# Patient Record
Sex: FEMALE | Race: WHITE | URBAN - METROPOLITAN AREA
[De-identification: names, ages, dates, MRNs, and addresses within clinical notes are randomized per-mention and may not be internally consistent; named-entity substitution may affect disease eponyms.]

---

## 2017-05-31 ENCOUNTER — OUTPATIENT (OUTPATIENT)
Dept: OUTPATIENT SERVICES | Facility: HOSPITAL | Age: 82
LOS: 1 days | Discharge: HOME | End: 2017-05-31

## 2017-06-28 ENCOUNTER — OUTPATIENT (OUTPATIENT)
Dept: OUTPATIENT SERVICES | Facility: HOSPITAL | Age: 82
LOS: 1 days | Discharge: HOME | End: 2017-06-28

## 2017-06-28 DIAGNOSIS — I48.91 UNSPECIFIED ATRIAL FIBRILLATION: ICD-10-CM

## 2017-06-28 DIAGNOSIS — S72.143A DISPLACED INTERTROCHANTERIC FRACTURE OF UNSPECIFIED FEMUR, INITIAL ENCOUNTER FOR CLOSED FRACTURE: ICD-10-CM

## 2017-06-28 DIAGNOSIS — Z79.01 LONG TERM (CURRENT) USE OF ANTICOAGULANTS: ICD-10-CM

## 2017-08-02 ENCOUNTER — OUTPATIENT (OUTPATIENT)
Dept: OUTPATIENT SERVICES | Facility: HOSPITAL | Age: 82
LOS: 1 days | Discharge: HOME | End: 2017-08-02

## 2017-08-02 DIAGNOSIS — Z79.01 LONG TERM (CURRENT) USE OF ANTICOAGULANTS: ICD-10-CM

## 2017-08-02 DIAGNOSIS — I48.91 UNSPECIFIED ATRIAL FIBRILLATION: ICD-10-CM

## 2017-08-02 DIAGNOSIS — S72.143A DISPLACED INTERTROCHANTERIC FRACTURE OF UNSPECIFIED FEMUR, INITIAL ENCOUNTER FOR CLOSED FRACTURE: ICD-10-CM

## 2017-09-06 ENCOUNTER — OUTPATIENT (OUTPATIENT)
Dept: OUTPATIENT SERVICES | Facility: HOSPITAL | Age: 82
LOS: 1 days | Discharge: HOME | End: 2017-09-06

## 2017-09-06 DIAGNOSIS — S72.143A DISPLACED INTERTROCHANTERIC FRACTURE OF UNSPECIFIED FEMUR, INITIAL ENCOUNTER FOR CLOSED FRACTURE: ICD-10-CM

## 2017-09-06 DIAGNOSIS — I48.91 UNSPECIFIED ATRIAL FIBRILLATION: ICD-10-CM

## 2017-09-06 DIAGNOSIS — Z79.01 LONG TERM (CURRENT) USE OF ANTICOAGULANTS: ICD-10-CM

## 2017-09-08 ENCOUNTER — OUTPATIENT (OUTPATIENT)
Dept: OUTPATIENT SERVICES | Facility: HOSPITAL | Age: 82
LOS: 1 days | Discharge: HOME | End: 2017-09-08

## 2017-09-08 DIAGNOSIS — S72.143A DISPLACED INTERTROCHANTERIC FRACTURE OF UNSPECIFIED FEMUR, INITIAL ENCOUNTER FOR CLOSED FRACTURE: ICD-10-CM

## 2017-09-08 DIAGNOSIS — I48.91 UNSPECIFIED ATRIAL FIBRILLATION: ICD-10-CM

## 2017-09-08 DIAGNOSIS — Z79.01 LONG TERM (CURRENT) USE OF ANTICOAGULANTS: ICD-10-CM

## 2017-10-11 ENCOUNTER — OUTPATIENT (OUTPATIENT)
Dept: OUTPATIENT SERVICES | Facility: HOSPITAL | Age: 82
LOS: 1 days | Discharge: HOME | End: 2017-10-11

## 2017-10-11 DIAGNOSIS — I48.91 UNSPECIFIED ATRIAL FIBRILLATION: ICD-10-CM

## 2017-10-11 DIAGNOSIS — S72.143A DISPLACED INTERTROCHANTERIC FRACTURE OF UNSPECIFIED FEMUR, INITIAL ENCOUNTER FOR CLOSED FRACTURE: ICD-10-CM

## 2017-10-11 DIAGNOSIS — Z79.01 LONG TERM (CURRENT) USE OF ANTICOAGULANTS: ICD-10-CM

## 2017-10-25 ENCOUNTER — OUTPATIENT (OUTPATIENT)
Dept: OUTPATIENT SERVICES | Facility: HOSPITAL | Age: 82
LOS: 1 days | Discharge: HOME | End: 2017-10-25

## 2017-10-25 DIAGNOSIS — S72.143A DISPLACED INTERTROCHANTERIC FRACTURE OF UNSPECIFIED FEMUR, INITIAL ENCOUNTER FOR CLOSED FRACTURE: ICD-10-CM

## 2017-10-25 DIAGNOSIS — I48.91 UNSPECIFIED ATRIAL FIBRILLATION: ICD-10-CM

## 2017-10-25 DIAGNOSIS — Z79.01 LONG TERM (CURRENT) USE OF ANTICOAGULANTS: ICD-10-CM

## 2017-11-15 ENCOUNTER — OUTPATIENT (OUTPATIENT)
Dept: OUTPATIENT SERVICES | Facility: HOSPITAL | Age: 82
LOS: 1 days | Discharge: HOME | End: 2017-11-15

## 2017-11-15 DIAGNOSIS — Z79.01 LONG TERM (CURRENT) USE OF ANTICOAGULANTS: ICD-10-CM

## 2017-11-15 DIAGNOSIS — S72.143A DISPLACED INTERTROCHANTERIC FRACTURE OF UNSPECIFIED FEMUR, INITIAL ENCOUNTER FOR CLOSED FRACTURE: ICD-10-CM

## 2017-11-15 DIAGNOSIS — I48.91 UNSPECIFIED ATRIAL FIBRILLATION: ICD-10-CM

## 2017-12-20 ENCOUNTER — OUTPATIENT (OUTPATIENT)
Dept: OUTPATIENT SERVICES | Facility: HOSPITAL | Age: 82
LOS: 1 days | Discharge: HOME | End: 2017-12-20

## 2017-12-20 DIAGNOSIS — Z79.01 LONG TERM (CURRENT) USE OF ANTICOAGULANTS: ICD-10-CM

## 2017-12-20 DIAGNOSIS — S72.143A DISPLACED INTERTROCHANTERIC FRACTURE OF UNSPECIFIED FEMUR, INITIAL ENCOUNTER FOR CLOSED FRACTURE: ICD-10-CM

## 2017-12-20 DIAGNOSIS — I48.91 UNSPECIFIED ATRIAL FIBRILLATION: ICD-10-CM

## 2018-01-24 ENCOUNTER — OUTPATIENT (OUTPATIENT)
Dept: OUTPATIENT SERVICES | Facility: HOSPITAL | Age: 83
LOS: 1 days | Discharge: HOME | End: 2018-01-24

## 2018-01-24 DIAGNOSIS — Z79.01 LONG TERM (CURRENT) USE OF ANTICOAGULANTS: ICD-10-CM

## 2018-01-24 DIAGNOSIS — I48.91 UNSPECIFIED ATRIAL FIBRILLATION: ICD-10-CM

## 2018-02-04 DIAGNOSIS — S72.143A DISPLACED INTERTROCHANTERIC FRACTURE OF UNSPECIFIED FEMUR, INITIAL ENCOUNTER FOR CLOSED FRACTURE: ICD-10-CM

## 2018-02-28 ENCOUNTER — OUTPATIENT (OUTPATIENT)
Dept: OUTPATIENT SERVICES | Facility: HOSPITAL | Age: 83
LOS: 1 days | Discharge: HOME | End: 2018-02-28

## 2018-02-28 DIAGNOSIS — I48.91 UNSPECIFIED ATRIAL FIBRILLATION: ICD-10-CM

## 2018-02-28 DIAGNOSIS — Z79.01 LONG TERM (CURRENT) USE OF ANTICOAGULANTS: ICD-10-CM

## 2018-04-04 ENCOUNTER — OUTPATIENT (OUTPATIENT)
Dept: OUTPATIENT SERVICES | Facility: HOSPITAL | Age: 83
LOS: 1 days | Discharge: HOME | End: 2018-04-04

## 2018-04-04 DIAGNOSIS — Z79.01 LONG TERM (CURRENT) USE OF ANTICOAGULANTS: ICD-10-CM

## 2018-04-04 DIAGNOSIS — I48.91 UNSPECIFIED ATRIAL FIBRILLATION: ICD-10-CM

## 2018-05-02 ENCOUNTER — OUTPATIENT (OUTPATIENT)
Dept: OUTPATIENT SERVICES | Facility: HOSPITAL | Age: 83
LOS: 1 days | Discharge: HOME | End: 2018-05-02

## 2018-05-02 DIAGNOSIS — Z79.01 LONG TERM (CURRENT) USE OF ANTICOAGULANTS: ICD-10-CM

## 2018-05-02 DIAGNOSIS — I48.91 UNSPECIFIED ATRIAL FIBRILLATION: ICD-10-CM

## 2018-05-09 ENCOUNTER — OUTPATIENT (OUTPATIENT)
Dept: OUTPATIENT SERVICES | Facility: HOSPITAL | Age: 83
LOS: 1 days | Discharge: HOME | End: 2018-05-09

## 2018-05-09 DIAGNOSIS — Z79.01 LONG TERM (CURRENT) USE OF ANTICOAGULANTS: ICD-10-CM

## 2018-05-09 DIAGNOSIS — I48.91 UNSPECIFIED ATRIAL FIBRILLATION: ICD-10-CM

## 2018-05-23 ENCOUNTER — OUTPATIENT (OUTPATIENT)
Dept: OUTPATIENT SERVICES | Facility: HOSPITAL | Age: 83
LOS: 1 days | Discharge: HOME | End: 2018-05-23

## 2018-05-23 DIAGNOSIS — I48.91 UNSPECIFIED ATRIAL FIBRILLATION: ICD-10-CM

## 2018-05-23 DIAGNOSIS — Z79.01 LONG TERM (CURRENT) USE OF ANTICOAGULANTS: ICD-10-CM

## 2018-06-06 ENCOUNTER — OUTPATIENT (OUTPATIENT)
Dept: OUTPATIENT SERVICES | Facility: HOSPITAL | Age: 83
LOS: 1 days | Discharge: HOME | End: 2018-06-06

## 2018-06-06 DIAGNOSIS — I48.91 UNSPECIFIED ATRIAL FIBRILLATION: ICD-10-CM

## 2018-06-06 DIAGNOSIS — Z79.01 LONG TERM (CURRENT) USE OF ANTICOAGULANTS: ICD-10-CM

## 2018-07-05 ENCOUNTER — OUTPATIENT (OUTPATIENT)
Dept: OUTPATIENT SERVICES | Facility: HOSPITAL | Age: 83
LOS: 1 days | Discharge: HOME | End: 2018-07-05

## 2018-07-05 DIAGNOSIS — Z79.01 LONG TERM (CURRENT) USE OF ANTICOAGULANTS: ICD-10-CM

## 2018-07-05 DIAGNOSIS — I48.91 UNSPECIFIED ATRIAL FIBRILLATION: ICD-10-CM

## 2018-08-09 ENCOUNTER — OUTPATIENT (OUTPATIENT)
Dept: OUTPATIENT SERVICES | Facility: HOSPITAL | Age: 83
LOS: 1 days | Discharge: HOME | End: 2018-08-09

## 2018-08-09 DIAGNOSIS — I48.91 UNSPECIFIED ATRIAL FIBRILLATION: ICD-10-CM

## 2018-08-09 DIAGNOSIS — Z79.01 LONG TERM (CURRENT) USE OF ANTICOAGULANTS: ICD-10-CM

## 2018-09-13 ENCOUNTER — OUTPATIENT (OUTPATIENT)
Dept: OUTPATIENT SERVICES | Facility: HOSPITAL | Age: 83
LOS: 1 days | Discharge: HOME | End: 2018-09-13

## 2018-09-13 DIAGNOSIS — I48.91 UNSPECIFIED ATRIAL FIBRILLATION: ICD-10-CM

## 2018-09-13 DIAGNOSIS — Z79.01 LONG TERM (CURRENT) USE OF ANTICOAGULANTS: ICD-10-CM

## 2018-10-14 ENCOUNTER — EMERGENCY (EMERGENCY)
Facility: HOSPITAL | Age: 83
LOS: 0 days | Discharge: HOME | End: 2018-10-14
Attending: EMERGENCY MEDICINE | Admitting: EMERGENCY MEDICINE

## 2018-10-14 VITALS
TEMPERATURE: 98 F | DIASTOLIC BLOOD PRESSURE: 70 MMHG | OXYGEN SATURATION: 99 % | HEIGHT: 64 IN | RESPIRATION RATE: 20 BRPM | SYSTOLIC BLOOD PRESSURE: 176 MMHG | HEART RATE: 70 BPM
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
## 2018-10-14 DIAGNOSIS — Z88.8 ALLERGY STATUS TO OTHER DRUGS, MEDICAMENTS AND BIOLOGICAL SUBSTANCES STATUS: ICD-10-CM

## 2018-10-14 DIAGNOSIS — I25.10 ATHEROSCLEROTIC HEART DISEASE OF NATIVE CORONARY ARTERY WITHOUT ANGINA PECTORIS: ICD-10-CM

## 2018-10-14 DIAGNOSIS — Z79.899 OTHER LONG TERM (CURRENT) DRUG THERAPY: ICD-10-CM
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
## 2018-10-14 DIAGNOSIS — S09.90XA UNSPECIFIED INJURY OF HEAD, INITIAL ENCOUNTER: ICD-10-CM
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
## 2018-10-14 DIAGNOSIS — Z88.0 ALLERGY STATUS TO PENICILLIN: ICD-10-CM

## 2018-10-14 DIAGNOSIS — Z79.01 LONG TERM (CURRENT) USE OF ANTICOAGULANTS: ICD-10-CM

## 2018-10-14 LAB
APTT BLD: 40.4 SEC — HIGH (ref 27–39.2)
INR BLD: 3.48 RATIO — HIGH (ref 0.65–1.3)
PROTHROM AB SERPL-ACNC: 38.5 SEC — HIGH (ref 9.95–12.87)

## 2018-10-14 NOTE — ED ADULT TRIAGE NOTE - CHIEF COMPLAINT QUOTE
" I was kneeling on the floor looking for soemthing and I fell back and  hit my head" denies LOC, pt on blood thinner.

## 2018-10-14 NOTE — ED PROVIDER NOTE - OBJECTIVE STATEMENT
89 y/o female on coumadin s/p fall striking her head on floor. 89 y/o female on coumadin s/p mechanical fall striking her head on floor. patient denies any LOC, visual changes, neck pain , vomiting. patient denies any back or buttock pain, weakness to upper or lower extremities. 89 y/o female on coumadin s/p mechanical fall striking her head on floor. patient denies any LOC, visual changes, neck pain , vomiting. patient denies any back or buttock pain, weakness to upper or lower extremities. last INR from 9/13/18 was 2.3

## 2018-10-14 NOTE — ED PROVIDER NOTE - MUSCULOSKELETAL, MLM
Spine appears normal, no cervical or lumbar tenderness or step off , range of motion is not limited, no muscle or joint tenderness

## 2018-10-14 NOTE — ED ADULT NURSE NOTE - NSIMPLEMENTINTERV_GEN_ALL_ED
Implemented All Fall Risk Interventions:  [place] to call system. Call bell, personal items and telephone within reach. Instruct patient to call for assistance. Room bathroom lighting operational. Non-slip footwear when patient is off stretcher. Physically safe environment: no spills, clutter or unnecessary equipment. Stretcher in lowest position, wheels locked, appropriate side rails in place. Provide visual cue, wrist band, yellow gown, etc. Monitor gait and stability. Monitor for mental status changes and reorient to person, place, and time. Review medications for side effects contributing to fall risk. Reinforce activity limits and safety measures with patient and family.

## 2018-10-14 NOTE — ED PROVIDER NOTE - ATTENDING CONTRIBUTION TO CARE
89 year old female, on coumadin, comes in with closed head injury after falling backwards, no loc, no n/v/d, no fever, no cp/sob    CONSTITUTIONAL: Well-developed; well-nourished; in no acute distress. Sitting up and providing appropriate history and physical examination  TRAUMA: Primary and Secondary surveys intact, GCS 15, no midline CTLS spine tenderness, Pelvis stable, + moving all extremities  SKIN: skin exam is warm and dry, no acute rash.  HEAD: Normocephalic;   EYES: PERRL, 3 mm bilateral, no nystagmus, EOM intact; conjunctiva and sclera clear.  ENT: No nasal discharge; airway clear.  NECK: Supple; non tender. + full passive ROM in all directions. No JVD  EXT: Normal ROM. No clubbing, cyanosis or edema. Dp and Pt Pulses intact. Cap refill less than 3 seconds  NEURO: CN 2-12 intact, normal finger to nose, normal romberg, stable gait, no sensory or motor deficits, Alert, oriented, grossly unremarkable. No Focal deficits. GCS 15. NIH 0

## 2018-10-14 NOTE — ED PROVIDER NOTE - SKIN, MLM
Skin normal color for race, warm, dry and intact. No evidence of rash. +STS with hematoma noted to left occiput

## 2018-10-19 ENCOUNTER — OUTPATIENT (OUTPATIENT)
Dept: OUTPATIENT SERVICES | Facility: HOSPITAL | Age: 83
LOS: 1 days | Discharge: HOME | End: 2018-10-19

## 2018-10-19 DIAGNOSIS — I48.91 UNSPECIFIED ATRIAL FIBRILLATION: ICD-10-CM

## 2018-10-19 DIAGNOSIS — Z79.01 LONG TERM (CURRENT) USE OF ANTICOAGULANTS: ICD-10-CM

## 2018-10-19 PROBLEM — I25.10 ATHEROSCLEROTIC HEART DISEASE OF NATIVE CORONARY ARTERY WITHOUT ANGINA PECTORIS: Chronic | Status: ACTIVE | Noted: 2018-10-14

## 2018-10-19 PROBLEM — D64.9 ANEMIA, UNSPECIFIED: Chronic | Status: ACTIVE | Noted: 2018-10-14

## 2018-10-19 LAB
POCT INR: 2.8 RATIO — HIGH (ref 0.9–1.2)
POCT PT: 34 SEC — HIGH (ref 10–13.4)

## 2018-11-14 ENCOUNTER — OUTPATIENT (OUTPATIENT)
Dept: OUTPATIENT SERVICES | Facility: HOSPITAL | Age: 83
LOS: 1 days | Discharge: HOME | End: 2018-11-14

## 2018-11-14 DIAGNOSIS — Z79.01 LONG TERM (CURRENT) USE OF ANTICOAGULANTS: ICD-10-CM

## 2018-11-14 DIAGNOSIS — I63.9 CEREBRAL INFARCTION, UNSPECIFIED: ICD-10-CM

## 2018-11-14 LAB
POCT INR: 2.5 RATIO — HIGH (ref 0.9–1.2)
POCT INR: 2.5 RATIO — HIGH (ref 0.9–1.2)
POCT PT: 30.2 SEC — HIGH (ref 10–13.4)
POCT PT: 30.2 SEC — HIGH (ref 10–13.4)

## 2018-12-19 ENCOUNTER — OUTPATIENT (OUTPATIENT)
Dept: OUTPATIENT SERVICES | Facility: HOSPITAL | Age: 83
LOS: 1 days | Discharge: HOME | End: 2018-12-19

## 2018-12-19 DIAGNOSIS — Z79.01 LONG TERM (CURRENT) USE OF ANTICOAGULANTS: ICD-10-CM

## 2018-12-19 DIAGNOSIS — I63.9 CEREBRAL INFARCTION, UNSPECIFIED: ICD-10-CM

## 2019-01-23 ENCOUNTER — OUTPATIENT (OUTPATIENT)
Dept: OUTPATIENT SERVICES | Facility: HOSPITAL | Age: 84
LOS: 1 days | Discharge: HOME | End: 2019-01-23

## 2019-01-23 DIAGNOSIS — Z79.01 LONG TERM (CURRENT) USE OF ANTICOAGULANTS: ICD-10-CM

## 2019-01-23 DIAGNOSIS — I63.9 CEREBRAL INFARCTION, UNSPECIFIED: ICD-10-CM

## 2019-01-23 LAB
POCT INR: 2.9 RATIO — HIGH (ref 0.9–1.2)
POCT PT: 34.7 SEC — HIGH (ref 10–13.4)

## 2019-02-27 ENCOUNTER — OUTPATIENT (OUTPATIENT)
Dept: OUTPATIENT SERVICES | Facility: HOSPITAL | Age: 84
LOS: 1 days | Discharge: HOME | End: 2019-02-27

## 2019-02-27 DIAGNOSIS — Z79.01 LONG TERM (CURRENT) USE OF ANTICOAGULANTS: ICD-10-CM

## 2019-02-27 DIAGNOSIS — I63.9 CEREBRAL INFARCTION, UNSPECIFIED: ICD-10-CM

## 2019-02-27 LAB
POCT INR: 2.1 RATIO — HIGH (ref 0.9–1.2)
POCT PT: 25.8 SEC — HIGH (ref 10–13.4)

## 2019-03-27 ENCOUNTER — OUTPATIENT (OUTPATIENT)
Dept: OUTPATIENT SERVICES | Facility: HOSPITAL | Age: 84
LOS: 1 days | Discharge: HOME | End: 2019-03-27

## 2019-03-27 DIAGNOSIS — Z79.01 LONG TERM (CURRENT) USE OF ANTICOAGULANTS: ICD-10-CM

## 2019-03-27 DIAGNOSIS — I63.9 CEREBRAL INFARCTION, UNSPECIFIED: ICD-10-CM

## 2019-05-02 ENCOUNTER — OUTPATIENT (OUTPATIENT)
Dept: OUTPATIENT SERVICES | Facility: HOSPITAL | Age: 84
LOS: 1 days | Discharge: HOME | End: 2019-05-02

## 2019-05-02 DIAGNOSIS — Z79.01 LONG TERM (CURRENT) USE OF ANTICOAGULANTS: ICD-10-CM

## 2019-05-02 DIAGNOSIS — I63.9 CEREBRAL INFARCTION, UNSPECIFIED: ICD-10-CM

## 2019-05-02 LAB
POCT INR: 2.9 RATIO — HIGH (ref 0.9–1.2)
POCT PT: 34.8 SEC — HIGH (ref 10–13.4)

## 2019-05-21 PROBLEM — Z00.00 ENCOUNTER FOR PREVENTIVE HEALTH EXAMINATION: Status: ACTIVE | Noted: 2019-05-21

## 2019-05-24 ENCOUNTER — APPOINTMENT (OUTPATIENT)
Dept: VASCULAR SURGERY | Facility: CLINIC | Age: 84
End: 2019-05-24
Payer: MEDICARE

## 2019-05-24 DIAGNOSIS — I83.019 VARICOSE VEINS OF RIGHT LOWER EXTREMITY WITH ULCER OF UNSPECIFIED SITE: ICD-10-CM

## 2019-05-24 DIAGNOSIS — L97.919 VARICOSE VEINS OF RIGHT LOWER EXTREMITY WITH ULCER OF UNSPECIFIED SITE: ICD-10-CM

## 2019-05-24 DIAGNOSIS — I10 ESSENTIAL (PRIMARY) HYPERTENSION: ICD-10-CM

## 2019-05-24 PROCEDURE — 29580 STRAPPING UNNA BOOT: CPT | Mod: 50

## 2019-05-24 RX ORDER — LOSARTAN POTASSIUM 100 MG/1
TABLET, FILM COATED ORAL
Refills: 0 | Status: ACTIVE | COMMUNITY

## 2019-05-24 RX ORDER — WARFARIN SODIUM 6 MG/1
TABLET ORAL
Refills: 0 | Status: ACTIVE | COMMUNITY

## 2019-05-24 NOTE — ASSESSMENT
[FreeTextEntry1] : 89 y/o female presents for bilateral lower extremity edema and weeping wounds, denies any pain, fever or chills. She has palpable pedal pulses on exam and I am initiating unna boots bilaterally, she will follow up in one weeks time.

## 2019-05-24 NOTE — HISTORY OF PRESENT ILLNESS
[FreeTextEntry1] : 89 y/o female presents for bilateral lower extremity edema and weeping wounds, denies any pain, fever or chills.

## 2019-05-31 ENCOUNTER — APPOINTMENT (OUTPATIENT)
Dept: VASCULAR SURGERY | Facility: CLINIC | Age: 84
End: 2019-05-31

## 2019-06-06 ENCOUNTER — OUTPATIENT (OUTPATIENT)
Dept: OUTPATIENT SERVICES | Facility: HOSPITAL | Age: 84
LOS: 1 days | Discharge: HOME | End: 2019-06-06

## 2019-06-06 DIAGNOSIS — Z79.01 LONG TERM (CURRENT) USE OF ANTICOAGULANTS: ICD-10-CM

## 2019-06-06 DIAGNOSIS — I63.9 CEREBRAL INFARCTION, UNSPECIFIED: ICD-10-CM

## 2019-06-06 LAB
POCT INR: 2.6 RATIO — HIGH (ref 0.9–1.2)
POCT PT: 30.9 SEC — HIGH (ref 10–13.4)

## 2019-07-10 ENCOUNTER — OUTPATIENT (OUTPATIENT)
Dept: OUTPATIENT SERVICES | Facility: HOSPITAL | Age: 84
LOS: 1 days | Discharge: HOME | End: 2019-07-10

## 2019-07-10 DIAGNOSIS — I63.9 CEREBRAL INFARCTION, UNSPECIFIED: ICD-10-CM

## 2019-07-10 DIAGNOSIS — Z79.01 LONG TERM (CURRENT) USE OF ANTICOAGULANTS: ICD-10-CM

## 2019-08-14 ENCOUNTER — OUTPATIENT (OUTPATIENT)
Dept: OUTPATIENT SERVICES | Facility: HOSPITAL | Age: 84
LOS: 1 days | Discharge: HOME | End: 2019-08-14

## 2019-08-14 DIAGNOSIS — I63.9 CEREBRAL INFARCTION, UNSPECIFIED: ICD-10-CM

## 2019-08-14 DIAGNOSIS — Z79.01 LONG TERM (CURRENT) USE OF ANTICOAGULANTS: ICD-10-CM

## 2019-08-14 LAB
POCT INR: 2.5 RATIO — HIGH (ref 0.9–1.2)
POCT PT: 30.4 SEC — HIGH (ref 10–13.4)

## 2019-09-18 ENCOUNTER — OUTPATIENT (OUTPATIENT)
Dept: OUTPATIENT SERVICES | Facility: HOSPITAL | Age: 84
LOS: 1 days | Discharge: HOME | End: 2019-09-18

## 2019-09-18 DIAGNOSIS — Z79.01 LONG TERM (CURRENT) USE OF ANTICOAGULANTS: ICD-10-CM

## 2019-09-18 DIAGNOSIS — I63.9 CEREBRAL INFARCTION, UNSPECIFIED: ICD-10-CM

## 2019-09-18 LAB
POCT INR: 3.1 RATIO — HIGH (ref 0.9–1.2)
POCT PT: 37.1 SEC — HIGH (ref 10–13.4)

## 2019-10-15 ENCOUNTER — APPOINTMENT (OUTPATIENT)
Dept: VASCULAR SURGERY | Facility: CLINIC | Age: 84
End: 2019-10-15
Payer: MEDICARE

## 2019-10-15 PROCEDURE — 29580 STRAPPING UNNA BOOT: CPT | Mod: LT

## 2019-10-15 NOTE — ASSESSMENT
[FreeTextEntry1] : Patient is a 89-year-old female with left leg venous stasis ulcers. I recommend restarting Lupron therapy and I will see her back in my office in one week's time or sooner if any new symptoms develop.

## 2019-10-15 NOTE — HISTORY OF PRESENT ILLNESS
[FreeTextEntry1] : The patient is a 89-year-old female with a new left leg venous stasis ulcer. The ulcers on the lateral aspect of her leg. The patient presents with Scotch tape and bandaged to the skin. During removal of the hand each the skin double stent and the patient began to bleed. So the nitrate and pressure was used to achieve hemostasis

## 2019-10-22 ENCOUNTER — APPOINTMENT (OUTPATIENT)
Dept: VASCULAR SURGERY | Facility: CLINIC | Age: 84
End: 2019-10-22
Payer: MEDICARE

## 2019-10-22 PROCEDURE — 29580 STRAPPING UNNA BOOT: CPT | Mod: LT

## 2019-10-22 NOTE — REASON FOR VISIT
[Follow-Up: _____] : a [unfilled] follow-up visit [Spouse] : spouse [FreeTextEntry1] :  left leg stasis ulcer.

## 2019-10-22 NOTE — HISTORY OF PRESENT ILLNESS
[FreeTextEntry1] : 89 year-old female with a left leg venous stasis ulcer on the lateral aspect of her leg. Started on unna boot therapy, tolerating it.

## 2019-10-22 NOTE — ASSESSMENT
[FreeTextEntry1] : 89 year-old female with a left leg venous stasis ulcer on the lateral aspect of her leg. Started on unna boot therapy, tolerating it.\par I recommend to continue with unna boot therapy until the wound is healed well.

## 2019-10-23 ENCOUNTER — OUTPATIENT (OUTPATIENT)
Dept: OUTPATIENT SERVICES | Facility: HOSPITAL | Age: 84
LOS: 1 days | Discharge: HOME | End: 2019-10-23

## 2019-10-23 DIAGNOSIS — Z79.01 LONG TERM (CURRENT) USE OF ANTICOAGULANTS: ICD-10-CM

## 2019-10-23 DIAGNOSIS — I63.9 CEREBRAL INFARCTION, UNSPECIFIED: ICD-10-CM

## 2019-10-23 LAB
POCT INR: 5.1 RATIO — CRITICAL HIGH (ref 0.9–1.2)
POCT PT: 61 SEC — HIGH (ref 10–13.4)

## 2019-10-25 ENCOUNTER — OUTPATIENT (OUTPATIENT)
Dept: OUTPATIENT SERVICES | Facility: HOSPITAL | Age: 84
LOS: 1 days | Discharge: HOME | End: 2019-10-25

## 2019-10-25 DIAGNOSIS — I63.9 CEREBRAL INFARCTION, UNSPECIFIED: ICD-10-CM

## 2019-10-25 DIAGNOSIS — Z79.01 LONG TERM (CURRENT) USE OF ANTICOAGULANTS: ICD-10-CM

## 2019-10-25 LAB
POCT INR: 2.8 RATIO — HIGH (ref 0.9–1.2)
POCT PT: 33.1 SEC — HIGH (ref 10–13.4)

## 2019-10-29 ENCOUNTER — APPOINTMENT (OUTPATIENT)
Dept: VASCULAR SURGERY | Facility: CLINIC | Age: 84
End: 2019-10-29
Payer: MEDICARE

## 2019-10-29 DIAGNOSIS — I70.213 ATHEROSCLEROSIS OF NATIVE ARTERIES OF EXTREMITIES WITH INTERMITTENT CLAUDICATION, BILATERAL LEGS: ICD-10-CM

## 2019-10-29 PROCEDURE — 29580 STRAPPING UNNA BOOT: CPT | Mod: LT

## 2019-10-29 PROCEDURE — 93922 UPR/L XTREMITY ART 2 LEVELS: CPT

## 2019-10-29 NOTE — ASSESSMENT
[FreeTextEntry1] : 89 year-old female with a left leg venous stasis ulcer on the lateral aspect of her leg. Started on unna boot therapy, tolerating it. She requests VNS services for wound care.\par I recommend to continue with unna boot therapy until the wound is healed well.

## 2019-11-05 ENCOUNTER — APPOINTMENT (OUTPATIENT)
Dept: VASCULAR SURGERY | Facility: CLINIC | Age: 84
End: 2019-11-05

## 2019-11-06 ENCOUNTER — OUTPATIENT (OUTPATIENT)
Dept: OUTPATIENT SERVICES | Facility: HOSPITAL | Age: 84
LOS: 1 days | Discharge: HOME | End: 2019-11-06

## 2019-11-06 DIAGNOSIS — Z79.01 LONG TERM (CURRENT) USE OF ANTICOAGULANTS: ICD-10-CM

## 2019-11-06 DIAGNOSIS — I63.9 CEREBRAL INFARCTION, UNSPECIFIED: ICD-10-CM

## 2019-11-06 LAB
POCT INR: 2 RATIO — HIGH (ref 0.9–1.2)
POCT PT: 24.6 SEC — HIGH (ref 10–13.4)

## 2019-11-07 ENCOUNTER — APPOINTMENT (OUTPATIENT)
Dept: VASCULAR SURGERY | Facility: CLINIC | Age: 84
End: 2019-11-07
Payer: MEDICARE

## 2019-11-07 PROCEDURE — 29580 STRAPPING UNNA BOOT: CPT | Mod: LT

## 2019-11-07 NOTE — ASSESSMENT
[FreeTextEntry1] : 89 year-old female with a left leg venous stasis ulcer on the postero-lateral aspect of her leg, improving on unna boot therapy, tolerating it. She refused VNS services for wound care.\par I recommend to continue with unna boot therapy until the wound is healed well.

## 2019-11-15 ENCOUNTER — APPOINTMENT (OUTPATIENT)
Dept: VASCULAR SURGERY | Facility: CLINIC | Age: 84
End: 2019-11-15
Payer: MEDICARE

## 2019-11-15 PROCEDURE — 29580 STRAPPING UNNA BOOT: CPT | Mod: LT

## 2019-11-21 ENCOUNTER — APPOINTMENT (OUTPATIENT)
Dept: VASCULAR SURGERY | Facility: CLINIC | Age: 84
End: 2019-11-21
Payer: MEDICARE

## 2019-11-21 PROCEDURE — 29580 STRAPPING UNNA BOOT: CPT | Mod: LT

## 2019-11-26 ENCOUNTER — APPOINTMENT (OUTPATIENT)
Dept: VASCULAR SURGERY | Facility: CLINIC | Age: 84
End: 2019-11-26
Payer: MEDICARE

## 2019-11-26 DIAGNOSIS — I83.029 VARICOSE VEINS OF LEFT LOWER EXTREMITY WITH ULCER OF UNSPECIFIED SITE: ICD-10-CM

## 2019-11-26 DIAGNOSIS — L97.929 VARICOSE VEINS OF LEFT LOWER EXTREMITY WITH ULCER OF UNSPECIFIED SITE: ICD-10-CM

## 2019-11-26 PROCEDURE — 29580 STRAPPING UNNA BOOT: CPT | Mod: LT

## 2019-11-26 NOTE — ASSESSMENT
[FreeTextEntry1] : 89 year-old female with a left leg venous stasis ulcer on the postero-lateral aspect of her leg, healed on unna boot therapy. She was instructed on compression therapy and follow up as needed.

## 2019-11-27 ENCOUNTER — OUTPATIENT (OUTPATIENT)
Dept: OUTPATIENT SERVICES | Facility: HOSPITAL | Age: 84
LOS: 1 days | Discharge: HOME | End: 2019-11-27

## 2019-11-27 DIAGNOSIS — Z79.01 LONG TERM (CURRENT) USE OF ANTICOAGULANTS: ICD-10-CM

## 2019-11-27 DIAGNOSIS — I63.9 CEREBRAL INFARCTION, UNSPECIFIED: ICD-10-CM

## 2019-11-27 LAB
POCT INR: 2.6 RATIO — HIGH (ref 0.9–1.2)
POCT PT: 31.2 SEC — HIGH (ref 10–13.4)

## 2020-01-03 ENCOUNTER — OUTPATIENT (OUTPATIENT)
Dept: OUTPATIENT SERVICES | Facility: HOSPITAL | Age: 85
LOS: 1 days | Discharge: HOME | End: 2020-01-03

## 2020-01-03 DIAGNOSIS — Z79.01 LONG TERM (CURRENT) USE OF ANTICOAGULANTS: ICD-10-CM

## 2020-01-03 DIAGNOSIS — I63.9 CEREBRAL INFARCTION, UNSPECIFIED: ICD-10-CM

## 2020-01-03 LAB
POCT INR: 2.7 RATIO — HIGH (ref 0.9–1.2)
POCT PT: 32.6 SEC — HIGH (ref 10–13.4)

## 2020-02-11 ENCOUNTER — OUTPATIENT (OUTPATIENT)
Dept: OUTPATIENT SERVICES | Facility: HOSPITAL | Age: 85
LOS: 1 days | Discharge: HOME | End: 2020-02-11

## 2020-02-11 DIAGNOSIS — I63.9 CEREBRAL INFARCTION, UNSPECIFIED: ICD-10-CM

## 2020-02-11 DIAGNOSIS — Z79.01 LONG TERM (CURRENT) USE OF ANTICOAGULANTS: ICD-10-CM

## 2020-02-11 LAB
POCT INR: 3 RATIO — HIGH (ref 0.9–1.2)
POCT PT: 36 SEC — HIGH (ref 10–13.4)

## 2020-02-18 LAB
INR PPP: 3 RATIO
POCT-PROTHROMBIN TIME: 36 SECS

## 2020-03-11 ENCOUNTER — OUTPATIENT (OUTPATIENT)
Dept: OUTPATIENT SERVICES | Facility: HOSPITAL | Age: 85
LOS: 1 days | Discharge: HOME | End: 2020-03-11

## 2020-03-11 DIAGNOSIS — I63.9 CEREBRAL INFARCTION, UNSPECIFIED: ICD-10-CM

## 2020-03-11 DIAGNOSIS — Z79.01 LONG TERM (CURRENT) USE OF ANTICOAGULANTS: ICD-10-CM

## 2020-03-11 LAB
INR PPP: 3.4 RATIO
POCT INR: 3.4 RATIO — HIGH (ref 0.9–1.2)
POCT PT: 41.2 SEC — HIGH (ref 10–13.4)
POCT-PROTHROMBIN TIME: 41.2 SECS

## 2020-03-13 DIAGNOSIS — Z86.69 PERSONAL HISTORY OF OTHER DISEASES OF THE NERVOUS SYSTEM AND SENSE ORGANS: ICD-10-CM

## 2020-03-13 DIAGNOSIS — Z86.39 PERSONAL HISTORY OF OTHER ENDOCRINE, NUTRITIONAL AND METABOLIC DISEASE: ICD-10-CM

## 2020-03-13 DIAGNOSIS — Z86.73 PERSONAL HISTORY OF TRANSIENT ISCHEMIC ATTACK (TIA), AND CEREBRAL INFARCTION W/OUT RESIDUAL DEFICITS: ICD-10-CM

## 2020-03-13 DIAGNOSIS — Z86.79 PERSONAL HISTORY OF OTHER DISEASES OF THE CIRCULATORY SYSTEM: ICD-10-CM

## 2020-03-13 DIAGNOSIS — I63.9 CEREBRAL INFARCTION, UNSPECIFIED: ICD-10-CM

## 2020-03-13 DIAGNOSIS — Z87.39 PERSONAL HISTORY OF OTHER DISEASES OF THE MUSCULOSKELETAL SYSTEM AND CONNECTIVE TISSUE: ICD-10-CM

## 2020-03-13 RX ORDER — ACETAMINOPHEN 325 MG/1
TABLET, FILM COATED ORAL
Refills: 0 | Status: ACTIVE | COMMUNITY

## 2020-03-13 RX ORDER — EZETIMIBE AND SIMVASTATIN 10; 20 MG/1; MG/1
10-20 TABLET ORAL
Refills: 0 | Status: ACTIVE | COMMUNITY

## 2020-04-15 ENCOUNTER — APPOINTMENT (OUTPATIENT)
Dept: VASCULAR SURGERY | Facility: CLINIC | Age: 85
End: 2020-04-15

## 2020-04-17 ENCOUNTER — INPATIENT (INPATIENT)
Facility: HOSPITAL | Age: 85
LOS: 4 days | Discharge: HOME | End: 2020-04-22
Attending: INTERNAL MEDICINE | Admitting: INTERNAL MEDICINE
Payer: MEDICARE

## 2020-04-17 VITALS
RESPIRATION RATE: 18 BRPM | TEMPERATURE: 97 F | DIASTOLIC BLOOD PRESSURE: 70 MMHG | HEART RATE: 72 BPM | SYSTOLIC BLOOD PRESSURE: 167 MMHG | OXYGEN SATURATION: 100 %

## 2020-04-17 LAB
ALBUMIN SERPL ELPH-MCNC: 3.2 G/DL — LOW (ref 3.5–5.2)
ALP SERPL-CCNC: 126 U/L — HIGH (ref 30–115)
ALT FLD-CCNC: 26 U/L — SIGNIFICANT CHANGE UP (ref 0–41)
ANION GAP SERPL CALC-SCNC: 10 MMOL/L — SIGNIFICANT CHANGE UP (ref 7–14)
APTT BLD: 54.4 SEC — HIGH (ref 27–39.2)
AST SERPL-CCNC: 34 U/L — SIGNIFICANT CHANGE UP (ref 0–41)
BASOPHILS # BLD AUTO: 0.01 K/UL — SIGNIFICANT CHANGE UP (ref 0–0.2)
BASOPHILS NFR BLD AUTO: 0.1 % — SIGNIFICANT CHANGE UP (ref 0–1)
BILIRUB SERPL-MCNC: 1.4 MG/DL — HIGH (ref 0.2–1.2)
BLD GP AB SCN SERPL QL: SIGNIFICANT CHANGE UP
BUN SERPL-MCNC: 16 MG/DL — SIGNIFICANT CHANGE UP (ref 10–20)
CALCIUM SERPL-MCNC: 8.5 MG/DL — SIGNIFICANT CHANGE UP (ref 8.5–10.1)
CHLORIDE SERPL-SCNC: 91 MMOL/L — LOW (ref 98–110)
CO2 SERPL-SCNC: 24 MMOL/L — SIGNIFICANT CHANGE UP (ref 17–32)
CREAT SERPL-MCNC: 0.8 MG/DL — SIGNIFICANT CHANGE UP (ref 0.7–1.5)
EOSINOPHIL # BLD AUTO: 0.09 K/UL — SIGNIFICANT CHANGE UP (ref 0–0.7)
EOSINOPHIL NFR BLD AUTO: 0.9 % — SIGNIFICANT CHANGE UP (ref 0–8)
GLUCOSE SERPL-MCNC: 89 MG/DL — SIGNIFICANT CHANGE UP (ref 70–99)
HCT VFR BLD CALC: 26 % — LOW (ref 37–47)
HCT VFR BLD CALC: 26.8 % — LOW (ref 37–47)
HGB BLD-MCNC: 8.5 G/DL — LOW (ref 12–16)
HGB BLD-MCNC: 8.6 G/DL — LOW (ref 12–16)
IMM GRANULOCYTES NFR BLD AUTO: 0.6 % — HIGH (ref 0.1–0.3)
INR BLD: 4.62 RATIO — HIGH (ref 0.65–1.3)
INR BLD: 5.04 RATIO — CRITICAL HIGH (ref 0.65–1.3)
LYMPHOCYTES # BLD AUTO: 1.04 K/UL — LOW (ref 1.2–3.4)
LYMPHOCYTES # BLD AUTO: 10.6 % — LOW (ref 20.5–51.1)
MCHC RBC-ENTMCNC: 23.7 PG — LOW (ref 27–31)
MCHC RBC-ENTMCNC: 24.9 PG — LOW (ref 27–31)
MCHC RBC-ENTMCNC: 31.7 G/DL — LOW (ref 32–37)
MCHC RBC-ENTMCNC: 33.1 G/DL — SIGNIFICANT CHANGE UP (ref 32–37)
MCV RBC AUTO: 74.9 FL — LOW (ref 81–99)
MCV RBC AUTO: 75.1 FL — LOW (ref 81–99)
MONOCYTES # BLD AUTO: 1.23 K/UL — HIGH (ref 0.1–0.6)
MONOCYTES NFR BLD AUTO: 12.6 % — HIGH (ref 1.7–9.3)
NEUTROPHILS # BLD AUTO: 7.36 K/UL — HIGH (ref 1.4–6.5)
NEUTROPHILS NFR BLD AUTO: 75.2 % — SIGNIFICANT CHANGE UP (ref 42.2–75.2)
NRBC # BLD: 0 /100 WBCS — SIGNIFICANT CHANGE UP (ref 0–0)
NRBC # BLD: 0 /100 WBCS — SIGNIFICANT CHANGE UP (ref 0–0)
NT-PROBNP SERPL-SCNC: 1944 PG/ML — HIGH (ref 0–300)
PLATELET # BLD AUTO: 319 K/UL — SIGNIFICANT CHANGE UP (ref 130–400)
PLATELET # BLD AUTO: 358 K/UL — SIGNIFICANT CHANGE UP (ref 130–400)
POTASSIUM SERPL-MCNC: 4.2 MMOL/L — SIGNIFICANT CHANGE UP (ref 3.5–5)
POTASSIUM SERPL-SCNC: 4.2 MMOL/L — SIGNIFICANT CHANGE UP (ref 3.5–5)
PROT SERPL-MCNC: 6.7 G/DL — SIGNIFICANT CHANGE UP (ref 6–8)
PROTHROM AB SERPL-ACNC: >40 SEC — HIGH (ref 9.95–12.87)
PROTHROM AB SERPL-ACNC: >40 SEC — HIGH (ref 9.95–12.87)
RBC # BLD: 3.46 M/UL — LOW (ref 4.2–5.4)
RBC # BLD: 3.58 M/UL — LOW (ref 4.2–5.4)
RBC # FLD: 14.8 % — HIGH (ref 11.5–14.5)
RBC # FLD: 14.9 % — HIGH (ref 11.5–14.5)
SODIUM SERPL-SCNC: 125 MMOL/L — LOW (ref 135–146)
WBC # BLD: 11.28 K/UL — HIGH (ref 4.8–10.8)
WBC # BLD: 9.79 K/UL — SIGNIFICANT CHANGE UP (ref 4.8–10.8)
WBC # FLD AUTO: 11.28 K/UL — HIGH (ref 4.8–10.8)
WBC # FLD AUTO: 9.79 K/UL — SIGNIFICANT CHANGE UP (ref 4.8–10.8)

## 2020-04-17 PROCEDURE — 72170 X-RAY EXAM OF PELVIS: CPT | Mod: 26

## 2020-04-17 PROCEDURE — 71250 CT THORAX DX C-: CPT | Mod: 26

## 2020-04-17 PROCEDURE — 73590 X-RAY EXAM OF LOWER LEG: CPT | Mod: 26,50

## 2020-04-17 PROCEDURE — 73706 CT ANGIO LWR EXTR W/O&W/DYE: CPT | Mod: 26,LT

## 2020-04-17 PROCEDURE — 99285 EMERGENCY DEPT VISIT HI MDM: CPT

## 2020-04-17 PROCEDURE — 74176 CT ABD & PELVIS W/O CONTRAST: CPT | Mod: 26

## 2020-04-17 PROCEDURE — 93970 EXTREMITY STUDY: CPT | Mod: 26

## 2020-04-17 PROCEDURE — 70450 CT HEAD/BRAIN W/O DYE: CPT | Mod: 26

## 2020-04-17 PROCEDURE — 71045 X-RAY EXAM CHEST 1 VIEW: CPT | Mod: 26

## 2020-04-17 PROCEDURE — 73552 X-RAY EXAM OF FEMUR 2/>: CPT | Mod: 26,LT

## 2020-04-17 PROCEDURE — 72125 CT NECK SPINE W/O DYE: CPT | Mod: 26

## 2020-04-17 RX ORDER — PHYTONADIONE (VIT K1) 5 MG
5 TABLET ORAL ONCE
Refills: 0 | Status: COMPLETED | OUTPATIENT
Start: 2020-04-17 | End: 2020-04-17

## 2020-04-17 RX ORDER — CHLORHEXIDINE GLUCONATE 213 G/1000ML
1 SOLUTION TOPICAL
Refills: 0 | Status: DISCONTINUED | OUTPATIENT
Start: 2020-04-17 | End: 2020-04-22

## 2020-04-17 RX ORDER — MORPHINE SULFATE 50 MG/1
2 CAPSULE, EXTENDED RELEASE ORAL ONCE
Refills: 0 | Status: DISCONTINUED | OUTPATIENT
Start: 2020-04-17 | End: 2020-04-17

## 2020-04-17 RX ORDER — TRANEXAMIC ACID 100 MG/ML
1000 INJECTION, SOLUTION INTRAVENOUS ONCE
Refills: 0 | Status: COMPLETED | OUTPATIENT
Start: 2020-04-17 | End: 2020-04-17

## 2020-04-17 RX ORDER — PROTHROMBIN COMPLEX CONCENTRATE (HUMAN) 25.5; 16.5; 24; 22; 22; 26 [IU]/ML; [IU]/ML; [IU]/ML; [IU]/ML; [IU]/ML; [IU]/ML
1500 POWDER, FOR SOLUTION INTRAVENOUS ONCE
Refills: 0 | Status: COMPLETED | OUTPATIENT
Start: 2020-04-17 | End: 2020-04-17

## 2020-04-17 RX ORDER — PROTHROMBIN COMPLEX CONCENTRATE (HUMAN) 25.5; 16.5; 24; 22; 22; 26 [IU]/ML; [IU]/ML; [IU]/ML; [IU]/ML; [IU]/ML; [IU]/ML
1500 POWDER, FOR SOLUTION INTRAVENOUS ONCE
Refills: 0 | Status: DISCONTINUED | OUTPATIENT
Start: 2020-04-17 | End: 2020-04-17

## 2020-04-17 RX ADMIN — PROTHROMBIN COMPLEX CONCENTRATE (HUMAN) 400 INTERNATIONAL UNIT(S): 25.5; 16.5; 24; 22; 22; 26 POWDER, FOR SOLUTION INTRAVENOUS at 22:10

## 2020-04-17 RX ADMIN — Medication 5 MILLIGRAM(S): at 22:23

## 2020-04-17 RX ADMIN — TRANEXAMIC ACID 220 MILLIGRAM(S): 100 INJECTION, SOLUTION INTRAVENOUS at 22:24

## 2020-04-17 RX ADMIN — MORPHINE SULFATE 2 MILLIGRAM(S): 50 CAPSULE, EXTENDED RELEASE ORAL at 14:25

## 2020-04-17 NOTE — CONSULT NOTE ADULT - ASSESSMENT
ASSESSMENT:  89y old f presents as a transfer from [facility] with elevated INR (5) and large left medial thigh hematoma with signs of extrav vs pseudoaneurysm    PLAN:    - Continue trending CBC (hgb 8.5->8.6)  - Reversal of INR  - Appreciate vascular recommendations IR for intervention if required  -Trauma will follow - given lack of trauma to area/no known mechanism of injury, no further signs of trauma and INR >5, injury likely spontaneous/not related to significant traumatic injury    -  d/w Dr. Cabral ASSESSMENT:  89y old f presents as a transfer from [facility] with elevated INR (5) and large left medial thigh hematoma with signs of extrav vs pseudoaneurysm    PLAN:    - Continue trending CBC (hgb 8.5->8.6)  - Hyponatremia - fluid restrict  - Reversal of INR  - Appreciate vascular recommendations IR for intervention if required  -Trauma will follow - given lack of trauma to area/no known mechanism of injury, no further signs of trauma and INR >5, injury likely spontaneous/not related to significant traumatic injury      -  d/w Dr. Cabral

## 2020-04-17 NOTE — ED PROVIDER NOTE - SECONDARY DIAGNOSIS.
Extravasation of blood Iliac artery bleed Hematoma Elevated INR Lower extremity edema Leg ulcer, right, limited to breakdown of skin Hyponatremia

## 2020-04-17 NOTE — ED ADULT NURSE REASSESSMENT NOTE - NS ED NURSE REASSESS COMMENT FT1
Received patient from [facility]. pt is A&Ox2 presenting with a large, hard hematoma on the left thigh. Pt denies any pain or discomfort in that area and is disoriented to situation. Pt denies chest pain, SOB, cough, pt is +pulses in all extremities.

## 2020-04-17 NOTE — ED PROVIDER NOTE - PROGRESS NOTE DETAILS
Elsa: Spoke to vascular fellow, , reviewed films on phone, states " do not transfer, trend H/H, if drop transfer for potential embolization". spoke with [facility], evaluated at bedside. no specific recs will touch base with dr. BOYER. Will transfer pt for H/H trend, and for close monitoring and having tertiary care in house. Dr. Kilgore is aware of transfer. Ct with 3 mm focus of hyperdensity within the left iliacus muscle, compatible with active extravasation and/or pseudoaneurysm. 5 cm left iliacus intramuscular hematoma. Given elevated INR and h/H patient will require serial H/H and close monitoring, will benefit from transfer to [facility] given availability of IR and Vascular. pt evalulated in ED, d/w vascular at [facility], no intervention at this time,   will repeat H/H and consider reversal of INR

## 2020-04-17 NOTE — ED PROVIDER NOTE - NS ED ROS FT
Constitutional: (-) fever, (-) chills  Eyes: (-) visual changes  ENT: (-) nasal congestions  Cardiovascular: (-) chest pain, (-) syncope  Respiratory: (-) cough, (-) shortness of breath, (-) dyspnea,   Gastrointestinal: (-) vomiting, (-) diarrhea, (-)nausea,  Musculoskeletal: (-) neck pain, (-) back pain, (-) joint pain,  Integumentary: (-) rash, (+) edema, (+) bruises  Neurological: (-) headache, (-) loc, (-) dizziness, (-) tingling, (-)numbness,  Peripheral Vascular: (+) leg swelling  :  (-)dysuria,  (-) hematuria  Allergic/Immunologic: (-) pruritus

## 2020-04-17 NOTE — ED ADULT TRIAGE NOTE - ESI TRIAGE ACUITY LEVEL, MLM
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 Patient slept well overnight. Denies any issues. Anxiety continues to improve after visit with psych/onc yesterday, plan for family visit with Dr. Yuan today. Remains afebrile, VSS. On nadege and vanc until 3/23 then will switch to ppx levaquin on 3/24; last dose of vicki today, will switch to voriconazole tonight. HR remains controlled on metoprolol and diltiazem. O2 sats WNL on 2L NC. Stopping allopurinol today, TLS labs will now be daily and DIC labs will be Mon/Thurs. Continues working with PT/OT    Objective:     Vital Signs (Most Recent):  Temp: 96.7 °F (35.9 °C) (03/20/20 1127)  Pulse: 69 (03/20/20 1127)  Resp: 19 (03/20/20 1127)  BP: 131/61 (03/20/20 1127)  SpO2: (!) 92 % (03/20/20 1127) Vital Signs (24h Range):  Temp:  [96.7 °F (35.9 °C)-98.5 °F (36.9 °C)] 96.7 °F (35.9 °C)  Pulse:  [66-80] 69  Resp:  [17-22] 19  SpO2:  [92 %-100 %] 92 %  BP: (116-135)/(51-63) 131/61     Weight: 112.9 kg (248 lb 14.4 oz)  Body mass index is 42.72 kg/m².  Body surface area is 2.26 meters squared.    ECOG SCORE         [unfilled]    Intake/Output - Last 3 Shifts       03/18 0700 - 03/19 0659 03/19 0700 - 03/20 0659 03/20 0700 - 03/21 0659    P.O. 1380 850     I.V. (mL/kg) 1188.3 (10.9) 1260.8 (11.2)     Blood   350    IV Piggyback 2363.5 2441.5     Total Intake(mL/kg) 4931.9 (45) 4552.3 (40.3) 350 (3.1)    Urine (mL/kg/hr) 3350 (1.3) 2850 (1.1) 800 (1.5)    Stool 0      Total Output 3350 2850 800    Net +1581.9 +1702.3 -450           Urine Occurrence  5 x     Stool Occurrence 0 x            Physical Exam   Constitutional: She is oriented to person, place, and time. She appears well-developed and well-nourished. No distress.   Morbidly obese female   HENT:   Head: Normocephalic and atraumatic.   Right Ear: External ear normal.   Left Ear: External ear normal.   Mouth/Throat: Oropharynx is clear and moist.   Eyes: Conjunctivae and EOM  are normal. No scleral icterus.   Neck: Normal range of motion. Neck supple. No JVD present.   Cardiovascular: Normal rate, regular rhythm, normal heart sounds and intact distal pulses.   Pulmonary/Chest: Effort normal. No respiratory distress.   Diminished throughout   Abdominal: Soft. Bowel sounds are normal. She exhibits no distension. There is no tenderness.   Musculoskeletal: Normal range of motion. She exhibits no edema.   Lymphadenopathy:     She has no cervical adenopathy.   Neurological: She is alert and oriented to person, place, and time.   Skin: Skin is warm and dry.   Previous rash with scattered small, somewhat round papules on neck, forearm, back, chest are now unremarkable; generalized bruising  RCW scherer c/d/i with no signs of infection   Psychiatric: Her behavior is normal. Judgment and thought content normal. Her mood appears anxious.   Nursing note and vitals reviewed.      Significant Labs:   CBC:   Recent Labs   Lab 03/19/20  0457 03/19/20  1752 03/20/20  0414   WBC 0.67* 0.70* 0.35*   HGB 7.5* 7.1* 6.7*   HCT 24.1* 22.0* 21.3*   PLT 30* 34* 36*    and CMP:   Recent Labs   Lab 03/19/20  0457 03/19/20  1752 03/20/20  0414    140 139   K 4.4 3.9 4.1    110 106   CO2 22* 22* 23   * 151* 161*   BUN 28* 27* 25*   CREATININE 0.8 0.8 0.7   CALCIUM 8.5* 7.6* 8.1*   PROT 6.4 5.8* 5.7*   ALBUMIN 3.2* 2.9* 2.9*   BILITOT 1.4* 1.2* 1.3*   ALKPHOS 83 77 70   AST 11 9* 8*   ALT 30 24 22   ANIONGAP 9 8 10   EGFRNONAA >60.0 >60.0 >60.0       Diagnostic Results:  None   3

## 2020-04-17 NOTE — ED ADULT NURSE NOTE - OBJECTIVE STATEMENT
bruising to left posterior/inner thigh, edema to bilateral lower extremities, weeping wounds to legs

## 2020-04-17 NOTE — ED ADULT NURSE NOTE - NSIMPLEMENTINTERV_GEN_ALL_ED
Implemented All Fall with Harm Risk Interventions:  [place] to call system. Call bell, personal items and telephone within reach. Instruct patient to call for assistance. Room bathroom lighting operational. Non-slip footwear when patient is off stretcher. Physically safe environment: no spills, clutter or unnecessary equipment. Stretcher in lowest position, wheels locked, appropriate side rails in place. Provide visual cue, wrist band, yellow gown, etc. Monitor gait and stability. Monitor for mental status changes and reorient to person, place, and time. Review medications for side effects contributing to fall risk. Reinforce activity limits and safety measures with patient and family. Provide visual clues: red socks.

## 2020-04-17 NOTE — CONSULT NOTE ADULT - SUBJECTIVE AND OBJECTIVE BOX
TRAUMA ACTIVATION LEVEL:  Consult    MECHANISM OF INJURY:      [] Blunt  	[] MVC	[] Fall	[] Pedestrian Struck	[] Motorcycle   [] Assault   [] Bicycle collision  [] Sports injury     [] Penetrating  	[] Gun Shot Wound 		[] Stab Wound    GCS: 	E: 4	V: 5	M: 6    89y old f Presents as a transfer from [facility] with LE hematoma. Patient s/p vascular surgery evaluation for LLE hematoma with signs concerning for pseudoaneurysm vs active extravasation Patient transferred [place] for further evaluation by ED team. Patient and family deny any history of falls or traumatic injury however due to lack of known mechanism trauma surgery consult placed for evaluation. From chart review and dicussion patients symptoms have been present for approximately 2 days but have worsened. In ED patient with INR of 5      PAST MEDICAL & SURGICAL HISTORY:  CAD (coronary artery disease)  Anemia  Atrial fibrillation      Allergies    Celebrex (Unknown)  penicillins (Unknown)    Intolerances        Home Medications:  Dilt- mg/24 hours oral capsule, extended release: 1 cap(s) orally once a day (14 Oct 2018 12:09)  losartan 50 mg oral tablet: 1 tab(s) orally once a day (14 Oct 2018 12:09)  Vytorin:  (14 Oct 2018 12:09)  warfarin 2.5 mg oral tablet: 1 tab(s) orally once a day (14 Oct 2018 12:09)      ROS: 10-system review is otherwise negative except HPI above.      Primary Survey:    A - airway intact  B - bilateral breath sounds and good chest rise  C - palpable pulses in all extremities  D - GCS 15 on arrival, CROCKER  Exposure obtained    Vital Signs Last 24 Hrs  T(C): 36.8 (17 Apr 2020 20:38), Max: 36.8 (17 Apr 2020 20:38)  T(F): 98.2 (17 Apr 2020 20:38), Max: 98.2 (17 Apr 2020 20:38)  HR: 66 (17 Apr 2020 20:38) (66 - 94)  BP: 147/74 (17 Apr 2020 20:38) (140/71 - 176/62)  BP(mean): --  RR: 18 (17 Apr 2020 20:38) (18 - 19)  SpO2: 99% (17 Apr 2020 20:38) (99% - 100%)    Secondary Survey:   General: NAD  HEENT: Normocephalic, atraumatic  Neck: Soft, midline trachea. no cspine tenderness  Chest: No chest wall tenderness  Cardiac: S1, S2, RRR  Respiratory: Bilateral breath sounds, clear and equal bilaterally  Abdomen: Soft, non-distended, non-tender, no rebound,   Groin: Normal appearing, pelvis stable   Ext: B/L LE wraps, palpable dp b/l. Left thigh with large tender area to medial portion, decreased strength likely secondary to discomfort  Back: no TTP, no palpable runoff/stepoff/deformity    LABS:  Labs:  CAPILLARY BLOOD GLUCOSE                              8.6    11.28 )-----------( 319      ( 17 Apr 2020 20:50 )             26.0       Auto Neutrophil %: 75.2 % (04-17-20 @ 14:15)  Auto Immature Granulocyte %: 0.6 % (04-17-20 @ 14:15)    04-17    125<L>  |  91<L>  |  16  ----------------------------<  89  4.2   |  24  |  0.8      Calcium, Total Serum: 8.5 mg/dL (04-17-20 @ 14:15)      LFTs:             6.7  | 1.4  | 34       ------------------[126     ( 17 Apr 2020 14:15 )  3.2  | x    | 26          Lipase:x      Amylase:x             Coags:     >40.00  ----< 5.04    ( 17 Apr 2020 14:15 )     54.4            Serum Pro-Brain Natriuretic Peptide: 1944 pg/mL (04-17-20 @ 14:15)      RADIOLOGY & ADDITIONAL STUDIES:  < from: CT Head No Cont (04.17.20 @ 16:59) >  No CT evidence of acute intracranial pathology.    Chronic microvascular ischemic changes.    < end of copied text >    < from: CT Cervical Spine No Cont (04.17.20 @ 16:59) >  No evidence of acute fracture or subluxation.     Multilevel degenerative disc disease.    < end of copied text >    < from: CT Chest No Cont (04.17.20 @ 16:59) >  Limited evaluation of solid organs and vascular structures without intravenous contrast.    Left iliopsoas hematoma measuring 7 cm x 4 cm.. Call back request submitted    Trace bilateral pleural effusions.    < end of copied text >    < from: CT Angio Lower Extremity w/ IV Cont, Left (04.17.20 @ 16:59) >  1.  3 mm focus of hyperdensity within the left iliacus muscle, compatible with active extravasation and/or pseudoaneurysm. 5 cm left iliacus intramuscular hematoma.    2.  Soft tissue edema in the left calf.    3.  Atherosclerotic calcifications, without significant stenosis.    < end of copied text >    < from: Xray Femur 2 Views, Left (04.17.20 @ 15:53) >  Single image  No soft tissue abnormality is seen.  This is a limited examination of the femur. Grossly no bony abnormality is seen.  Impression  Limited exam. Grossly no acute abnormality seen.    < end of copied text >    < from: Xray Tibia + Fibula 2 Views, Bilateral (04.17.20 @ 15:53) >  Impression  Soft tissue swelling    < end of copied text >    < from: Xray Pelvis AP only (04.17.20 @ 15:53) >  No acute fracture or dislocation.    < end of copied text >    < from: Xray Chest 1 View-PORTABLE IMMEDIATE (04.17.20 @ 14:03) >  Bilateral nonspecific reticular opacities.    Enlarged cardiac silhouettewhich appears similar to prior exam and may represent cardiomegaly. Echocardiogram may be obtained for correlation.    < end of copied text >

## 2020-04-17 NOTE — CONSULT NOTE ADULT - ASSESSMENT
89 yo F, with pmhx of Afib on warfarin, which is supra-therapeutic at 5,  brought to the ED with complaints of LEFT leg discomfort and ecchymosis noted 2 days ago. CT demonstrates Left iliopsoas hematoma measuring 7 cm x 4 cm with concern of active bleed.    Case D/W vascular fellow, Dr. Almodovar:   - no acute vascular intervention  - recommend to hold/reverse coumadin   -Trend H/H Q 6 hrs; transfuse PRN  -Consider IR consult for dropping h/h or worsening pain/hematoma  - above D/W ER attending/staff: pt to be transferred [facility] for further management/vascular eval. Dr. Almodovar aware 89 yo F, with pmhx of Afib on warfarin, which is supra-therapeutic at 5,  brought to the ED with complaints of LEFT leg discomfort and ecchymosis noted 2 days ago. CT demonstrates Left iliopsoas hematoma measuring 7 cm x 4 cm with concern of active bleed.    Case D/W vascular fellow, Dr. Corley:   - no acute vascular intervention  - recommend to hold/reverse coumadin   -Trend H/H Q 6 hrs; transfuse PRN  -Consider IR consult for dropping h/h or worsening pain/hematoma  - above D/W ER attending/staff: pt to be transferred [facility] for further management/vascular eval. Dr. Corley aware

## 2020-04-17 NOTE — ED PROVIDER NOTE - OBJECTIVE STATEMENT
89yo female, pmh of afib on coumadin, hearing impairment, cad, presents to ed for left leg bruising and pain, right lower leg weeping. As per son, heriberto, ecchymosis to left leg noticed two days ago, mild, worsening over last two days, painful to touch, no specific radiation of pain. Pt and family deny any specific trauma or chi. Denies fever, chills, cp, sob, nvd, abd pain, back pain, other joint pain.

## 2020-04-17 NOTE — ED PROVIDER NOTE - PHYSICAL EXAMINATION
Physical Exam    Vital Signs: I have reviewed the initial vital signs.  Constitutional: well-nourished, appears stated age, no acute distress  Eyes: Conjunctiva pink, Sclera clear.  Cardiovascular: S1 and S2, regular rate, regular rhythm, well-perfused extremities, radial pulses equal and 2+  Respiratory: unlabored respiratory effort, clear to auscultation bilaterally no wheezing, rales and rhonchi  Gastrointestinal: soft, non-tender abdomen, no pulsatile mass, normal bowl sounds  Musculoskeletal: supple neck, + lower extremity edema, no midline tenderness, ttp over left medial thigh overlying ecchymosis, induration.   Integumentary: lower extremity edema, right lateral ankle with skin break down 2/2 ulcer. ecchymosis left medial thigh extending from inguinal fold to medial knee.   Neurologic: awake, alert, nvi

## 2020-04-17 NOTE — ED PROVIDER NOTE - CARE PLAN
Principal Discharge DX:	Anemia  Secondary Diagnosis:	Extravasation of blood  Secondary Diagnosis:	Iliac artery bleed  Secondary Diagnosis:	Hematoma  Secondary Diagnosis:	Elevated INR Principal Discharge DX:	Anemia  Secondary Diagnosis:	Extravasation of blood  Secondary Diagnosis:	Iliac artery bleed  Secondary Diagnosis:	Hematoma  Secondary Diagnosis:	Elevated INR  Secondary Diagnosis:	Lower extremity edema  Secondary Diagnosis:	Leg ulcer, right, limited to breakdown of skin

## 2020-04-17 NOTE — ED ADULT NURSE REASSESSMENT NOTE - NS ED NURSE REASSESS COMMENT FT1
[place] [facility]. alert and oriented upon arrival [place] [facility]. alert and confused upon arrival. vss

## 2020-04-17 NOTE — ED PROVIDER NOTE - CLINICAL SUMMARY MEDICAL DECISION MAKING FREE TEXT BOX
tx from [facility]  ED workup with anemia and ?extravasation, will admit

## 2020-04-17 NOTE — CONSULT NOTE ADULT - SUBJECTIVE AND OBJECTIVE BOX
*Pt poor historian, history obtained from ED staff*    89 yo F, with pmhx of Afib on warfarin, brought to the ED with complaints of LEFT leg discomfort and ecchymosis noted 2 days ago. Pt states that she may have "hit" her leg although mechanism of injury is unknown.    PAST MEDICAL & SURGICAL HISTORY:  CAD (coronary artery disease)  Anemia  Atrial fibrillation    Home Medications:(AS PER EMR)  Dilt- mg/24 hours oral capsule, extended release: 1 cap(s) orally once a day (14 Oct 2018 12:09)  losartan 50 mg oral tablet: 1 tab(s) orally once a day (14 Oct 2018 12:09)  Vytorin:  (14 Oct 2018 12:09)  warfarin 2.5 mg oral tablet: 1 tab(s) orally once a day (14 Oct 2018 12:09)    Allergies    Celebrex (Unknown)  penicillins (Unknown)    Intolerances    Social History:  n/a    ICU Vital Signs Last 24 Hrs  T(C): 36.3 (17 Apr 2020 18:35), Max: 36.3 (17 Apr 2020 18:35)  T(F): 97.4 (17 Apr 2020 18:35), Max: 97.4 (17 Apr 2020 18:35)  HR: 75 (17 Apr 2020 18:35) (72 - 75)  BP: 171/70 (17 Apr 2020 18:35) (140/71 - 171/70)  BP(mean): --  ABP: --  ABP(mean): --  RR: 18 (17 Apr 2020 18:35) (18 - 19)  SpO2: 100% (17 Apr 2020 18:35) (99% - 100%)    VITALS:     GENERAL: NAD, lying in bed comfortably  HEAD:  Atraumatic, Normocephalic  EXTREMITIES:  Large area of ecchymosis at the LEFT medial thigh radiating past her knee; area is soft, mild induration noted to posterior thigh; mild tenderness, Lower ankles wrapped secondary to b/l weeping ulcers at the ankle.                             8.5    9.79  )-----------( 358      ( 17 Apr 2020 14:15 )             26.8       04-17    125<L>  |  91<L>  |  16  ----------------------------<  89  4.2   |  24  |  0.8    Ca    8.5      17 Apr 2020 14:15    TPro  6.7  /  Alb  3.2<L>  /  TBili  1.4<H>  /  DBili  x   /  AST  34  /  ALT  26  /  AlkPhos  126<H>  04-17      PT/INR - ( 17 Apr 2020 14:15 )   PT: >40.00 sec;   INR: 5.04 ratio         PTT - ( 17 Apr 2020 14:15 )  PTT:54.4 sec    Lactate Trend    Radiology:  CT abd/pelvis/chest    IMPRESSION:    Limited evaluation of solid organs and vascular structures without intravenous contrast.    Left iliopsoas hematoma measuring 7 cm x 4 cm.. Call back request submitted    Trace bilateral pleural effusions.    < from: CT Cervical Spine No Cont (04.17.20 @ 16:59) >    IMPRESSION:     No evidence of acute fracture or subluxation.     Multilevel degenerative disc disease.      < from: CT Head No Cont (04.17.20 @ 16:59) >  IMPRESSION:    No CT evidence of acute intracranial pathology.    Chronic microvascular ischemic changes.    < from: Xray Tibia + Fibula 2 Views, Bilateral (04.17.20 @ 15:53) >  INTERPRETATION:  Clinical History / Reason for exam: Soft tissue swelling  2 images  There is diffuse soft tissue swelling. No soft tissue emphysema is seen.  No bony abnormality is seen. 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  Impression  Soft tissue swelling    < from: Xray Pelvis AP only (04.17.20 @ 15:53) >  Impression:    No acute fracture or dislocation.

## 2020-04-18 LAB
ALBUMIN SERPL ELPH-MCNC: 3 G/DL — LOW (ref 3.5–5.2)
ALP SERPL-CCNC: 106 U/L — SIGNIFICANT CHANGE UP (ref 30–115)
ALT FLD-CCNC: 21 U/L — SIGNIFICANT CHANGE UP (ref 0–41)
ANION GAP SERPL CALC-SCNC: 14 MMOL/L — SIGNIFICANT CHANGE UP (ref 7–14)
APPEARANCE UR: CLEAR — SIGNIFICANT CHANGE UP
APTT BLD: 37.3 SEC — SIGNIFICANT CHANGE UP (ref 27–39.2)
AST SERPL-CCNC: 24 U/L — SIGNIFICANT CHANGE UP (ref 0–41)
BACTERIA # UR AUTO: NEGATIVE — SIGNIFICANT CHANGE UP
BASOPHILS # BLD AUTO: 0.02 K/UL — SIGNIFICANT CHANGE UP (ref 0–0.2)
BASOPHILS NFR BLD AUTO: 0.2 % — SIGNIFICANT CHANGE UP (ref 0–1)
BILIRUB SERPL-MCNC: 1.2 MG/DL — SIGNIFICANT CHANGE UP (ref 0.2–1.2)
BILIRUB UR-MCNC: NEGATIVE — SIGNIFICANT CHANGE UP
BUN SERPL-MCNC: 11 MG/DL — SIGNIFICANT CHANGE UP (ref 10–20)
CALCIUM SERPL-MCNC: 8.3 MG/DL — LOW (ref 8.5–10.1)
CHLORIDE SERPL-SCNC: 94 MMOL/L — LOW (ref 98–110)
CHLORIDE UR-SCNC: 99 — SIGNIFICANT CHANGE UP
CO2 SERPL-SCNC: 21 MMOL/L — SIGNIFICANT CHANGE UP (ref 17–32)
COLOR SPEC: YELLOW — SIGNIFICANT CHANGE UP
CREAT SERPL-MCNC: 0.5 MG/DL — LOW (ref 0.7–1.5)
DIFF PNL FLD: SIGNIFICANT CHANGE UP
EOSINOPHIL # BLD AUTO: 0.08 K/UL — SIGNIFICANT CHANGE UP (ref 0–0.7)
EOSINOPHIL NFR BLD AUTO: 0.8 % — SIGNIFICANT CHANGE UP (ref 0–8)
EPI CELLS # UR: 2 /HPF — SIGNIFICANT CHANGE UP (ref 0–5)
GLUCOSE SERPL-MCNC: 57 MG/DL — LOW (ref 70–99)
GLUCOSE UR QL: NEGATIVE — SIGNIFICANT CHANGE UP
HCT VFR BLD CALC: 26 % — LOW (ref 37–47)
HGB BLD-MCNC: 8.2 G/DL — LOW (ref 12–16)
HYALINE CASTS # UR AUTO: 1 /LPF — SIGNIFICANT CHANGE UP (ref 0–7)
IMM GRANULOCYTES NFR BLD AUTO: 0.4 % — HIGH (ref 0.1–0.3)
INR BLD: 1.9 RATIO — HIGH (ref 0.65–1.3)
KETONES UR-MCNC: ABNORMAL
LEUKOCYTE ESTERASE UR-ACNC: NEGATIVE — SIGNIFICANT CHANGE UP
LYMPHOCYTES # BLD AUTO: 1.2 K/UL — SIGNIFICANT CHANGE UP (ref 1.2–3.4)
LYMPHOCYTES # BLD AUTO: 12.1 % — LOW (ref 20.5–51.1)
MAGNESIUM SERPL-MCNC: 2 MG/DL — SIGNIFICANT CHANGE UP (ref 1.8–2.4)
MCHC RBC-ENTMCNC: 23.4 PG — LOW (ref 27–31)
MCHC RBC-ENTMCNC: 31.5 G/DL — LOW (ref 32–37)
MCV RBC AUTO: 74.3 FL — LOW (ref 81–99)
MONOCYTES # BLD AUTO: 1.03 K/UL — HIGH (ref 0.1–0.6)
MONOCYTES NFR BLD AUTO: 10.4 % — HIGH (ref 1.7–9.3)
NEUTROPHILS # BLD AUTO: 7.52 K/UL — HIGH (ref 1.4–6.5)
NEUTROPHILS NFR BLD AUTO: 76.1 % — HIGH (ref 42.2–75.2)
NITRITE UR-MCNC: NEGATIVE — SIGNIFICANT CHANGE UP
NRBC # BLD: 0 /100 WBCS — SIGNIFICANT CHANGE UP (ref 0–0)
OSMOLALITY UR: 570 MOS/KG — SIGNIFICANT CHANGE UP (ref 50–1400)
PH UR: 6.5 — SIGNIFICANT CHANGE UP (ref 5–8)
PHOSPHATE SERPL-MCNC: 3.1 MG/DL — SIGNIFICANT CHANGE UP (ref 2.1–4.9)
PLATELET # BLD AUTO: 359 K/UL — SIGNIFICANT CHANGE UP (ref 130–400)
POTASSIUM SERPL-MCNC: 4.7 MMOL/L — SIGNIFICANT CHANGE UP (ref 3.5–5)
POTASSIUM SERPL-SCNC: 4.7 MMOL/L — SIGNIFICANT CHANGE UP (ref 3.5–5)
PROT SERPL-MCNC: 6.3 G/DL — SIGNIFICANT CHANGE UP (ref 6–8)
PROT UR-MCNC: ABNORMAL
PROTHROM AB SERPL-ACNC: 21.9 SEC — HIGH (ref 9.95–12.87)
RBC # BLD: 3.5 M/UL — LOW (ref 4.2–5.4)
RBC # FLD: 14.9 % — HIGH (ref 11.5–14.5)
RBC CASTS # UR COMP ASSIST: 43 /HPF — HIGH (ref 0–4)
SODIUM SERPL-SCNC: 129 MMOL/L — LOW (ref 135–146)
SODIUM UR-SCNC: 120 MMOL/L — SIGNIFICANT CHANGE UP
SP GR SPEC: 1.04 — HIGH (ref 1.01–1.02)
UROBILINOGEN FLD QL: ABNORMAL
WBC # BLD: 9.89 K/UL — SIGNIFICANT CHANGE UP (ref 4.8–10.8)
WBC # FLD AUTO: 9.89 K/UL — SIGNIFICANT CHANGE UP (ref 4.8–10.8)
WBC UR QL: 1 /HPF — SIGNIFICANT CHANGE UP (ref 0–5)

## 2020-04-18 PROCEDURE — 99222 1ST HOSP IP/OBS MODERATE 55: CPT | Mod: AI,GC

## 2020-04-18 RX ORDER — SODIUM CHLORIDE 9 MG/ML
1000 INJECTION INTRAMUSCULAR; INTRAVENOUS; SUBCUTANEOUS
Refills: 0 | Status: DISCONTINUED | OUTPATIENT
Start: 2020-04-18 | End: 2020-04-20

## 2020-04-18 RX ORDER — PANTOPRAZOLE SODIUM 20 MG/1
40 TABLET, DELAYED RELEASE ORAL
Refills: 0 | Status: DISCONTINUED | OUTPATIENT
Start: 2020-04-18 | End: 2020-04-20

## 2020-04-18 RX ADMIN — CHLORHEXIDINE GLUCONATE 1 APPLICATION(S): 213 SOLUTION TOPICAL at 05:19

## 2020-04-18 RX ADMIN — PANTOPRAZOLE SODIUM 40 MILLIGRAM(S): 20 TABLET, DELAYED RELEASE ORAL at 06:06

## 2020-04-18 RX ADMIN — SODIUM CHLORIDE 75 MILLILITER(S): 9 INJECTION INTRAMUSCULAR; INTRAVENOUS; SUBCUTANEOUS at 09:19

## 2020-04-18 NOTE — CHART NOTE - NSCHARTNOTEFT_GEN_A_CORE
Pt was seen and examined at bedside independently, pt feels OK, she denies any active bleeding for last 16-18 hours, asking for food. Pt was admitted for acute blood loss anemia, lower GI bleed ( was bleeding from sigmoid colon), antiplatelets were held ( pt has h/o PVD, h/o b/l LE stents in 2018).  She was started on Protonix drip, consulted by GI, needs follow up ( unable to get in touch with GI team today), will start clear liquid diet.  If pt is stable for next 24 hours will resume ASA.  Case d/w medical residents during rounds. Pt was seen and examined at bedside independently, pt feels OK, she denies any complaints, she is very anxious, does not remember how she ended up in the hospital.  Pt was admitted with acute blood loss anemia, spontaneous soft tissue bleeding in setting of supra therapeutic INR.   Coumadin was held, will monitor CBC and INR level, pt has a very high risk of fall, advanced age and h/o spontaneous bleeding, will d/c Coumadin from now on ( risk outweigh the benefit).  Case d/w residents during rounds.

## 2020-04-18 NOTE — PROGRESS NOTE ADULT - ASSESSMENT
89y old f presents as a transfer from [facility] with elevated INR (5) and large left medial thigh hematoma with signs of extrav vs pseudoaneurysm    PLAN:    - Continue trending CBC (hgb 8.5->8.6)  - Hyponatremia - fluid restrict  - Reversal of INR  - Appreciate vascular recommendations IR for intervention if required  -Trauma will follow - given lack of trauma to area/no known mechanism of injury, no further signs of trauma and INR >5, injury likely spontaneous/not related to significant traumatic injury      -  d/w Dr. Cabral 89y old f presents as a transfer from [facility] with elevated INR (5) and large left medial thigh hematoma with signs of extrav vs pseudoaneurysm    PLAN:    - Continue trending CBC (hgb 8.5->8.6->8.2)  - Hyponatremia - fluid restrict  - INR <2  - Given lack of trauma to area/no known mechanism of injury, no further signs of trauma and INR >5, injury likely spontaneous/not related to significant traumatic injury

## 2020-04-18 NOTE — H&P ADULT - NSHPPHYSICALEXAM_GEN_ALL_CORE
:  VITAL SIGNS: Last 24 Hours  T(C): 36.9 (18 Apr 2020 00:37), Max: 36.9 (18 Apr 2020 00:37)  T(F): 98.5 (18 Apr 2020 00:37), Max: 98.5 (18 Apr 2020 00:37)  HR: 85 (18 Apr 2020 00:37) (66 - 94)  BP: 167/77 (18 Apr 2020 00:37) (140/71 - 176/62)  RR: 18 (18 Apr 2020 00:37) (18 - 19)  SpO2: 99% (17 Apr 2020 20:38) (99% - 100%)    PHYSICAL EXAM:  GENERAL:   Awake, alert; NAD.  HEENT:  Head NC/AT; Conjunctivae pink, Sclera anicteric; Oral mucosa moist.  CARDIO:   Regular rate; IRRegular rhythm (Afib vs. PVC); S1 & S2.  RESP:   Decreased inspiration; No rales or rhonchi appreciated.  GI:   Soft; NT/ND; No guarding; No rebound tenderness.  EXT:   Minimal edema present in the BL LE.  SKIN:   Intact; Ecchymosis along medial aspect of the left thigh and to upper portion of leg (Painful to palpation); BL legs cleanly wrapped in Kerlex.

## 2020-04-18 NOTE — H&P ADULT - NSICDXPASTMEDICALHX_GEN_ALL_CORE_FT
PAST MEDICAL HISTORY:  Anemia     Atrial fibrillation     CAD (coronary artery disease)     Coronary artery disease     Dementia     Hearing loss     Hypertension PAST MEDICAL HISTORY:  Anemia     Atrial fibrillation     CAD (coronary artery disease)     Coronary artery disease     Dementia     Hearing loss     Hypertension     Peripheral artery disease     Venous stasis ulcers of both lower extremities

## 2020-04-18 NOTE — H&P ADULT - HISTORY OF PRESENT ILLNESS
PMHx:  Atrial fibrillation (Coumadin); CAD; ?HF; Hearing loss    This is a 89 year old female who presented to the ED with a cc/o Left Lower Extremity Pain associated with ecchymosis and weeping x2 days. The patient likely suffers from dementia, as she was unable to provide me with appropriate HPI. She was not aware of why she was at the hospital, and simply agreed to everything discussed without providing details. She denied any recent fall and/or trauma. She reported she felt fine and had no active complaints.

## 2020-04-18 NOTE — H&P ADULT - ASSESSMENT
Left Iliopsoas Hematoma (7.6 cm x 4.0 cm); Extravasation vs. pseudoaneurysm   Likely spontaneous in setting of supra-therapeutic INR and lack of evidence of trauma  - Surgical/Trauma and Vascular consult recommendations appreciated  - s/p K-centra and Vitamin K in ED with concern for active bleeding  - Hemoglobin has remained stable since presentation to admission  - Continue to hold daily Coumadin; Follow up INR in AM  - Follow up Hemoglobin; Follow up extension of hematoma  - If worsening, consider Interventional Radiology evaluation   - Type and Screen active    Hyponatremia; Unclear etiology  - May be related to fluid overload (high BNP & reported weight gain), but not appreciated on exam  - Follow up AM Serum Osm/BMP and Urine Osm + Lytes  - Fluid restriction; NPO for now    Hx of Atrial Fibrillation; On Coumadin  - Currently rate controlled  - Continue home CCB, and hold daily Coumadin    Hx of CAD; Hx of ?HF  - Unclear of extent of cardiac disease  - Continue home medications; Stable    Hx of HTN and HLD  - Continue home dose of Ezetimibe and Simvastatin    Hearing Loss  - Hard of hearing during exam Please call family in AM for further information regarding patient HPI and PMHx/Medications; Medications listed were last updated in 2018; Most recent note in HIE from vascular says the patient is only on Coumadin and Losartan, but no doses listed. No medications available on outside source option.    Left Iliopsoas Hematoma (7.6 cm x 4.0 cm); Extravasation vs. pseudoaneurysm   Likely spontaneous in setting of supra-therapeutic INR and lack of evidence of trauma  - Surgical/Trauma and Vascular consult recommendations appreciated  - s/p K-centra and Vitamin K in ED with concern for active bleeding  - Hemoglobin has remained stable since presentation to admission  - Continue to hold daily Coumadin; Follow up INR in AM  - Follow up Hemoglobin; Follow up extension of hematoma  - If worsening, consider Interventional Radiology evaluation   - Type and Screen active    Hyponatremia; Unclear etiology  - May be related to fluid overload (high BNP & reported weight gain), but not appreciated on exam  - Follow up AM Serum Osm/BMP and Urine Osm + Lytes  - Fluid restriction; NPO for now    Venous stasis ulcers in BL LE; Hx of BL LE PAD  - Follow up with Vascular regarding daily care; Unna boots in the past    Hx of Atrial Fibrillation; On Coumadin  - Currently rate controlled  - Continue home CCB, and hold daily Coumadin    BL Trace Pleural Effusions; Hx of ?CAD; Hx of ?HF: Unclear of extent of cardiac disease; Elevated BNP    Hx of HTN and HLD: Blood pressure acceptable during time of admission    Hearing Loss: Hard of hearing during exam

## 2020-04-18 NOTE — H&P ADULT - ATTENDING COMMENTS
HPI:  PMHx:  Atrial fibrillation (Coumadin); CAD; ?HF; Hearing loss    This is a 89 year old female who presented to the ED with a cc/o Left Lower Extremity Pain associated with ecchymosis and weeping x2 days. The patient likely suffers from dementia, as she was unable to provide me with appropriate HPI. She was not aware of why she was at the hospital, and simply agreed to everything discussed without providing details. She denied any recent fall and/or trauma. She reported she felt fine and had no active complaints. (18 Apr 2020 02:43)    REVIEW OF SYSTEMS: see cc/HPI  CONSTITUTIONAL: No weakness, fevers or chills  EYES/ENT: No visual changes;  No vertigo or throat pain   NECK: No pain or stiffness  RESPIRATORY: No cough, wheezing, hemoptysis; No shortness of breath  CARDIOVASCULAR: No chest pain or palpitations  GASTROINTESTINAL: No abdominal or epigastric pain. No nausea, vomiting, or hematemesis; No diarrhea or constipation. No melena or hematochezia.  GENITOURINARY: No dysuria, frequency or hematuria  NEUROLOGICAL: No numbness or weakness  SKIN: No itching, rashes    Physical Exam:  General: WN/WD NAD  Neurology: A&Ox2- confused a bit, nonfocal, follows commands  Eyes: PERRLA/ EOMI  ENT/Neck: Neck supple, trachea midline, No JVD  Respiratory: CTA B/L, No wheezing, rales, rhonchi  CV: Normal rate regular rhythm, S1S2, no murmurs, rubs or gallops  Abdominal: Soft, NT, ND +BS,   Extremities: No edema, + peripheral pulses, LLE tense at the level of the thigh  Skin: No Rashes, Hematoma, Ecchymosis, weeping skin on the distal LE / ant tibial areas  Incisions:   Tubes:    L iliopsoas hematoma / supra-therapeutic INR - spontaneous bleeding and s/p K-centra and Vit K  - agree w/ holding coumadin  -serial CBC/ Type and screen  -monitor clinically for pain and swelling     Hyponatremia   -send urine lytes/ cr/ osm  -check serum osm    Venous stasis ulcers   -local wound care and Vascular team adam Cook w/ surpa-therapeutic INR  -coumadin held for now and will restart when INR 2-3 and no acute bleeding     HTN / Dyslipidemia     B/L LE SCD for DVT prophylaxis

## 2020-04-18 NOTE — PROGRESS NOTE ADULT - SUBJECTIVE AND OBJECTIVE BOX
GENERAL SURGERY PROGRESS NOTE     CHAVO DYE  89y  Female  Hospital day :1d  POD:  Procedure:   OVERNIGHT EVENTS: Uneventful    T(F): 98.5 (04-18-20 @ 00:37), Max: 98.5 (04-18-20 @ 00:37)  HR: 85 (04-18-20 @ 00:37) (66 - 94)  BP: 167/77 (04-18-20 @ 00:37) (140/71 - 176/62)  ABP: --  ABP(mean): --  RR: 18 (04-18-20 @ 00:37) (18 - 19)  SpO2: 98% (04-18-20 @ 03:05) (98% - 100%)    DIET/FLUIDS:   NG:                                                                              DRAINS:   BM:   EMESIS:   URINE:    GI proph:  pantoprazole    Tablet 40 milliGRAM(s) Oral before breakfast    AC/ proph:   ABx:     PHYSICAL EXAM:  GENERAL: NAD  CHEST/LUNG: Clear to auscultation bilaterally  HEART: Regular rate and rhythm  ABDOMEN: Soft, Nontender, Nondistended;   EXTREMITIES: B/L LE wraps, palpable dp b/l. Left thigh with large tender area to medial portion, decreased strength likely secondary to discomfort        LABS  Labs:  CAPILLARY BLOOD GLUCOSE                              8.6    11.28 )-----------( 319      ( 17 Apr 2020 20:50 )             26.0       Auto Neutrophil %: 75.2 % (04-17-20 @ 14:15)  Auto Immature Granulocyte %: 0.6 % (04-17-20 @ 14:15)    04-17    125<L>  |  91<L>  |  16  ----------------------------<  89  4.2   |  24  |  0.8      Calcium, Total Serum: 8.5 mg/dL (04-17-20 @ 14:15)      LFTs:             6.7  | 1.4  | 34       ------------------[126     ( 17 Apr 2020 14:15 )  3.2  | x    | 26          Lipase:x      Amylase:x             Coags:     >40.00  ----< 4.62    ( 17 Apr 2020 21:36 )     x               Serum Pro-Brain Natriuretic Peptide: 1944 pg/mL (04-17-20 @ 14:15)              RADIOLOGY & ADDITIONAL TESTS:      No new studies

## 2020-04-18 NOTE — H&P ADULT - NSHPLABSRESULTS_GEN_ALL_CORE
:  LAB RESULTS:                        8.6    11.28 )-----------( 319      ( 17 Apr 2020 20:50 )             26.0     125<L>  |  91<L>  |  16  ----------------------------<  89  4.2   |  24  |  0.8    Ca    8.5      17 Apr 2020 14:15    TPro  6.7  /  Alb  3.2<L>  /  TBili  1.4<H>  /  DBili  x   /  AST  34  /  ALT  26  /  AlkPhos  126<H>  04-17    PT/INR - ( 17 Apr 2020 21:36 )   PT: >40.00 sec;   INR: 4.62 ratio    PTT - ( 17 Apr 2020 14:15 )  PTT:54.4 sec    MICROBIOLOGY: None    RADIOLOGY:  Reviewed    ALLERGIES:  Celebrex (Unknown)  penicillins (Unknown)    ===========================================================

## 2020-04-19 LAB
ANION GAP SERPL CALC-SCNC: 14 MMOL/L — SIGNIFICANT CHANGE UP (ref 7–14)
BASOPHILS # BLD AUTO: 0 K/UL — SIGNIFICANT CHANGE UP (ref 0–0.2)
BASOPHILS NFR BLD AUTO: 0 % — SIGNIFICANT CHANGE UP (ref 0–1)
BUN SERPL-MCNC: 11 MG/DL — SIGNIFICANT CHANGE UP (ref 10–20)
CALCIUM SERPL-MCNC: 7.8 MG/DL — LOW (ref 8.5–10.1)
CHLORIDE SERPL-SCNC: 98 MMOL/L — SIGNIFICANT CHANGE UP (ref 98–110)
CO2 SERPL-SCNC: 20 MMOL/L — SIGNIFICANT CHANGE UP (ref 17–32)
CREAT SERPL-MCNC: 0.5 MG/DL — LOW (ref 0.7–1.5)
EOSINOPHIL # BLD AUTO: 0.11 K/UL — SIGNIFICANT CHANGE UP (ref 0–0.7)
EOSINOPHIL NFR BLD AUTO: 1.4 % — SIGNIFICANT CHANGE UP (ref 0–8)
GLUCOSE SERPL-MCNC: 66 MG/DL — LOW (ref 70–99)
HCT VFR BLD CALC: 26.9 % — LOW (ref 37–47)
HGB BLD-MCNC: 8.7 G/DL — LOW (ref 12–16)
IMM GRANULOCYTES NFR BLD AUTO: 0.5 % — HIGH (ref 0.1–0.3)
LYMPHOCYTES # BLD AUTO: 0.93 K/UL — LOW (ref 1.2–3.4)
LYMPHOCYTES # BLD AUTO: 12 % — LOW (ref 20.5–51.1)
MCHC RBC-ENTMCNC: 24.6 PG — LOW (ref 27–31)
MCHC RBC-ENTMCNC: 32.3 G/DL — SIGNIFICANT CHANGE UP (ref 32–37)
MCV RBC AUTO: 76.2 FL — LOW (ref 81–99)
MONOCYTES # BLD AUTO: 1.04 K/UL — HIGH (ref 0.1–0.6)
MONOCYTES NFR BLD AUTO: 13.4 % — HIGH (ref 1.7–9.3)
NEUTROPHILS # BLD AUTO: 5.62 K/UL — SIGNIFICANT CHANGE UP (ref 1.4–6.5)
NEUTROPHILS NFR BLD AUTO: 72.7 % — SIGNIFICANT CHANGE UP (ref 42.2–75.2)
NRBC # BLD: 0 /100 WBCS — SIGNIFICANT CHANGE UP (ref 0–0)
PLATELET # BLD AUTO: 301 K/UL — SIGNIFICANT CHANGE UP (ref 130–400)
POTASSIUM SERPL-MCNC: 4.6 MMOL/L — SIGNIFICANT CHANGE UP (ref 3.5–5)
POTASSIUM SERPL-SCNC: 4.6 MMOL/L — SIGNIFICANT CHANGE UP (ref 3.5–5)
RBC # BLD: 3.53 M/UL — LOW (ref 4.2–5.4)
RBC # FLD: 15.1 % — HIGH (ref 11.5–14.5)
SODIUM SERPL-SCNC: 132 MMOL/L — LOW (ref 135–146)
WBC # BLD: 7.74 K/UL — SIGNIFICANT CHANGE UP (ref 4.8–10.8)
WBC # FLD AUTO: 7.74 K/UL — SIGNIFICANT CHANGE UP (ref 4.8–10.8)

## 2020-04-19 PROCEDURE — 99233 SBSQ HOSP IP/OBS HIGH 50: CPT

## 2020-04-19 PROCEDURE — 71045 X-RAY EXAM CHEST 1 VIEW: CPT | Mod: 26

## 2020-04-19 RX ORDER — METOPROLOL TARTRATE 50 MG
25 TABLET ORAL DAILY
Refills: 0 | Status: DISCONTINUED | OUTPATIENT
Start: 2020-04-19 | End: 2020-04-22

## 2020-04-19 RX ORDER — ACETAMINOPHEN 500 MG
650 TABLET ORAL EVERY 6 HOURS
Refills: 0 | Status: DISCONTINUED | OUTPATIENT
Start: 2020-04-19 | End: 2020-04-22

## 2020-04-19 RX ADMIN — Medication 25 MILLIGRAM(S): at 14:33

## 2020-04-19 RX ADMIN — PANTOPRAZOLE SODIUM 40 MILLIGRAM(S): 20 TABLET, DELAYED RELEASE ORAL at 06:30

## 2020-04-19 RX ADMIN — CHLORHEXIDINE GLUCONATE 1 APPLICATION(S): 213 SOLUTION TOPICAL at 06:30

## 2020-04-19 RX ADMIN — Medication 650 MILLIGRAM(S): at 18:26

## 2020-04-19 NOTE — PROGRESS NOTE ADULT - ASSESSMENT
89 year old female who presented to the ED with a cc/o Left Lower Extremity Pain associated with ecchymosis and weeping x2 days. The patient likely suffers from dementia, as she was unable to provide me with appropriate HPI. She was not aware of why she was at the hospital, and simply agreed to everything discussed without providing details. She denied any recent fall and/or trauma. She reported she felt fine and had no active complaints.     A/P    # Coagulopathy/ Supra therapeutic INR / spontaneous soft tissue hematoma / Acute blood loss anemia   - coumadin held   - INR reversed, pt received vit K   - coagulopathy resolved   - pt was consulted by surgery for extensive soft tissue hematoma, no surgical intervention recommended   - H/H stable in last 48 hours   - keep Hb above 7.5 and active type and cross   - from now on will d/c long terma anticoagulation ( risk outweighs the benefit, pt is elderly, demented with high risk of fall and h/o significant blood loss deu to spontaneous bleeding)     # Dementia  - pt has a high risk of fall, fall precautions   - supportive care, aspiration precautions   - PT/Rehab   - pt was admitted from home     # HTN  - DASH diet   - start Toprol XL 25 mg Q 24 hours     # h/o A-fib  - start BB  - no anticoagulation from now on ( high risk)   - f/u with PMD and cardiology after discharge     # Elevated BNP/ trace b/l pleural effusions   - likely pt has CHF ( unable to specify, no recent 2Decho is available)  - restrict fluids 1200 ml in 24 hours   - intake and output monitoring   - daily weight  - monitor for fluid overload   - Na level is trending down     # GI prophylaxis   - on Protonix       # DVT prophylaxis     #Progress Note Handoff  Pending (specify): PT/rehab evaluation, monitor h/h and Na level   Family discussion: n/a, will contact family   Disposition: Home___/SNF___/Other________/Unknown at this time____x ____

## 2020-04-19 NOTE — PROGRESS NOTE ADULT - SUBJECTIVE AND OBJECTIVE BOX
89 yo F, with pmhx of Afib on warfarin, which is supra-therapeutic at 5,  brought to the ED with complaints of LEFT leg discomfort and ecchymosis noted 2 days ago. CT demonstrates Left iliopsoas hematoma measuring 7 cm x 4 cm with concern of active bleed.   INR was reversed with VIt K, pt was consulted by surgery, no intervention recommended.  She is stable for last 24 hours, pt is comfortable, c/o left thigh hematoma, denies any other complaints.     Vital Signs Last 24 Hrs  T(C): 36.8 (2020 04:56), Max: 36.8 (2020 13:12)  T(F): 98.3 (2020 04:56), Max: 98.3 (2020 04:56)  HR: 81 (2020 08:20) (75 - 87)  BP: 149/66 (2020 08:20) (149/66 - 174/67)  BP(mean): --  RR: 18 (2020 04:56) (18 - 18)  SpO2: 98% (2020 19:58) (98% - 98%)    PHYSICAL EXAM:  GENERAL: NAD, frail elderly female with temporal muscle waisting   NECK: supple, no JVD  CHEST/LUNG: decreased BS at bases   ABDOMEN : SOFT, NT, ND, BS present   EXTREMITIES:  Large area of ecchymosis at the LEFT medial thigh extending to  past her knee; area is soft, mild induration noted to posterior thigh; mild tenderness, Lower ankles wrapped secondary to b/l weeping ulcers at the ankle, statin dermatitis noted on LE b/l       LABS                          8.7    7.74  )-----------( 301      ( 2020 06:38 )             26.9   04-19    132<L>  |  98  |  11  ----------------------------<  66<L>  4.6   |  20  |  0.5<L>    Ca    7.8<L>      2020 06:38  Phos  3.1     04-18  Mg     2.0     04-18    INR: 1.90: Recommended ranges for therapeutic INR:    2.0-3.0 for most medical and surgical thromboembolic states    2.0-3.0 for atrial fibrillation    2.0-3.0 for bileaflet mechanical valve in aortic position    2.5-3.5 for mechanical heart valves    Chest 2004;126:[id number]  The presence of direct thrombin inhibitors (argatroban, refludan)  may falsely increase results. ratio (20 @ 06:04)      TPro  6.3  /  Alb  3.0<L>  /  TBili  1.2  /  DBili  x   /  AST  24  /  ALT  21  /  AlkPhos  106    Serum Pro-Brain Natriuretic Peptide: 1944 pg/mL (20 @ 14:15)      Urinalysis Basic - ( 2020 07:40 )    Color: Yellow / Appearance: Clear / S.040 / pH: x  Gluc: x / Ketone: Small  / Bili: Negative / Urobili: 3 mg/dL   Blood: x / Protein: 30 mg/dL / Nitrite: Negative   Leuk Esterase: Negative / RBC: 43 /HPF / WBC 1 /HPF   Sq Epi: x / Non Sq Epi: 2 /HPF / Bacteria: Negative      RADIOLOGY & ADDITIONAL TESTS:  < from: CT Abdomen and Pelvis No Cont (20 @ 16:59) >  IMPRESSION:    Limited evaluation of solid organs and vascular structures without intravenous contrast.    Left iliopsoas hematoma measuring 7 cm x 4 cm.. Call back request submitted    Trace bilateral pleural effusions.    < end of copied text >    < from: CT Head No Cont (20 @ 16:59) >  IMPRESSION:    No CT evidence of acute intracranial pathology.    Chronic microvascular ischemic changes.    < end of copied text >    < from: CT Angio Lower Extremity w/ IV Cont, Left (20 @ 16:59) >  IMPRESSION:    1.  3 mm focus of hyperdensity within the left iliacus muscle, compatible with active extravasation and/or pseudoaneurysm. 5 cm left iliacus intramuscular hematoma.    2.  Soft tissue edema in the left calf.    3.  Atherosclerotic calcifications, without significant stenosis.    MEDICATIONS  (STANDING):  chlorhexidine 4% Liquid 1 Application(s) Topical <User Schedule>  pantoprazole    Tablet 40 milliGRAM(s) Oral before breakfast  sodium chloride 0.9%. 1000 milliLiter(s) (75 mL/Hr) IV Continuous <Continuous>

## 2020-04-19 NOTE — PROGRESS NOTE ADULT - SUBJECTIVE AND OBJECTIVE BOX
GENERAL SURGERY PROGRESS NOTE     CHAVO DYE  89y  Female  Hospital day :2d  POD:  Procedure:   OVERNIGHT EVENTS: Patient admitted for observation and coumadin reversal after spontaneous left thigh hematoma development. INR 5 on admission, now 1.9 after reversal.    T(F): 98 (20 @ 20:00), Max: 98.5 (20 @ 04:37)  HR: 75 (20 @ 20:00) (74 - 83)  BP: 163/67 (20 @ 20:00) (126/62 - 163/72)  ABP: --  ABP(mean): --  RR: 18 (20 @ 20:00) (18 - 18)  SpO2: 98% (20 @ 19:58) (98% - 98%)    DIET/FLUIDS: sodium chloride 0.9%. 1000 milliLiter(s) IV Continuous <Continuous>      GI proph:  pantoprazole    Tablet 40 milliGRAM(s) Oral before breakfast    AC/ proph:   ABx:     PHYSICAL EXAM:  GENERAL: NAD, well-appearing  CHEST/LUNG: Clear to auscultation bilaterally  ABDOMEN : SOFT, NT, ND  EXTREMITIES:  Large area of ecchymosis at the LEFT medial thigh radiating past her knee; area is soft, mild induration noted to posterior thigh; mild tenderness, Lower ankles wrapped secondary to b/l weeping ulcers at the ankle. No clubbing, cyanosis, or edema      LABS  Labs:  CAPILLARY BLOOD GLUCOSE                              8.2    9.89  )-----------( 359      ( 2020 06:04 )             26.0       Auto Neutrophil %: 76.1 % (20 @ 06:04)  Auto Immature Granulocyte %: 0.4 % (20 @ 06:04)        129<L>  |  94<L>  |  11  ----------------------------<  57<L>  4.7   |  21  |  0.5<L>      Calcium, Total Serum: 8.3 mg/dL (20 @ 06:04)      LFTs:             6.3  | 1.2  | 24       ------------------[106     ( 2020 06:04 )  3.0  | x    | 21          Lipase:x      Amylase:x             Coags:     21.90  ----< 1.90    ( 2020 06:04 )     37.3            Serum Pro-Brain Natriuretic Peptide: 1944 pg/mL (20 @ 14:15)      Urinalysis Basic - ( 2020 07:40 )    Color: Yellow / Appearance: Clear / S.040 / pH: x  Gluc: x / Ketone: Small  / Bili: Negative / Urobili: 3 mg/dL   Blood: x / Protein: 30 mg/dL / Nitrite: Negative   Leuk Esterase: Negative / RBC: 43 /HPF / WBC 1 /HPF   Sq Epi: x / Non Sq Epi: 2 /HPF / Bacteria: Negative            RADIOLOGY & ADDITIONAL TESTS:    N/A

## 2020-04-19 NOTE — PROGRESS NOTE ADULT - ASSESSMENT
89 yo F, with pmhx of Afib on warfarin, which is supra-therapeutic at 5,  brought to the ED with complaints of LEFT leg discomfort and ecchymosis noted 2 days ago. CT demonstrates Left iliopsoas hematoma measuring 7 cm x 4 cm with concern of active bleed.    -No surgical intervention  -Trend HGB  -Keep ace bandage around left thigh for compression

## 2020-04-19 NOTE — PHYSICAL THERAPY INITIAL EVALUATION ADULT - GENERAL OBSERVATIONS, REHAB EVAL
PT IE completed. Patient encountered supine in bed, CONT ISO (ORSA Urine), in NAD, +IV lock, +wound dressing on B lower legs due to +weeping, [place], +ecchymosis on L thigh area, agreeable to therapy. Patient c/o severe pain on L knee, unable to quantify.

## 2020-04-19 NOTE — PHYSICAL THERAPY INITIAL EVALUATION ADULT - PERTINENT HX OF CURRENT PROBLEM, REHAB EVAL
89 year old female who presented to the ED with a cc/o Left Lower Extremity Pain associated with ecchymosis and weeping x2 days, Left iliopsoas hematoma. s/p K-centra and Vitamin K in ED with concern for active bleeding

## 2020-04-19 NOTE — PHYSICAL THERAPY INITIAL EVALUATION ADULT - PRECAUTIONS/LIMITATIONS, REHAB EVAL
[place]/aspiration precautions/cardiac precautions/fall precautions/isolation precautions [place]SA Urine, [place]/aspiration precautions/cardiac precautions/fall precautions/isolation precautions

## 2020-04-19 NOTE — PHYSICAL THERAPY INITIAL EVALUATION ADULT - PLANNED THERAPY INTERVENTIONS, PT EVAL
strengthening/balance training/gait training/postural re-education/transfer training/bed mobility training

## 2020-04-19 NOTE — PHYSICAL THERAPY INITIAL EVALUATION ADULT - IMPAIRMENTS FOUND, PT EVAL
poor safety awareness/aerobic capacity/endurance/ergonomics and body mechanics/gait, locomotion, and balance/joint integrity and mobility/muscle strength

## 2020-04-20 LAB
ALBUMIN SERPL ELPH-MCNC: 2.5 G/DL — LOW (ref 3.5–5.2)
ALP SERPL-CCNC: 111 U/L — SIGNIFICANT CHANGE UP (ref 30–115)
ALT FLD-CCNC: 15 U/L — SIGNIFICANT CHANGE UP (ref 0–41)
ANION GAP SERPL CALC-SCNC: 10 MMOL/L — SIGNIFICANT CHANGE UP (ref 7–14)
APTT BLD: 32.8 SEC — SIGNIFICANT CHANGE UP (ref 27–39.2)
AST SERPL-CCNC: 20 U/L — SIGNIFICANT CHANGE UP (ref 0–41)
BASOPHILS # BLD AUTO: 0.01 K/UL — SIGNIFICANT CHANGE UP (ref 0–0.2)
BASOPHILS NFR BLD AUTO: 0.1 % — SIGNIFICANT CHANGE UP (ref 0–1)
BILIRUB SERPL-MCNC: 1.4 MG/DL — HIGH (ref 0.2–1.2)
BLD GP AB SCN SERPL QL: SIGNIFICANT CHANGE UP
BUN SERPL-MCNC: 9 MG/DL — LOW (ref 10–20)
CALCIUM SERPL-MCNC: 7.6 MG/DL — LOW (ref 8.5–10.1)
CHLORIDE SERPL-SCNC: 98 MMOL/L — SIGNIFICANT CHANGE UP (ref 98–110)
CO2 SERPL-SCNC: 22 MMOL/L — SIGNIFICANT CHANGE UP (ref 17–32)
CREAT SERPL-MCNC: <0.5 MG/DL — LOW (ref 0.7–1.5)
CRP SERPL-MCNC: 5 MG/DL — HIGH (ref 0–0.4)
EOSINOPHIL # BLD AUTO: 0.1 K/UL — SIGNIFICANT CHANGE UP (ref 0–0.7)
EOSINOPHIL NFR BLD AUTO: 1.1 % — SIGNIFICANT CHANGE UP (ref 0–8)
FERRITIN SERPL-MCNC: 143 NG/ML — SIGNIFICANT CHANGE UP (ref 15–150)
GLUCOSE SERPL-MCNC: 79 MG/DL — SIGNIFICANT CHANGE UP (ref 70–99)
HCT VFR BLD CALC: 25 % — LOW (ref 37–47)
HGB BLD-MCNC: 7.9 G/DL — LOW (ref 12–16)
IMM GRANULOCYTES NFR BLD AUTO: 0.3 % — SIGNIFICANT CHANGE UP (ref 0.1–0.3)
INR BLD: 1.65 RATIO — HIGH (ref 0.65–1.3)
LDH SERPL L TO P-CCNC: 158 — SIGNIFICANT CHANGE UP (ref 50–242)
LYMPHOCYTES # BLD AUTO: 1.18 K/UL — LOW (ref 1.2–3.4)
LYMPHOCYTES # BLD AUTO: 12.8 % — LOW (ref 20.5–51.1)
MAGNESIUM SERPL-MCNC: 1.9 MG/DL — SIGNIFICANT CHANGE UP (ref 1.8–2.4)
MCHC RBC-ENTMCNC: 23.7 PG — LOW (ref 27–31)
MCHC RBC-ENTMCNC: 31.6 G/DL — LOW (ref 32–37)
MCV RBC AUTO: 74.9 FL — LOW (ref 81–99)
MONOCYTES # BLD AUTO: 1.28 K/UL — HIGH (ref 0.1–0.6)
MONOCYTES NFR BLD AUTO: 13.8 % — HIGH (ref 1.7–9.3)
NEUTROPHILS # BLD AUTO: 6.65 K/UL — HIGH (ref 1.4–6.5)
NEUTROPHILS NFR BLD AUTO: 71.9 % — SIGNIFICANT CHANGE UP (ref 42.2–75.2)
NRBC # BLD: 0 /100 WBCS — SIGNIFICANT CHANGE UP (ref 0–0)
PLATELET # BLD AUTO: 310 K/UL — SIGNIFICANT CHANGE UP (ref 130–400)
POTASSIUM SERPL-MCNC: 4 MMOL/L — SIGNIFICANT CHANGE UP (ref 3.5–5)
POTASSIUM SERPL-SCNC: 4 MMOL/L — SIGNIFICANT CHANGE UP (ref 3.5–5)
PROCALCITONIN SERPL-MCNC: 0.09 NG/ML — SIGNIFICANT CHANGE UP (ref 0.02–0.1)
PROT SERPL-MCNC: 5.7 G/DL — LOW (ref 6–8)
PROTHROM AB SERPL-ACNC: 19 SEC — HIGH (ref 9.95–12.87)
RBC # BLD: 3.34 M/UL — LOW (ref 4.2–5.4)
RBC # FLD: 15.2 % — HIGH (ref 11.5–14.5)
SODIUM SERPL-SCNC: 130 MMOL/L — LOW (ref 135–146)
WBC # BLD: 9.25 K/UL — SIGNIFICANT CHANGE UP (ref 4.8–10.8)
WBC # FLD AUTO: 9.25 K/UL — SIGNIFICANT CHANGE UP (ref 4.8–10.8)

## 2020-04-20 PROCEDURE — 99233 SBSQ HOSP IP/OBS HIGH 50: CPT

## 2020-04-20 PROCEDURE — 99231 SBSQ HOSP IP/OBS SF/LOW 25: CPT

## 2020-04-20 RX ORDER — ASCORBIC ACID 60 MG
500 TABLET,CHEWABLE ORAL
Refills: 0 | Status: DISCONTINUED | OUTPATIENT
Start: 2020-04-20 | End: 2020-04-22

## 2020-04-20 RX ORDER — ZINC SULFATE TAB 220 MG (50 MG ZINC EQUIVALENT) 220 (50 ZN) MG
220 TAB ORAL DAILY
Refills: 0 | Status: DISCONTINUED | OUTPATIENT
Start: 2020-04-20 | End: 2020-04-22

## 2020-04-20 RX ORDER — FAMOTIDINE 10 MG/ML
20 INJECTION INTRAVENOUS DAILY
Refills: 0 | Status: DISCONTINUED | OUTPATIENT
Start: 2020-04-20 | End: 2020-04-22

## 2020-04-20 RX ADMIN — Medication 25 MILLIGRAM(S): at 06:07

## 2020-04-20 RX ADMIN — FAMOTIDINE 20 MILLIGRAM(S): 10 INJECTION INTRAVENOUS at 11:38

## 2020-04-20 RX ADMIN — PANTOPRAZOLE SODIUM 40 MILLIGRAM(S): 20 TABLET, DELAYED RELEASE ORAL at 06:07

## 2020-04-20 RX ADMIN — CHLORHEXIDINE GLUCONATE 1 APPLICATION(S): 213 SOLUTION TOPICAL at 06:07

## 2020-04-20 RX ADMIN — ZINC SULFATE TAB 220 MG (50 MG ZINC EQUIVALENT) 220 MILLIGRAM(S): 220 (50 ZN) TAB at 11:37

## 2020-04-20 RX ADMIN — Medication 500 MILLIGRAM(S): at 17:25

## 2020-04-20 NOTE — CONSULT NOTE ADULT - ATTENDING COMMENTS
bilateral lower leg-->partial and full thickness wounds posterior lower legs---> healing    rec: soap and water qd, xeroform gauze, kerlex wrap, ace wrap dressing change bid    no surgery needed

## 2020-04-20 NOTE — PROGRESS NOTE ADULT - SUBJECTIVE AND OBJECTIVE BOX
History of Present Illness:  Reason for Admission: Left iliopsoas hematoma	  History of Present Illness: 	  PMHx:  Atrial fibrillation (Coumadin); CAD; ?HF; Hearing loss    This is a 89 year old female who presented to the ED on 4/17/2020 with a cc/o Left Lower Extremity Pain associated with ecchymosis and weeping x2 days. The patient likely suffers from dementia, as she was unable to provide appropriate HPI. She was not aware of why she was at the hospital, and simply agreed to everything discussed without providing details. She denied any recent fall and/or trauma. She reported she felt fine and had no active complaints.       Allergies and Intolerances:        Allergies:  	Celebrex: Drug, Unknown, Originally Entered as [Unknown] reaction to [Celebrex]  	penicillins: Drug Category, Unknown, Originally Entered as [Unknown] reaction to [Penicillins]    Home Medications:   * Patient Currently Takes Medications as of 14-Oct-2018 12:09 documented in Structured Notes  · 	warfarin 2.5 mg oral tablet: 1 tab(s) orally once a day  · 	Dilt- mg/24 hours oral capsule, extended release: 1 cap(s) orally once a day  · 	losartan 50 mg oral tablet: 1 tab(s) orally once a day  · 	Vytorin:     .    Patient History:    Past Medical, Past Surgical, and Family History:  PAST MEDICAL HISTORY:  Anemia     Atrial fibrillation     CAD (coronary artery disease)     Coronary artery disease     Dementia     Hearing loss     Hypertension     Peripheral artery disease     Venous stasis ulcers of both lower extremities.    < from: Xray Chest 1 View-PORTABLE IMMEDIATE (04.19.20 @ 19:18) >  PROCEDURE DATE:  04/19/2020        INTERPRETATION:  Clinical History / Reason for exam: Fever    Comparison : Chest radiograph 4/17/2020    Technique/Positioning:  AP portable  .    Findings:    Support devices: None.    Cardiac/mediastinum/hilum: Cardiomegaly and tortuous calcified aorta.    Lung parenchyma/Pleura: Bilateral interstitial prominence. Right lung apex is obscured by the overlying chin. Small bilateral effusions.    Skeleton/soft tissues: Degenerative changes of both shoulders and thoracic spine.    Impression:      Bilateral interstitial prominence and small pleural effusions. This may be secondary to CHF in the appropriate clinical setting. Developing viral pneumonia can have a similar appearance.    < end of copied text >    < from: CT Abdomen and Pelvis No Cont (04.17.20 @ 16:59) >  FINDINGS:    CHEST:    LUNGS/PLEURA/AIRWAYS: No lobar consolidation or pneumothorax. Trace bilateral pleural effusions. Patent central airways. Benign left lower lobe peripheral nodule (series 2 image 34).    MEDIASTINUM/THORACIC NODES: No lymphadenopathy.    HEART/GREAT VESSELS: Cardiomegaly. Mitral annulus calcification, aortic and coronary calcifications. No pericardial effusion. Normal caliber main pulmonary artery and thoracic aorta.      ABDOMEN/PELVIS:    HEPATOBILIARY: Unremarkable.    SPLEEN: Unremarkable.    PANCREAS: Unremarkable.    ADRENAL GLANDS: Unremarkable.    KIDNEYS: No hydronephrosis or obstructing renal calculus.    ABDOMINOPELVIC NODES: Unremarkable.    PELVIC ORGANS: Fibroid uterus.    PERITONEUM/MESENTERY/BOWEL: No bowel obstruction, pneumoperitoneum or ascites. Presacral edema.    BONES/SOFT TISSUES: Diffuse osteopenia and degenerative changes. No acute osseous abnormality. Right hip prosthesis. Grade 1 anterolisthesis of L4 on L5. Increased size left iliopsoas muscle measuring 7.6 cm x 4 cm containing a poorly defined hyperintense area presumably representing hematoma formation.    OTHER: Extensive atherosclerotic changes.    IMPRESSION:    Limited evaluation of solid organs and vascular structures without intravenous contrast.    Left iliopsoas hematoma measuring 7 cm x 4 cm.. Call back request submitted    Trace bilateral pleural effusions.    < end of copied text >    < from: CT Angio Lower Extremity w/ IV Cont, Left (04.17.20 @ 16:59) >  FINDINGS:      CT ANGIOGRAPHY:    ABDOMINAL AORTA:     Atherosclerotic calcifications of the abdominal aorta and iliac arteries, without significant stenosis.    LEFT LEG:    There is a 3 mm focus of hyperdensity within the iliacus muscle (series 3 image 36, series 400 image 59, series 401 image 52) compatible with active extravasation and/or pseudoaneurysm. There is a intramuscular hematoma within the iliacus muscle measuring approximately 2.2 x 2.5 x 5.2 (series 3 image 21, series 400 image 63).    Atherosclerotic calcifications without significant stenosis.    Patent common, external and internal iliac arteries.  Patent superficial femoral, profunda femoris, and popliteal arteries.     Soft tissue edema in the left calf.    Partially visualized pelvis demonstrate fibroid uterus.       IMPRESSION:    1.  3 mm focus of hyperdensity within the left iliacus muscle, compatible with active extravasation and/or pseudoaneurysm. 5 cm left iliacus intramuscular hematoma.  < from: CT Angio Lower Extremity w/ IV Cont, Left (04.17.20 @ 16:59) >  *** ADDENDUM 04/17/2020  ***    Dr Carolina Jennings discussed findings 3 mm focus of arterial extravasation in the left iliacus muscle, and 5 cm intramuscular hematoma with Dr. Rolando Hunter from the emergency room on 4/17/2020 at 6:02 PM. Patient has ecchymosis on the left upper thigh and currently on anticoagulation.      *** END OF ADDENDUM 04/17/2020  ***    < end of copied text >      LABS:               7.9    9.25  )-----------( 310      ( 20 Apr 2020 06:25 )             25.0     04-20    130<L>  |  98  |  9<L>  ----------------------------<  79  4.0   |  22  |  <0.5<L>    Ca    7.6<L>      20 Apr 2020 06:25  Mg     1.9     04-20    TPro  5.7<L>  /  Alb  2.5<L>  /  TBili  1.4<H>  /  DBili  x   /  AST  20  /  ALT  15  /  AlkPhos  111  04-20    PT/INR - ( 20 Apr 2020 06:25 )   PT: 19.00 sec;   INR: 1.65 ratio         PTT - ( 20 Apr 2020 06:25 )  PTT:32.8 sec  BNP 4/17 = 1944  4/19: CRP = 5           FERRITIN = 143           PROCALCITONIN = 0.09            Vital Signs Last 24 Hrs  T(C): 36.2 (20 Apr 2020 16:19), Max: 37.8 (20 Apr 2020 05:01)  T(F): 97.2 (20 Apr 2020 16:19), Max: 100.1 (20 Apr 2020 05:01)  HR: 77 (20 Apr 2020 16:19) (73 - 79)  BP: 141/61 (20 Apr 2020 16:19) (122/78 - 145/64)  BP(mean): --  RR: 20 (20 Apr 2020 16:19) (20 - 20)  SpO2: 99% (20 Apr 2020 16:19) (97% - 99%)    MEDICATIONS  (STANDING):  ascorbic acid 500 milliGRAM(s) Oral two times a day  chlorhexidine 4% Liquid 1 Application(s) Topical <User Schedule>  famotidine    Tablet 20 milliGRAM(s) Oral daily  metoprolol succinate ER 25 milliGRAM(s) Oral daily  zinc sulfate 220 milliGRAM(s) Oral daily    MEDICATIONS  (PRN):  acetaminophen   Tablet .. 650 milliGRAM(s) Oral every 6 hours PRN Temp greater or equal to 38C (100.4F)      PE:    the patient is alert, pleasant, very [place], in NAD  Skin - very dry  Lungs - decreased bs at bases, few rales at bases R > L  Heart - irregular, HR= 72, gr II - III/VI MARINA at L 2nd ICS ----> LSB  Abdomen - soft, bs+, non-tender  Extremities - b/l LE's are edematous, wrapped with ACE wraps to knees    R/O COVID19      nasopharyngeal swab was done 4/19/2020      monitor labs, markers, pulse ox, VS      ID consult if +    LLE and L ILEOPSOAS HEMATOMAS:      patient was seen by Surgery, no intervention      monitor CBC, keep T & S active    A FIB / mild HF:      continue Toprol XL 25mg po daily      No anticoagulation      avoid fluid overload      Cardiology follow up    VENOUS STASIS ULCERS:     venous doppler b/l LE's was NEG 4/17/2020     patient was seen by Burn     rec: soap and water qd, xeroform gauze, kerlex wrap, ace wrap dressing change bid

## 2020-04-20 NOTE — PROGRESS NOTE ADULT - SUBJECTIVE AND OBJECTIVE BOX
89 yo F, with pmhx of Afib on warfarin, which is supra-therapeutic at 5,  brought to the ED with complaints of LEFT leg discomfort and ecchymosis noted 2 days ago. CT demonstrates Left iliopsoas hematoma measuring 7 cm x 4 cm with concern of active bleed.   INR was reversed with VIt K, pt was consulted by surgery, no intervention recommended. While in the hospital pt was spiking fever, repeat CXR showed b/l opacities, will send swab for COVID 19 .  Today pt is calm, awake, she is c/o left thigh pain and weakness.     Vital Signs Last 24 Hrs  T(C): 36.3 (2020 14:05), Max: 38.1 (2020 18:06)  T(F): 97.3 (2020 14:05), Max: 100.6 (2020 18:06)  HR: 73 (2020 14:05) (73 - 80)  BP: 145/64 (2020 14:05) (122/78 - 145/66)  BP(mean): --  RR: 20 (2020 14:05) (20 - 20)  SpO2: 97% (2020 07:30) (96% - 97%)    PHYSICAL EXAM:  GENERAL: NAD, frail elderly female with temporal muscle waisting   NECK: supple, no JVD  CHEST/LUNG: decreased BS at bases   ABDOMEN : SOFT, NT, ND, BS present   EXTREMITIES:  Large area of ecchymosis at the LEFT medial thigh extending to  past her knee; area is soft, mild induration noted to posterior thigh; mild tenderness, Lower ankles wrapped secondary to b/l weeping ulcers at the ankle, statin dermatitis noted on LE b/l       LABS                          7.9    9.25  )-----------( 310      ( 2020 06:25 )             25.0   04-20    130<L>  |  98  |  9<L>  ----------------------------<  79  4.0   |  22  |  <0.5<L>    Ca    7.6<L>      2020 06:25  Mg     1.9     04-20    TPro  5.7<L>  /  Alb  2.5<L>  /  TBili  1.4<H>  /  DBili  x   /  AST  20  /  ALT  15  /  AlkPhos  111        INR: 1.90: Recommended ranges for therapeutic INR:    2.0-3.0 for most medical and surgical thromboembolic states    2.0-3.0 for atrial fibrillation    2.0-3.0 for bileaflet mechanical valve in aortic position    2.5-3.5 for mechanical heart valves    Chest 2004;[id number]  The presence of direct thrombin inhibitors (argatroban, refludan)  may falsely increase results. ratio (20 @ 06:04)      TPro  6.3  /  Alb  3.0<L>  /  TBili  1.2  /  DBili  x   /  AST  24  /  ALT  21  /  AlkPhos  106    Serum Pro-Brain Natriuretic Peptide: 1944 pg/mL (20 @ 14:15)      Urinalysis Basic - ( 2020 07:40 )    Color: Yellow / Appearance: Clear / S.040 / pH: x  Gluc: x / Ketone: Small  / Bili: Negative / Urobili: 3 mg/dL   Blood: x / Protein: 30 mg/dL / Nitrite: Negative   Leuk Esterase: Negative / RBC: 43 /HPF / WBC 1 /HPF   Sq Epi: x / Non Sq Epi: 2 /HPF / Bacteria: Negative      RADIOLOGY & ADDITIONAL TESTS:  < from: CT Abdomen and Pelvis No Cont (20 @ 16:59) >  IMPRESSION:    Limited evaluation of solid organs and vascular structures without intravenous contrast.    Left iliopsoas hematoma measuring 7 cm x 4 cm.. Call back request submitted    Trace bilateral pleural effusions.    < end of copied text >    < from: CT Head No Cont (20 @ 16:59) >  IMPRESSION:    No CT evidence of acute intracranial pathology.    Chronic microvascular ischemic changes.    < end of copied text >    < from: CT Angio Lower Extremity w/ IV Cont, Left (20 @ 16:59) >  IMPRESSION:    1.  3 mm focus of hyperdensity within the left iliacus muscle, compatible with active extravasation and/or pseudoaneurysm. 5 cm left iliacus intramuscular hematoma.    2.  Soft tissue edema in the left calf.    3.  Atherosclerotic calcifications, without significant stenosis.  < from: Xray Chest 1 View-PORTABLE IMMEDIATE (20 @ 19:18) >  Impression:      Bilateral interstitial prominence and small pleural effusions. This may be secondary to CHF in the appropriate clinical setting. Developing viral pneumonia can have a similar appearance.    < end of copied text >      MEDICATIONS  (STANDING):  ascorbic acid 500 milliGRAM(s) Oral two times a day  chlorhexidine 4% Liquid 1 Application(s) Topical <User Schedule>  famotidine    Tablet 20 milliGRAM(s) Oral daily  metoprolol succinate ER 25 milliGRAM(s) Oral daily  sodium chloride 0.9%. 1000 milliLiter(s) (75 mL/Hr) IV Continuous <Continuous>  zinc sulfate 220 milliGRAM(s) Oral daily    MEDICATIONS  (PRN):  acetaminophen   Tablet .. 650 milliGRAM(s) Oral every 6 hours PRN Temp greater or equal to 38C (100.4F)

## 2020-04-20 NOTE — PROGRESS NOTE ADULT - SUBJECTIVE AND OBJECTIVE BOX
GENERAL SURGERY PROGRESS NOTE     CHAVO DYE  Female  Hospital day :3d  POD:  Procedure:     OVERNIGHT EVENTS: No overnight events, hemoglobin remains stable at 8.6 from 8.2.     T(F): 99.6 (20 @ 21:08), Max: 100.6 (20 @ 18:06)  HR: 74 (20 @ 21:08) (74 - 87)  BP: 122/78 (20 @ 21:08) (122/78 - 183/78)  ABP: --  ABP(mean): --  RR: 20 (20 @ 21:08) (18 - 20)  SpO2: 97% (20 @ 20:17) (96% - 97%)    DIET/FLUIDS: sodium chloride 0.9%. 1000 milliLiter(s) IV Continuous <Continuous>    GI proph:  pantoprazole    Tablet 40 milliGRAM(s) Oral before breakfast    AC/ proph:   ABx:     PHYSICAL EXAM:  GENERAL: NAD, well-appearing  CHEST/LUNG: Clear to auscultation bilaterally  HEART: Regular rate and rhythm  ABDOMEN: Soft, Nontender, Nondistended;   EXTREMITIES:  Left medial thigh ecchymosis, extending past knee. skin is soft, No clubbing, cyanosis, or edema      LABS  Labs:  CAPILLARY BLOOD GLUCOSE                              8.7    7.74  )-----------( 301      ( 2020 06:38 )             26.9       Auto Immature Granulocyte %: 0.5 % (20 @ 06:38)  Auto Neutrophil %: 72.7 % (20 @ 06:38)        132<L>  |  98  |  11  ----------------------------<  66<L>  4.6   |  20  |  0.5<L>      Calcium, Total Serum: 7.8 mg/dL (20 @ 06:38)      LFTs:             6.3  | 1.2  | 24       ------------------[106     ( 2020 06:04 )  3.0  | x    | 21          Lipase:x      Amylase:x             Coags:     21.90  ----< 1.90    ( 2020 06:04 )     37.3            Serum Pro-Brain Natriuretic Peptide: 1944 pg/mL (20 @ 14:15)      Urinalysis Basic - ( 2020 07:40 )    Color: Yellow / Appearance: Clear / S.040 / pH: x  Gluc: x / Ketone: Small  / Bili: Negative / Urobili: 3 mg/dL   Blood: x / Protein: 30 mg/dL / Nitrite: Negative   Leuk Esterase: Negative / RBC: 43 /HPF / WBC 1 /HPF   Sq Epi: x / Non Sq Epi: 2 /HPF / Bacteria: Negative            RADIOLOGY & ADDITIONAL TESTS:

## 2020-04-20 NOTE — PROGRESS NOTE ADULT - ASSESSMENT
89 yo F, with pmhx of Afib on warfarin, which is supra-therapeutic at 5,  brought to the ED with complaints of LEFT leg discomfort and ecchymosis noted 2 days ago. CT demonstrates Left iliopsoas hematoma measuring 7 cm x 4 cm with concern of active bleed.    -No surgical intervention  -Trend HGB  -Keep ace bandage around left thigh for compression

## 2020-04-20 NOTE — PROGRESS NOTE ADULT - ASSESSMENT
89 year old female who presented to the ED with a cc/o Left Lower Extremity Pain associated with ecchymosis and weeping x2 days. The patient likely suffers from dementia, as she was unable to provide me with appropriate HPI. She was not aware of why she was at the hospital, and simply agreed to everything discussed without providing details. She denied any recent fall and/or trauma. She reported she felt fine and had no active complaints.     A/P    # Fever/ abnormal CXR ( suspicious for viral PNA)  - send swab for COVID 19, c/w isolation , add airborne   - start Vitamin C, zinc, Pepcid   - Tylenol for fever, avoid NSAIDs   - aspiration precautions     # Coagulopathy/ Supra therapeutic INR / spontaneous soft tissue hematoma / Acute blood loss anemia   - coumadin held   - INR reversed, pt received vit K   - coagulopathy resolved   - pt was consulted by surgery for extensive soft tissue hematoma, no surgical intervention recommended   - H/H stable  - keep Hb above 7.5 and active type and cross   - from now on will d/c long term anticoagulation ( risk outweighs the benefit, pt is elderly, demented with high risk of fall and h/o significant blood loss due  to spontaneous bleeding)     # Dementia  - pt has a high risk of fall, fall precautions   - supportive care, aspiration precautions   - PT/Rehab   - pt was admitted from home     # HTN  - DASH diet   - start Toprol XL 25 mg Q 24 hours     # h/o A-fib  - start BB  - no anticoagulation from now on ( high risk)   - f/u with PMD and cardiology after discharge     # Elevated BNP/ trace b/l pleural effusions   - likely pt has CHF ( unable to specify, no recent 2Decho is available)  - restrict fluids 1200 ml in 24 hours   - intake and output monitoring   - daily weight  - monitor for fluid overload   - Na level is trending down     # GI prophylaxis   - on Protonix       # DVT prophylaxis     #Progress Note Handoff  Pending (specify): swab for COVID 19, start Vit C, Zinc, Pepcid, Tylenol for fever   Family discussion: I spoke with daughter [phone] today at length   Disposition: Home___/SNF___/Other________/Unknown at this time____x ____

## 2020-04-20 NOTE — CONSULT NOTE ADULT - SUBJECTIVE AND OBJECTIVE BOX
asked to evaluate bilateral lower leg wounds    PE: bilateral lower leg-->partial and full thickness wounds posterior lower legs---> healing

## 2020-04-20 NOTE — PROGRESS NOTE ADULT - SUBJECTIVE AND OBJECTIVE BOX
SUBJECTIVE:    Patient is a 89y old Female who presents with a chief complaint of Left iliopsoas hematoma (20 Apr 2020 02:30)    Currently admitted to medicine with the primary diagnosis of Anemia     Today is hospital day 3d. This morning she is resting comfortably in bed  ROS:     difficult to obtain due to patient's mental status. the patient , however denies shortness of breath, chest pain, abdominal pain, denies leg pain; she reports feeling comfortable    PAST MEDICAL & SURGICAL HISTORY  Venous stasis ulcers of both lower extremities  Peripheral artery disease  Dementia  Hypertension  Hearing loss  Coronary artery disease  CAD (coronary artery disease)  Anemia  Atrial fibrillation      ALLERGIES:  Celebrex (Unknown)  penicillins (Unknown)    MEDICATIONS:  STANDING MEDICATIONS  chlorhexidine 4% Liquid 1 Application(s) Topical <User Schedule>  metoprolol succinate ER 25 milliGRAM(s) Oral daily  pantoprazole    Tablet 40 milliGRAM(s) Oral before breakfast  sodium chloride 0.9%. 1000 milliLiter(s) IV Continuous <Continuous>    PRN MEDICATIONS  acetaminophen   Tablet .. 650 milliGRAM(s) Oral every 6 hours PRN    VITALS:   T(F): 97  HR: 79  BP: 126/61  RR: 20  SpO2: 97%    LABS:  Negative for smoking/alcohol/drug use.                         7.9    9.25  )-----------( 310      ( 20 Apr 2020 06:25 )             25.0     04-20    130<L>  |  98  |  9<L>  ----------------------------<  79  4.0   |  22  |  <0.5<L>    Ca    7.6<L>      20 Apr 2020 06:25  Mg     1.9     04-20    TPro  5.7<L>  /  Alb  2.5<L>  /  TBili  1.4<H>  /  DBili  x   /  AST  20  /  ALT  15  /  AlkPhos  111  04-20    PT/INR - ( 20 Apr 2020 06:25 )   PT: 19.00 sec;   INR: 1.65 ratio         PTT - ( 20 Apr 2020 06:25 )  PTT:32.8 sec          RADIOLOGY:    < from: Xray Chest 1 View-PORTABLE IMMEDIATE (04.19.20 @ 19:18) >  Impression:      Bilateral interstitial prominence and small pleural effusions. This may be secondary to CHF in the appropriate clinical setting. Developing viral pneumonia can have a similar appearance.    < end of copied text >  PHYSICAL EXAM:  GEN: No acute distress  HEENT: normocephalic, atraumatic, aniceteric  LUNGS: Clear to auscultation bilaterally, no rales/wheezing/ rhonchi  HEART: S1/S2 present. RRR, no murmurs  ABD: Soft, non-tender, non-distended. Bowel sounds present  EXT: left medial thigh  ecchymosis  extending past the left knee ; b/l lower extremity ankles wrapped for ulcers (clean dry and intact)  NEURO: AAOX1, normal affect      ASSESSMENT AND PLAN:    # Coagulopathy/ Supra therapeutic INR / spontaneous soft tissue hematoma / Acute blood loss anemia   - coumadin held   - INR reversed, pt received vit K   - coagulopathy resolved   - pt was consulted by surgery for extensive soft tissue hematoma, no surgical intervention recommended   - H/H stable in last 48 hours   - keep Hb above 7.5 and active type and cross   - from now on will d/c long term anticoagulation ( risk outweighs the benefit, pt is elderly, demented with high risk of fall and h/o significant blood loss due to spontaneous bleeding)     # Elevated BNP/ trace b/l pleural effusions   ? likely secondary to CHF (no recent TTE)  - i&o monitoring  - daily weight  - monitor for fluid overload   - Na level is trending down , 130 today    # Dementia  - aax1 today  - pt has a high risk of fall, fall precautions   - supportive care, aspiration precautions   - PT/Rehab      # HTN  - DASH diet   - c/w Metoprolol 25 mg po qd    # h/o A-fib  - c/w metoprolol 25 mg po qd  - no anticoagulation due to risk of bleeding / fall risk        # DVT prophylaxis   # GI PPX: PPI  # Activity: as tolerated    # Dispo: from home SUBJECTIVE:    Patient is a 89y old Female who presents with a chief complaint of Left iliopsoas hematoma (20 Apr 2020 02:30)    Currently admitted to medicine with the primary diagnosis of Anemia     Today is hospital day 3d. This morning she is resting comfortably in bed    ROS:     difficult to obtain due to patient's mental status. the patient , however denies shortness of breath, chest pain, abdominal pain, denies leg pain; she reports feeling comfortable    PAST MEDICAL & SURGICAL HISTORY  Venous stasis ulcers of both lower extremities  Peripheral artery disease  Dementia  Hypertension  Hearing loss  Coronary artery disease  CAD (coronary artery disease)  Anemia  Atrial fibrillation      ALLERGIES:  Celebrex (Unknown)  penicillins (Unknown)    MEDICATIONS:  STANDING MEDICATIONS  chlorhexidine 4% Liquid 1 Application(s) Topical <User Schedule>  metoprolol succinate ER 25 milliGRAM(s) Oral daily  pantoprazole    Tablet 40 milliGRAM(s) Oral before breakfast  sodium chloride 0.9%. 1000 milliLiter(s) IV Continuous <Continuous>    PRN MEDICATIONS  acetaminophen   Tablet .. 650 milliGRAM(s) Oral every 6 hours PRN    VITALS:   T(F): 97  HR: 79  BP: 126/61  RR: 20  SpO2: 97%    LABS:  Negative for smoking/alcohol/drug use.                         7.9    9.25  )-----------( 310      ( 20 Apr 2020 06:25 )             25.0     04-20    130<L>  |  98  |  9<L>  ----------------------------<  79  4.0   |  22  |  <0.5<L>    Ca    7.6<L>      20 Apr 2020 06:25  Mg     1.9     04-20    TPro  5.7<L>  /  Alb  2.5<L>  /  TBili  1.4<H>  /  DBili  x   /  AST  20  /  ALT  15  /  AlkPhos  111  04-20    PT/INR - ( 20 Apr 2020 06:25 )   PT: 19.00 sec;   INR: 1.65 ratio         PTT - ( 20 Apr 2020 06:25 )  PTT:32.8 sec    RADIOLOGY:    < from: Xray Chest 1 View-PORTABLE IMMEDIATE (04.19.20 @ 19:18) >  Impression:      Bilateral interstitial prominence and small pleural effusions. This may be secondary to CHF in the appropriate clinical setting. Developing viral pneumonia can have a similar appearance.    < end of copied text >  PHYSICAL EXAM:  GEN: No acute distress  HEENT: normocephalic, atraumatic, aniceteric  LUNGS: Clear to auscultation bilaterally, no rales/wheezing/ rhonchi  HEART: S1/S2 present. RRR, no murmurs  ABD: Soft, non-tender, non-distended. Bowel sounds present  EXT: left medial thigh  ecchymosis  extending past the left knee ; b/l lower extremity ankles wrapped for ulcers (clean dry and intact)  NEURO: AAOX1, normal affect      ASSESSMENT AND PLAN:    #Fever  ? covid19 vs. infected lower extremity ulcers   - 100.6 F  - worsening CXR   -  U/A negative, f/u urine clx  -  will order inflammatory markers  - start vitamin C, zinc, and pepcid  - burn f/u    # Coagulopathy/ Supra therapeutic INR / spontaneous soft tissue hematoma / Acute blood loss anemia   - coumadin held   - INR reversed, pt received vit K   - coagulopathy resolved   - pt was consulted by surgery for extensive soft tissue hematoma, no surgical intervention recommended   - H/H stable in last 48 hours   - keep Hb above 7.5 and active type and cross   - from now on will d/c long term anticoagulation ( risk outweighs the benefit, pt is elderly, demented with high risk of fall and h/o significant blood loss due to spontaneous bleeding)     # Elevated BNP/ trace b/l pleural effusions   ? likely secondary to CHF (no recent TTE)  - i&o monitoring  - daily weight  - monitor for fluid overload   - Na level is trending down , 130 today    # Dementia  - aax1 today  - pt has a high risk of fall, fall precautions   - supportive care, aspiration precautions   - PT/Rehab      # HTN  - DASH diet   - c/w Metoprolol 25 mg po qd    # h/o A-fib  - c/w metoprolol 25 mg po qd  - no anticoagulation due to risk of bleeding / fall risk      # DVT prophylaxis   # GI PPX: pepcid  # Activity: as tolerated    # Dispo: from home    Handoff: covid19 r/o , f/u burn lower extremity ulcers

## 2020-04-21 LAB
ALBUMIN SERPL ELPH-MCNC: 2.8 G/DL — LOW (ref 3.5–5.2)
ALP SERPL-CCNC: 109 U/L — SIGNIFICANT CHANGE UP (ref 30–115)
ALT FLD-CCNC: 15 U/L — SIGNIFICANT CHANGE UP (ref 0–41)
ANION GAP SERPL CALC-SCNC: 10 MMOL/L — SIGNIFICANT CHANGE UP (ref 7–14)
APTT BLD: 31.8 SEC — SIGNIFICANT CHANGE UP (ref 27–39.2)
AST SERPL-CCNC: 23 U/L — SIGNIFICANT CHANGE UP (ref 0–41)
BASOPHILS # BLD AUTO: 0.02 K/UL — SIGNIFICANT CHANGE UP (ref 0–0.2)
BASOPHILS NFR BLD AUTO: 0.2 % — SIGNIFICANT CHANGE UP (ref 0–1)
BILIRUB SERPL-MCNC: 1.2 MG/DL — SIGNIFICANT CHANGE UP (ref 0.2–1.2)
BUN SERPL-MCNC: 10 MG/DL — SIGNIFICANT CHANGE UP (ref 10–20)
CALCIUM SERPL-MCNC: 7.8 MG/DL — LOW (ref 8.5–10.1)
CHLORIDE SERPL-SCNC: 99 MMOL/L — SIGNIFICANT CHANGE UP (ref 98–110)
CO2 SERPL-SCNC: 22 MMOL/L — SIGNIFICANT CHANGE UP (ref 17–32)
CREAT SERPL-MCNC: <0.5 MG/DL — LOW (ref 0.7–1.5)
CRP SERPL-MCNC: 6.89 MG/DL — HIGH (ref 0–0.4)
CULTURE RESULTS: SIGNIFICANT CHANGE UP
D DIMER BLD IA.RAPID-MCNC: 820 NG/ML DDU — HIGH (ref 0–230)
EOSINOPHIL # BLD AUTO: 0.2 K/UL — SIGNIFICANT CHANGE UP (ref 0–0.7)
EOSINOPHIL NFR BLD AUTO: 2 % — SIGNIFICANT CHANGE UP (ref 0–8)
GLUCOSE SERPL-MCNC: 77 MG/DL — SIGNIFICANT CHANGE UP (ref 70–99)
HCT VFR BLD CALC: 28.2 % — LOW (ref 37–47)
HGB BLD-MCNC: 9.1 G/DL — LOW (ref 12–16)
IMM GRANULOCYTES NFR BLD AUTO: 0.6 % — HIGH (ref 0.1–0.3)
INR BLD: 1.49 RATIO — HIGH (ref 0.65–1.3)
LDH SERPL L TO P-CCNC: 173 — SIGNIFICANT CHANGE UP (ref 50–242)
LYMPHOCYTES # BLD AUTO: 1.03 K/UL — LOW (ref 1.2–3.4)
LYMPHOCYTES # BLD AUTO: 10.3 % — LOW (ref 20.5–51.1)
MCHC RBC-ENTMCNC: 24.9 PG — LOW (ref 27–31)
MCHC RBC-ENTMCNC: 32.3 G/DL — SIGNIFICANT CHANGE UP (ref 32–37)
MCV RBC AUTO: 77 FL — LOW (ref 81–99)
MONOCYTES # BLD AUTO: 1.17 K/UL — HIGH (ref 0.1–0.6)
MONOCYTES NFR BLD AUTO: 11.7 % — HIGH (ref 1.7–9.3)
NEUTROPHILS # BLD AUTO: 7.56 K/UL — HIGH (ref 1.4–6.5)
NEUTROPHILS NFR BLD AUTO: 75.2 % — SIGNIFICANT CHANGE UP (ref 42.2–75.2)
NRBC # BLD: 0 /100 WBCS — SIGNIFICANT CHANGE UP (ref 0–0)
NT-PROBNP SERPL-SCNC: 3921 PG/ML — HIGH (ref 0–300)
PLATELET # BLD AUTO: 323 K/UL — SIGNIFICANT CHANGE UP (ref 130–400)
POTASSIUM SERPL-MCNC: 3.9 MMOL/L — SIGNIFICANT CHANGE UP (ref 3.5–5)
POTASSIUM SERPL-SCNC: 3.9 MMOL/L — SIGNIFICANT CHANGE UP (ref 3.5–5)
PROCALCITONIN SERPL-MCNC: 0.07 NG/ML — SIGNIFICANT CHANGE UP (ref 0.02–0.1)
PROT SERPL-MCNC: 6 G/DL — SIGNIFICANT CHANGE UP (ref 6–8)
PROTHROM AB SERPL-ACNC: 17.1 SEC — HIGH (ref 9.95–12.87)
RBC # BLD: 3.66 M/UL — LOW (ref 4.2–5.4)
RBC # FLD: 15.4 % — HIGH (ref 11.5–14.5)
SARS-COV-2 RNA SPEC QL NAA+PROBE: SIGNIFICANT CHANGE UP
SODIUM SERPL-SCNC: 131 MMOL/L — LOW (ref 135–146)
SPECIMEN SOURCE: SIGNIFICANT CHANGE UP
WBC # BLD: 10.04 K/UL — SIGNIFICANT CHANGE UP (ref 4.8–10.8)
WBC # FLD AUTO: 10.04 K/UL — SIGNIFICANT CHANGE UP (ref 4.8–10.8)

## 2020-04-21 PROCEDURE — 99233 SBSQ HOSP IP/OBS HIGH 50: CPT

## 2020-04-21 RX ORDER — LOSARTAN POTASSIUM 100 MG/1
25 TABLET, FILM COATED ORAL
Refills: 0 | Status: DISCONTINUED | OUTPATIENT
Start: 2020-04-21 | End: 2020-04-22

## 2020-04-21 RX ORDER — FUROSEMIDE 40 MG
20 TABLET ORAL DAILY
Refills: 0 | Status: DISCONTINUED | OUTPATIENT
Start: 2020-04-22 | End: 2020-04-22

## 2020-04-21 RX ORDER — FUROSEMIDE 40 MG
20 TABLET ORAL DAILY
Refills: 0 | Status: DISCONTINUED | OUTPATIENT
Start: 2020-04-21 | End: 2020-04-21

## 2020-04-21 RX ORDER — FUROSEMIDE 40 MG
20 TABLET ORAL ONCE
Refills: 0 | Status: COMPLETED | OUTPATIENT
Start: 2020-04-21 | End: 2020-04-21

## 2020-04-21 RX ADMIN — LOSARTAN POTASSIUM 25 MILLIGRAM(S): 100 TABLET, FILM COATED ORAL at 17:18

## 2020-04-21 RX ADMIN — Medication 25 MILLIGRAM(S): at 05:32

## 2020-04-21 RX ADMIN — Medication 500 MILLIGRAM(S): at 05:32

## 2020-04-21 RX ADMIN — ZINC SULFATE TAB 220 MG (50 MG ZINC EQUIVALENT) 220 MILLIGRAM(S): 220 (50 ZN) TAB at 11:18

## 2020-04-21 RX ADMIN — Medication 20 MILLIGRAM(S): at 17:17

## 2020-04-21 RX ADMIN — Medication 500 MILLIGRAM(S): at 17:18

## 2020-04-21 RX ADMIN — CHLORHEXIDINE GLUCONATE 1 APPLICATION(S): 213 SOLUTION TOPICAL at 05:32

## 2020-04-21 RX ADMIN — FAMOTIDINE 20 MILLIGRAM(S): 10 INJECTION INTRAVENOUS at 11:18

## 2020-04-21 NOTE — PROGRESS NOTE ADULT - SUBJECTIVE AND OBJECTIVE BOX
89 yo F, with pmhx of Afib on warfarin, which is supra-therapeutic at 5,  brought to the ED with complaints of LEFT leg discomfort and ecchymosis noted 2 days ago. CT demonstrates Left iliopsoas hematoma measuring 7 cm x 4 cm with concern of active bleed.   INR was reversed with VIt K, pt was consulted by surgery, no intervention recommended. While in the hospital pt was spiking fever, repeat CXR showed b/l opacities likely due to CHF, she tested negative for  COVID 19, d/c isolation and send off COVID floor.   Today pt is comfortable, she denies any specific complaints.     Vital Signs Last 24 Hrs  T(C): 37.6 (2020 11:53), Max: 37.6 (2020 11:53)  T(F): 99.6 (2020 11:53), Max: 99.6 (2020 11:53)  HR: 82 (2020 11:53) (77 - 84)  BP: 162/69 (2020 11:53) (141/61 - 180/76)  BP(mean): --  RR: 20 (2020 11:53) (19 - 20)  SpO2: 99% (2020 11:53) (99% - 100%)    PHYSICAL EXAM:  GENERAL: NAD, frail elderly female with temporal muscle waisting   NECK: supple, no JVD  CHEST/LUNG: decreased BS at bases   ABDOMEN : SOFT, NT, ND, BS present   EXTREMITIES:  Large area of ecchymosis at the LEFT medial thigh extending to  past her knee; area is soft, mild induration noted to posterior thigh; mild tenderness, Lower ankles wrapped secondary to b/l weeping ulcers at the ankle, statin dermatitis noted on LE b/l       LABS                                        9.1    10.04 )-----------( 323      ( 2020 07:02 )             28.2   04-21    131<L>  |  99  |  10  ----------------------------<  77  3.9   |  22  |  <0.5<L>    Ca    7.8<L>      2020 07:02  Mg     1.9     04-20    TPro  6.0  /  Alb  2.8<L>  /  TBili  1.2  /  DBili  x   /  AST  23  /  ALT  15  /  AlkPhos  109  04-21    Prothrombin Time and INR, Plasma in AM (20 @ 07:02)    Prothrombin Time, Plasma: 17.10 sec    INR: 1.49: Recommended ranges for therapeutic INR:    2.0-3.0 for most medical and surgical thromboembolic states    2.0-3.0 for atrial fibrillation    2.0-3.0 for bileaflet mechanical valve in aortic position    2.5-3.5 for mechanical heart valves    Chest 2004;126:[id number]  The presence of direct thrombin inhibitors (argatroban, refludan)  may falsely increase results. ratio        INR: 1.90: Recommended ranges for therapeutic INR:    2.0-3.0 for most medical and surgical thromboembolic states    2.0-3.0 for atrial fibrillation    2.0-3.0 for bileaflet mechanical valve in aortic position    2.5-3.5 for mechanical heart valves    Chest 2004;126:[id number]  The presence of direct thrombin inhibitors (argatroban, refludan)  may falsely increase results. ratio (20 @ 06:04)      TPro  6.3  /  Alb  3.0<L>  /  TBili  1.2  /  DBili  x   /  AST  24  /  ALT  21  /  AlkPhos  106  04-18  Serum Pro-Brain Natriuretic Peptide: 1944 pg/mL (20 @ 14:15)      Urinalysis Basic - ( 2020 07:40 )    Color: Yellow / Appearance: Clear / S.040 / pH: x  Gluc: x / Ketone: Small  / Bili: Negative / Urobili: 3 mg/dL   Blood: x / Protein: 30 mg/dL / Nitrite: Negative   Leuk Esterase: Negative / RBC: 43 /HPF / WBC 1 /HPF   Sq Epi: x / Non Sq Epi: 2 /HPF / Bacteria: Negative      RADIOLOGY & ADDITIONAL TESTS:  < from: CT Abdomen and Pelvis No Cont (20 @ 16:59) >  IMPRESSION:    Limited evaluation of solid organs and vascular structures without intravenous contrast.    Left iliopsoas hematoma measuring 7 cm x 4 cm.. Call back request submitted    Trace bilateral pleural effusions.    < end of copied text >    < from: CT Head No Cont (20 @ 16:59) >  IMPRESSION:    No CT evidence of acute intracranial pathology.    Chronic microvascular ischemic changes.    < end of copied text >    < from: CT Angio Lower Extremity w/ IV Cont, Left (20 @ 16:59) >  IMPRESSION:    1.  3 mm focus of hyperdensity within the left iliacus muscle, compatible with active extravasation and/or pseudoaneurysm. 5 cm left iliacus intramuscular hematoma.    2.  Soft tissue edema in the left calf.    3.  Atherosclerotic calcifications, without significant stenosis.  < from: Xray Chest 1 View-PORTABLE IMMEDIATE (20 @ 19:18) >  Impression:      Bilateral interstitial prominence and small pleural effusions. This may be secondary to CHF in the appropriate clinical setting. Developing viral pneumonia can have a similar appearance.    < end of copied text >      MEDICATIONS  (STANDING):  ascorbic acid 500 milliGRAM(s) Oral two times a day  chlorhexidine 4% Liquid 1 Application(s) Topical <User Schedule>  famotidine    Tablet 20 milliGRAM(s) Oral daily  furosemide    Tablet 20 milliGRAM(s) Oral daily  furosemide   Injectable 20 milliGRAM(s) IV Push once  losartan 25 milliGRAM(s) Oral <User Schedule>  metoprolol succinate ER 25 milliGRAM(s) Oral daily  zinc sulfate 220 milliGRAM(s) Oral daily    MEDICATIONS  (PRN):  acetaminophen   Tablet .. 650 milliGRAM(s) Oral every 6 hours PRN Temp greater or equal to 38C (100.4F)

## 2020-04-21 NOTE — PROGRESS NOTE ADULT - SUBJECTIVE AND OBJECTIVE BOX
The patient is alert and in no distress, but confused, she was unable to say where she is, "I know where I am, I'm in this place".  Covid NEGATIVE from 4/20.  She has mild HF, pulse ox = 99% RA.   BP is high, labs are stable except BNP = 3421 (increased from 1944 on 4/17)  IV fluids were d/c'd yesterday.  CXR showed small b/l effusionsa and b/l interstitial prominence, could be due to CHF, on 4/19      LABS:               9.1    10.04 )-----------( 323      ( 21 Apr 2020 07:02 )             28.2     04-21    131<L>  |  99  |  10  ----------------------------<  77  3.9   |  22  |  <0.5<L>    Ca    7.8<L>      21 Apr 2020 07:02  Mg     1.9     04-20    TPro  6.0  /  Alb  2.8<L>  /  TBili  1.2  /  DBili  x   /  AST  23  /  ALT  15  /  AlkPhos  109  04-21    PT/INR - ( 21 Apr 2020 07:02 )   PT: 17.10 sec;   INR: 1.49 ratio  (off all anticoagulation), INR was 5 on admission 4/17       PTT - ( 21 Apr 2020 07:02 )  PTT:31.8 sec  BNP = 3921 on 4/21         = 1944 on 4/17    D-Dimer = 820  CRP, ferritin, procalcitonin are pending from today (from 4/19 CRP = 5, ferritin = 143 and procalcitonin = 0.09)  Blood culture negative from 4/19, repeat BC pending from today, urine culture also pending (UA 4/18 showed 30mg protein, trace blood, no bacteria)    COVID NEGATIVE      transfer the patient to a covid negative medical unit when bed is available, to monitor VS, labs, and to get PT/OT to help her improve strength and endurance and   be able to go home and use her walker again; however she lives with her 93 yo  and will need 24 hour care for her safety and wound care LE's    CHF/ a fib:     monitor daily weights, I's and O's; weight today = 62.4kg; weight 4/20 = 62.3kg     start low-dose Lasix 20mg po daily     continue Toprol XL 25mg po daily     resume losartan at 25mg po daily (she was on 50mg but keep lower dose for now since lasix is starting also)     monitor labs, VS, pulse ox     no anticoagulation--very high risk to fall (per son Parker, she has fallen a few times in the past year); has 2 hematomas now that are being monitored, and the patient is extremely  [place] and confused     patient believes her cardiologist is Dr. Longoria; follow up as needed    L THIGH AND L ILEOPSOAS HEMATOMAS:     monitor CBC daily--stable today; Hg = 9.1 (Hg was 7.9 yesterday; last T & S done 4/20)     monitor size     Surgery follow up appreciated    B/L LE VENOUS STASIS ULCERS:     continue daily local wound care as per Burn: wash with soap and water, apply Xeroform + Kerlix + ACE wrap    The patient ate most of her breakfast when she was fed by the nurse, but was too full to eat lunch; she did drink some fluids though;  and Ensure was ordered for her twice a day as supplement. Will ask nursing staff to assist her with meals to make sure she eats and drinks. The patient is alert and in no distress, but confused, she was unable to say where she is, "I know where I am, I'm in this place".  Covid NEGATIVE from 4/20.  She has mild HF, pulse ox = 99% RA.   BP is high, labs are stable except BNP = 3421 (increased from 1944 on 4/17)  IV fluids were d/c'd yesterday.  CXR showed small b/l effusions and b/l interstitial prominence, could be due to CHF, on 4/19      LABS:               9.1    10.04 )-----------( 323      ( 21 Apr 2020 07:02 )             28.2     04-21    131<L>  |  99  |  10  ----------------------------<  77  3.9   |  22  |  <0.5<L>    Ca    7.8<L>      21 Apr 2020 07:02  Mg     1.9     04-20    TPro  6.0  /  Alb  2.8<L>  /  TBili  1.2  /  DBili  x   /  AST  23  /  ALT  15  /  AlkPhos  109  04-21    PT/INR - ( 21 Apr 2020 07:02 )   PT: 17.10 sec;   INR: 1.49 ratio  (off all anticoagulation), INR was 5 on admission 4/17       PTT - ( 21 Apr 2020 07:02 )  PTT:31.8 sec  BNP = 3921 on 4/21         = 1944 on 4/17    D-Dimer = 820  CRP, ferritin, procalcitonin are pending from today (from 4/19 CRP = 5, ferritin = 143 and procalcitonin = 0.09)  Blood culture negative from 4/19, repeat BC pending from today, urine culture also pending (UA 4/18 showed 30mg protein, trace blood, no bacteria)    COVID NEGATIVE      transfer the patient to a covid negative medical unit when bed is available, to monitor VS, labs, and to get PT/OT to help her improve strength and endurance and   be able to go home and use her walker again; however she lives with her 95 yo  and will need 24 hour care for her safety and wound care LE's    CHF/ a fib:     monitor daily weights, I's and O's; weight today = 62.4kg; weight 4/20 = 62.3kg     start low-dose Lasix 20mg po daily     continue Toprol XL 25mg po daily     resume losartan at 25mg po daily (she was on 50mg but keep lower dose for now since lasix is starting also)     monitor labs, VS, pulse ox     no anticoagulation--very high risk to fall (per son Parker, she has fallen a few times in the past year); has 2 hematomas now that are being monitored, and the patient is extremely  [place] and confused     patient believes her cardiologist is Dr. Longoria; follow up as needed    L THIGH AND L ILEOPSOAS HEMATOMAS:     monitor CBC daily--stable today; Hg = 9.1 (Hg was 7.9 yesterday; last T & S done 4/20)     monitor size     Surgery follow up appreciated    B/L LE VENOUS STASIS ULCERS:     continue daily local wound care as per Burn: wash with soap and water, apply Xeroform + Kerlix + ACE wrap    The patient ate most of her breakfast when she was fed by the nurse, but was too full to eat lunch; she did drink some fluids though;  and Ensure was ordered for her twice a day as supplement. Will ask nursing staff to assist her with meals to make sure she eats and drinks.    ADDENDUM:      The patient was seen by Dr. Ferrer, she ordered one dose of IV lasix 20mg today;       has order for 20mg po daily starting tomorrow, may only need it for a few days. Will check labs daily for the next 3 days.      Dr. Ferrer spoke to the patient's daughter-in-law today, writer also spoke to the daughter-in-law; the family is afraid of their mom catching covid if she stays in hospital or goes to SNF, but explained that the patient needs 24 hour care at this time. She will call the  and discuss options re: private hiring to fill in when home care isn't there, or if the family will be able  to stay with her during those times. The patient and her 94 year old  live downstairs from their son and daughter-in-law in a 2-family house. The patient is alert and in no distress, but confused, she was unable to say where she is, "I know where I am, I'm in this place".  Covid NEGATIVE from 4/20.  She has mild HF, pulse ox = 99% RA.   BP is high, labs are stable except BNP = 3421 (increased from 1944 on 4/17)  IV fluids were d/c'd yesterday.  CXR showed small b/l effusions and b/l interstitial prominence, could be due to CHF, on 4/19     Vital Signs Last 24 Hrs  T(C): 37.3 (21 Apr 2020 15:52), Max: 37.6 (21 Apr 2020 11:53)  T(F): 99.2 (21 Apr 2020 15:52), Max: 99.6 (21 Apr 2020 11:53)  HR: 91 (21 Apr 2020 15:52) (79 - 91)  BP: 166/76 (21 Apr 2020 15:52) (158/77 - 180/76)  BP(mean): --  RR: 20 (21 Apr 2020 15:52) (19 - 20)  SpO2: 98% (21 Apr 2020 15:52) (98% - 100%)    LABS:               9.1    10.04 )-----------( 323      ( 21 Apr 2020 07:02 )             28.2     04-21    131<L>  |  99  |  10  ----------------------------<  77  3.9   |  22  |  <0.5<L>    Ca    7.8<L>      21 Apr 2020 07:02  Mg     1.9     04-20    TPro  6.0  /  Alb  2.8<L>  /  TBili  1.2  /  DBili  x   /  AST  23  /  ALT  15  /  AlkPhos  109  04-21    PT/INR - ( 21 Apr 2020 07:02 )   PT: 17.10 sec;   INR: 1.49 ratio  (off all anticoagulation), INR was 5 on admission 4/17       PTT - ( 21 Apr 2020 07:02 )  PTT:31.8 sec  BNP = 3921 on 4/21         = 1944 on 4/17    D-Dimer = 820  CRP, ferritin, procalcitonin are pending from today (from 4/19 CRP = 5, ferritin = 143 and procalcitonin = 0.09)  Blood culture negative from 4/19, repeat BC pending from today, urine culture also pending (UA 4/18 showed 30mg protein, trace blood, no bacteria)    COVID NEGATIVE      transfer the patient to a covid negative medical unit when bed is available, to monitor VS, labs, and to get PT/OT to help her improve strength and endurance and   be able to go home and use her walker again; however she lives with her 95 yo  and will need 24 hour care for her safety and wound care LE's    CHF/ a fib:     monitor daily weights, I's and O's; weight today = 62.4kg; weight 4/20 = 62.3kg     start low-dose Lasix 20mg po daily     continue Toprol XL 25mg po daily     resume losartan at 25mg po daily (she was on 50mg but keep lower dose for now since lasix is starting also)     monitor labs, VS, pulse ox     no anticoagulation--very high risk to fall (per son Parker, she has fallen a few times in the past year); has 2 hematomas now that are being monitored, and the patient is extremely  [place] and confused     patient believes her cardiologist is Dr. Longoria; follow up as needed    L THIGH AND L ILEOPSOAS HEMATOMAS:     monitor CBC daily--stable today; Hg = 9.1 (Hg was 7.9 yesterday; last T & S done 4/20)     monitor size     Surgery follow up appreciated    B/L LE VENOUS STASIS ULCERS:     continue daily local wound care as per Burn: wash with soap and water, apply Xeroform + Kerlix + ACE wrap    The patient ate most of her breakfast when she was fed by the nurse, but was too full to eat lunch; she did drink some fluids though;  and Ensure was ordered for her twice a day as supplement. Will ask nursing staff to assist her with meals to make sure she eats and drinks.    ADDENDUM:      The patient was seen by Dr. Ferrer, she ordered one dose of IV lasix 20mg today;       has order for 20mg po daily starting tomorrow, may only need it for a few days. Will check labs daily for the next 3 days.      Dr. Ferrer spoke to the patient's daughter-in-law today, writer also spoke to the daughter-in-law; the family is afraid of their mom catching covid if she stays in hospital or goes to SNF, but explained that the patient needs 24 hour care at this time. She will call the  and discuss options re: private hiring to fill in when home care isn't there, or if the family will be able  to stay with her during those times. The patient and her 94 year old  live downstairs from their son and daughter-in-law in a 2-family house.

## 2020-04-21 NOTE — DIETITIAN INITIAL EVALUATION ADULT. - REASON INDICATOR FOR ASSESSMENT
Stage I pressure ulcer found on RD screen   Pt overdue from 4/19 due to high volume of critical care pts

## 2020-04-21 NOTE — DIETITIAN INITIAL EVALUATION ADULT. - PHYSICAL APPEARANCE
BMI 26.8. Alert but disoriented; no edema noted; no chewing/swallowing difficulties noted; no GI issues, LBM 4/19; skin- RLE & LLE venous ulcers and stage 1 pressure ulcer to sacrum./overweight

## 2020-04-21 NOTE — DIETITIAN INITIAL EVALUATION ADULT. - CONTINUE CURRENT NUTRITION CARE PLAN
52F with a PMH of nephrolithiasis presents with ureteral stone and UTI.  Will admit to floor for IV hydration, pain control.  Reports hx of stones and UTI.  Urology consult in the am yes Left hydronephrosis, UTI, obstruction in midureter with stone. Scheduled for left ureteroscopy, possible removal of stone and insertion of stent. Procedure, Alternatives, Benefits and Risks discussed, all questions answered. Patient understands and requests to proceed with the plan and procedure.

## 2020-04-21 NOTE — DIETITIAN INITIAL EVALUATION ADULT. - MALNUTRITION
Unable to conduct NFPE d/t limited contact restrictions r/t medical condition and isolation precautions

## 2020-04-21 NOTE — PROGRESS NOTE ADULT - ASSESSMENT
89 year old female who presented to the ED with a cc/o Left Lower Extremity Pain associated with ecchymosis and weeping x2 days. The patient likely suffers from dementia, as she was unable to provide me with appropriate HPI. She was not aware of why she was at the hospital, and simply agreed to everything discussed without providing details. She denied any recent fall and/or trauma. She reported she felt fine and had no active complaints.     A/P    # Fever/ abnormal CXR   - tested negative for  COVID 19, d/c  isolation       # Coagulopathy/ Supra therapeutic INR / spontaneous soft tissue hematoma / Acute blood loss anemia   - coumadin held   - INR reversed, pt received vit K   - coagulopathy resolved   - pt was consulted by surgery for extensive soft tissue hematoma, no surgical intervention recommended   - H/H stable  - keep Hb above 7.5 and active type and cross   - from now on will d/c long term anticoagulation ( risk outweighs the benefit, pt is elderly, demented with high risk of fall and h/o significant blood loss due  to spontaneous bleeding)     # Dementia  - pt has a high risk of fall, fall precautions   - supportive care, aspiration precautions   - PT/Rehab   - pt was admitted from home     # HTN  - DASH diet   - start Toprol XL 25 mg Q 24 hours     # h/o A-fib  - start BB  - no anticoagulation from now on ( high risk)   - f/u with PMD and cardiology after discharge     # Elevated BNP/ trace b/l pleural effusions   - likely pt has CHF ( unable to specify, no recent 2Decho is available)  - restrict fluids 1200 ml in 24 hours   - intake and output monitoring   - daily weight  - monitor for fluid overload   - Na level is trending down, will give one dose of Lasix 20 IV push today   - f/u repeat Na in AM     # GI prophylaxis   - on Protonix       # DVT prophylaxis     #Progress Note Handoff  Pending (specify): send off COVID floor , give on dose of IV Lasix, monitor Na level, PT f/u, d/c planning   Family discussion: I spoke with daughter [phone] today at length on 4/20   Disposition: anticipate discharge in 24 hours, pt needs SNF but family refusing ( I spoke with  today, will contact daughter)

## 2020-04-21 NOTE — DIETITIAN INITIAL EVALUATION ADULT. - OTHER INFO
Pertinent Medical Information: p/w c/o LLE pain. R/O COVID19 nasopharyngeal swab was done 4/19/2020; ID consult if positive. LE and L ileopsoas hematomas: pt was seen by Surgery, no surgical intervention recommended. A FIB / mild HF: avoid fluid overload. Venous stasis ulcers: burn following.     Pertinent Subjective Information: Unable to interview pt due to pt is in isolation precautions for COVID. RD called pt's  who reports pt had good appetite PTA; consuming 3 meals a day. UBW of 175lbs; denies any changes in wt recently. No supplement use reported PTA. NKFA. No cultural/[employer] food preferences. Pt followed regular diet PTA. Per RN/PA, pt currently has poor appetite. Pt had few bits/sips of breakfast meal this morning.  agreed to try Ensure Enlive BID to improve po intake.

## 2020-04-21 NOTE — DIETITIAN INITIAL EVALUATION ADULT. - RD TO REMAIN AVAILABLE
Interventions: meals and snacks, medical food supplement, vitamins and minerals supplement. Monitor/Evaluate: RD to monitor energy intake, diet order, body comp, NFPF./yes

## 2020-04-21 NOTE — PROGRESS NOTE ADULT - ASSESSMENT
Serial cbcs monitor hemoglobin  monitor hematoma size  no surgical intervention at this time   will continue to follow  call surgery team as needed

## 2020-04-21 NOTE — PROGRESS NOTE ADULT - SUBJECTIVE AND OBJECTIVE BOX
CHAVO DYE  89y Female   [id number]    Hospital Day: 5  Post Operative Day:  Procedure:  Patient is a 89y old  Female who presents with a chief complaint of Left iliopsoas hematoma (2020 18:44)    PAST MEDICAL & SURGICAL HISTORY:  Venous stasis ulcers of both lower extremities  Peripheral artery disease  Dementia  Hypertension  Hearing loss  Coronary artery disease  CAD (coronary artery disease)  Anemia  Atrial fibrillation      Events of the Last 24h:none  Vital Signs Last 24 Hrs  T(C): 36.2 (2020 16:19), Max: 37.8 (2020 05:01)  T(F): 97.2 (2020 16:19), Max: 100.1 (2020 05:01)  HR: 77 (2020 16:19) (73 - 79)  BP: 141/61 (2020 16:19) (126/61 - 145/64)  BP(mean): --  RR: 20 (2020 16:19) (20 - 20)  SpO2: 99% (2020 16:19) (97% - 99%)        Diet, Regular (20 @ 18:00)      I&O's Summary   I&O's Detail      MEDICATIONS  (STANDING):  ascorbic acid 500 milliGRAM(s) Oral two times a day  chlorhexidine 4% Liquid 1 Application(s) Topical <User Schedule>  famotidine    Tablet 20 milliGRAM(s) Oral daily  metoprolol succinate ER 25 milliGRAM(s) Oral daily  zinc sulfate 220 milliGRAM(s) Oral daily    MEDICATIONS  (PRN):  acetaminophen   Tablet .. 650 milliGRAM(s) Oral every 6 hours PRN Temp greater or equal to 38C (100.4F)      PHYSICAL EXAM:    GENERAL: NAD    HEENT: NCAT    CHEST/LUNGS: CTAB    HEART: RRR,  No murmurs, rubs, or gallops    ABDOMEN: SNTND +BS    EXTREMITIES:  FROM, No clubbing, cyanosis, or edema, palpable pulse, left thigh hematoma     NEURO: No focal neurological deficits    SKIN: No rashes or lesions    INCISION/WOUNDS:                          7.9    9.25  )-----------( 310      ( 2020 06:25 )             25.0        CBC Full  -  ( 2020 06:25 )  WBC Count : 9.25 K/uL  RBC Count : 3.34 M/uL  Hemoglobin : 7.9 g/dL  Hematocrit : 25.0 %  Platelet Count - Automated : 310 K/uL  Mean Cell Volume : 74.9 fL  Mean Cell Hemoglobin : 23.7 pg  Mean Cell Hemoglobin Concentration : 31.6 g/dL  Auto Neutrophil # : 6.65 K/uL  Auto Lymphocyte # : 1.18 K/uL  Auto Monocyte # : 1.28 K/uL  Auto Eosinophil # : 0.10 K/uL  Auto Basophil # : 0.01 K/uL  Auto Neutrophil % : 71.9 %  Auto Lymphocyte % : 12.8 %  Auto Monocyte % : 13.8 %  Auto Eosinophil % : 1.1 %  Auto Basophil % : 0.1 %               130   |  98    |  9                  Ca: 7.6    BMP:   ----------------------------< 79     M.9   (20 @ 06:25)             4.0    |  22    | <0.5               Ph: x        LFT:     TPro: 5.7 / Alb: 2.5 / TBili: 1.4 / DBili: x / AST: 20 / ALT: 15 / AlkPhos: 111   (20 @ 06:25)    LIVER FUNCTIONS - ( 2020 06:25 )  Alb: 2.5 g/dL / Pro: 5.7 g/dL / ALK PHOS: 111 U/L / ALT: 15 U/L / AST: 20 U/L / GGT: x           PT/INR - ( 2020 06:25 )   PT: 19.00 sec;   INR: 1.65 ratio         PTT - ( 2020 06:25 )  PTT:32.8 sec          < from: CT Abdomen and Pelvis No Cont (20 @ 16:59) >  FINDINGS:    CHEST:    LUNGS/PLEURA/AIRWAYS: No lobar consolidation or pneumothorax. Trace bilateral pleural effusions. Patent central airways. Benign left lower lobe peripheral nodule (series 2 image 34).    MEDIASTINUM/THORACIC NODES: No lymphadenopathy.    HEART/GREAT VESSELS: Cardiomegaly. Mitral annulus calcification, aortic and coronary calcifications. No pericardial effusion. Normal caliber main pulmonary artery and thoracic aorta.      ABDOMEN/PELVIS:    HEPATOBILIARY: Unremarkable.    SPLEEN: Unremarkable.    PANCREAS: Unremarkable.    ADRENAL GLANDS: Unremarkable.    KIDNEYS: No hydronephrosis or obstructing renal calculus.    ABDOMINOPELVIC NODES: Unremarkable.    PELVIC ORGANS: Fibroid uterus.    PERITONEUM/MESENTERY/BOWEL: No bowel obstruction, pneumoperitoneum or ascites. Presacral edema.    BONES/SOFT TISSUES: Diffuse osteopenia and degenerative changes. No acute osseous abnormality. Right hip prosthesis. Grade 1 anterolisthesis of L4 on L5. Increased size left iliopsoas muscle measuring 7.6 cm x 4 cm containing a poorly defined hyperintense area presumably representing hematoma formation.    OTHER: Extensive atherosclerotic changes.    IMPRESSION:    Limited evaluation of solid organs and vascular structures without intravenous contrast.    Left iliopsoas hematoma measuring 7 cm x 4 cm.. Call back request submitted    Trace bilateral pleural effusions.        < end of copied text >

## 2020-04-21 NOTE — DIETITIAN INITIAL EVALUATION ADULT. - ENERGY NEEDS
Calories: 0984-5095 kcal/day (MSJ x 1.2-1.4 AF for stage 1 pressure ulcer to sacrum considered)  Protein: 74-87 g/day (1.2-1.4 g/kg CBW for above reason)  Fluids: 1 ml/kcal or per LIP

## 2020-04-21 NOTE — CHART NOTE - NSCHARTNOTEFT_GEN_A_CORE
I made rounds today with the treatment team including the hospitalist, residents,  nurses and  and discussed the patient's current medical status and discharge  planning needs, and reviewed the chart.    T(C): 37.6 (04-21-20 @ 11:53), Max: 37.6 (04-21-20 @ 11:53)  HR: 82 (04-21-20 @ 11:53) (73 - 84)  BP: 162/69 (04-21-20 @ 11:53) (141/61 - 180/76)  RR: 20 (04-21-20 @ 11:53) (19 - 20)  SpO2: 99% (04-21-20 @ 11:53) (99% - 100%)          I reached out to the patient's health care proxy/ responsible family member-           [     ]  I reached                                     and discussed the patient's medical condition,                   family concerns, and discharge planning           [     ]  I left a message with family               [   x  ]  I personally participated in rounds with the medical team and my resident and discussed the case. My resident reached                   family member/ HCP    son & daghter in law under my direction and supervision  and we reviewed the conversaion.          [     ]  My resident left a message with family under my direction and supervision    The following was discussed:  pt is covid negtve, likely will be sent home , will be seen by PT,OT  will coordinate home services for wound care          [     ] I spent 5-10 minutes on the above discussing medical issues with team members and family and/ or my resident    [     ] I spent 11-20 minutes on the above discussing medical issues with team members and family and/ or my resident    [   x  ] I spent 21-30 minutes on the above discussing medical issues with team members and family and/ or my resident

## 2020-04-22 ENCOUNTER — TRANSCRIPTION ENCOUNTER (OUTPATIENT)
Age: 85
End: 2020-04-22

## 2020-04-22 VITALS
SYSTOLIC BLOOD PRESSURE: 185 MMHG | RESPIRATION RATE: 20 BRPM | DIASTOLIC BLOOD PRESSURE: 76 MMHG | HEART RATE: 62 BPM | TEMPERATURE: 98 F

## 2020-04-22 LAB
ANION GAP SERPL CALC-SCNC: 13 MMOL/L — SIGNIFICANT CHANGE UP (ref 7–14)
BASOPHILS # BLD AUTO: 0.03 K/UL — SIGNIFICANT CHANGE UP (ref 0–0.2)
BASOPHILS NFR BLD AUTO: 0.2 % — SIGNIFICANT CHANGE UP (ref 0–1)
BUN SERPL-MCNC: 10 MG/DL — SIGNIFICANT CHANGE UP (ref 10–20)
CALCIUM SERPL-MCNC: 8.1 MG/DL — LOW (ref 8.5–10.1)
CHLORIDE SERPL-SCNC: 95 MMOL/L — LOW (ref 98–110)
CO2 SERPL-SCNC: 24 MMOL/L — SIGNIFICANT CHANGE UP (ref 17–32)
CREAT SERPL-MCNC: <0.5 MG/DL — LOW (ref 0.7–1.5)
EOSINOPHIL # BLD AUTO: 0.15 K/UL — SIGNIFICANT CHANGE UP (ref 0–0.7)
EOSINOPHIL NFR BLD AUTO: 1.1 % — SIGNIFICANT CHANGE UP (ref 0–8)
FERRITIN SERPL-MCNC: 187 NG/ML — HIGH (ref 15–150)
GLUCOSE SERPL-MCNC: 84 MG/DL — SIGNIFICANT CHANGE UP (ref 70–99)
HCT VFR BLD CALC: 28 % — LOW (ref 37–47)
HGB BLD-MCNC: 8.9 G/DL — LOW (ref 12–16)
IMM GRANULOCYTES NFR BLD AUTO: 0.5 % — HIGH (ref 0.1–0.3)
LYMPHOCYTES # BLD AUTO: 1.34 K/UL — SIGNIFICANT CHANGE UP (ref 1.2–3.4)
LYMPHOCYTES # BLD AUTO: 10.2 % — LOW (ref 20.5–51.1)
MAGNESIUM SERPL-MCNC: 1.8 MG/DL — SIGNIFICANT CHANGE UP (ref 1.8–2.4)
MCHC RBC-ENTMCNC: 23.6 PG — LOW (ref 27–31)
MCHC RBC-ENTMCNC: 31.8 G/DL — LOW (ref 32–37)
MCV RBC AUTO: 74.3 FL — LOW (ref 81–99)
MONOCYTES # BLD AUTO: 1.76 K/UL — HIGH (ref 0.1–0.6)
MONOCYTES NFR BLD AUTO: 13.4 % — HIGH (ref 1.7–9.3)
NEUTROPHILS # BLD AUTO: 9.8 K/UL — HIGH (ref 1.4–6.5)
NEUTROPHILS NFR BLD AUTO: 74.6 % — SIGNIFICANT CHANGE UP (ref 42.2–75.2)
NRBC # BLD: 0 /100 WBCS — SIGNIFICANT CHANGE UP (ref 0–0)
PLATELET # BLD AUTO: 350 K/UL — SIGNIFICANT CHANGE UP (ref 130–400)
POTASSIUM SERPL-MCNC: 3.5 MMOL/L — SIGNIFICANT CHANGE UP (ref 3.5–5)
POTASSIUM SERPL-SCNC: 3.5 MMOL/L — SIGNIFICANT CHANGE UP (ref 3.5–5)
RBC # BLD: 3.77 M/UL — LOW (ref 4.2–5.4)
RBC # FLD: 15.4 % — HIGH (ref 11.5–14.5)
SODIUM SERPL-SCNC: 132 MMOL/L — LOW (ref 135–146)
WBC # BLD: 13.15 K/UL — HIGH (ref 4.8–10.8)
WBC # FLD AUTO: 13.15 K/UL — HIGH (ref 4.8–10.8)

## 2020-04-22 PROCEDURE — 99239 HOSP IP/OBS DSCHRG MGMT >30: CPT

## 2020-04-22 RX ORDER — DILTIAZEM HCL 120 MG
1 CAPSULE, EXT RELEASE 24 HR ORAL
Qty: 0 | Refills: 0 | DISCHARGE

## 2020-04-22 RX ORDER — ASCORBIC ACID 60 MG
1 TABLET,CHEWABLE ORAL
Qty: 0 | Refills: 0 | DISCHARGE
Start: 2020-04-22

## 2020-04-22 RX ORDER — FUROSEMIDE 40 MG
1 TABLET ORAL
Qty: 7 | Refills: 0
Start: 2020-04-22 | End: 2020-04-28

## 2020-04-22 RX ORDER — FAMOTIDINE 10 MG/ML
1 INJECTION INTRAVENOUS
Qty: 30 | Refills: 0
Start: 2020-04-22 | End: 2020-05-21

## 2020-04-22 RX ORDER — FAMOTIDINE 10 MG/ML
1 INJECTION INTRAVENOUS
Qty: 0 | Refills: 0 | DISCHARGE
Start: 2020-04-22

## 2020-04-22 RX ORDER — LOSARTAN POTASSIUM 100 MG/1
1 TABLET, FILM COATED ORAL
Qty: 30 | Refills: 0
Start: 2020-04-22 | End: 2020-05-21

## 2020-04-22 RX ORDER — METOPROLOL TARTRATE 50 MG
1 TABLET ORAL
Qty: 30 | Refills: 0
Start: 2020-04-22 | End: 2020-05-21

## 2020-04-22 RX ORDER — ZINC SULFATE TAB 220 MG (50 MG ZINC EQUIVALENT) 220 (50 ZN) MG
1 TAB ORAL
Qty: 30 | Refills: 0
Start: 2020-04-22 | End: 2020-05-21

## 2020-04-22 RX ORDER — LOSARTAN POTASSIUM 100 MG/1
1 TABLET, FILM COATED ORAL
Qty: 0 | Refills: 0 | DISCHARGE

## 2020-04-22 RX ORDER — ACETAMINOPHEN 500 MG
2 TABLET ORAL
Qty: 0 | Refills: 0 | DISCHARGE
Start: 2020-04-22

## 2020-04-22 RX ORDER — LOSARTAN POTASSIUM 100 MG/1
1 TABLET, FILM COATED ORAL
Qty: 0 | Refills: 0 | DISCHARGE
Start: 2020-04-22

## 2020-04-22 RX ORDER — WARFARIN SODIUM 2.5 MG/1
1 TABLET ORAL
Qty: 0 | Refills: 0 | DISCHARGE

## 2020-04-22 RX ADMIN — FAMOTIDINE 20 MILLIGRAM(S): 10 INJECTION INTRAVENOUS at 12:27

## 2020-04-22 RX ADMIN — Medication 20 MILLIGRAM(S): at 06:02

## 2020-04-22 RX ADMIN — Medication 25 MILLIGRAM(S): at 05:56

## 2020-04-22 RX ADMIN — CHLORHEXIDINE GLUCONATE 1 APPLICATION(S): 213 SOLUTION TOPICAL at 05:31

## 2020-04-22 RX ADMIN — ZINC SULFATE TAB 220 MG (50 MG ZINC EQUIVALENT) 220 MILLIGRAM(S): 220 (50 ZN) TAB at 12:27

## 2020-04-22 RX ADMIN — Medication 500 MILLIGRAM(S): at 05:31

## 2020-04-22 NOTE — PROGRESS NOTE ADULT - SUBJECTIVE AND OBJECTIVE BOX
· Subjective and Objective: 	  The patient is alert and in no distress, but confused, Covid NEGATIVE from 4/20.  She has mild HF, pulse ox = 99% RA.   BP is high, labs are stable except BNP = 3421 (increased from 1944 on 4/17)  IV fluids were d/c'd yesterday.  CXR showed small b/l effusions and b/l interstitial prominence, could be due to CHF, on 4/19   Vital Signs Last 24 Hrs  T(C): 36.8 (22 Apr 2020 08:15), Max: 37.6 (21 Apr 2020 11:53)  T(F): 98.2 (22 Apr 2020 08:15), Max: 99.6 (21 Apr 2020 11:53)  HR: 99 (22 Apr 2020 08:15) (82 - 99)  BP: 160/67 (22 Apr 2020 08:15) (125/71 - 166/76)  BP(mean): --  RR: 20 (22 Apr 2020 08:15) (18 - 20)  SpO2: 99% (22 Apr 2020 08:15) (98% - 99%)                            8.9    13.15 )-----------( 350      ( 22 Apr 2020 06:58 )             28.0     Mean Cell Volume : 74.3 fL  Mean Cell Hemoglobin : 23.6 pg  Mean Cell Hemoglobin Concentration : 31.8 g/dL  Auto Neutrophil # : 9.80 K/uL  Auto Lymphocyte # : 1.34 K/uL  Auto Monocyte # : 1.76 K/uL  Auto Eosinophil # : 0.15 K/uL  Auto Basophil # : 0.03 K/uL  Auto Neutrophil % : 74.6 %  Auto Lymphocyte % : 10.2 %  Auto Monocyte % : 13.4 %  Auto Eosinophil % : 1.1 %  Auto Basophil % : 0.2 %  < from: Xray Chest 1 View-PORTABLE IMMEDIATE (04.19.20 @ 19:18) >  EXAM:  XR CHEST PORTABLE IMMED 1V            PROCEDURE DATE:  04/19/2020            INTERPRETATION:  Clinical History / Reason for exam: Fever    Comparison : Chest radiograph 4/17/2020    Impression:      Bilateral interstitial prominence and small pleural effusions. This may be secondary to CHF in the appropriate clinical setting. Developing viral pneumonia can have a similar appearance.    < end of copied text >      PT/INR - ( 21 Apr 2020 07:02 )   PT: 17.10 sec;   INR: 1.49 ratio  (off all anticoagulation), INR was 5 on admission 4/17       PTT - ( 21 Apr 2020 07:02 )  PTT:31.8 sec  BNP = 3921 on 4/21         = 1944 on 4/17    D-Dimer = 820  CRP, ferritin, procalcitonin are pending from today (from 4/19 CRP = 5, ferritin = 143 and procalcitonin = 0.09)  Blood culture negative from 4/19, repeat BC pending from today, urine culture also pending (UA 4/18 showed 30mg protein, trace blood, no bacteria)

## 2020-04-22 NOTE — DISCHARGE NOTE NURSING/CASE MANAGEMENT/SOCIAL WORK - PATIENT PORTAL LINK FT
You can access the FollowMyHealth Patient Portal offered by [facility] by registering at the following website: [email or web address]. By joining [facility]’s FollowMyHealth portal, you will also be able to view your health information using other applications (apps) compatible with our system.

## 2020-04-22 NOTE — DISCHARGE NOTE PROVIDER - NSDCCPCAREPLAN_GEN_ALL_CORE_FT
PRINCIPAL DISCHARGE DIAGNOSIS  Diagnosis: Hematoma  Assessment and Plan of Treatment: s/p multiple falls on anticoagulation, hematoma ( oozing of blood  under skin )evaluated by surgical team , no intervention, watch for increase in swelling or  pain, fever etc . to follow up with his pmd,s s office in 1 week .      SECONDARY DISCHARGE DIAGNOSES  Diagnosis: Chronic CHF  Assessment and Plan of Treatment: needed iv lasix once , now on lasix ( water pill )  to be continued for 7 days , please observe for swelling increases , weight gain or breathing difficulty , then to get immediate medical attention    Diagnosis: Afib  Assessment and Plan of Treatment: cardizem discontinued and on  metoprolol , heart rate stable , please follow up with cardiologist as advised before , ib not follow up with him in 2 weeks    Diagnosis: Hyponatremia  Assessment and Plan of Treatment: resolved    Diagnosis: Leg ulcer, right, limited to breakdown of skin  Assessment and Plan of Treatment: bilateral venous stasis  ulcers , need to continue wound care with xeroform, DSD and kerlix wrap to vijay le , if gets worse please get medical attaention , may use ace wrap to prevent swelling up , keeps leg elevated on pillows when in bed    Diagnosis: Elevated INR  Assessment and Plan of Treatment: discontinued  Anticoagulation  due to  frequent falls and hematoma due to high inr

## 2020-04-22 NOTE — PROGRESS NOTE ADULT - REASON FOR ADMISSION
Left iliopsoas hematoma

## 2020-04-22 NOTE — DISCHARGE NOTE PROVIDER - CARE PROVIDER_API CALL
Alessandra,   or your cardiologist  Phone: (   )    -  Fax: (   )    -  Follow Up Time:     YOUR PMD,   Phone: (   )    -  Fax: (   )    -  Follow Up Time:

## 2020-04-22 NOTE — CHART NOTE - NSCHARTNOTEFT_GEN_A_CORE
I made rounds today with the treatment team including the hospitalist, residents,  nurses and  and discussed the patient's current medical status and discharge  planning needs, and reviewed the chart.    T(C): 36.2 (04-22-20 @ 12:26), Max: 37.3 (04-21-20 @ 15:52)  HR: 87 (04-22-20 @ 12:26) (84 - 99)  BP: 137/61 (04-22-20 @ 12:26) (125/71 - 166/76)  RR: 20 (04-22-20 @ 12:26) (18 - 20)  SpO2: 97% (04-22-20 @ 12:26) (97% - 99%)          I reached out to the patient's health care proxy/ responsible family member-           [     ]  I reached                                     and discussed the patient's medical condition,                   family concerns, and discharge planning           [     ]  I left a message with family               [   x  ]  I personally participated in rounds with the medical team and my resident and discussed the case. My resident reached                   family member/ HCP    son & daughter in law under my direction and supervision  and we reviewed the conversation.          [     ]  My resident left a message with family under my direction and supervision    The following was discussed:  pt is stable family does not want snf negative covid, want home.          [     ] I spent 5-10 minutes on the above discussing medical issues with team members and family and/ or my resident    [     ] I spent 11-20 minutes on the above discussing medical issues with team members and family and/ or my resident    [  x   ] I spent 21-30 minutes on the above discussing medical issues with team members and family and/ or my resident

## 2020-04-22 NOTE — PROGRESS NOTE ADULT - ASSESSMENT
COVID NEGATIVE     CHF/ a fib: no more anticoagulation        s/p  IV lasix 20mg  yesterday , she is better today , no sob , ox 98% rair      on  20mg po daily starting  today ., may only need it for a few days.     check labs daily.     monitor daily weights, I's and O's; weight today = 62.4kg; weight 4/20 = 62.3kg     continue Toprol XL 25mg po daily     resumed low dose  losartan at 25mg po daily (she was on 50mg but keep lower dose for now since lasix is starting also)     monitor labs, VS, pulse ox     no anticoagulation- presented with supratherapeutic inr , s/p vit k ,  has 2 hematomas  with fall , now that are being monitored, left thigh  and the patient is extremely [place] and confused   and -very high risk to fall (per son Parker, she has fallen a few times in the past year);     patient believes her cardiologist is Dr. Longoria; follow up as needed    L THIGH AND L ILEOPSOAS HEMATOMAS:     monitor CBC daily--stable today; Hg = 9.1 (Hg was 7.9 yesterday; last T & S done 4/20)     monitor size     Surgery follow up appreciated, no indication for surgery ,     B/L SRAVAN VENOUS STASIS ULCERS:     continue daily local wound care as per Burn: wash with soap and water, apply Xeroform + Kerlix + ACE wrap     poor oral intake, can eat better when  bed / assisted to feed .Ensure was ordered for her twice a day as supplement. Will ask nursing staff to assist her with meals to make sure she eats and drinks.  ***covid negative ---   transfer the patient to a covid negative medical unit when bed is available por , to monitor VS, labs, and to get PT/OT to help her improve strength and endurance and   be able to go home and use her walker again; however she lives with her 89 yo  and will need 24 hour care for her safety and wound care LE's        Dr. Ferrer spoke to the patient's daughter-in-law  yesterday,  spoke to the daughter-in-law; the family is afraid of their mom catching covid if she stays in hospital or goes to SNF, but explained that the patient needs 24 hour care at this time. She will call the  and discuss options re: private hiring to fill in when home care isn't there, or if the family will be able  to stay with her during those times. The patient and her 94 year old  live downstairs from their son and daughter-in-law in a 2-family house.     today awaiting for discharge plans from  COVID NEGATIVE     CHF/ a fib: no more anticoagulation        s/p  IV lasix 20mg  yesterday , she is better today , no sob , ox 98% rair      on  20mg po daily starting  today ., may only need it for a few days.     check labs daily.     monitor daily weights, I's and O's; weight today = 62.4kg; weight 4/20 = 62.3kg     continue Toprol XL 25mg po daily     resumed low dose  losartan at 25mg po daily (she was on 50mg but keep lower dose for now since lasix is starting also)     monitor labs, VS, pulse ox     no anticoagulation- presented with supratherapeutic inr , s/p vit k ,  has 2 hematomas  with fall , now that are being monitored, left thigh  and the patient is extremely [place] and confused   and -very high risk to fall (per son Parker, she has fallen a few times in the past year);     patient believes her cardiologist is Dr. Longoria; follow up as needed    L THIGH AND L ILEOPSOAS HEMATOMAS:     monitor CBC daily--stable today; Hg = 9.1 (Hg was 7.9 yesterday; last T & S done 4/20)     monitor size     Surgery follow up appreciated, no indication for surgery ,     B/L SRAVAN VENOUS STASIS ULCERS:     continue daily local wound care as per Burn: wash with soap and water, apply Xeroform + Kerlix + ACE wrap     poor oral intake, can eat better when  bed / assisted to feed .Ensure was ordered for her twice a day as supplement. Will ask nursing staff to assist her with meals to make sure she eats and drinks.  ***covid negative ---   transfer the patient to a covid negative medical unit when bed is available por , to monitor VS, labs, and to get PT/OT to help her improve strength and endurance and   be able to go home and use her walker again; however she lives with her 93 yo  and will need 24 hour care for her safety and wound care LE's        Dr. Ferrer spoke to the patient's daughter-in-law  yesterday,   the family is afraid of their mom catching covid if she stays in hospital or goes to SNF, but explained that the patient needs 24 hour care at this time. She will call the  and discuss options re: private hiring to fill in when home care isn't there, or if the family will be able  to stay with her during those times. The patient and her 94 year old  live downstairs from their son and daughter-in-law in a 2-family house.     today awaiting for discharge plans from  COVID NEGATIVE     CHF/ a fib: no more anticoagulation        s/p  IV lasix 20mg  yesterday , she is better today , no sob , ox 98% rair      on  20mg po daily starting  today ., may only need it for a few days.     check labs daily.     monitor daily weights, I's and O's; weight today = 62.4kg; weight 4/20 = 62.3kg     continue Toprol XL 25mg po daily     resumed low dose  losartan at 25mg po daily (she was on 50mg but keep lower dose for now since lasix is starting also)     monitor labs, VS, pulse ox     no anticoagulation- presented with supratherapeutic inr , s/p vit k ,  has 2 hematomas  with fall , now that are being monitored, left thigh  and the patient is extremely [place] and confused   and -very high risk to fall (per son Parker, she has fallen a few times in the past year);     patient believes her cardiologist is Dr. Longoria; follow up as needed    L THIGH AND L ILEOPSOAS HEMATOMAS:     monitor CBC daily--stable today; Hg = 9.1 (Hg was 7.9 yesterday; last T & S done 4/20)     monitor size     Surgery follow up appreciated, no indication for surgery ,     B/L SRAVAN VENOUS STASIS ULCERS:     continue daily local wound care as per Burn: wash with soap and water, apply Xeroform + Kerlix + ACE wrap     poor oral intake, can eat better when  bed / assisted to feed .Ensure was ordered for her twice a day as supplement. Will ask nursing staff to assist her with meals to make sure she eats and drinks.  ***covid negative ---   transfer the patient to a covid negative medical unit when bed is available por , to monitor VS, labs, and to get PT/OT to help her improve strength and endurance and   be able to go home and use her walker again; however she lives with her 93 yo  and will need 24 hour care for her safety and wound care LE's        Dr. Ferrer spoke to the patient's daughter-in-law  yesterday,   the family is afraid of their mom catching covid if she stays in hospital or goes to SNF, but explained that the patient needs 24 hour care at this time. She will call the  and discuss options re: private hiring to fill in when home care isn't there, or if the family will be able  to stay with her during those times. The patient and her 94 year old  live downstairs from their son and daughter-in-law in a 2-family house.     today awaiting for discharge plans from  . as per discussion with dr Enriquez  we can discharge pt today.

## 2020-04-22 NOTE — DISCHARGE NOTE PROVIDER - NSDCMRMEDTOKEN_GEN_ALL_CORE_FT
acetaminophen 325 mg oral tablet: 2 tab(s) orally every 6 hours, As needed, Temp greater or equal to 38C (100.4F)  ascorbic acid 500 mg oral tablet: 1 tab(s) orally 2 times a day  famotidine 20 mg oral tablet: 1 tab(s) orally once a day  furosemide 20 mg oral tablet: 1 tab(s) orally once a day  losartan 25 mg oral tablet: 1 tab(s) orally once a day   metoprolol succinate 25 mg oral tablet, extended release: 1 tab(s) orally once a day  Vytorin:   zinc sulfate 220 mg oral capsule: 1 cap(s) orally once a day acetaminophen 325 mg oral tablet: 2 tab(s) orally every 6 hours, As needed, Temp greater or equal to 38C (100.4F)  ascorbic acid 500 mg oral tablet: 1 tab(s) orally 2 times a day  Vytorin:   zinc sulfate 220 mg oral capsule: 1 cap(s) orally once a day

## 2020-04-22 NOTE — DISCHARGE NOTE PROVIDER - HOSPITAL COURSE
89 year old female who presented to the ED with a cc/o Left Lower Extremity Pain associated with ecchymosis and weeping x2 days. The patient likely suffers from dementia, as she was unable to provide me with appropriate HPI. She was not aware of why she was at the hospital, and simply agreed to everything discussed without providing details. She denied any recent fall and/or trauma. She reported she felt fine and had no active complaints.             # Fever/ abnormal CXR  and wbc 13,000, blood culture and urine culture was negative from 4/19    - tested negative for  COVID 19, d/c  isolation , fever resolved ,, may be from / pleural effusion              # Coagulopathy/ Supra therapeutic INR / spontaneous soft tissue hematoma / Acute blood loss anemia     - coumadin held     - INR reversed, pt received vit K     - coagulopathy resolved     - pt was consulted by surgery for extensive soft tissue hematoma, no surgical intervention recommended     - H/H stable    - keep Hb above 7.5 and active type and cross     - from now on will d/c long term anticoagulation ( risk outweighs the benefit, pt is elderly, demented with high risk of fall and h/o significant blood loss due  to spontaneous bleeding)         # Dementia    - pt has a high risk of fall, fall precautions     - supportive care, aspiration precautions     - PT/Rehab     - pt was admitted from home         # HTN    - DASH diet     - start Toprol XL 25 mg Q 24 hours ,      resumed low dose  losartan at 25mg po daily (she was on 50mg        # h/o A-fib    - start BB,  cardizem stopped     - no anticoagulation from now on ( high risk)     - f/u with PMD and cardiology after discharge         # Elevated BNP/ trace b/l pleural effusions     - likely pt has CHF ( unable to specify, no recent 2Decho is available)    - restrict fluids 1200 ml in 24 hours     - intake and output monitoring     - daily weight    - monitor for fluid overload     - Na level is trending down, will give one dose of Lasix 20 IV push today     - f/u repeat Na in AM         # GI prophylaxis     - on Protonix      today pt being discharged home ,            , 89 year old female who presented to the ED with a cc/o Left Lower Extremity Pain associated with ecchymosis and weeping x2 days. The patient likely suffers from dementia, as she was unable to provide me with appropriate HPI. She was not aware of why she was at the hospital, and simply agreed to everything discussed without providing details. She denied any recent fall and/or trauma. She reported she felt fine and had no active complaints.             # Fever/ abnormal CXR  and wbc 13,000, blood culture and urine culture was negative from 4/19    - tested negative for  COVID 19, d/c  isolation , fever resolved ,, may be from / pleural effusion              # Coagulopathy/ Supra therapeutic INR / spontaneous soft tissue hematoma / Acute blood loss anemia     - coumadin held     - INR reversed, pt received vit K     - coagulopathy resolved     - pt was consulted by surgery for extensive soft tissue hematoma, no surgical intervention recommended     - H/H stable    - keep Hb above 7.5 and active type and cross     - from now on will d/c long term anticoagulation ( risk outweighs the benefit, pt is elderly, demented with high risk of fall and h/o significant blood loss due  to spontaneous bleeding)         # Dementia    - pt has a high risk of fall, fall precautions     - supportive care, aspiration precautions     - PT/Rehab     - pt was admitted from home         # HTN    - DASH diet     - start Toprol XL 25 mg Q 24 hours ,      resumed low dose  losartan at 25mg po daily (she was on 50mg        # h/o A-fib    - start BB,  cardizem stopped     - no anticoagulation from now on ( high risk)     - f/u with PMD and cardiology after discharge         # Elevated BNP/ trace b/l pleural effusions     - likely pt has CHF ( unable to specify, no recent 2Decho is available)    - restrict fluids 1200 ml in 24 hours     - intake and output monitoring     - daily weight    - monitor for fluid overload     - Na level is trending down, will give one dose of Lasix 20 IV push today     - f/u repeat Na in AM         # GI prophylaxis     - on Protonix  , switch to pepcid at home      today pt being discharged home ,       pt is at her base line mental status , has dementia , alert, follows commands ,   she is covid negative ,  today being discharged home with family, family refused her to go to snf .. she requires, wound care , reminders for adls , feding etc      , 89 year old female who presented to the ED with a cc/o Left Lower Extremity Pain associated with ecchymosis and weeping x2 days. The patient likely suffers from dementia, as she was unable to provide me with appropriate HPI. She was not aware of why she was at the hospital, and simply agreed to everything discussed without providing details. She denied any recent fall and/or trauma. She reported she felt fine and had no active complaints.             # Fever/ abnormal CXR  and wbc 13,000, blood culture and urine culture was negative from 4/19    - tested negative for  COVID 19, d/c  isolation , fever resolved ,, may be from / pleural effusion              # Coagulopathy/ Supra therapeutic INR / spontaneous soft tissue hematoma / Acute blood loss anemia     - coumadin held     - INR reversed, pt received vit K     - coagulopathy resolved     - pt was consulted by surgery for extensive soft tissue hematoma, no surgical intervention recommended     - H/H stable    - keep Hb above 7.5 and active type and cross     - from now on will d/c long term anticoagulation ( risk outweighs the benefit, pt is elderly, demented with high risk of fall and h/o significant blood loss due  to spontaneous bleeding)         # Dementia    - pt has a high risk of fall, fall precautions     - supportive care, aspiration precautions     - PT/Rehab     - pt was admitted from home         # HTN    - DASH diet     - start Toprol XL 25 mg Q 24 hours ,      resumed low dose  losartan at 25mg po daily (she was on 50mg        # h/o A-fib    - start BB,  cardizem stopped     - no anticoagulation from now on ( high risk)     - f/u with PMD and cardiology after discharge         # Elevated BNP/ trace b/l pleural effusions     - likely pt has CHF ( unable to specify, no recent 2Decho is available)    - restrict fluids 1200 ml in 24 hours     - intake and output monitoring     - daily weight    - monitor for fluid overload     - Na level is trending down, will give one dose of Lasix 20 IV push today     - f/u repeat Na in AM         # GI prophylaxis     - on Protonix  , switch to pepcid at home      today pt being discharged home ,       pt is at her base line mental status , has dementia , alert, follows commands ,   she is covid negative ,  today being discharged home with family, family refused her to go to snf .. she requires, wound care , reminders for adls , feding etc      Attending Attestation:    Patient was seen & examined independently. At least 10 systems were reviewed in ROS. All systems reviewed  are within normal limits. Latest vital signs and labs were reviewed today. Case was discussed with house staff in morning rounds for assessment and plan.  Patient is medically stable for discharge . About 36 mins spent on discharge disposition.

## 2020-04-22 NOTE — DISCHARGE NOTE PROVIDER - PROVIDER TOKENS
FREE:[LAST:[[facility]],PHONE:[(   )    -],FAX:[(   )    -],ADDRESS:[or your cardiologist]],FREE:[LAST:[YOUR PMD],PHONE:[(   )    -],FAX:[(   )    -]]

## 2020-04-24 ENCOUNTER — INPATIENT (INPATIENT)
Facility: HOSPITAL | Age: 85
LOS: 2 days | Discharge: NURSING, REHAB OR LONG-TERM CARE | End: 2020-04-27
Attending: INTERNAL MEDICINE | Admitting: INTERNAL MEDICINE
Payer: MEDICARE

## 2020-04-24 VITALS
HEART RATE: 74 BPM | RESPIRATION RATE: 18 BRPM | TEMPERATURE: 97 F | WEIGHT: 190.04 LBS | OXYGEN SATURATION: 98 % | SYSTOLIC BLOOD PRESSURE: 145 MMHG | DIASTOLIC BLOOD PRESSURE: 65 MMHG | HEIGHT: 60 IN

## 2020-04-24 DIAGNOSIS — F03.90 UNSPECIFIED DEMENTIA, UNSPECIFIED SEVERITY, WITHOUT BEHAVIORAL DISTURBANCE, PSYCHOTIC DISTURBANCE, MOOD DISTURBANCE, AND ANXIETY: ICD-10-CM

## 2020-04-24 DIAGNOSIS — R79.89 OTHER SPECIFIED ABNORMAL FINDINGS OF BLOOD CHEMISTRY: ICD-10-CM

## 2020-04-24 DIAGNOSIS — R26.2 DIFFICULTY IN WALKING, NOT ELSEWHERE CLASSIFIED: ICD-10-CM

## 2020-04-24 DIAGNOSIS — I25.10 ATHEROSCLEROTIC HEART DISEASE OF NATIVE CORONARY ARTERY WITHOUT ANGINA PECTORIS: ICD-10-CM

## 2020-04-24 DIAGNOSIS — I11.0 HYPERTENSIVE HEART DISEASE WITH HEART FAILURE: ICD-10-CM

## 2020-04-24 DIAGNOSIS — R50.9 FEVER, UNSPECIFIED: ICD-10-CM

## 2020-04-24 DIAGNOSIS — D64.9 ANEMIA, UNSPECIFIED: ICD-10-CM

## 2020-04-24 DIAGNOSIS — Z88.8 ALLERGY STATUS TO OTHER DRUGS, MEDICAMENTS AND BIOLOGICAL SUBSTANCES STATUS: ICD-10-CM

## 2020-04-24 DIAGNOSIS — I50.32 CHRONIC DIASTOLIC (CONGESTIVE) HEART FAILURE: ICD-10-CM

## 2020-04-24 DIAGNOSIS — Z88.0 ALLERGY STATUS TO PENICILLIN: ICD-10-CM

## 2020-04-24 DIAGNOSIS — Z79.01 LONG TERM (CURRENT) USE OF ANTICOAGULANTS: ICD-10-CM

## 2020-04-24 DIAGNOSIS — D62 ACUTE POSTHEMORRHAGIC ANEMIA: ICD-10-CM

## 2020-04-24 DIAGNOSIS — Z91.81 HISTORY OF FALLING: ICD-10-CM

## 2020-04-24 DIAGNOSIS — H91.90 UNSPECIFIED HEARING LOSS, UNSPECIFIED EAR: ICD-10-CM

## 2020-04-24 DIAGNOSIS — I73.9 PERIPHERAL VASCULAR DISEASE, UNSPECIFIED: ICD-10-CM

## 2020-04-24 DIAGNOSIS — I48.20 CHRONIC ATRIAL FIBRILLATION, UNSPECIFIED: ICD-10-CM

## 2020-04-24 DIAGNOSIS — M79.81 NONTRAUMATIC HEMATOMA OF SOFT TISSUE: ICD-10-CM

## 2020-04-24 DIAGNOSIS — L97.819 NON-PRESSURE CHRONIC ULCER OF OTHER PART OF RIGHT LOWER LEG WITH UNSPECIFIED SEVERITY: ICD-10-CM

## 2020-04-24 DIAGNOSIS — I83.028 VARICOSE VEINS OF LEFT LOWER EXTREMITY WITH ULCER OTHER PART OF LOWER LEG: ICD-10-CM

## 2020-04-24 DIAGNOSIS — L97.829 NON-PRESSURE CHRONIC ULCER OF OTHER PART OF LEFT LOWER LEG WITH UNSPECIFIED SEVERITY: ICD-10-CM

## 2020-04-24 DIAGNOSIS — Z98.890 OTHER SPECIFIED POSTPROCEDURAL STATES: Chronic | ICD-10-CM

## 2020-04-24 DIAGNOSIS — J90 PLEURAL EFFUSION, NOT ELSEWHERE CLASSIFIED: ICD-10-CM

## 2020-04-24 DIAGNOSIS — I50.9 HEART FAILURE, UNSPECIFIED: ICD-10-CM

## 2020-04-24 DIAGNOSIS — F03.90 UNSPECIFIED DEMENTIA WITHOUT BEHAVIORAL DISTURBANCE: ICD-10-CM

## 2020-04-24 DIAGNOSIS — Z90.49 ACQUIRED ABSENCE OF OTHER SPECIFIED PARTS OF DIGESTIVE TRACT: Chronic | ICD-10-CM

## 2020-04-24 DIAGNOSIS — M25.462 EFFUSION, LEFT KNEE: ICD-10-CM

## 2020-04-24 DIAGNOSIS — I87.8 OTHER SPECIFIED DISORDERS OF VEINS: ICD-10-CM

## 2020-04-24 DIAGNOSIS — D68.9 COAGULATION DEFECT, UNSPECIFIED: ICD-10-CM

## 2020-04-24 DIAGNOSIS — L97.911 NON-PRESSURE CHRONIC ULCER OF UNSPECIFIED PART OF RIGHT LOWER LEG LIMITED TO BREAKDOWN OF SKIN: ICD-10-CM

## 2020-04-24 DIAGNOSIS — D50.9 IRON DEFICIENCY ANEMIA, UNSPECIFIED: ICD-10-CM

## 2020-04-24 DIAGNOSIS — I83.018 VARICOSE VEINS OF RIGHT LOWER EXTREMITY WITH ULCER OTHER PART OF LOWER LEG: ICD-10-CM

## 2020-04-24 DIAGNOSIS — M17.12 UNILATERAL PRIMARY OSTEOARTHRITIS, LEFT KNEE: ICD-10-CM

## 2020-04-24 DIAGNOSIS — I48.91 UNSPECIFIED ATRIAL FIBRILLATION: ICD-10-CM

## 2020-04-24 DIAGNOSIS — L97.921 NON-PRESSURE CHRONIC ULCER OF UNSPECIFIED PART OF LEFT LOWER LEG LIMITED TO BREAKDOWN OF SKIN: ICD-10-CM

## 2020-04-24 DIAGNOSIS — E87.1 HYPO-OSMOLALITY AND HYPONATREMIA: ICD-10-CM

## 2020-04-24 PROBLEM — I83.019 VARICOSE VEINS OF RIGHT LOWER EXTREMITY WITH ULCER OF UNSPECIFIED SITE: Chronic | Status: ACTIVE | Noted: 2020-04-18

## 2020-04-24 PROBLEM — I10 ESSENTIAL (PRIMARY) HYPERTENSION: Chronic | Status: ACTIVE | Noted: 2020-04-18

## 2020-04-24 LAB
ALBUMIN SERPL ELPH-MCNC: 3 G/DL — LOW (ref 3.5–5.2)
ALP SERPL-CCNC: 166 U/L — HIGH (ref 30–115)
ALT FLD-CCNC: 43 U/L — HIGH (ref 0–41)
ANION GAP SERPL CALC-SCNC: 11 MMOL/L — SIGNIFICANT CHANGE UP (ref 7–14)
APTT BLD: 29.7 SEC — SIGNIFICANT CHANGE UP (ref 27–39.2)
AST SERPL-CCNC: 61 U/L — HIGH (ref 0–41)
BASOPHILS # BLD AUTO: 0.01 K/UL — SIGNIFICANT CHANGE UP (ref 0–0.2)
BASOPHILS NFR BLD AUTO: 0.1 % — SIGNIFICANT CHANGE UP (ref 0–1)
BILIRUB SERPL-MCNC: 1.1 MG/DL — SIGNIFICANT CHANGE UP (ref 0.2–1.2)
BUN SERPL-MCNC: 24 MG/DL — HIGH (ref 10–20)
CALCIUM SERPL-MCNC: 8.8 MG/DL — SIGNIFICANT CHANGE UP (ref 8.5–10.1)
CHLORIDE SERPL-SCNC: 97 MMOL/L — LOW (ref 98–110)
CO2 SERPL-SCNC: 27 MMOL/L — SIGNIFICANT CHANGE UP (ref 17–32)
CREAT SERPL-MCNC: 0.7 MG/DL — SIGNIFICANT CHANGE UP (ref 0.7–1.5)
EOSINOPHIL # BLD AUTO: 0.19 K/UL — SIGNIFICANT CHANGE UP (ref 0–0.7)
EOSINOPHIL NFR BLD AUTO: 1.8 % — SIGNIFICANT CHANGE UP (ref 0–8)
FIBRINOGEN AG PPP IA-MCNC: 527 MG/DL — HIGH
GLUCOSE SERPL-MCNC: 103 MG/DL — HIGH (ref 70–99)
HCT VFR BLD CALC: 27.3 % — LOW (ref 37–47)
HGB BLD-MCNC: 8.8 G/DL — LOW (ref 12–16)
IMM GRANULOCYTES NFR BLD AUTO: 0.6 % — HIGH (ref 0.1–0.3)
INR BLD: 1.33 RATIO — HIGH (ref 0.65–1.3)
LYMPHOCYTES # BLD AUTO: 1.09 K/UL — LOW (ref 1.2–3.4)
LYMPHOCYTES # BLD AUTO: 10.4 % — LOW (ref 20.5–51.1)
MCHC RBC-ENTMCNC: 23.8 PG — LOW (ref 27–31)
MCHC RBC-ENTMCNC: 32.2 G/DL — SIGNIFICANT CHANGE UP (ref 32–37)
MCV RBC AUTO: 74 FL — LOW (ref 81–99)
MONOCYTES # BLD AUTO: 1.19 K/UL — HIGH (ref 0.1–0.6)
MONOCYTES NFR BLD AUTO: 11.3 % — HIGH (ref 1.7–9.3)
NEUTROPHILS # BLD AUTO: 7.96 K/UL — HIGH (ref 1.4–6.5)
NEUTROPHILS NFR BLD AUTO: 75.8 % — HIGH (ref 42.2–75.2)
NRBC # BLD: 0 /100 WBCS — SIGNIFICANT CHANGE UP (ref 0–0)
PLATELET # BLD AUTO: 410 K/UL — HIGH (ref 130–400)
POTASSIUM SERPL-MCNC: 3.6 MMOL/L — SIGNIFICANT CHANGE UP (ref 3.5–5)
POTASSIUM SERPL-SCNC: 3.6 MMOL/L — SIGNIFICANT CHANGE UP (ref 3.5–5)
PROT SERPL-MCNC: 6.5 G/DL — SIGNIFICANT CHANGE UP (ref 6–8)
PROTHROM AB SERPL-ACNC: 15.3 SEC — HIGH (ref 9.95–12.87)
RBC # BLD: 3.69 M/UL — LOW (ref 4.2–5.4)
RBC # FLD: 15.5 % — HIGH (ref 11.5–14.5)
SODIUM SERPL-SCNC: 135 MMOL/L — SIGNIFICANT CHANGE UP (ref 135–146)
WBC # BLD: 10.5 K/UL — SIGNIFICANT CHANGE UP (ref 4.8–10.8)
WBC # FLD AUTO: 10.5 K/UL — SIGNIFICANT CHANGE UP (ref 4.8–10.8)

## 2020-04-24 PROCEDURE — 72170 X-RAY EXAM OF PELVIS: CPT | Mod: 26

## 2020-04-24 PROCEDURE — 99285 EMERGENCY DEPT VISIT HI MDM: CPT

## 2020-04-24 PROCEDURE — 71045 X-RAY EXAM CHEST 1 VIEW: CPT | Mod: 26

## 2020-04-24 PROCEDURE — 73590 X-RAY EXAM OF LOWER LEG: CPT | Mod: 26,LT

## 2020-04-24 PROCEDURE — 99222 1ST HOSP IP/OBS MODERATE 55: CPT | Mod: AI

## 2020-04-24 PROCEDURE — 73706 CT ANGIO LWR EXTR W/O&W/DYE: CPT | Mod: 26,LT

## 2020-04-24 RX ORDER — ACETAMINOPHEN 500 MG
650 TABLET ORAL EVERY 4 HOURS
Refills: 0 | Status: DISCONTINUED | OUTPATIENT
Start: 2020-04-24 | End: 2020-04-27

## 2020-04-24 RX ORDER — LIDOCAINE 4 G/100G
1 CREAM TOPICAL DAILY
Refills: 0 | Status: DISCONTINUED | OUTPATIENT
Start: 2020-04-24 | End: 2020-04-25

## 2020-04-24 RX ORDER — CHLORHEXIDINE GLUCONATE 213 G/1000ML
1 SOLUTION TOPICAL
Refills: 0 | Status: DISCONTINUED | OUTPATIENT
Start: 2020-04-24 | End: 2020-04-27

## 2020-04-24 RX ORDER — FUROSEMIDE 40 MG
20 TABLET ORAL DAILY
Refills: 0 | Status: DISCONTINUED | OUTPATIENT
Start: 2020-04-24 | End: 2020-04-27

## 2020-04-24 RX ORDER — METOPROLOL TARTRATE 50 MG
25 TABLET ORAL DAILY
Refills: 0 | Status: DISCONTINUED | OUTPATIENT
Start: 2020-04-24 | End: 2020-04-27

## 2020-04-24 RX ORDER — TRAMADOL HYDROCHLORIDE 50 MG/1
25 TABLET ORAL EVERY 6 HOURS
Refills: 0 | Status: DISCONTINUED | OUTPATIENT
Start: 2020-04-24 | End: 2020-04-27

## 2020-04-24 NOTE — H&P ADULT - NSICDXPASTSURGICALHX_GEN_ALL_CORE_FT
PAST SURGICAL HISTORY:  History of appendectomy     S/P ORIF (open reduction internal fixation) fracture RT hip Fracture

## 2020-04-24 NOTE — ED PROVIDER NOTE - NS ED ROS FT
Constitutional: no fever, chills, no recent weight loss, change in appetite or malaise  Eyes: no redness/discharge/pain/vision changes  ENT: no rhinorrhea/ear pain/sore throat  Cardiac: No chest pain, SOB or edema.  Respiratory: No cough or respiratory distress  GI: No nausea, vomiting, diarrhea or abdominal pain.  : No dysuria, frequency, urgency or hematuria  MS: + LLE pain. no loss of ROM, no weakness  Neuro: No headache or weakness. No LOC.  Skin: No skin rash.  Endocrine: No history of thyroid disease or diabetes.  Except as documented in the HPI, all other systems are negative.

## 2020-04-24 NOTE — H&P ADULT - NSICDXPASTMEDICALHX_GEN_ALL_CORE_FT
PAST MEDICAL HISTORY:  Anemia     Atrial fibrillation     CAD (coronary artery disease)     Coronary artery disease     Dementia     Hearing loss     Hypertension     OA (osteoarthritis) Left Knee    Peripheral artery disease     Venous stasis ulcers of both lower extremities

## 2020-04-24 NOTE — H&P ADULT - NSHPLABSRESULTS_GEN_ALL_CORE
8.8    10.50 )-----------( 410      ( 24 Apr 2020 13:14 )             27.3       04-24    135  |  97<L>  |  24<H>  ----------------------------<  103<H>  3.6   |  27  |  0.7    Ca    8.8      24 Apr 2020 13:14    TPro  6.5  /  Alb  3.0<L>  /  TBili  1.1  /  DBili  x   /  AST  61<H>  /  ALT  43<H>  /  AlkPhos  166<H>  04-24      X-ray Tibia + Fibula 2 Views, Left (04.24.20 @ 13:53)    Comparison 4/17/2020  There is decreased soft tissue swelling.  No bony abnormality is seen.  No subcutaneous emphysema is seen.    IMPRESSION:  Decreased soft tissue swelling      X-ray Pelvis AP only (04.24.20 @ 13:53)     No acute fracture or dislocation is seen.  Impression no acute fracture seen        X-ray Chest 1 View-PORTABLE IMMEDIATE (04.24.20 @ 13:53)     No radiographic evidence of acute cardiopulmonary disease.          CT Angio Lower Extremity w/ IV Cont, Left (04.24.20 @ 15:39)     Since 4/17/2020,    Stable appearance of small subacute hematoma within the left iliac muscle. No evidence of active extravasation and/or pseudoaneurysm. (Compare Series 2 Image 23, 4/24/20 with Series 2 Image 54, 4/17/20.)     Diffuse atherosclerotic disease with focal occlusion of the tibio-peroneal trunk with reconstitution distally and three vessel runoff to the foot.    Moderate knee joint effusion. No evidence of acute osseous abnormality.

## 2020-04-24 NOTE — H&P ADULT - HISTORY OF PRESENT ILLNESS
89 year old F with hx of dementia, HTN, CAD, anemia biba for LLE pain. Pt was recently admitted on 04/18 for L iliopsoas hematoma. Surgery consulted/no acute surgical intervention at this time. As per daughter pt had slipped off her recliner last night. Event was witnessed, no head trauma/loc. Pt normally ambulates with walker but has not been able to walk/bare weight today. Limited hx from pt due to dementia. 89 year old F with hx of Dementia, HTN, CAD, AFIB off AC, Chronic Anemia BIBA from home with complaints of for LLE pain and inability to ambulate. Pt was recently admitted at [facility] on 04/18/20 for L iliopsoas hematoma, managed conservatively and discharged home on 4/22/20 as family refused any STR due to fear of patient having COVID-19 infection. As per family patient since discharge had been complaining of persistent LLE pain with difficulty ambulating and had slipped off her recliner the night prior to presentation. Event was witnessed with no LOC or Head Trauma. Patient at baseline normally ambulates with a walker but has not been able to walk/bear weight on the LLE. She had no other complaints.

## 2020-04-24 NOTE — H&P ADULT - ASSESSMENT
89 year old female who presented to the ED with a cc/o Left Lower Extremity Pain associated with ecchymosis and weeping x2 days. The patient likely suffers from dementia, as she was unable to provide me with appropriate HPI. She was not aware of why she was at the hospital, and simply agreed to everything discussed without providing details. She denied any recent fall and/or trauma. She reported she felt fine and had no active complaints.       # Fever/ abnormal CXR  and wbc 13,000, blood culture and urine culture was negative from 4/19  - tested negative for  COVID 19, d/c  isolation , fever resolved ,, may be from / pleural effusion        # Coagulopathy/ Supra therapeutic INR / spontaneous soft tissue hematoma / Acute blood loss anemia   - coumadin held   - INR reversed, pt received vit K   - coagulopathy resolved   - pt was consulted by surgery for extensive soft tissue hematoma, no surgical intervention recommended   - H/H stable  - keep Hb above 7.5 and active type and cross   - from now on will d/c long term anticoagulation ( risk outweighs the benefit, pt is elderly, demented with high risk of fall and h/o significant blood loss due  to spontaneous bleeding)     # Dementia  - pt has a high risk of fall, fall precautions   - supportive care, aspiration precautions   - PT/Rehab   - pt was admitted from home     # HTN  - DASH diet   - start Toprol XL 25 mg Q 24 hours ,    resumed low dose  losartan at 25mg po daily (she was on 50mg    # h/o A-fib  - start BB,  cardizem stopped   - no anticoagulation from now on ( high risk)   - f/u with PMD and cardiology after discharge     # Elevated BNP/ trace b/l pleural effusions   - likely pt has CHF ( unable to specify, no recent 2Decho is available)  - restrict fluids 1200 ml in 24 hours   - intake and output monitoring   - daily weight  - monitor for fluid overload   - Na level is trending down, will give one dose of Lasix 20 IV push today   - f/u repeat Na in AM     # GI prophylaxis   - on Protonix  , switch to pepcid at home    today pt being discharged home ,     pt is at her base line mental status , has dementia , alert, follows commands ,   she is covid negative ,  today being discharged home with family, family refused her to go to snf .. she requires, wound care , reminders for adls , feding etc    Attending Attestation:  Patient was seen & examined independently. At least 10 systems were reviewed in ROS. All systems reviewed  are within normal limits. Latest vital signs and labs were reviewed today. Case was discussed with house staff in morning rounds for assessment and plan.  Patient is medically stable for discharge . About 36 mins spent on discharge disposition.       COVID NEGATIVE     CHF/ a fib: no more anticoagulation        s/p  IV lasix 20mg  yesterday , she is better today , no sob , ox 98% rair      on  20mg po daily starting  today ., may only need it for a few days.     check labs daily.     monitor daily weights, I's and O's; weight today = 62.4kg; weight 4/20 = 62.3kg     continue Toprol XL 25mg po daily     resumed low dose  losartan at 25mg po daily (she was on 50mg but keep lower dose for now since lasix is starting also)     monitor labs, VS, pulse ox     no anticoagulation- presented with supratherapeutic inr , s/p vit k ,  has 2 hematomas  with fall , now that are being monitored, left thigh  and the patient is extremely [place] and confused   and -very high risk to fall (per son Parker, she has fallen a few times in the past year);     patient believes her cardiologist is Dr. Longoria; follow up as needed    L THIGH AND L ILEOPSOAS HEMATOMAS:     monitor CBC daily--stable today; Hg = 9.1 (Hg was 7.9 yesterday; last T & S done 4/20)     monitor size     Surgery follow up appreciated, no indication for surgery ,     B/L LE VENOUS STASIS ULCERS:     continue daily local wound care as per Burn: wash with soap and water, apply Xeroform + Kerlix + ACE wrap        Prophylaxis: SCDs  Code status: Full code    Progress Note Handoff:  Pending consults: PT  Pending Tests: None  Family/Patient discussion: Plan of care discussed with Patient & SonSpeedy.  Disposition: Pending PT evaluation.      Attending: Dr. Catherine Roldan. Spectra 1503. 89 year old F with hx of Dementia, HTN, CAD, AFIB off AC, Chronic Anemia BIBA from home with complaints of for LLE pain and inability to ambulate. Pt was recently admitted at [facility] on 04/18/20 for L iliopsoas hematoma, managed conservatively and discharged home on 4/22/20 as family refused any STR due to fear of patient having COVID-19 infection. As per family patient since discharge had been complaining of persistent LLE pain with difficulty ambulating and had slipped off her recliner the night prior to presentation. Event was witnessed with no LOC or Head Trauma. Patient at baseline normally ambulates with a walker but has not been able to walk/bear weight on the LLE. She had no other complaints.      Assessment & Plan:      1. Ambulatory Dysfunction: due to Left knee pain  X-ray: No acute fracture or dislocation is seen.  CT : Moderate knee joint effusion. No evidence of acute osseous abnormality.  Pain control. Fall precautions.  PT evaluation.      2. L Thigh & L Ileo Psoas Hematomas:   CT: Stable appearance of small subacute hematoma within the left iliac muscle.  No further intervention.      3. Microcytic Anemia:  Hgb stable. h/o Recent blood loss.  Monitor Hgb/Hct.  Follow up Iron studies.      4. Dementia:  Mentation at baseline. No behavioural Disturbance.  Supportive care.      5. HTN:  BP stable on Toprol and Lasix.  Hold Losartan.      6. Chronic A-fib:  Rate controlled.   Off Coumadin.      7. Suspected Chronic HFpEF:  No acute exacerbation.  Continue low dose Lasix.      8. Bilateral LE Venous Stasis Ulcers:   Local wound care: wash with soap and water, apply Xeroform + Kerlix + ACE wrap  Out patient follow up with Burn.        Prophylaxis: SCDs  Code status: Full code    Progress Note Handoff:  Pending consults: PT  Pending Tests: None  Family/Patient discussion: Plan of care discussed with Patient & Son, Speedy.  Disposition: Pending PT evaluation.      Attending: Dr. Catherine Roldan. [address]ra [address]3.

## 2020-04-24 NOTE — ED PROVIDER NOTE - ATTENDING CONTRIBUTION TO CARE
s/p fall.  new onset inability to ambulate.  painful ROM LLE.  xrays reviewed.  no fx seen.  CT ordered to re evaluate hematoma.

## 2020-04-24 NOTE — ED PROVIDER NOTE - OBJECTIVE STATEMENT
89 year old F with hx of dementia, HTN, CAD, anemia biba for LLE pain. Pt was recently admitted on 04/18 for L iliopsoas hematoma. Surgery consulted/no acute surgical intervention at this time. As per daughter pt had slipped off her recliner last night. Event was witnessed, no head trauma/loc. Pt normally ambulates with walker but has not been able to walk/bare weight today. Limited hx from pt due to dementia.

## 2020-04-24 NOTE — ED ADULT NURSE NOTE - PMH
Anemia    Atrial fibrillation    CAD (coronary artery disease)    Coronary artery disease    Dementia    Hearing loss    Hypertension    Peripheral artery disease    Venous stasis ulcers of both lower extremities

## 2020-04-24 NOTE — ED PROVIDER NOTE - PHYSICAL EXAMINATION
CONSTITUTIONAL: + elderly F in nad  NECK: Supple; non-tender; no cervical lymphadenopathy.   CARDIOVASCULAR: Normal S1, S2; no murmurs, rubs, or gallops.   RESPIRATORY: Normal chest excursion with respiration; breath sounds clear and equal bilaterally; no wheezes, rhonchi, or rales.  GI/: Normal bowel sounds; non-distended; non-tender; no palpable organomegaly.   MS: No evidence of trauma or deformity. + ttp to L thigh. No midline spinal ttp. Stable pelvis  Extrem: + b/l LE peripheral edema L > R.  SKIN: + small area of ecchymosis to L medial thigh. + multiple superficial ulcers noted to B/l LE. No cellulitic changes  NEURO/PSYCH: A & O x 1; grossly unremarkable. mood and manner are appropriate. No focal deficits.

## 2020-04-24 NOTE — H&P ADULT - NSHPPHYSICALEXAM_GEN_ALL_CORE
VITALS:  T(F): 97.2 (04-24-20 @ 16:11), Max: 97.2 (04-24-20 @ 16:11)  HR: 77 (04-24-20 @ 16:11) (74 - 77)  BP: 156/68 (04-24-20 @ 16:11) (145/65 - 156/68)  RR: 18 (04-24-20 @ 16:11) (18 - 18)  SpO2: 99% (04-24-20 @ 16:11) (98% - 99%)        GENERAL: NAD, well-developed  HEAD:  Atraumatic, Normocephalic  EYES: conjunctiva and sclera clear  ENMT: Moist mucous membranes  NECK: Supple, Normal thyroid  NERVOUS SYSTEM:  Alert & Oriented X 2, Motor Strength 5/5 B/L upper and lower extremities ROM in the left Knee.  CHEST/LUNG: Clear to auscultation bilaterally; No rales, rhonchi, wheezing, or rubs  HEART: Regular rate and rhythm; No murmurs, rubs, or gallops  ABDOMEN: Soft, Nontender, Full abdomen; Bowel sounds present  EXTREMITIES:  2+ Peripheral Pulses, No clubbing, cyanosis, Bipedal edema L>R. ACE wrap +  LYMPH: No lymphadenopathy noted  SKIN: No rashes or lesions

## 2020-04-24 NOTE — ED ADULT NURSE NOTE - NSIMPLEMENTINTERV_GEN_ALL_ED
Implemented All Fall with Harm Risk Interventions:  [place] to call system. Call bell, personal items and telephone within reach. Instruct patient to call for assistance. Room bathroom lighting operational. Non-slip footwear when patient is off stretcher. Physically safe environment: no spills, clutter or unnecessary equipment. Stretcher in lowest position, wheels locked, appropriate side rails in place. Provide visual cue, wrist band, yellow gown, etc. Monitor gait and stability. Monitor for mental status changes and reorient to person, place, and time. Review medications for side effects contributing to fall risk. Reinforce activity limits and safety measures with patient and family. Provide visual clues: red socks.

## 2020-04-25 LAB
ALBUMIN SERPL ELPH-MCNC: 2.7 G/DL — LOW (ref 3.5–5.2)
ALP SERPL-CCNC: 160 U/L — HIGH (ref 30–115)
ALT FLD-CCNC: 41 U/L — SIGNIFICANT CHANGE UP (ref 0–41)
ANION GAP SERPL CALC-SCNC: 9 MMOL/L — SIGNIFICANT CHANGE UP (ref 7–14)
APPEARANCE UR: CLEAR — SIGNIFICANT CHANGE UP
AST SERPL-CCNC: 66 U/L — HIGH (ref 0–41)
BACTERIA # UR AUTO: ABNORMAL
BASOPHILS # BLD AUTO: 0.02 K/UL — SIGNIFICANT CHANGE UP (ref 0–0.2)
BASOPHILS NFR BLD AUTO: 0.2 % — SIGNIFICANT CHANGE UP (ref 0–1)
BILIRUB SERPL-MCNC: 0.9 MG/DL — SIGNIFICANT CHANGE UP (ref 0.2–1.2)
BILIRUB UR-MCNC: ABNORMAL
BUN SERPL-MCNC: 15 MG/DL — SIGNIFICANT CHANGE UP (ref 10–20)
CALCIUM SERPL-MCNC: 8.3 MG/DL — LOW (ref 8.5–10.1)
CHLORIDE SERPL-SCNC: 98 MMOL/L — SIGNIFICANT CHANGE UP (ref 98–110)
CO2 SERPL-SCNC: 27 MMOL/L — SIGNIFICANT CHANGE UP (ref 17–32)
COD CRY URNS QL: ABNORMAL
COLOR SPEC: SIGNIFICANT CHANGE UP
CREAT SERPL-MCNC: 0.5 MG/DL — LOW (ref 0.7–1.5)
CULTURE RESULTS: SIGNIFICANT CHANGE UP
DIFF PNL FLD: NEGATIVE — SIGNIFICANT CHANGE UP
EOSINOPHIL # BLD AUTO: 0.24 K/UL — SIGNIFICANT CHANGE UP (ref 0–0.7)
EOSINOPHIL NFR BLD AUTO: 2.6 % — SIGNIFICANT CHANGE UP (ref 0–8)
EPI CELLS # UR: ABNORMAL /HPF
GLUCOSE SERPL-MCNC: 88 MG/DL — SIGNIFICANT CHANGE UP (ref 70–99)
GLUCOSE UR QL: NEGATIVE MG/DL — SIGNIFICANT CHANGE UP
HCT VFR BLD CALC: 25.7 % — LOW (ref 37–47)
HGB BLD-MCNC: 8.1 G/DL — LOW (ref 12–16)
IMM GRANULOCYTES NFR BLD AUTO: 0.4 % — HIGH (ref 0.1–0.3)
KETONES UR-MCNC: NEGATIVE — SIGNIFICANT CHANGE UP
LEUKOCYTE ESTERASE UR-ACNC: NEGATIVE — SIGNIFICANT CHANGE UP
LYMPHOCYTES # BLD AUTO: 1.27 K/UL — SIGNIFICANT CHANGE UP (ref 1.2–3.4)
LYMPHOCYTES # BLD AUTO: 13.8 % — LOW (ref 20.5–51.1)
MCHC RBC-ENTMCNC: 23.8 PG — LOW (ref 27–31)
MCHC RBC-ENTMCNC: 31.5 G/DL — LOW (ref 32–37)
MCV RBC AUTO: 75.4 FL — LOW (ref 81–99)
MONOCYTES # BLD AUTO: 1.14 K/UL — HIGH (ref 0.1–0.6)
MONOCYTES NFR BLD AUTO: 12.4 % — HIGH (ref 1.7–9.3)
NEUTROPHILS # BLD AUTO: 6.48 K/UL — SIGNIFICANT CHANGE UP (ref 1.4–6.5)
NEUTROPHILS NFR BLD AUTO: 70.6 % — SIGNIFICANT CHANGE UP (ref 42.2–75.2)
NITRITE UR-MCNC: NEGATIVE — SIGNIFICANT CHANGE UP
NRBC # BLD: 0 /100 WBCS — SIGNIFICANT CHANGE UP (ref 0–0)
PH UR: 5.5 — SIGNIFICANT CHANGE UP (ref 5–8)
PLATELET # BLD AUTO: 383 K/UL — SIGNIFICANT CHANGE UP (ref 130–400)
POTASSIUM SERPL-MCNC: 3.6 MMOL/L — SIGNIFICANT CHANGE UP (ref 3.5–5)
POTASSIUM SERPL-SCNC: 3.6 MMOL/L — SIGNIFICANT CHANGE UP (ref 3.5–5)
PROT SERPL-MCNC: 5.9 G/DL — LOW (ref 6–8)
PROT UR-MCNC: 30 MG/DL
RBC # BLD: 3.41 M/UL — LOW (ref 4.2–5.4)
RBC # FLD: 15.7 % — HIGH (ref 11.5–14.5)
SODIUM SERPL-SCNC: 134 MMOL/L — LOW (ref 135–146)
SP GR SPEC: 1.01 — SIGNIFICANT CHANGE UP (ref 1.01–1.03)
SPECIMEN SOURCE: SIGNIFICANT CHANGE UP
UROBILINOGEN FLD QL: 4 MG/DL (ref 0.2–0.2)
WBC # BLD: 9.19 K/UL — SIGNIFICANT CHANGE UP (ref 4.8–10.8)
WBC # FLD AUTO: 9.19 K/UL — SIGNIFICANT CHANGE UP (ref 4.8–10.8)

## 2020-04-25 PROCEDURE — 99233 SBSQ HOSP IP/OBS HIGH 50: CPT

## 2020-04-25 RX ORDER — LIDOCAINE 4 G/100G
1 CREAM TOPICAL DAILY
Refills: 0 | Status: DISCONTINUED | OUTPATIENT
Start: 2020-04-25 | End: 2020-04-27

## 2020-04-25 RX ADMIN — Medication 20 MILLIGRAM(S): at 06:05

## 2020-04-25 RX ADMIN — LIDOCAINE 1 PATCH: 4 CREAM TOPICAL at 14:34

## 2020-04-25 RX ADMIN — Medication 650 MILLIGRAM(S): at 09:11

## 2020-04-25 RX ADMIN — CHLORHEXIDINE GLUCONATE 1 APPLICATION(S): 213 SOLUTION TOPICAL at 06:04

## 2020-04-25 RX ADMIN — Medication 25 MILLIGRAM(S): at 06:05

## 2020-04-25 NOTE — PHYSICAL THERAPY INITIAL EVALUATION ADULT - IMPAIRED TRANSFERS: SIT/STAND, REHAB EVAL
decreased ROM/decreased strength/decreased endurance/impaired postural control/impaired balance/cognition

## 2020-04-25 NOTE — PHYSICAL THERAPY INITIAL EVALUATION ADULT - GAIT DEVIATIONS NOTED, PT EVAL
increased time in double stance/decreased siobhan/decreased step length/decreased weight-shifting ability

## 2020-04-25 NOTE — PHYSICAL THERAPY INITIAL EVALUATION ADULT - PHYSICAL ASSIST/NONPHYSICAL ASSIST: GAIT, REHAB EVAL
proper sequencing with rolling walker, upright posture, heel-toe pattern/2 person assist/verbal cues

## 2020-04-25 NOTE — PHYSICAL THERAPY INITIAL EVALUATION ADULT - IMPAIRMENTS CONTRIBUTING TO GAIT DEVIATIONS, PT EVAL
decreased endurance/cognition/impaired postural control/impaired balance/decreased ROM/decreased strength

## 2020-04-25 NOTE — PHYSICAL THERAPY INITIAL EVALUATION ADULT - IMPAIRMENTS FOUND, PT EVAL
gait, locomotion, and balance/ROM/aerobic capacity/endurance/poor safety awareness/posture/muscle strength

## 2020-04-25 NOTE — PHYSICAL THERAPY INITIAL EVALUATION ADULT - GENERAL OBSERVATIONS, REHAB EVAL
10:30-11:00. Chart reviewed; confirmed with RN to see the pt for PT. Pt ready for PT; received in bed with no complain of pain and in NAD. + bilateral feet ace bandages, primafit. Agreeable for PT evaluation.

## 2020-04-25 NOTE — PROGRESS NOTE ADULT - ASSESSMENT
89 year old F with hx of Dementia, HTN, CAD, AFIB off AC, Chronic Anemia BIBA from home with complaints of for LLE pain and inability to ambulate. Pt was recently admitted at [facility] on 04/18/20 for L iliopsoas hematoma, managed conservatively and discharged home on 4/22/20 as family refused any STR due to fear of patient having COVID-19 infection. As per family patient since discharge had been complaining of persistent LLE pain with difficulty ambulating and had slipped off her recliner the night prior to presentation. Event was witnessed with no LOC or Head Trauma. Patient at baseline normally ambulates with a walker but has not been able to walk/bear weight on the LLE. She had no other complaints.      Assessment & Plan:      1. Ambulatory Dysfunction: due to Left knee pain  X-ray: No acute fracture or dislocation is seen.  CT : Moderate knee joint effusion. No evidence of acute osseous abnormality.  Pain control. Fall precautions.  PT evaluation.      2. L Thigh & L Ileo Psoas Hematomas:   CT: Stable appearance of small subacute hematoma within the left iliac muscle.  No further intervention.      3. Microcytic Anemia:  Hgb stable. h/o Recent blood loss.  Monitor Hgb/Hct.  Follow up Iron studies.      4. Dementia:  Mentation at baseline. No behavioural Disturbance.  Supportive care.      5. HTN:  BP stable on Toprol and Lasix.  Hold Losartan.      6. Chronic A-fib:  Rate controlled.   Off Coumadin.      7. Suspected Chronic HFpEF:  No acute exacerbation.  Continue low dose Lasix.      8. Bilateral LE Venous Stasis Ulcers:   Local wound care: wash with soap and water, apply Xeroform + Kerlix + ACE wrap  Out patient follow up with Burn.        Prophylaxis: SCDs  Code status: Full code    Progress Note Handoff:  Pending consults: PT  Pending Tests: None  Family/Patient discussion: Plan of care discussed with Patient & Son, Speedy.  Disposition: Pending PT evaluation.      Attending: Dr. Catherine Roldan. Spectra 1503.

## 2020-04-25 NOTE — PHYSICAL THERAPY INITIAL EVALUATION ADULT - IMPAIRMENTS CONTRIBUTING IMPAIRED BED MOBILITY, REHAB EVAL
impaired balance/cognition/decreased strength/decreased ROM/impaired postural control/decreased endurance

## 2020-04-25 NOTE — PROGRESS NOTE ADULT - SUBJECTIVE AND OBJECTIVE BOX
NILS DYECY  89y  Female    Patient is a 89y old  Female who presents with a chief complaint of Inability to Ambulate (2020 18:30)      INTERVAL HPI/OVERNIGHT EVENTS:  No interval events.  Patient has no new complaints.  Left knee and thigh pain resolving.      REVIEW OF SYSTEMS:  At least 10 systems were reviewed in ROS.   All systems reviewed are within normal limits except for that listed above.      VITALS:  T(F): 97 (20 @ 04:30), Max: 98.2 (20 @ 19:39)  HR: 89 (20 @ 04:30) (74 - 89)  BP: 158/68 (20 @ 04:30) (145/65 - 158/68)  RR: 18 (20 @ 04:30) (18 - 18)  SpO2: 99% (20 @ 16:11) (98% - 99%)      PHYSICAL EXAM:  GENERAL: NAD, well-developed  HEAD:  Atraumatic, Normocephalic  EYES: conjunctiva and sclera clear  ENMT: Moist mucous membranes  NECK: Supple, Normal thyroid  NERVOUS SYSTEM:  Alert & Oriented X 2, Motor Strength 5/5 B/L upper and lower extremities  CHEST/LUNG: Clear to auscultation bilaterally; No rales, rhonchi, wheezing, or rubs  HEART: Regular rate and rhythm; No murmurs, rubs, or gallops  ABDOMEN: Soft, Nontender, Nondistended; Bowel sounds present  EXTREMITIES:  2+ Peripheral Pulses, No clubbing, cyanosis, or edema  LYMPH: No lymphadenopathy noted  SKIN: Left Knee effusion improved. Bilateral ACE wrap. Left medial thigh ecchymosis.     Consultant(s) Notes Reviewed:  [x ] YES  [ ] NO  Care Discussed with Consultants/Other Providers [ x] YES  [ ] NO    LABS:                        8.1    9.19  )-----------( 383      ( 2020 06:53 )             25.7       04-25    134<L>  |  98  |  15  ----------------------------<  88  3.6   |  27  |  0.5<L>    Ca    8.3<L>      2020 06:53    TPro  5.9<L>  /  Alb  2.7<L>  /  TBili  0.9  /  DBili  x   /  AST  66<H>  /  ALT  41  /  AlkPhos  160<H>        Urinalysis Basic - ( 2020 04:00 )    Color: Karena / Appearance: Clear / S.015 / pH: x  Gluc: x / Ketone: Negative  / Bili: Small / Urobili: 4.0 mg/dL   Blood: x / Protein: 30 mg/dL / Nitrite: Negative   Leuk Esterase: Negative / RBC: x / WBC x   Sq Epi: x / Non Sq Epi: Few /HPF / Bacteria: Moderate      MICROBIOLOGY:   COVID-19 PCR . (20 @ 10:40)    COVID-19 PCR: NotDetec        RADIOLOGY & ADDITIONAL TESTS:  X-ray Tibia + Fibula 2 Views, Left (20 @ 13:53)  Decreased soft tissue swelling      X-ray Pelvis AP only (20 @ 13:53)   No acute fracture or dislocation is seen.  Impression no acute fracture seen        X-ray Chest 1 View-PORTABLE IMMEDIATE (20 @ 13:53)   No radiographic evidence of acute cardiopulmonary disease.        CT Angio Lower Extremity w/ IV Cont, Left (20 @ 15:39)   Diffuse atherosclerotic disease with focal occlusion of the tibio-peroneal trunk with reconstitution distally and three vessel runoff to the foot.    Moderate knee joint effusion. No evidence of acute osseous abnormality.      Imaging Personally Reviewed:  [x] YES  [ ] NO      MEDICATIONS  (STANDING):  chlorhexidine 4% Liquid 1 Application(s) Topical <User Schedule>  furosemide    Tablet 20 milliGRAM(s) Oral daily  metoprolol succinate ER 25 milliGRAM(s) Oral daily    MEDICATIONS  (PRN):  acetaminophen   Tablet .. 650 milliGRAM(s) Oral every 4 hours PRN Mild Pain (1 - 3)  traMADol 25 milliGRAM(s) Oral every 6 hours PRN Moderate Pain (4 - 6)        Home Medications:  acetaminophen 325 mg oral tablet: 2 tab(s) orally every 6 hours, As needed, Temp greater or equal to 38C (100.4F) (2020 12:48)  ascorbic acid 500 mg oral tablet: 1 tab(s) orally 2 times a day (2020 12:48)  Vytorin:  (14 Oct 2018 12:09)        HEALTH ISSUES - PROBLEM Dx:  OA (osteoarthritis): Left Knee  Venous stasis ulcers of both lower extremities  Peripheral artery disease  Dementia  Hypertension  Hearing loss  Coronary artery disease  CAD (coronary artery disease)  Anemia  Atrial fibrillation  S/P ORIF (open reduction internal fixation) fracture: RT hip Fracture  History of appendectomy

## 2020-04-26 LAB
CULTURE RESULTS: SIGNIFICANT CHANGE UP
FERRITIN SERPL-MCNC: 164 NG/ML — HIGH (ref 15–150)
HCT VFR BLD CALC: 27.4 % — LOW (ref 37–47)
HGB BLD-MCNC: 8.6 G/DL — LOW (ref 12–16)
IRON SATN MFR SERPL: 10 % — LOW (ref 15–50)
IRON SATN MFR SERPL: 20 UG/DL — LOW (ref 35–150)
MCHC RBC-ENTMCNC: 24 PG — LOW (ref 27–31)
MCHC RBC-ENTMCNC: 31.4 G/DL — LOW (ref 32–37)
MCV RBC AUTO: 76.3 FL — LOW (ref 81–99)
NRBC # BLD: 0 /100 WBCS — SIGNIFICANT CHANGE UP (ref 0–0)
PLATELET # BLD AUTO: 376 K/UL — SIGNIFICANT CHANGE UP (ref 130–400)
RBC # BLD: 3.59 M/UL — LOW (ref 4.2–5.4)
RBC # FLD: 15.9 % — HIGH (ref 11.5–14.5)
SPECIMEN SOURCE: SIGNIFICANT CHANGE UP
TIBC SERPL-MCNC: 206 UG/DL — LOW (ref 220–430)
UIBC SERPL-MCNC: 186 UG/DL — SIGNIFICANT CHANGE UP (ref 110–370)
WBC # BLD: 7.48 K/UL — SIGNIFICANT CHANGE UP (ref 4.8–10.8)
WBC # FLD AUTO: 7.48 K/UL — SIGNIFICANT CHANGE UP (ref 4.8–10.8)

## 2020-04-26 PROCEDURE — 99232 SBSQ HOSP IP/OBS MODERATE 35: CPT

## 2020-04-26 RX ADMIN — CHLORHEXIDINE GLUCONATE 1 APPLICATION(S): 213 SOLUTION TOPICAL at 11:54

## 2020-04-26 RX ADMIN — LIDOCAINE 1 PATCH: 4 CREAM TOPICAL at 17:40

## 2020-04-26 RX ADMIN — LIDOCAINE 1 PATCH: 4 CREAM TOPICAL at 03:50

## 2020-04-26 RX ADMIN — Medication 20 MILLIGRAM(S): at 05:18

## 2020-04-26 RX ADMIN — LIDOCAINE 1 PATCH: 4 CREAM TOPICAL at 23:21

## 2020-04-26 RX ADMIN — Medication 25 MILLIGRAM(S): at 05:18

## 2020-04-26 RX ADMIN — LIDOCAINE 1 PATCH: 4 CREAM TOPICAL at 11:55

## 2020-04-26 NOTE — PROGRESS NOTE ADULT - SUBJECTIVE AND OBJECTIVE BOX
CHAVO DYE  89y  Female    Patient is a 89y old  Female who presents with a chief complaint of Inability to Ambulate (2020 18:30)      INTERVAL HPI/OVERNIGHT EVENTS:  No interval events.  Patient has no new complaints.  Left knee and thigh pain resolved.      REVIEW OF SYSTEMS:  At least 10 systems were reviewed in ROS.   All systems reviewed are within normal limits except for that listed above.      VITALS:  T(C): 35.5 (2020 04:30), Max: 37.6 (2020 21:53)  T(F): 95.9 (2020 04:30), Max: 99.6 (2020 21:53)  HR: 75 (2020 04:30) (56 - 75)  BP: 132/59 (2020 04:30) (110/61 - 163/70)  BP(mean): --  RR: 17 (2020 04:30) (16 - 17)  SpO2: --      PHYSICAL EXAM:  GENERAL: NAD, well-developed  HEAD:  Atraumatic, Normocephalic  EYES: conjunctiva and sclera clear  ENMT: Moist mucous membranes  NECK: Supple, Normal thyroid  NERVOUS SYSTEM:  Alert & Oriented X 2, Motor Strength 5/5 B/L upper and lower extremities  CHEST/LUNG: Clear to auscultation bilaterally; No rales, rhonchi, wheezing, or rubs  HEART: Regular rate and rhythm; No murmurs, rubs, or gallops  ABDOMEN: Soft, Nontender, Nondistended; Bowel sounds present  EXTREMITIES:  2+ Peripheral Pulses, No clubbing, cyanosis, or edema  LYMPH: No lymphadenopathy noted  SKIN: Left Knee effusion improved. Bilateral ACE wrap. Left medial thigh ecchymosis.     Consultant(s) Notes Reviewed:  [x ] YES  [ ] NO  Care Discussed with Consultants/Other Providers [ x] YES  [ ] NO    LABS:                        8.1    9.19  )-----------( 383      ( 2020 06:53 )             25.7       04-25    134<L>  |  98  |  15  ----------------------------<  88  3.6   |  27  |  0.5<L>    Ca    8.3<L>      2020 06:53    TPro  5.9<L>  /  Alb  2.7<L>  /  TBili  0.9  /  DBili  x   /  AST  66<H>  /  ALT  41  /  AlkPhos  160<H>        Urinalysis Basic - ( 2020 04:00 )    Color: Karena / Appearance: Clear / S.015 / pH: x  Gluc: x / Ketone: Negative  / Bili: Small / Urobili: 4.0 mg/dL   Blood: x / Protein: 30 mg/dL / Nitrite: Negative   Leuk Esterase: Negative / RBC: x / WBC x   Sq Epi: x / Non Sq Epi: Few /HPF / Bacteria: Moderate      MICROBIOLOGY:   COVID-19 PCR . (20 @ 10:40)    COVID-19 PCR: NotDetec        RADIOLOGY & ADDITIONAL TESTS:  X-ray Tibia + Fibula 2 Views, Left (20 @ 13:53)  Decreased soft tissue swelling      X-ray Pelvis AP only (20 @ 13:53)   No acute fracture or dislocation is seen.  Impression no acute fracture seen        X-ray Chest 1 View-PORTABLE IMMEDIATE (20 @ 13:53)   No radiographic evidence of acute cardiopulmonary disease.        CT Angio Lower Extremity w/ IV Cont, Left (20 @ 15:39)   Diffuse atherosclerotic disease with focal occlusion of the tibio-peroneal trunk with reconstitution distally and three vessel runoff to the foot.    Moderate knee joint effusion. No evidence of acute osseous abnormality.      Imaging Personally Reviewed:  [x] YES  [ ] NO      MEDICATIONS  (STANDING):  chlorhexidine 4% Liquid 1 Application(s) Topical <User Schedule>  furosemide    Tablet 20 milliGRAM(s) Oral daily  lidocaine   Patch 1 Patch Transdermal daily  metoprolol succinate ER 25 milliGRAM(s) Oral daily      MEDICATIONS  (PRN):  acetaminophen   Tablet .. 650 milliGRAM(s) Oral every 4 hours PRN Mild Pain (1 - 3)  traMADol 25 milliGRAM(s) Oral every 6 hours PRN Moderate Pain (4 - 6)          Home Medications:  acetaminophen 325 mg oral tablet: 2 tab(s) orally every 6 hours, As needed, Temp greater or equal to 38C (100.4F) (2020 12:48)  ascorbic acid 500 mg oral tablet: 1 tab(s) orally 2 times a day (2020 12:48)  Vytorin:  (14 Oct 2018 12:09)        HEALTH ISSUES - PROBLEM Dx:  OA (osteoarthritis): Left Knee  Venous stasis ulcers of both lower extremities  Peripheral artery disease  Dementia  Hypertension  Hearing loss  Coronary artery disease  CAD (coronary artery disease)  Anemia  Atrial fibrillation  S/P ORIF (open reduction internal fixation) fracture: RT hip Fracture  History of appendectomy

## 2020-04-26 NOTE — PROGRESS NOTE ADULT - ASSESSMENT
89 year old F with hx of Dementia, HTN, CAD, AFIB off AC, Chronic Anemia BIBA from home with complaints of for LLE pain and inability to ambulate. Pt was recently admitted at [facility] on 04/18/20 for L iliopsoas hematoma, managed conservatively and discharged home on 4/22/20 as family refused any STR due to fear of patient having COVID-19 infection. As per family patient since discharge had been complaining of persistent LLE pain with difficulty ambulating and had slipped off her recliner the night prior to presentation. Event was witnessed with no LOC or Head Trauma. Patient at baseline normally ambulates with a walker but has not been able to walk/bear weight on the LLE. She had no other complaints.      Assessment & Plan:      1. Ambulatory Dysfunction: due to Left knee pain  X-ray: No acute fracture or dislocation is seen.  CT : Moderate knee joint effusion. No evidence of acute osseous abnormality.  Pain control. Fall precautions.  PT evaluation done. Will need STR.      2. L Thigh & L Ileo Psoas Hematomas:   CT: Stable appearance of small subacute hematoma within the left iliac muscle.  No further intervention.      3. Microcytic Anemia:  Hgb stable. h/o Recent blood loss.  Monitor Hgb/Hct.  Follow up Iron studies.      4. Dementia:  Mentation at baseline. No behavioural Disturbance.  Supportive care.      5. HTN:  BP stable on Toprol and Lasix.  Hold Losartan.      6. Chronic A-fib:  Rate controlled.   Off Coumadin.      7. Suspected Chronic HFpEF:  No acute exacerbation.  Continue low dose Lasix.      8. Bilateral LE Venous Stasis Ulcers:   Local wound care: wash with soap and water, apply Xeroform + Kerlix + ACE wrap  Out patient follow up with Burn.        Prophylaxis: SCDs  Code status: Full code    Progress Note Handoff:  Pending consults: None  Pending Tests: None  Family/Patient discussion: Plan of care discussed with Patient & Son, Speedy.  Disposition: STR.      Attending: Dr. Catherine Roldan. Spectra 1503.

## 2020-04-27 ENCOUNTER — TRANSCRIPTION ENCOUNTER (OUTPATIENT)
Age: 85
End: 2020-04-27

## 2020-04-27 VITALS
DIASTOLIC BLOOD PRESSURE: 59 MMHG | SYSTOLIC BLOOD PRESSURE: 133 MMHG | TEMPERATURE: 98 F | RESPIRATION RATE: 16 BRPM | HEART RATE: 76 BPM

## 2020-04-27 PROCEDURE — 99238 HOSP IP/OBS DSCHRG MGMT 30/<: CPT

## 2020-04-27 RX ORDER — FUROSEMIDE 40 MG
1 TABLET ORAL
Qty: 0 | Refills: 0 | DISCHARGE
Start: 2020-04-27

## 2020-04-27 RX ORDER — TRAMADOL HYDROCHLORIDE 50 MG/1
0.5 TABLET ORAL
Qty: 0 | Refills: 0 | DISCHARGE
Start: 2020-04-27

## 2020-04-27 RX ORDER — EZETIMIBE AND SIMVASTATIN 10; 80 MG/1; MG/1
0 TABLET, FILM COATED ORAL
Qty: 0 | Refills: 0 | DISCHARGE

## 2020-04-27 RX ORDER — METOPROLOL TARTRATE 50 MG
1 TABLET ORAL
Qty: 0 | Refills: 0 | DISCHARGE
Start: 2020-04-27

## 2020-04-27 RX ORDER — LIDOCAINE 4 G/100G
1 CREAM TOPICAL
Qty: 0 | Refills: 0 | DISCHARGE
Start: 2020-04-27

## 2020-04-27 RX ADMIN — CHLORHEXIDINE GLUCONATE 1 APPLICATION(S): 213 SOLUTION TOPICAL at 07:33

## 2020-04-27 RX ADMIN — Medication 20 MILLIGRAM(S): at 05:39

## 2020-04-27 RX ADMIN — Medication 25 MILLIGRAM(S): at 05:39

## 2020-04-27 RX ADMIN — LIDOCAINE 1 PATCH: 4 CREAM TOPICAL at 11:18

## 2020-04-27 NOTE — DISCHARGE NOTE PROVIDER - NSDCMRMEDTOKEN_GEN_ALL_CORE_FT
acetaminophen 325 mg oral tablet: 2 tab(s) orally every 6 hours, As needed, Temp greater or equal to 38C (100.4F)  ascorbic acid 500 mg oral tablet: 1 tab(s) orally 2 times a day  furosemide 20 mg oral tablet: 1 tab(s) orally once a day  lidocaine 5% topical film: Apply topically to affected area once a day  metoprolol succinate 25 mg oral tablet, extended release: 1 tab(s) orally once a day  traMADol 50 mg oral tablet: 0.5 tab(s) orally every 6 hours, As needed, Moderate Pain (4 - 6)  Vytorin: 1 tab(s) orally once a day  zinc sulfate 220 mg oral capsule: 1 cap(s) orally once a day

## 2020-04-27 NOTE — PROGRESS NOTE ADULT - SUBJECTIVE AND OBJECTIVE BOX
CHAVO DYE  89y  Female    Patient is a 89y old  Female who presents with a chief complaint of Inability to Ambulate (2020 18:30)      INTERVAL HPI/OVERNIGHT EVENTS:  No interval events.  Patient has no new complaints.  Left knee and thigh pain resolved.       REVIEW OF SYSTEMS:  At least 10 systems were reviewed in ROS.   All systems reviewed are within normal limits except for that listed above.      VITALS:  T(C): 36.2 (2020 04:30), Max: 36.8 (2020 14:36)  T(F): 97.1 (2020 04:30), Max: 98.2 (2020 14:36)  HR: 74 (2020 04:30) (70 - 80)  BP: 150/69 (2020 04:30) (115/52 - 150/69)  BP(mean): --  RR: 16 (2020 04:30) (16 - 16)  SpO2: --      PHYSICAL EXAM:  GENERAL: NAD, well-developed  HEAD:  Atraumatic, Normocephalic  EYES: conjunctiva and sclera clear  ENMT: Moist mucous membranes  NECK: Supple, Normal thyroid  NERVOUS SYSTEM:  Alert & Oriented X 2, Motor Strength 5/5 B/L upper and lower extremities  CHEST/LUNG: Clear to auscultation bilaterally; No rales, rhonchi, wheezing, or rubs  HEART: Regular rate and rhythm; No murmurs, rubs, or gallops  ABDOMEN: Soft, Nontender, Nondistended; Bowel sounds present  EXTREMITIES:  2+ Peripheral Pulses, No clubbing, cyanosis, or edema  LYMPH: No lymphadenopathy noted  SKIN: Left Knee effusion improved. Bilateral ACE wrap. Left medial thigh ecchymosis.     Consultant(s) Notes Reviewed:  [x ] YES  [ ] NO  Care Discussed with Consultants/Other Providers [ x] YES  [ ] NO    LABS:                                 8.6    7.48  )-----------( 376      ( 2020 08:34 )             27.4         04-25    134<L>  |  98  |  15  ----------------------------<  88  3.6   |  27  |  0.5<L>    Ca    8.3<L>      2020 06:53    TPro  5.9<L>  /  Alb  2.7<L>  /  TBili  0.9  /  DBili  x   /  AST  66<H>  /  ALT  41  /  AlkPhos  160<H>        Urinalysis Basic - ( 2020 04:00 )    Color: Karena / Appearance: Clear / S.015 / pH: x  Gluc: x / Ketone: Negative  / Bili: Small / Urobili: 4.0 mg/dL   Blood: x / Protein: 30 mg/dL / Nitrite: Negative   Leuk Esterase: Negative / RBC: x / WBC x   Sq Epi: x / Non Sq Epi: Few /HPF / Bacteria: Moderate      MICROBIOLOGY:   COVID-19 PCR . (20 @ 10:40)    COVID-19 PCR: NotDetec        RADIOLOGY & ADDITIONAL TESTS:  X-ray Tibia + Fibula 2 Views, Left (20 @ 13:53)  Decreased soft tissue swelling      X-ray Pelvis AP only (20 @ 13:53)   No acute fracture or dislocation is seen.  Impression no acute fracture seen        X-ray Chest 1 View-PORTABLE IMMEDIATE (20 @ 13:53)   No radiographic evidence of acute cardiopulmonary disease.        CT Angio Lower Extremity w/ IV Cont, Left (20 @ 15:39)   Diffuse atherosclerotic disease with focal occlusion of the tibio-peroneal trunk with reconstitution distally and three vessel runoff to the foot.    Moderate knee joint effusion. No evidence of acute osseous abnormality.      Imaging Personally Reviewed:  [x] YES  [ ] NO      MEDICATIONS  (STANDING):  chlorhexidine 4% Liquid 1 Application(s) Topical <User Schedule>  furosemide    Tablet 20 milliGRAM(s) Oral daily  lidocaine   Patch 1 Patch Transdermal daily  metoprolol succinate ER 25 milliGRAM(s) Oral daily      MEDICATIONS  (PRN):  acetaminophen   Tablet .. 650 milliGRAM(s) Oral every 4 hours PRN Mild Pain (1 - 3)  traMADol 25 milliGRAM(s) Oral every 6 hours PRN Moderate Pain (4 - 6)          Home Medications:  acetaminophen 325 mg oral tablet: 2 tab(s) orally every 6 hours, As needed, Temp greater or equal to 38C (100.4F) (2020 12:48)  ascorbic acid 500 mg oral tablet: 1 tab(s) orally 2 times a day (2020 12:48)  Vytorin:  (14 Oct 2018 12:09)        HEALTH ISSUES - PROBLEM Dx:  OA (osteoarthritis): Left Knee  Venous stasis ulcers of both lower extremities  Peripheral artery disease  Dementia  Hypertension  Hearing loss  Coronary artery disease  CAD (coronary artery disease)  Anemia  Atrial fibrillation  S/P ORIF (open reduction internal fixation) fracture: RT hip Fracture  History of appendectomy

## 2020-04-27 NOTE — CONSULT NOTE ADULT - ASSESSMENT
IMPRESSION: Rehab of 89 y/o  f  rehab  for  gd  sp  fall  oa  pelvic  hematoma      PRECAUTIONS: [  ] Cardiac  [  ] Respiratory  [  ] Seizures [  ] Contact Isolation  [  ] Droplet Isolation  [ FALL ] Other    Weight Bearing Status:     RECOMMENDATION:    Out of Bed to Chair     DVT/Decubiti Prophylaxis    REHAB PLAN:     [  xx ] Bedside P/T 3-5 times a week   [   ]   Bedside O/T  2-3 times a week             [   ] No Rehab Therapy Indicated                   [   ]  Speech Therapy   Conditioning/ROM                                    ADL  Bed Mobility                                               Conditioning/ROM  Transfers                                                     Bed Mobility  Sitting /Standing Balance                         Transfers                                        Gait Training                                               Sitting/Standing Balance  Stair Training [   ]Applicable                    Home equipment Eval                                                                        Splinting  [   ] Only      GOALS:   ADL   [  x ]   Independent                    Transfers  [x ] Independent                          Ambulation  [ x  ] Independent     [x    ] With device                            [   ]  CG                                                         [   ]  CG                                                                  [   ] CG                            [    ] Min A                                                   [   ] Min A                                                              [   ] Min  A          DISCHARGE PLAN:   [   ]  Good candidate for Intensive Rehabilitation/Hospital based-4A [facility]                                             Will tolerate 3hrs Intensive Rehab Daily                                       [  xx  ]  Short Term Rehab in Skilled Nursing Facility cont  current care  not  safe  for dc  home                                       [    ]  Home with Outpatient or VN services                                         [    ]  Possible Candidate for Intensive Hospital based Rehab

## 2020-04-27 NOTE — DISCHARGE NOTE NURSING/CASE MANAGEMENT/SOCIAL WORK - PATIENT PORTAL LINK FT
You can access the FollowMyHealth Patient Portal offered by [facility] by registering at the following website: [email or web address]. By joining [employer]’s FollowMyHealth portal, you will also be able to view your health information using other applications (apps) compatible with our system.

## 2020-04-27 NOTE — DISCHARGE NOTE PROVIDER - CARE PROVIDER_API CALL
Dontae Soria)  Geriatric Medicine; Internal Medicine  [address] [place] [address]  Phone: [phone]  Fax: [phone]  Follow Up Time: 1 week

## 2020-04-27 NOTE — PROGRESS NOTE ADULT - ASSESSMENT
89 year old F with hx of Dementia, HTN, CAD, AFIB off AC, Chronic Anemia BIBA from home with complaints of for LLE pain and inability to ambulate. Pt was recently admitted at [facility] on 04/18/20 for L iliopsoas hematoma, managed conservatively and discharged home on 4/22/20 as family refused any STR due to fear of patient having COVID-19 infection. As per family patient since discharge had been complaining of persistent LLE pain with difficulty ambulating and had slipped off her recliner the night prior to presentation. Event was witnessed with no LOC or Head Trauma. Patient at baseline normally ambulates with a walker but has not been able to walk/bear weight on the LLE. She had no other complaints.      Assessment & Plan:      1. Ambulatory Dysfunction: due to Left knee pain  X-ray: No acute fracture or dislocation is seen.  CT : Moderate knee joint effusion. No evidence of acute osseous abnormality.  Pain control. Fall precautions.  PT evaluation done: STR recommended.      2. L Thigh & L Ileo Psoas Hematomas:   CT: Stable appearance of small subacute hematoma within the left iliac muscle.  No further intervention.      3. Microcytic Anemia:  Hgb stable. h/o Recent blood loss.  Monitor Hgb/Hct.  Follow up Iron studies.      4. Dementia:  Mentation at baseline. No behavioural Disturbance.  Supportive care.      5. HTN:  BP stable on Toprol and Lasix.  Hold Losartan.      6. Chronic A-fib:  Rate controlled.   Off Coumadin.      7. Suspected Chronic HFpEF:  No acute exacerbation.  Continue low dose Lasix.      8. Bilateral LE Venous Stasis Ulcers:   Local wound care: wash with soap and water, apply Xeroform + Kerlix + ACE wrap  Out patient follow up with Burn.        Prophylaxis: SCDs  Code status: Full code    Progress Note Handoff:  Pending consults: None  Pending Tests: None  Family/Patient discussion: Plan of care discussed with Patient & Son, Speedy.  Disposition: STR      Attending: Dr. Catherine Roldan. Spectra 1503.

## 2020-04-27 NOTE — DISCHARGE NOTE PROVIDER - HOSPITAL COURSE
89 year old F with hx of Dementia, HTN, CAD, AFIB off AC, Chronic Anemia BIBA from home with complaints of for LLE pain and inability to ambulate. Pt was recently admitted at [facility] on 04/18/20 for L iliopsoas hematoma, managed conservatively and discharged home on 4/22/20 as family refused any STR due to fear of patient having COVID-19 infection. As per family patient since discharge had been complaining of persistent LLE pain with difficulty ambulating and had slipped off her recliner the night prior to presentation. Event was witnessed with no LOC or Head Trauma. Patient at baseline normally ambulates with a walker but has not been able to walk/bear weight on the LLE. She had no other complaints.            Assessment & Plan:            1. Ambulatory Dysfunction: due to Left knee pain    X-ray: No acute fracture or dislocation is seen.    CT : Moderate knee joint effusion. No evidence of acute osseous abnormality.    Pain control. Fall precautions taken.    PT evaluation done: STR recommended.            2. L Thigh & L Ileo Psoas Hematomas:     CT: Stable appearance of small subacute hematoma within the left iliac muscle.    No further intervention.            3. Microcytic Anemia:    Hgb stable. h/o Recent blood loss.    Out Patient monitoring.            4. Dementia:    Mentation at baseline. No behavioural Disturbance.    Supportive care.            5. HTN:    BP stable on Toprol and Lasix. Off Losartan.            6. Chronic A-fib: Rate controlled. Off Coumadin.            7. Suspected Chronic HFpEF: No acute exacerbation.    Continued low dose Lasix.            8. Bilateral LE Venous Stasis Ulcers:     Local wound care: wash with soap and water, apply Xeroform + Kerlix + ACE wrap    Out patient follow up with Burn.

## 2020-04-27 NOTE — CONSULT NOTE ADULT - SUBJECTIVE AND OBJECTIVE BOX
HPI: 89 year old F with hx of Dementia, HTN, CAD, AFIB off AC, Chronic Anemia BIBA from home with complaints of for LLE pain and inability to ambulate. Pt was recently admitted at [facility] on 04/18/20 for L iliopsoas hematoma, managed conservatively and discharged home on 4/22/20 as family refused any STR due to fear of patient having COVID-19 infection. As per family patient since discharge had been complaining of persistent LLE pain with difficulty ambulating and had slipped off her recliner the night prior to presentation. Event was witnessed with no LOC or Head Trauma. Patient at baseline normally ambulates with a walker but has not been able to walk/bear weight on the LLE. She had no other complaints. ptn  seen and exam  at  bed side  aox2  NAD    PTN  REFERRED TO ACUTE  REHAB  FOR  EVAL AND  TX   PAST MEDICAL & SURGICAL HISTORY:  OA (osteoarthritis): Left Knee  Venous stasis ulcers of both lower extremities  Peripheral artery disease  Dementia  Hypertension  Hearing loss  Coronary artery disease  CAD (coronary artery disease)  Anemia  Atrial fibrillation  S/P ORIF (open reduction internal fixation) fracture: RT hip Fracture  History of appendectomy      Hospital Course:    TODAY'S SUBJECTIVE & REVIEW OF SYMPTOMS:     Constitutional WNL   Cardio WNL   Resp WNL   GI WNL  Heme WNL  Endo WNL  Skin WNL  MSK WNL  Neuro WNL  Cognitive WNL  Psych WNL      MEDICATIONS  (STANDING):  chlorhexidine 4% Liquid 1 Application(s) Topical <User Schedule>  furosemide    Tablet 20 milliGRAM(s) Oral daily  lidocaine   Patch 1 Patch Transdermal daily  metoprolol succinate ER 25 milliGRAM(s) Oral daily    MEDICATIONS  (PRN):  acetaminophen   Tablet .. 650 milliGRAM(s) Oral every 4 hours PRN Mild Pain (1 - 3)  traMADol 25 milliGRAM(s) Oral every 6 hours PRN Moderate Pain (4 - 6)      FAMILY HISTORY:  No pertinent family history in first degree relatives      Allergies    Celebrex (Unknown)  penicillins (Unknown)    Intolerances        SOCIAL HISTORY:    [  ] Etoh  [  ] Smoking  [  ] Substance abuse     Home Environment:  [  ] Home Alone  [ x ] Lives with Family dtr  has  her  apt   [  ] Home Health Aid    Dwelling:  [  ] Apartment  [ x ] Private House  [  ] Adult Home  [  ] Skilled Nursing Facility      [  ] Short Term  [  ] Long Term  [ x ] Stairs       Elevator [  ]    FUNCTIONAL STATUS PTA: (Check all that apply)  Ambulation: [  x ]Independent    [  ] Dependent     [  ] Non-Ambulatory  Assistive Device: [  ] SA Cane  [  ]  Q Cane  [ x ] Walker  [  ]  Wheelchair  ADL : [ x ] Independent  [  ]  Dependent       Vital Signs Last 24 Hrs  T(C): 36.2 (27 Apr 2020 04:30), Max: 36.8 (26 Apr 2020 14:36)  T(F): 97.1 (27 Apr 2020 04:30), Max: 98.2 (26 Apr 2020 14:36)  HR: 74 (27 Apr 2020 04:30) (70 - 80)  BP: 150/69 (27 Apr 2020 04:30) (115/52 - 150/69)  BP(mean): --  RR: 16 (27 Apr 2020 04:30) (16 - 16)  SpO2: --      PHYSICAL EXAM: Alert & Oriented X 2  GENERAL: NAD, well-groomed, well-developed  HEAD:  Atraumatic, Normocephalic  EYES: EOMI, PERRLA, conjunctiva and sclera clear  NECK: Supple, No JVD, Normal thyroid  CHEST/LUNG: Clear to percussion bilaterally; No rales, rhonchi, wheezing, or rubs  HEART: Regular rate and rhythm; No murmurs, rubs, or gallops  ABDOMEN: Soft, Nontender, Nondistended; Bowel sounds present  EXTREMITIES:  2+ Peripheral Pulses, No clubbing, cyanosis, or edema    NERVOUS SYSTEM:  Cranial Nerves 2-12 intact [ x ] Abnormal  [  ]  ROM: WFL all extremities [  ]  Abnormal [ x ]  Motor Strength: WFL all extremities  [  ]  Abnormal [ x ]  Sensation: intact to light touch [  ] Abnormal [x  ]  Reflexes: Symmetric [  ]  Abnormal [x  ]    FUNCTIONAL STATUS:  Bed Mobility: Independent [  ]  Supervision [  ]  Needs Assistance [x  ]  N/A [  ]  Transfers: Independent [  ]  Supervision [  ]  Needs Assistance [ x ]  N/A [  ]   Ambulation: Independent [  ]  Supervision [  ]  Needs Assistance [x ]  N/A [  ]  ADL: Independent [  ] Requires Assistance [  ] N/A [ x ]  SEE PT/OT IE NOTES    LABS:                        8.6    7.48  )-----------( 376      ( 26 Apr 2020 08:34 )             27.4                 RADIOLOGY & ADDITIONAL STUDIES:< from: Xray Tibia + Fibula 2 Views, Left (04.24.20 @ 13:53) >  INTERPRETATION:  Clinical History / Reason for exam: Trauma  2. Images  Comparison 4/17/2020  There is decreased soft tissue swelling.  No bony abnormality is seen.  No subcutaneous emphysema is seen.    IMPRESSION:  Decreased soft tissue swelling    < end of copied text >      Assesment:

## 2020-04-27 NOTE — DISCHARGE NOTE PROVIDER - NSDCCPCAREPLAN_GEN_ALL_CORE_FT
PRINCIPAL DISCHARGE DIAGNOSIS  Diagnosis: Iliopsoas muscle hematoma  Assessment and Plan of Treatment: CT: Stable appearance of small subacute hematoma within the left iliac muscle.  No further intervention per surgery.      SECONDARY DISCHARGE DIAGNOSES  Diagnosis: Anemia  Assessment and Plan of Treatment: Hemoglobin stable. History of Recent blood loss. Out Patient monitoring.    Diagnosis: Hypertension  Assessment and Plan of Treatment: BP stable on lasix and Toprol.  Losartan 25 mg daily dscontinued.    Diagnosis: Venous stasis ulcers of both lower extremities  Assessment and Plan of Treatment: Local wound care: wash with soap and water, apply Xeroform + Kerlix + ACE wrap. Out patient follow up with Burn surgery after discharge from rehab.    Diagnosis: Afib  Assessment and Plan of Treatment: Continue Toprol. Off Coumadin.    Diagnosis: Chronic heart failure  Assessment and Plan of Treatment: Continue low dose Lasix.      Diagnosis: Dementia  Assessment and Plan of Treatment: Mental status at baseline. No behavioural Disturbance. Supportive care.    Diagnosis: Ambulatory dysfunction  Assessment and Plan of Treatment: Ambulatory Dysfunction due to Left knee pain. X-ray showed No acute fracture or dislocation is seen. CT showed Moderate knee joint effusion. No evidence of acute osseous abnormality. Pian controlled with Tylenol and Lidocaine patches. Ambulate as tolerated.

## 2020-12-28 RX ORDER — CEPHALEXIN 250 MG/1
250 CAPSULE ORAL 3 TIMES DAILY
Qty: 21 | Refills: 0 | Status: ACTIVE | COMMUNITY
Start: 2020-12-28 | End: 1900-01-01

## 2020-12-28 RX ORDER — SILVER SULFADIAZINE 10 MG/G
1 CREAM TOPICAL DAILY
Qty: 1 | Refills: 3 | Status: ACTIVE | COMMUNITY
Start: 2020-12-28 | End: 1900-01-01

## 2021-06-16 PROBLEM — M19.90 UNSPECIFIED OSTEOARTHRITIS, UNSPECIFIED SITE: Chronic | Status: ACTIVE | Noted: 2020-04-24

## 2021-06-27 ENCOUNTER — INPATIENT (INPATIENT)
Facility: HOSPITAL | Age: 86
LOS: 3 days | Discharge: NURSING, REHAB OR LONG-TERM CARE | End: 2021-07-01
Attending: STUDENT IN AN ORGANIZED HEALTH CARE EDUCATION/TRAINING PROGRAM | Admitting: STUDENT IN AN ORGANIZED HEALTH CARE EDUCATION/TRAINING PROGRAM
Payer: MEDICARE

## 2021-06-27 VITALS
WEIGHT: 164.91 LBS | SYSTOLIC BLOOD PRESSURE: 193 MMHG | DIASTOLIC BLOOD PRESSURE: 77 MMHG | RESPIRATION RATE: 18 BRPM | TEMPERATURE: 97 F | OXYGEN SATURATION: 98 % | HEART RATE: 75 BPM | HEIGHT: 63 IN

## 2021-06-27 DIAGNOSIS — Z90.49 ACQUIRED ABSENCE OF OTHER SPECIFIED PARTS OF DIGESTIVE TRACT: Chronic | ICD-10-CM

## 2021-06-27 DIAGNOSIS — Z98.890 OTHER SPECIFIED POSTPROCEDURAL STATES: Chronic | ICD-10-CM

## 2021-06-27 LAB
ALBUMIN SERPL ELPH-MCNC: 3.8 G/DL — SIGNIFICANT CHANGE UP (ref 3.5–5.2)
ALP SERPL-CCNC: 87 U/L — SIGNIFICANT CHANGE UP (ref 30–115)
ALT FLD-CCNC: 7 U/L — SIGNIFICANT CHANGE UP (ref 0–41)
ANION GAP SERPL CALC-SCNC: 8 MMOL/L — SIGNIFICANT CHANGE UP (ref 7–14)
AST SERPL-CCNC: 16 U/L — SIGNIFICANT CHANGE UP (ref 0–41)
BASOPHILS # BLD AUTO: 0.02 K/UL — SIGNIFICANT CHANGE UP (ref 0–0.2)
BASOPHILS NFR BLD AUTO: 0.2 % — SIGNIFICANT CHANGE UP (ref 0–1)
BILIRUB SERPL-MCNC: 0.7 MG/DL — SIGNIFICANT CHANGE UP (ref 0.2–1.2)
BUN SERPL-MCNC: 14 MG/DL — SIGNIFICANT CHANGE UP (ref 10–20)
CALCIUM SERPL-MCNC: 9.8 MG/DL — SIGNIFICANT CHANGE UP (ref 8.5–10.1)
CHLORIDE SERPL-SCNC: 98 MMOL/L — SIGNIFICANT CHANGE UP (ref 98–110)
CO2 SERPL-SCNC: 28 MMOL/L — SIGNIFICANT CHANGE UP (ref 17–32)
CREAT SERPL-MCNC: 0.5 MG/DL — LOW (ref 0.7–1.5)
EOSINOPHIL # BLD AUTO: 0.09 K/UL — SIGNIFICANT CHANGE UP (ref 0–0.7)
EOSINOPHIL NFR BLD AUTO: 0.8 % — SIGNIFICANT CHANGE UP (ref 0–8)
GLUCOSE SERPL-MCNC: 101 MG/DL — HIGH (ref 70–99)
HCT VFR BLD CALC: 37.4 % — SIGNIFICANT CHANGE UP (ref 37–47)
HGB BLD-MCNC: 11.8 G/DL — LOW (ref 12–16)
IMM GRANULOCYTES NFR BLD AUTO: 0.3 % — SIGNIFICANT CHANGE UP (ref 0.1–0.3)
LYMPHOCYTES # BLD AUTO: 1.99 K/UL — SIGNIFICANT CHANGE UP (ref 1.2–3.4)
LYMPHOCYTES # BLD AUTO: 17.7 % — LOW (ref 20.5–51.1)
MCHC RBC-ENTMCNC: 26.8 PG — LOW (ref 27–31)
MCHC RBC-ENTMCNC: 31.6 G/DL — LOW (ref 32–37)
MCV RBC AUTO: 85 FL — SIGNIFICANT CHANGE UP (ref 81–99)
MONOCYTES # BLD AUTO: 1.23 K/UL — HIGH (ref 0.1–0.6)
MONOCYTES NFR BLD AUTO: 11 % — HIGH (ref 1.7–9.3)
NEUTROPHILS # BLD AUTO: 7.87 K/UL — HIGH (ref 1.4–6.5)
NEUTROPHILS NFR BLD AUTO: 70 % — SIGNIFICANT CHANGE UP (ref 42.2–75.2)
NRBC # BLD: 0 /100 WBCS — SIGNIFICANT CHANGE UP (ref 0–0)
NT-PROBNP SERPL-SCNC: 2391 PG/ML — HIGH (ref 0–300)
PLATELET # BLD AUTO: 267 K/UL — SIGNIFICANT CHANGE UP (ref 130–400)
POTASSIUM SERPL-MCNC: 4.1 MMOL/L — SIGNIFICANT CHANGE UP (ref 3.5–5)
POTASSIUM SERPL-SCNC: 4.1 MMOL/L — SIGNIFICANT CHANGE UP (ref 3.5–5)
PROT SERPL-MCNC: 7.3 G/DL — SIGNIFICANT CHANGE UP (ref 6–8)
RBC # BLD: 4.4 M/UL — SIGNIFICANT CHANGE UP (ref 4.2–5.4)
RBC # FLD: 13.7 % — SIGNIFICANT CHANGE UP (ref 11.5–14.5)
SODIUM SERPL-SCNC: 134 MMOL/L — LOW (ref 135–146)
TROPONIN T SERPL-MCNC: <0.01 NG/ML — SIGNIFICANT CHANGE UP
WBC # BLD: 11.23 K/UL — HIGH (ref 4.8–10.8)
WBC # FLD AUTO: 11.23 K/UL — HIGH (ref 4.8–10.8)

## 2021-06-27 PROCEDURE — 99285 EMERGENCY DEPT VISIT HI MDM: CPT | Mod: CS

## 2021-06-27 PROCEDURE — 93010 ELECTROCARDIOGRAM REPORT: CPT | Mod: 76

## 2021-06-27 PROCEDURE — 71045 X-RAY EXAM CHEST 1 VIEW: CPT | Mod: 26

## 2021-06-27 NOTE — ED ADULT TRIAGE NOTE - BP NONINVASIVE DIASTOLIC (MM HG)
77 The order for scooter needs to go to [employer].  Meagan did not have information or fax number.   [facility] is out of this product.  Please call pt when this is done

## 2021-06-28 DIAGNOSIS — I48.91 UNSPECIFIED ATRIAL FIBRILLATION: ICD-10-CM

## 2021-06-28 DIAGNOSIS — R07.9 CHEST PAIN, UNSPECIFIED: ICD-10-CM

## 2021-06-28 DIAGNOSIS — F03.90 UNSPECIFIED DEMENTIA WITHOUT BEHAVIORAL DISTURBANCE: ICD-10-CM

## 2021-06-28 DIAGNOSIS — I50.9 HEART FAILURE, UNSPECIFIED: ICD-10-CM

## 2021-06-28 DIAGNOSIS — Z79.899 OTHER LONG TERM (CURRENT) DRUG THERAPY: ICD-10-CM

## 2021-06-28 DIAGNOSIS — E54 ASCORBIC ACID DEFICIENCY: ICD-10-CM

## 2021-06-28 LAB
ALBUMIN SERPL ELPH-MCNC: 3.6 G/DL — SIGNIFICANT CHANGE UP (ref 3.5–5.2)
ALP SERPL-CCNC: 89 U/L — SIGNIFICANT CHANGE UP (ref 30–115)
ALT FLD-CCNC: 5 U/L — SIGNIFICANT CHANGE UP (ref 0–41)
ANION GAP SERPL CALC-SCNC: 7 MMOL/L — SIGNIFICANT CHANGE UP (ref 7–14)
AST SERPL-CCNC: 13 U/L — SIGNIFICANT CHANGE UP (ref 0–41)
BILIRUB SERPL-MCNC: 0.7 MG/DL — SIGNIFICANT CHANGE UP (ref 0.2–1.2)
BUN SERPL-MCNC: 10 MG/DL — SIGNIFICANT CHANGE UP (ref 10–20)
CALCIUM SERPL-MCNC: 9.4 MG/DL — SIGNIFICANT CHANGE UP (ref 8.5–10.1)
CHLORIDE SERPL-SCNC: 99 MMOL/L — SIGNIFICANT CHANGE UP (ref 98–110)
CK MB CFR SERPL CALC: 1.7 NG/ML — SIGNIFICANT CHANGE UP (ref 0.6–6.3)
CK SERPL-CCNC: 26 U/L — SIGNIFICANT CHANGE UP (ref 0–225)
CO2 SERPL-SCNC: 29 MMOL/L — SIGNIFICANT CHANGE UP (ref 17–32)
CREAT SERPL-MCNC: <0.5 MG/DL — LOW (ref 0.7–1.5)
GLUCOSE SERPL-MCNC: 88 MG/DL — SIGNIFICANT CHANGE UP (ref 70–99)
HCT VFR BLD CALC: 38.2 % — SIGNIFICANT CHANGE UP (ref 37–47)
HGB BLD-MCNC: 11.9 G/DL — LOW (ref 12–16)
MCHC RBC-ENTMCNC: 26.5 PG — LOW (ref 27–31)
MCHC RBC-ENTMCNC: 31.2 G/DL — LOW (ref 32–37)
MCV RBC AUTO: 85.1 FL — SIGNIFICANT CHANGE UP (ref 81–99)
NRBC # BLD: 0 /100 WBCS — SIGNIFICANT CHANGE UP (ref 0–0)
PLATELET # BLD AUTO: 266 K/UL — SIGNIFICANT CHANGE UP (ref 130–400)
POTASSIUM SERPL-MCNC: 4.1 MMOL/L — SIGNIFICANT CHANGE UP (ref 3.5–5)
POTASSIUM SERPL-SCNC: 4.1 MMOL/L — SIGNIFICANT CHANGE UP (ref 3.5–5)
PROT SERPL-MCNC: 7.2 G/DL — SIGNIFICANT CHANGE UP (ref 6–8)
RAPID RVP RESULT: SIGNIFICANT CHANGE UP
RBC # BLD: 4.49 M/UL — SIGNIFICANT CHANGE UP (ref 4.2–5.4)
RBC # FLD: 13.6 % — SIGNIFICANT CHANGE UP (ref 11.5–14.5)
SARS-COV-2 RNA SPEC QL NAA+PROBE: SIGNIFICANT CHANGE UP
SODIUM SERPL-SCNC: 135 MMOL/L — SIGNIFICANT CHANGE UP (ref 135–146)
TROPONIN T SERPL-MCNC: <0.01 NG/ML — SIGNIFICANT CHANGE UP
WBC # BLD: 9.67 K/UL — SIGNIFICANT CHANGE UP (ref 4.8–10.8)
WBC # FLD AUTO: 9.67 K/UL — SIGNIFICANT CHANGE UP (ref 4.8–10.8)

## 2021-06-28 PROCEDURE — 99223 1ST HOSP IP/OBS HIGH 75: CPT

## 2021-06-28 PROCEDURE — 99221 1ST HOSP IP/OBS SF/LOW 40: CPT

## 2021-06-28 PROCEDURE — 93010 ELECTROCARDIOGRAM REPORT: CPT | Mod: 76

## 2021-06-28 RX ORDER — ZINC SULFATE TAB 220 MG (50 MG ZINC EQUIVALENT) 220 (50 ZN) MG
220 TAB ORAL DAILY
Refills: 0 | Status: DISCONTINUED | OUTPATIENT
Start: 2021-06-28 | End: 2021-07-01

## 2021-06-28 RX ORDER — MORPHINE SULFATE 50 MG/1
2 CAPSULE, EXTENDED RELEASE ORAL ONCE
Refills: 0 | Status: DISCONTINUED | OUTPATIENT
Start: 2021-06-28 | End: 2021-06-28

## 2021-06-28 RX ORDER — TRAMADOL HYDROCHLORIDE 50 MG/1
25 TABLET ORAL EVERY 6 HOURS
Refills: 0 | Status: DISCONTINUED | OUTPATIENT
Start: 2021-06-28 | End: 2021-07-01

## 2021-06-28 RX ORDER — LIDOCAINE 4 G/100G
1 CREAM TOPICAL DAILY
Refills: 0 | Status: DISCONTINUED | OUTPATIENT
Start: 2021-06-28 | End: 2021-07-01

## 2021-06-28 RX ORDER — ACETAMINOPHEN 500 MG
650 TABLET ORAL EVERY 6 HOURS
Refills: 0 | Status: DISCONTINUED | OUTPATIENT
Start: 2021-06-28 | End: 2021-07-01

## 2021-06-28 RX ORDER — ASCORBIC ACID 60 MG
500 TABLET,CHEWABLE ORAL
Refills: 0 | Status: DISCONTINUED | OUTPATIENT
Start: 2021-06-28 | End: 2021-07-01

## 2021-06-28 RX ORDER — SIMVASTATIN 20 MG/1
10 TABLET, FILM COATED ORAL AT BEDTIME
Refills: 0 | Status: DISCONTINUED | OUTPATIENT
Start: 2021-06-28 | End: 2021-07-01

## 2021-06-28 RX ORDER — METOPROLOL TARTRATE 50 MG
25 TABLET ORAL DAILY
Refills: 0 | Status: DISCONTINUED | OUTPATIENT
Start: 2021-06-28 | End: 2021-07-01

## 2021-06-28 RX ORDER — FUROSEMIDE 40 MG
20 TABLET ORAL DAILY
Refills: 0 | Status: DISCONTINUED | OUTPATIENT
Start: 2021-06-28 | End: 2021-07-01

## 2021-06-28 RX ADMIN — MORPHINE SULFATE 2 MILLIGRAM(S): 50 CAPSULE, EXTENDED RELEASE ORAL at 04:47

## 2021-06-28 RX ADMIN — ZINC SULFATE TAB 220 MG (50 MG ZINC EQUIVALENT) 220 MILLIGRAM(S): 220 (50 ZN) TAB at 12:51

## 2021-06-28 RX ADMIN — MORPHINE SULFATE 2 MILLIGRAM(S): 50 CAPSULE, EXTENDED RELEASE ORAL at 07:00

## 2021-06-28 RX ADMIN — Medication 500 MILLIGRAM(S): at 17:32

## 2021-06-28 RX ADMIN — TRAMADOL HYDROCHLORIDE 25 MILLIGRAM(S): 50 TABLET ORAL at 12:10

## 2021-06-28 RX ADMIN — TRAMADOL HYDROCHLORIDE 25 MILLIGRAM(S): 50 TABLET ORAL at 12:40

## 2021-06-28 RX ADMIN — SIMVASTATIN 10 MILLIGRAM(S): 20 TABLET, FILM COATED ORAL at 22:13

## 2021-06-28 RX ADMIN — LIDOCAINE 1 PATCH: 4 CREAM TOPICAL at 14:46

## 2021-06-28 NOTE — CONSULT NOTE ADULT - ASSESSMENT
Patient with hx dementia She has afib not on AC. She now claims 3 days chest pain. Pain pleuritic. No pain now. Need r/o mi. Would check d-dimer. Echo, check cxr.Further rx after above

## 2021-06-28 NOTE — ED PROVIDER NOTE - OBJECTIVE STATEMENT
89 yo F with PMHx of HTN, CAD, aFib (no AC), chronic anemia, and dementia presents to the ED c/o mild substernal chest pain that started earlier and has been persisting. Pain is dull, occasionally sharp, non-radiating and occasionally worse with inspiration. Pt denies taking medication to improve her symptoms. She denies other complaints. Pt denies fever, chills, nausea, vomiting, abdominal pain, diarrhea, headache, dizziness, weakness, SOB, back pain, LOC, trauma, urinary symptoms, cough, calf pain/swelling, recent travel, recent surgery.

## 2021-06-28 NOTE — ED ADULT NURSE NOTE - EXTENSIONS OF SELF_ADULT
Spoke with pt's mother and advised her of MD recommendations and provided the [facility] central scheduling number.  Mom verbalized understanding and follow up visit was scheduled for 7/10/2018.  Mom wanted Dr. Lawrence to know that she is very thankful for the follow up.  Routed as FYI to MD.    
None

## 2021-06-28 NOTE — H&P ADULT - HISTORY OF PRESENT ILLNESS
89yo female whose PMH includes dementia HTN, CAD, Atrial Fibrillation (not on anti coagulation) and chronic anemia presents to the ER due to mid sternal chest pains since the morning. Actually had relief with IV morphine given soon after admission 89yo female whose PMH includes dementia HTN, CAD, Atrial Fibrillation (not on anti coagulation) and chronic anemia presents to the ER due to mid sternal chest pains since the morning. Described variously as dull or sharp, it was sometimes worse with inspiration. No fevers, chills or shortness of breath. Actually had relief with IV morphine given soon after admission 89yo female whose PMH includes dementia HTN, CAD, Atrial Fibrillation (not on anti coagulation) and chronic anemia presents to the ER due to mid sternal chest pains since the morning. Described variously as dull or sharp, it was sometimes worse with inspiration. No fevers, chills or shortness of breath. Actually had relief with IV morphine given soon after admission (has been on tramadol in past)

## 2021-06-28 NOTE — ED PROVIDER NOTE - PHYSICAL EXAMINATION
VITAL SIGNS: I have reviewed nursing notes and confirm.  CONSTITUTIONAL: Elderly female laying on stretcher; in no acute distress.  SKIN: Skin exam is warm and dry, no acute rash.  HEAD: Normocephalic; atraumatic.  EYES: conjunctiva and sclera clear.  ENT: No nasal discharge; airway clear.   CARD: S1, S2 normal; no murmurs, gallops, or rubs. Regular rate and rhythm.  RESP: No wheezes, rales or rhonchi. Speaking in full sentences.   ABD: Normal bowel sounds; soft; non-distended; non-tender; No rebound or guarding. No CVA tenderness.  EXT: Normal ROM. No clubbing, cyanosis or edema. No calf TTP.   NEURO: Alert, oriented. Grossly unremarkable. No focal deficits.

## 2021-06-28 NOTE — H&P ADULT - PROBLEM SELECTOR PLAN 1
Though patient has history of CAD, this pain appears to be musculoskeletal, monitor consider small doses of morphine if needed

## 2021-06-28 NOTE — H&P ADULT - NSHPLABSRESULTS_GEN_ALL_CORE
EKG- Atrial fibrillation EKG- Atrial fibrillation                          11.8   11.23 )-----------( 267      ( 27 Jun 2021 23:15 )             37.4     06-27    134<L>  |  98  |  14  ----------------------------<  101<H>  4.1   |  28  |  0.5<L>    Ca    9.8      27 Jun 2021 23:15    TPro  7.3  /  Alb  3.8  /  TBili  0.7  /  DBili  x   /  AST  16  /  ALT  7   /  AlkPhos  87  06-27              Lactate Trend    CARDIAC MARKERS ( 27 Jun 2021 23:15 )  x     / <0.01 ng/mL / x     / x     / x          CAPILLARY BLOOD GLUCOSE

## 2021-06-28 NOTE — CONSULT NOTE ADULT - SUBJECTIVE AND OBJECTIVE BOX
CARDIOLOGY CONSULT NOTE     CHIEF COMPLAINT/REASON FOR CONSULT:    HPI:  89yo female whose PMH includes dementia HTN, CAD, Atrial Fibrillation (not on anti coagulation) and chronic anemia presents to the ER due to mid sternal chest pains since the morning. Described variously as dull or sharp, it was sometimes worse with inspiration. No fevers, chills or shortness of breath. Actually had relief with IV morphine given soon after admission (has been on tramadol in past)  (28 Jun 2021 04:38)      PAST MEDICAL & SURGICAL HISTORY:  Atrial fibrillation    Anemia    CAD (coronary artery disease)    Coronary artery disease    Hearing loss    Hypertension    Dementia    Peripheral artery disease    Venous stasis ulcers of both lower extremities    OA (osteoarthritis)  Left Knee    History of appendectomy    S/P ORIF (open reduction internal fixation) fracture  RT hip Fracture        Cardiac Risks:   [x ]HTN, [ ] DM, [ ] Smoking, [ ] FH,  [ ] Lipids        MEDICATIONS:  MEDICATIONS  (STANDING):  ascorbic acid 500 milliGRAM(s) Oral two times a day  furosemide    Tablet 20 milliGRAM(s) Oral daily  lidocaine   Patch 1 Patch Transdermal daily  metoprolol succinate ER 25 milliGRAM(s) Oral daily  simvastatin 10 milliGRAM(s) Oral at bedtime  zinc sulfate 220 milliGRAM(s) Oral daily      FAMILY HISTORY:  No pertinent family history in first degree relatives        SOCIAL HISTORY:      [ ] Marital status    Allergies    Celebrex (Unknown)  penicillins (Unknown)        	    REVIEW OF SYSTEMS:  CONSTITUTIONAL: No fever, weight loss, or fatigue  EYES: No eye pain, visual disturbances, or discharge  ENMT:  No difficulty hearing, tinnitus, vertigo; No sinus or throat pain  NECK: No pain or stiffness  RESPIRATORY: No cough, wheezing, chills or hemoptysis; No Shortness of Breath  CARDIOVASCULAR See above  GASTROINTESTINAL: No abdominal or epigastric pain. No nausea, vomiting, or hematemesis; No diarrhea or constipation. No melena or hematochezia.  GENITOURINARY: No dysuria, frequency, hematuria, or incontinence  NEUROLOGICAL: No headaches, memory loss, loss of strength, numbness, or tremors  SKIN: No itching, burning, rashes, or lesions   	      PHYSICAL EXAM:  T(C): 36.4 (06-28-21 @ 08:26), Max: 36.4 (06-28-21 @ 08:26)  HR: 80 (06-28-21 @ 08:26) (66 - 80)  BP: 192/85 (06-28-21 @ 08:26) (165/76 - 210/90)  RR: 18 (06-28-21 @ 03:26) (18 - 18)  SpO2: 99% (06-28-21 @ 03:26) (98% - 99%)  Wt(kg): --  I&O's Summary      Appearance: Normal	  Psychiatry: A & O x 3, Mood & affect appropriate  HEENT:   Normal oral mucosa, PERRL, EOMI	  Lymphatic: No lymphadenopathy  Cardiovascular: Normal S1 S2,RRR, No JVD, i/ vi kedar lsb  Respiratory: Lungs clear to auscultation	  Gastrointestinal:  Soft, Non-tender, + BS	  Skin: No rashes, No ecchymoses, No cyanosis	  Neurologic: Non-focal  Extremities: Normal range of motion, No clubbing, cyanosis or edema legs wrapped  Vascular: Peripheral pulses palpable 2+ bilaterally      ECG:  	not available    	  LABS:	 	    CARDIAC MARKERS:          Serum Pro-Brain Natriuretic Peptide: 2391 pg/mL (06-27 @ 23:15)                            11.8   11.23 )-----------( 267      ( 27 Jun 2021 23:15 )             37.4     06-27    134<L>  |  98  |  14  ----------------------------<  101<H>  4.1   |  28  |  0.5<L>    Ca    9.8      27 Jun 2021 23:15    TPro  7.3  /  Alb  3.8  /  TBili  0.7  /  DBili  x   /  AST  16  /  ALT  7   /  AlkPhos  87  06-27      proBNP: Serum Pro-Brain Natriuretic Peptide: 2391 pg/mL (06-27 @ 23:15)

## 2021-06-28 NOTE — ED ADULT NURSE NOTE - PMH
Anemia    Atrial fibrillation    CAD (coronary artery disease)    Coronary artery disease    Dementia    Hearing loss    Hypertension    OA (osteoarthritis)  Left Knee  Peripheral artery disease    Venous stasis ulcers of both lower extremities

## 2021-06-28 NOTE — ED PROVIDER NOTE - CLINICAL SUMMARY MEDICAL DECISION MAKING FREE TEXT BOX
ATTENDING NOTE: 89 y/o F with PMHx CAD presents with mid substernal CP earlier in the day. Initial vitals pt is hypertensive. EKG noted to have Afib with no ST elevation. Physical- NAD, PERRL, MMM, RRR, CTAB, ABD soft NTND, FROMx4, a&ox3. Obtained labs. Labs reviewed noted to have elevated BNP. CXR revealed cardiomegaly. Due to high risk, pt admitted for ACS work-up.

## 2021-06-29 ENCOUNTER — APPOINTMENT (OUTPATIENT)
Dept: VASCULAR SURGERY | Facility: CLINIC | Age: 86
End: 2021-06-29

## 2021-06-29 LAB
COVID-19 SPIKE DOMAIN AB INTERP: NEGATIVE — SIGNIFICANT CHANGE UP
COVID-19 SPIKE DOMAIN ANTIBODY RESULT: 0.4 U/ML — SIGNIFICANT CHANGE UP
D DIMER BLD IA.RAPID-MCNC: 1033 NG/ML DDU — HIGH (ref 0–230)
SARS-COV-2 IGG+IGM SERPL QL IA: 0.4 U/ML — SIGNIFICANT CHANGE UP
SARS-COV-2 IGG+IGM SERPL QL IA: NEGATIVE — SIGNIFICANT CHANGE UP

## 2021-06-29 PROCEDURE — 93970 EXTREMITY STUDY: CPT | Mod: 26

## 2021-06-29 PROCEDURE — 99232 SBSQ HOSP IP/OBS MODERATE 35: CPT

## 2021-06-29 RX ORDER — PETROLATUM,WHITE
1 JELLY (GRAM) TOPICAL
Refills: 0 | Status: DISCONTINUED | OUTPATIENT
Start: 2021-06-29 | End: 2021-07-01

## 2021-06-29 RX ADMIN — SIMVASTATIN 10 MILLIGRAM(S): 20 TABLET, FILM COATED ORAL at 21:34

## 2021-06-29 RX ADMIN — Medication 20 MILLIGRAM(S): at 05:52

## 2021-06-29 RX ADMIN — Medication 500 MILLIGRAM(S): at 05:52

## 2021-06-29 RX ADMIN — Medication 500 MILLIGRAM(S): at 17:59

## 2021-06-29 RX ADMIN — Medication 1 APPLICATION(S): at 17:59

## 2021-06-29 RX ADMIN — Medication 25 MILLIGRAM(S): at 05:52

## 2021-06-29 RX ADMIN — ZINC SULFATE TAB 220 MG (50 MG ZINC EQUIVALENT) 220 MILLIGRAM(S): 220 (50 ZN) TAB at 11:50

## 2021-06-29 NOTE — PHYSICAL THERAPY INITIAL EVALUATION ADULT - FOLLOWS COMMANDS/ANSWERS QUESTIONS, REHAB EVAL
"[facility]  Psychiatry  Progress Note    Patient Name: Selma Alonzo Lux MD  MRN: [id number]   Code Status: Full Code  Admission Date: 12/16/2018  Hospital Length of Stay: 0 days  Expected Discharge Date: 12/20/2018  Attending Physician: Rogelio Mijares MD  Primary Care Provider: Bhargav Hirsch MD    Current Legal Status: N/A    Patient information was obtained from relative(s), past medical records and ER records.     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  Psychiatry was originally consulted to address the patient's symptoms of confusion, delirium.    Per Primary MD's H&P:    "Ms. 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 female with rheumatoid arthritis on steroids, Plaquenil and Sulfasalazine, history of stroke on Aspirin/Plavix, and vascular dementia who presents to the emergency department with her daughter because of confusion.  History is obtained from the daughter, as the patient is not able to provide a good history.  The daughter mentions that at baseline, the patient is very independent and is very sharp.  However, the day prior to admission the patient was endorsing some blurred vision, having difficulty eating saying that she could not see her plate, and was having unusual behavior-like sitting on her bedroom floor, claiming that someone moved her bed causing her to fall.  She has been having tangential speech requiring frequent redirection.  Additionally, she was complaining of some right-sided leg pain, which is unusual, as she normally complaints of left-sided leg pain.  The patient denies any numbness or tingling in any of her extremities.  The patient was just started sulfasalazine yesterday, to be additional therapy for her rheumatoid arthritis.  Of note, the " "patient ran out of her prednisone 2 days prior to admission.  She has been taking 25 mg daily, and took her dose this morning.     The in the emergency department, she was found to have a blood pressure of 220 on arrival.  Daughter reports that her baseline blood pressure runs low, 110-130.  She received 2 doses of Labetalol 20mg IV x 2 with improvement in her blood pressure to the 180s.  Head CT and MRI were negative for any acute process.  Labs were notable for a trop 0.025.  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 She was able to wake up briefly upon hearing her name, but she did not communicate verbally. She continues to fall asleep moments after being awoken. Fortunately her daughter is present and able to give an accurate HPI and psychiatric history.    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 In April of this year, she was hospitalized following a cryptogenic pneumonia. Following her hospitalization in April, she has been through a lengthy rehabilitation process, and she had been making significant strides in improving her independent functioning. Her daughter reports that in recent months, she has been able to go out and shop with her and that she had been continuing to improve. Throughout this process, the patient's prednisone dose continued to be tapered, and she was on 25mg as of recently. The patient missed doses of prednisone on Thursday and Friday of last week (and possibly Wednesday as well), and she began complaining of severe pain during that period of time. This pain also coincided with blurry vision, including the patient's inability to see some of the food on her plate on Saturday evening. During this time, she began to experience some confusion as well, which culminated in her falling on the floor after misjudging the location of her chair near her bed. She began saying that " someone had moved the chair without her noticing. Per notes, the patient also could not find where the bathroom was in the house despite normally knowing where it is. On interview with staff, patient disclosed that she had seen things the other night that were not there. Dr. Lux's daughter reports that she had not known about that prior to this conversation. Over the last 2 days, DrMolly Lux's alertness has waxed and waned, and she has not been as coherent as she usually is. Last night, she was found having put her clothes in the sink with the water running, and she has been making statements about needing to leave the hospital despite not having the capability to do so.    At the time of my interview, patient was too drowsy to participate in [employer], as she fell asleep repeatedly. Per daughter, she was awake briefly moments prior, asked for water, and took a few bites of food.     Psychiatric History:  Diagnose(s): Yes - Vascular Dementia  Previous Medication Trials: No  Previous Psychiatric Hospitalizations: No  Previous Suicide Attempts: No  History of Violence: No  Outpatient Psychiatrist: No    Social History:  Marital Status:   Children: 2 daughters  Employment Status: retired  Education: medical degree  Special Ed: no   History: no  Housing Status: with daughter currently  Access to Gun: No    Substance Abuse History:  Recreational Drugs: none  Use of Alcohol: no recent use  Rehab History: No  Tobacco Use: No  Legal consequences of chemical use: N/A  Is the patient aware of the biomedical complications associated with substance abuse and mental illness? N/A    Legal History:  Past Charges/Incarcerations: No  Pending Charges: No    Family Psychiatric History:   No    Psychosocial Stressors: health.   Functioning Relationships: good relationship with children    Psychosocial Factors:  Maladaptive or problem behaviors: No    Collateral:   Yes - Daughter provides  "HPI                      Hospital Course: Patient presented on 12/16 shortly after noon, was found to have elevated BP to 220 systolic, Troponin 0.025, and altered mental status.    12/17/18 PM patient found to have put her clothes in the sink with water running, began making statements about needing to leave the hospital, and displaying confused thought process.    12/18/18 psychiatry consulted to address waxing and waning behavior, confusion, and acutely altered mental status    12/19/18 patient has become somnolent, no longer responding to verbal stimuli as during interaction yesterday. She is unable to participate in interview whatsoever today. Patient's daughter is present this morning but not on rounds. Per daughter, patient has remained this this state overnight. No periods of agitation overnight. Patient currently unable to tolerate PO, so seroquel PRN was discontinued.     Interval History: see hospital course    Family History     Problem Relation (Age of Onset)    Breast cancer Maternal Grandmother    Cancer Father    Heart disease Mother    Hypertension Mother    Prostate cancer Father        Tobacco Use    Smoking status: Never Smoker    Smokeless tobacco: Never Used   Substance and Sexual Activity    Alcohol use: No     Alcohol/week: 0.0 oz     Comment: very seldom     Drug use: No    Sexual activity: No     Comment: ,  age 50,      Psychotherapeutics (From admission, onward)    None           Review of Systems   Unable to perform ROS: Patient nonverbal     Objective:     Vital Signs (Most Recent):  Temp: 100 °F (37.8 °C) (12/19/18 0415)  Pulse: 109 (12/19/18 1100)  Resp: (!) 22 (12/19/18 1100)  BP: (!) 146/71 (12/19/18 1100)  SpO2: 95 % (12/19/18 1100) Vital Signs (24h Range):  Temp:  [97.7 °F (36.5 °C)-102.6 °F (39.2 °C)] 100 °F (37.8 °C)  Pulse:  [] 109  Resp:  [18-37] 22  SpO2:  [93 %-97 %] 95 %  BP: (122-201)/() 146/71     Height: 5' 3" (160 cm)  Weight: 72.4 kg (159 lb " 9.8 oz)  Body mass index is 28.27 kg/m².      Intake/Output Summary (Last 24 hours) at 12/19/2018 1155  Last data filed at 12/19/2018 1147  Gross per 24 hour   Intake 1060 ml   Output 320 ml   Net 740 ml       Mental Status Exam:  Appearance: unremarkable, age appropriate, lying in bed  Behavior/Cooperation: no participatation, somnolent  Speech: mute  Mood: unable to ilicit  Affect: flat  Thought Process: unable to assess  Thought Content: unable to assess  Orientation: unable to assess  Memory: Unable to assess  Attention Span/Concentration: unable to assess  Cognition: unable to assess  Insight: unable to assess  Judgment: unable to assess      Significant Labs:   Last 24 Hours:   Recent Lab Results       12/19/18  1102   12/19/18  0500   12/19/18  0317   12/19/18  0300   12/19/18  0046        Immature Granulocytes       0.6       Immature Grans (Abs)       0.11  Comment:  Mild elevation in immature granulocytes is non specific and   can be seen in a variety of conditions including stress response,   acute inflammation, trauma and pregnancy. Correlation with other   laboratory and clinical findings is essential.         Procalcitonin               Albumin       3.9       Alkaline Phosphatase       54       Allens Test     N/A         ALT       9       Anion Gap       16       Aniso       Slight       Appearance, UA   Clear           AST       13       Bacteria, UA   Rare           Baso #       0.04       Basophil%       0.2       Bilirubin (UA)   Negative           Total Bilirubin       1.5  Comment:  For infants and newborns, interpretation of results should be based  on gestational age, weight and in agreement with clinical  observations.  Premature Infant recommended reference ranges:  Up to 24 hours.............<8.0 mg/dL  Up to 48 hours............<12.0 mg/dL  3-5 days..................<15.0 mg/dL  6-29 days.................<15.0 mg/dL         Site     Other         BUN, Bld       17       Calcium       9.9        Chloride       98       CO2       23       Color, UA   Sissy           Creatinine       1.1       NuHabitats     Room Air         Differential Method       Automated       eGFR if        54.4       eGFR if non        47.2  Comment:  Calculation used to obtain the estimated glomerular filtration  rate (eGFR) is the CKD-EPI equation.          Eos #       0.1       Eosinophil%       0.5       Glucose       94       Glucose, UA   Negative           Gran # (ANC)       8.7       Gran%       46.5       Hematocrit       48.8       Hemoglobin       15.6       Hyaline Casts, UA   1           Hypo       Occasional       Ketones, UA   1+           Lactate, Sim               Leukocytes, UA   Negative           Lymph #       4.6       Lymph%       24.6       Magnesium       1.7       MCH       27.8       MCHC       32.0       MCV       87       Microscopic Comment   SEE COMMENT  Comment:  Other formed elements not mentioned in the report are not   present in the microscopic examination.              Mode               Mono #       5.2       Mono%       27.6       MPV       10.8       Nitrite, UA   Negative           nRBC       0       Occult Blood UA   Negative           pH, UA   7.0           Phosphorus       2.7       Platelet Estimate       Appears normal       Platelets       167       POC BE     0         POC HCO3     23.6         POC PCO2     34.1         POC PH     7.448         POC PO2     42         POC SATURATED O2     80         POC TCO2     25         POCT Glucose 121       103     Potassium       3.6       Total Protein       7.7       Protein, UA   2+  Comment:  Recommend a 24 hour urine protein or a urine   protein/creatinine ratio if globulin induced proteinuria is  clinically suspected.             RBC       5.61       RBC, UA   3           RDW       14.8       Sample     VENOUS         Sodium       137       Specific Gravity, UA   1.020           Specimen UA   Urine, Catheterized            Squam Epithel, UA   0           WBC, UA   2           WBC       18.63                        12/18/18  2201   12/18/18  2117        Immature Granulocytes         Immature Grans (Abs)         Procalcitonin   0.17  Comment:  A concentration < 0.25 ng/mL represents a low risk bacterial   infection.  Procalcitonin may not be accurate among patients with localized   infection, recent trauma or major surgery, immunosuppressed state,   invasive fungal infection, renal dysfunction. Decisions regarding   initiation or continuation of antibiotic therapy should not be based   solely on procalcitonin levels.       Albumin         Alkaline Phosphatase         Allens Test Pass       ALT         Anion Gap   14     Aniso         Appearance, UA         AST         Bacteria, UA         Baso #         Basophil%         Bilirubin (UA)         Total Bilirubin         Site RR       BUN, Bld   14     Calcium   9.8     Chloride   100     CO2   24     Color, UA         Creatinine   1.0     DelSys Room Air       Differential Method         eGFR if    >60.0     eGFR if non    53.0  Comment:  Calculation used to obtain the estimated glomerular filtration  rate (eGFR) is the CKD-EPI equation.        Eos #         Eosinophil%         Glucose   104     Glucose, UA         Gran # (ANC)         Gran%         Hematocrit         Hemoglobin         Hyaline Casts, UA         Hypo         Ketones, UA         Lactate, Sim   1.6  Comment:  Falsely low lactic acid results can be found in samples   containing >=13.0 mg/dL total bilirubin and/or >=3.5 mg/dL   direct bilirubin.       Leukocytes, UA         Lymph #         Lymph%         Magnesium         MCH         MCHC         MCV         Microscopic Comment         Mode SPONT       Mono #         Mono%         MPV         Nitrite, UA         nRBC         Occult Blood UA         pH, UA         Phosphorus         Platelet Estimate         Platelets         POC BE 2        POC HCO3 25.4       POC PCO2 32.8       POC PH 7.498       POC PO2 55       POC SATURATED O2 91       POC TCO2 26       POCT Glucose         Potassium   3.0     Total Protein         Protein, UA         RBC         RBC, UA         RDW         Sample ARTERIAL       Sodium   138     Specific Gravity, UA         Specimen UA         Squam Epithel, UA         WBC, UA         WBC               Significant Imaging: EEG ordered for today    Assessment/Plan:     * Acute encephalopathy    Patient's cognitive status declined from yesterday's interaction to today, no longer speaking/phonating/responding to verbal stimulation.    Seroquel was discontinued 2/2 unable to tolerate PO 2/2 AMS.    No agitation overnight.    If patient's mental status improves to the point where she is able to swallow medications, and agitation is present, would still recommend Seroquel 25mg Q6H PRN.    Psychiatry will sign off at this time. If patient begins to experience agitation/confusion, please re-consult us.          Need for Continued Hospitalization:   No need for inpatient psychiatric hospitalization. Continue medical care as per the primary team.    Anticipated Disposition: per primary team       Jered Roberts MD   Psychiatry  [facility]   100% of the time/able to follow single-step instructions

## 2021-06-29 NOTE — ADVANCED PRACTICE NURSE CONSULT - ASSESSMENT
Patient is  89yo female whose PMH includes dementia HTN, CAD, Atrial Fibrillation (not on anti coagulation) and chronic anemia presents to the ER due to mid sternal chest pains since the morning. Described variously as dull or sharp, it was sometimes worse with inspiration. No fevers, chills or shortness of breath. Actually had relief with IV morphine given soon after admission (has been on tramadol in past)    PAST MEDICAL & SURGICAL HISTORY:  Atrial fibrillation  Anemia  CAD (coronary artery disease)  Coronary artery disease  Hearing loss  Hypertension  Dementia  Peripheral artery disease  Venous stasis ulcers of both lower extremities  OA (osteoarthritis)  Left Knee  History of appendectomy  S/P ORIF (open reduction internal fixation) fracture  RT hip Fracture    Assessment:  Patient received in bed . A/O responds appropriately to verbal command. Primary rn present at bedside at time of assessment                       Skin assessed- B/l L LE venous stasis  ulceration with dry  scaly skin   Wound #1  Location:  R lateral shin   Size: Length: 4x4  Tissue Description :   [ ] Necrotic   [ ] Slough   [ ] Infected   [ ] Granulation (firm, beefy red tissue)   [ ] Hypergranulation (soft, gelatinous)  [x ] Poor-Quality Granulation (pale, grey/brown/red granulation tissue)   [ ] Epithelium (pink/mauve at wound edges)  [ ] Macerated  [ ] Other: _______  Wound Exudate : None    Wound Edge): Intact  Periwound Condition (area that extends 4cm from the edge of the wound):   [ ] Maceration [ ] Excoriation [ x] Dry skin [ ] Hyperkeratosis [ ] Callus [ ] Eczema [x ] Other: _______    Other Etiology:  [ ] Aterial  [x ] Venous   [ ] Surgical Incision  [ ] Other: ________

## 2021-06-29 NOTE — PHYSICAL THERAPY INITIAL EVALUATION ADULT - GENERAL OBSERVATIONS, REHAB EVAL
14:10-14:35 Chart reviewed. Pt encountered semireclined in bed, may be seen by Physical Therapist as confirmed with Nurse. Pt denied pain; +tele,+primafit, +dressing RLE

## 2021-06-29 NOTE — PROGRESS NOTE ADULT - ASSESSMENT
89yo female whose PMH includes dementia HTN, CAD, Atrial Fibrillation (not on anti coagulation) and chronic anemia presents to the ER due to mid sternal chest pains. Today pt denies CP    Chest pain     - cardiac enzymes negative   - symptoms resolved   - check d-dimer and echo as per cardiology   - continue home meds   - DC planning

## 2021-06-29 NOTE — ADVANCED PRACTICE NURSE CONSULT - RECOMMEDATIONS
Plan: Apply Aquaphor to B/L dryness    Clean R leg wound with saline Pat dry then apply xeroform with Kerlix dressing   Pressure ulcer preventive  measures  skin care   Asses wound and inform primary provider of any changes   Case discussed with primary Rn  Wound/ ostomy specialist  to f/u as needed     Offloading: [ ] Frequent position changes [ ] Devices/Equipment  Cleansing: [ ] Saline [ ] Soap/Water [ ] Other: ______  Topicals: [ ] Barrier Cream [ ] Antimicrobial [ ] Enzymatic Wound Debridement  Dressings: [ ] Dry, sterile [ ] Foam [ ] Absorbant Pads [ ] Collagenase

## 2021-06-29 NOTE — PHYSICAL THERAPY INITIAL EVALUATION ADULT - TRANSFER SAFETY CONCERNS NOTED: SIT/STAND, REHAB EVAL
decreased balance during turns/losing balance/decreased sequencing ability/decreased weight-shifting ability

## 2021-06-29 NOTE — PHYSICAL THERAPY INITIAL EVALUATION ADULT - GAIT DEVIATIONS NOTED, PT EVAL
stooped posture, dec heel strike/pushoff/decreased siobhan/decreased step length/decreased weight-shifting ability

## 2021-06-29 NOTE — PHYSICAL THERAPY INITIAL EVALUATION ADULT - ACTIVE RANGE OF MOTION EXAMINATION, REHAB EVAL
Both upper extremities/Both lower extremities joints within functional limits and painfree AAROM with tightness at end range

## 2021-06-30 PROCEDURE — 71275 CT ANGIOGRAPHY CHEST: CPT | Mod: 26

## 2021-06-30 PROCEDURE — 93308 TTE F-UP OR LMTD: CPT | Mod: 26

## 2021-06-30 PROCEDURE — 99232 SBSQ HOSP IP/OBS MODERATE 35: CPT

## 2021-06-30 RX ADMIN — SIMVASTATIN 10 MILLIGRAM(S): 20 TABLET, FILM COATED ORAL at 21:15

## 2021-06-30 RX ADMIN — LIDOCAINE 1 PATCH: 4 CREAM TOPICAL at 20:00

## 2021-06-30 RX ADMIN — Medication 650 MILLIGRAM(S): at 06:07

## 2021-06-30 RX ADMIN — Medication 1 APPLICATION(S): at 06:08

## 2021-06-30 RX ADMIN — ZINC SULFATE TAB 220 MG (50 MG ZINC EQUIVALENT) 220 MILLIGRAM(S): 220 (50 ZN) TAB at 13:44

## 2021-06-30 RX ADMIN — Medication 500 MILLIGRAM(S): at 17:29

## 2021-06-30 RX ADMIN — Medication 25 MILLIGRAM(S): at 06:08

## 2021-06-30 RX ADMIN — LIDOCAINE 1 PATCH: 4 CREAM TOPICAL at 23:00

## 2021-06-30 RX ADMIN — Medication 1 APPLICATION(S): at 17:29

## 2021-06-30 RX ADMIN — LIDOCAINE 1 PATCH: 4 CREAM TOPICAL at 11:32

## 2021-06-30 RX ADMIN — Medication 650 MILLIGRAM(S): at 06:10

## 2021-06-30 RX ADMIN — Medication 500 MILLIGRAM(S): at 06:08

## 2021-06-30 RX ADMIN — Medication 20 MILLIGRAM(S): at 06:08

## 2021-06-30 NOTE — PROGRESS NOTE ADULT - SUBJECTIVE AND OBJECTIVE BOX
OSMANIMARIIACHAVO TREVINO  89y, Female  Allergy: Celebrex (Unknown)  penicillins (Unknown)      OVERNIGHT EVENTS:    pt denies CP    PHYSICAL EXAM:    PHYSICAL EXAM:  Vital Signs Last 24 Hrs  T(C): 37.1 (29 Jun 2021 14:07), Max: 37.1 (28 Jun 2021 21:47)  T(F): 98.8 (29 Jun 2021 14:07), Max: 98.8 (28 Jun 2021 21:47)  HR: 86 (29 Jun 2021 14:07) (85 - 89)  BP: 128/67 (29 Jun 2021 14:07) (128/67 - 148/68)  RR: 18 (29 Jun 2021 07:45) (18 - 18)  SpO2: 97% (29 Jun 2021 07:45) (97% - 99%)    GENERAL: NAD  HEAD:  Atraumatic, Normocephalic  NERVOUS SYSTEM: no focal deficits   CHEST/LUNG: Clear to percussion bilaterally; No rales, rhonchi, wheezing, or rubs  HEART: Regular rate and rhythm; No murmurs, rubs, or gallops  ABDOMEN: Soft, Nontender, Nondistended; Bowel sounds present  EXTREMITIES:  2+ Peripheral Pulses, No clubbing, cyanosis, or edema  LYMPH: No lymphadenopathy noted  SKIN: No rashes or lesions          VITALS:  T(F): 98.8 (06-29-21 @ 14:07), Max: 98.8 (06-28-21 @ 21:47)  HR: 86 (06-29-21 @ 14:07)  BP: 128/67 (06-29-21 @ 14:07) (128/67 - 148/68)  RR: 18 (06-29-21 @ 07:45)  SpO2: 97% (06-29-21 @ 07:45)        TESTS & MEASUREMENTS:  Height (cm): 162.6 (06-28-21 @ 08:26)  Weight (kg): 74.8 (06-27-21 @ 22:52)  BMI (kg/m2): 28.3 (06-28-21 @ 08:26)    06-28-21 @ 07:01  -  06-29-21 @ 07:00  --------------------------------------------------------  IN: 0 mL / OUT: 500 mL / NET: -500 mL                            11.9   9.67  )-----------( 266      ( 28 Jun 2021 12:00 )             38.2         06-28    135  |  99  |  10  ----------------------------<  88  4.1   |  29  |  <0.5<L>    Ca    9.4      28 Jun 2021 12:00    TPro  7.2  /  Alb  3.6  /  TBili  0.7  /  DBili  x   /  AST  13  /  ALT  5   /  AlkPhos  89  06-28    LIVER FUNCTIONS - ( 28 Jun 2021 12:00 )  Alb: 3.6 g/dL / Pro: 7.2 g/dL / ALK PHOS: 89 U/L / ALT: 5 U/L / AST: 13 U/L / GGT: x           CARDIAC MARKERS ( 28 Jun 2021 12:00 )  x     / <0.01 ng/mL / 26 U/L / x     / 1.7 ng/mL  CARDIAC MARKERS ( 27 Jun 2021 23:15 )  x     / <0.01 ng/mL / x     / x     / x                RADIOLOGY & ADDITIONAL TESTS:  < from: Xray Chest 1 View-PORTABLE IMMEDIATE (06.27.21 @ 23:26) >  Impression:    Stable cardiomegaly      < end of copied text >  < from: 12 Lead ECG (06.28.21 @ 07:33) >  Diagnosis Line Atrial fibrillation  Low voltage QRS  Left anterior fascicular block  Possible Septal infarct , age undetermined    < end of copied text >    MEDICATIONS:  MEDICATIONS  (STANDING):  AQUAPHOR (petrolatum Ointment) 1 Application(s) Topical two times a day  ascorbic acid 500 milliGRAM(s) Oral two times a day  furosemide    Tablet 20 milliGRAM(s) Oral daily  lidocaine   Patch 1 Patch Transdermal daily  metoprolol succinate ER 25 milliGRAM(s) Oral daily  simvastatin 10 milliGRAM(s) Oral at bedtime  zinc sulfate 220 milliGRAM(s) Oral daily    MEDICATIONS  (PRN):  acetaminophen   Tablet .. 650 milliGRAM(s) Oral every 6 hours PRN Mild Pain (1 - 3)  traMADol 25 milliGRAM(s) Oral every 6 hours PRN Moderate Pain (4 - 6)      HEALTH ISSUES - PROBLEM Dx:    Chest pain    Atrial fibrillation    CHF (congestive heart failure)    Dementia
 Patient is a 89y old  Female who presents with a chief complaint of chest pain (29 Jun 2021 14:43)      T(F): 96 (06-30-21 @ 05:30), Max: 100.1 (06-29-21 @ 21:04)  HR: 89 (06-30-21 @ 05:30)  BP: 158/72 (06-30-21 @ 05:30)  RR: 18 (06-30-21 @ 05:30)  SpO2: 97% (06-30-21 @ 04:14) (97% - 97%)    PHYSICAL EXAM:  GENERAL: NAD  HEAD:  Atraumatic, Normocephalic  EYES: EOMI, PERRLA, conjunctiva and sclera clear  NERVOUS SYSTEM:   no focal deficits   CHEST/LUNG: Clear to percussion bilaterally; No rales, rhonchi, wheezing, or rubs  HEART: irregular  ABDOMEN: Soft, Nontender, Nondistended; Bowel sounds present  EXTREMITIES:  2+ Peripheral Pulses, No clubbing, cyanosis, or edema    LABS  06-28    135  |  99  |  10  ----------------------------<  88  4.1   |  29  |  <0.5<L>    Ca    9.4      28 Jun 2021 12:00    TPro  7.2  /  Alb  3.6  /  TBili  0.7  /  DBili  x   /  AST  13  /  ALT  5   /  AlkPhos  89  06-28                          11.9   9.67  )-----------( 266      ( 28 Jun 2021 12:00 )             38.2     D-Dimer Assay, Quantitative (06.29.21 @ 15:37)   D-Dimer Assay, Quantitative: 1033    CARDIAC ENZYMES  Creatine Kinase, Serum: 26 (06-28 @ 12:00)    CKMB Units: 1.7 (06-28 @ 12:00)    Troponin T, Serum: <0.01 ng/mL (06-28-21 @ 12:00)  Troponin T, Serum: <0.01 ng/mL (06-27-21 @ 23:15)    < from: 12 Lead ECG (06.28.21 @ 07:33) >  Diagnosis Line Atrial fibrillation  Low voltage QRS  Left anterior fascicular block  Possible Septal infarct , age undetermined    < end of copied text >      RADIOLOGY  < from: VA Duplex Lower Ext Vein Scan, Bilat (06.29.21 @ 20:08) >  Impression:    No evidence of deep venous thrombosis or superficial thrombophlebitis in the bilateral lower extremities.    < end of copied text >    MEDICATIONS  (STANDING):  AQUAPHOR (petrolatum Ointment) 1 Application(s) Topical two times a day  ascorbic acid 500 milliGRAM(s) Oral two times a day  furosemide    Tablet 20 milliGRAM(s) Oral daily  lidocaine   Patch 1 Patch Transdermal daily  metoprolol succinate ER 25 milliGRAM(s) Oral daily  simvastatin 10 milliGRAM(s) Oral at bedtime  zinc sulfate 220 milliGRAM(s) Oral daily    MEDICATIONS  (PRN):  acetaminophen   Tablet .. 650 milliGRAM(s) Oral every 6 hours PRN Mild Pain (1 - 3)  traMADol 25 milliGRAM(s) Oral every 6 hours PRN Moderate Pain (4 - 6)

## 2021-06-30 NOTE — PROGRESS NOTE ADULT - ASSESSMENT
89yo female whose PMH includes dementia HTN, CAD, Atrial Fibrillation (not on anti coagulation) and chronic anemia presents to the ER due to mid sternal chest pains. Today pt denies CP    Chest pain with elevated ddimer     - cardiac enzymes negative   - doppler LE is negative for DVT   - still has cp today   - CT chest PE protocol   -  echo as per cardiology   - continue home meds 89yo female whose PMH includes dementia HTN, CAD, chronic Atrial Fibrillation (not on anti coagulation) and chronic anemia presents to the ER due to mid sternal chest pains. Today pt denies CP    Chest pain with elevated ddimer     - cardiac enzymes negative   - doppler LE is negative for DVT   - still has cp today   - CT chest PE protocol   -  echo as per cardiology   - continue home meds

## 2021-07-01 ENCOUNTER — TRANSCRIPTION ENCOUNTER (OUTPATIENT)
Age: 86
End: 2021-07-01

## 2021-07-01 VITALS — HEART RATE: 95 BPM | SYSTOLIC BLOOD PRESSURE: 133 MMHG | DIASTOLIC BLOOD PRESSURE: 61 MMHG | TEMPERATURE: 97 F

## 2021-07-01 LAB
RAPID RVP RESULT: SIGNIFICANT CHANGE UP
SARS-COV-2 RNA SPEC QL NAA+PROBE: SIGNIFICANT CHANGE UP

## 2021-07-01 PROCEDURE — 99239 HOSP IP/OBS DSCHRG MGMT >30: CPT

## 2021-07-01 RX ORDER — ENOXAPARIN SODIUM 100 MG/ML
40 INJECTION SUBCUTANEOUS DAILY
Refills: 0 | Status: DISCONTINUED | OUTPATIENT
Start: 2021-07-01 | End: 2021-07-01

## 2021-07-01 RX ORDER — JNJ-78436735 50000000000 [PFU]/.5ML
0.5 SUSPENSION INTRAMUSCULAR ONCE
Refills: 0 | Status: COMPLETED | OUTPATIENT
Start: 2021-07-01 | End: 2021-07-01

## 2021-07-01 RX ADMIN — Medication 1 APPLICATION(S): at 05:38

## 2021-07-01 RX ADMIN — ENOXAPARIN SODIUM 40 MILLIGRAM(S): 100 INJECTION SUBCUTANEOUS at 15:38

## 2021-07-01 RX ADMIN — Medication 20 MILLIGRAM(S): at 05:39

## 2021-07-01 RX ADMIN — Medication 500 MILLIGRAM(S): at 17:07

## 2021-07-01 RX ADMIN — Medication 25 MILLIGRAM(S): at 05:39

## 2021-07-01 RX ADMIN — Medication 500 MILLIGRAM(S): at 05:39

## 2021-07-01 RX ADMIN — Medication 1 APPLICATION(S): at 17:07

## 2021-07-01 RX ADMIN — ZINC SULFATE TAB 220 MG (50 MG ZINC EQUIVALENT) 220 MILLIGRAM(S): 220 (50 ZN) TAB at 11:37

## 2021-07-01 RX ADMIN — JNJ-78436735 0.5 MILLILITER(S): 50000000000 SUSPENSION INTRAMUSCULAR at 17:02

## 2021-07-01 NOTE — DISCHARGE NOTE PROVIDER - HOSPITAL COURSE
89yo female whose PMH includes dementia HTN, CAD, Atrial Fibrillation (not on anti coagulation) and chronic anemia presented to the ER due to mid sternal chest pains the morning of 06/28/2021.  Cardiology consulted states that chest pain is pleuritic. Troponin trended two times and was negative. D-Dimer Assay was elevated. Per cardiology, 12 lead EKG showed Atrial fibrillation, Low voltage QRS, Left anterior fascicular block, and Possible Septal infarct , age undetermined;  Chest x-ray showed stable cardiomegaly, TTE Echo reports 1. Left ventricular ejection fraction, by visual estimation, is 55 to 60%,  2. Mild to moderately enlarged right atrium,  3. Moderately enlarged left atrium, 4. Mild mitral annular calcification,  5. Moderate mitral valve regurgitation,  6. Moderate tricuspid regurgitation,  7. Mild to moderate aortic regurgitation,  8. Sclerotic aortic valve with normal opening, 9. Peak transaortic gradient equals 32.4 mmHg, mean transaortic gradient equals 14.2 mmHg, the calculated aortic valve area equals 0.83 cm² by the continuity equation consistent with moderate aortic stenosis and 10. There is mild aortic root calcification. VA Duplex showed no evidence of deep venous thrombosis or superficial thrombophlebitis in the bilateral lower extremities. CT angio showed no evidence of pulmonary embolus, scattered areas of ground glass opacity and biapical interlobular septal thickening, possibly reflecting underlying CHF, and severe age-indeterminate compression deformity of the T6 vertebral body, new since April 2020. Physical therapy was consulted and recommends balance training; bed mobility training; gait training; strengthening; and transfer training. Symptoms resolved. Patient will be discharged to [facility].     89yo female whose PMH includes dementia HTN, CAD, Atrial Fibrillation (not on anti coagulation) and chronic anemia presented to the ER due to mid sternal chest pains the morning of 06/28/2021.  Cardiology consulted states that chest pain is pleuritic. Troponin trended two times and was negative. D-Dimer Assay was elevated. Per cardiology, 12 lead EKG showed Atrial fibrillation, Low voltage QRS, Left anterior fascicular block, and Possible Septal infarct , age undetermined;  Chest x-ray showed stable cardiomegaly, TTE Echo reports 1. Left ventricular ejection fraction, by visual estimation, is 55 to 60%,  2. Mild to moderately enlarged right atrium,  3. Moderately enlarged left atrium, 4. Mild mitral annular calcification,  5. Moderate mitral valve regurgitation,  6. Moderate tricuspid regurgitation,  7. Mild to moderate aortic regurgitation,  8. Sclerotic aortic valve with normal opening, 9. Peak transaortic gradient equals 32.4 mmHg, mean transaortic gradient equals 14.2 mmHg, the calculated aortic valve area equals 0.83 cm² by the continuity equation consistent with moderate aortic stenosis and 10. There is mild aortic root calcification. VA Duplex showed no evidence of deep venous thrombosis or superficial thrombophlebitis in the bilateral lower extremities. CT angio showed no evidence of pulmonary embolus, scattered areas of ground glass opacity and biapical interlobular septal thickening, possibly reflecting underlying CHF, and severe age-indeterminate compression deformity of the T6 vertebral body, new since April 2020. Physical therapy was consulted and recommends balance training; bed mobility training; gait training; strengthening; and transfer training. Symptoms resolved. Patient will be discharged to nursing home. Patient is follow up with outpatient cardiology and her primary care doctor within one week of discharge.

## 2021-07-01 NOTE — DISCHARGE NOTE PROVIDER - CARE PROVIDER_API CALL
Dontae Soria)  Geriatric Medicine; Internal Medicine  [address] [place] [address]  Phone: [phone]  Fax: [phone]  Established Patient  Follow Up Time: 1 week    Maged Montano)  Cardiovascular Disease; Internal Medicine  [address] [place] [address]  Phone: [phone]  Fax: [phone]  Follow Up Time:

## 2021-07-01 NOTE — DISCHARGE NOTE NURSING/CASE MANAGEMENT/SOCIAL WORK - PATIENT PORTAL LINK FT
You can access the FollowMyHealth Patient Portal offered by [facility] by registering at the following website: [email or web address]. By joining [employer]’s FollowMyHealth portal, you will also be able to view your health information using other applications (apps) compatible with our system.

## 2021-07-01 NOTE — DISCHARGE NOTE PROVIDER - PROVIDER TOKENS
PROVIDER:[TOKEN:[[id number]],FOLLOWUP:[1 week],ESTABLISHEDPATIENT:[T]],PROVIDER:[TOKEN:[[id number]]]

## 2021-07-01 NOTE — DISCHARGE NOTE PROVIDER - CARE PROVIDERS DIRECT ADDRESSES
[email or web address]

## 2021-07-01 NOTE — DISCHARGE NOTE NURSING/CASE MANAGEMENT/SOCIAL WORK - NSPROEXTENSIONSOFSELF_GEN_A_NUR
Central PA team  [phone]  Pool: [facility] ([id number])          PA has been initiated.       Pharmacy Medication Request  Thank you, your pharmacy medication authorization has been successfully submitted.    Your Tracking Number is [id number]      Response will be received via fax and may take up to 5-10 business days depending on plan       none

## 2021-07-01 NOTE — DISCHARGE NOTE PROVIDER - NSDCCPCAREPLAN_GEN_ALL_CORE_FT
PRINCIPAL DISCHARGE DIAGNOSIS  Diagnosis: Chest pain  Assessment and Plan of Treatment: You were admitted to telemetry and your symptoms resolved. Please follow up with outpatient cardiology. Continue your at home medications as directed.

## 2021-07-01 NOTE — DISCHARGE NOTE PROVIDER - NSDCPNSUBOBJ_GEN_ALL_CORE
Isabel Jara MD  Hospitalist   Spectra: [id number]    Patient is a 89y old  Female who presents with a chief complaint of chest pain (01 Jul 2021 10:50)      INTERVAL HPI/OVERNIGHT EVENTS: no acute overnight events noted     REVIEW OF SYSTEMS: negative  Vital Signs Last 24 Hrs  T(C): 36.3 (01 Jul 2021 13:45), Max: 36.9 (30 Jun 2021 20:48)  T(F): 97.4 (01 Jul 2021 13:45), Max: 98.4 (30 Jun 2021 20:48)  HR: 95 (01 Jul 2021 13:45) (66 - 95)  BP: 133/61 (01 Jul 2021 13:45) (124/65 - 133/69)  BP(mean): --  RR: 18 (01 Jul 2021 05:21) (18 - 18)  SpO2: --    PHYSICAL EXAM:   NAD; Normocephalic;   LUNGS - no wheezing  HEART: S1 S2+   ABDOMEN: Soft, Nontender, non distended  EXTREMITIES: no cyanosis; no edema  NERVOUS SYSTEM:  Awake and alert; no focal neuro deficits appreciated    LABS:              CAPILLARY BLOOD GLUCOSE          Medications:  MEDICATIONS  (STANDING):  AQUAPHOR (petrolatum Ointment) 1 Application(s) Topical two times a day  ascorbic acid 500 milliGRAM(s) Oral two times a day  enoxaparin Injectable 40 milliGRAM(s) SubCutaneous daily  furosemide    Tablet 20 milliGRAM(s) Oral daily  lidocaine   Patch 1 Patch Transdermal daily  metoprolol succinate ER 25 milliGRAM(s) Oral daily  simvastatin 10 milliGRAM(s) Oral at bedtime  zinc sulfate 220 milliGRAM(s) Oral daily    MEDICATIONS  (PRN):  acetaminophen   Tablet .. 650 milliGRAM(s) Oral every 6 hours PRN Mild Pain (1 - 3)  traMADol 25 milliGRAM(s) Oral every 6 hours PRN Moderate Pain (4 - 6)         89yo female whose PMH includes dementia HTN, CAD, chronic Atrial Fibrillation (not on anti coagulation) and chronic anemia presents to the ER due to mid sternal chest pains. Today pt denies CP    1. chest pain   2. HTN   3. dementia   4. h/o CAD   5. chronic afib   6. chfpef   7. chronic anemia      - cardiac enzymes negative   - doppler LE is negative for DVT   - CTPA negative   - chest pain resolved at this time     patient is hemodynamically stable and ready for discharge.

## 2021-07-01 NOTE — DISCHARGE NOTE NURSING/CASE MANAGEMENT/SOCIAL WORK - NSDCVIVACCINE_GEN_ALL_CORE_FT
No Vaccines Administered. COVID-19 vaccine, vector-nr, rS-Ad26, PF, 0.5 mL (Wallace); 01-Jul-2021 17:02; Doretha Doe (NANDA); Wallace; [id number] (Exp. Date: 01-Jul-2021); IntraMuscular; Deltoid Right.; 0.5 milliLiter(s);

## 2021-07-02 ENCOUNTER — EMERGENCY (EMERGENCY)
Facility: HOSPITAL | Age: 86
LOS: 1 days | Discharge: HOME | End: 2021-07-02
Attending: EMERGENCY MEDICINE | Admitting: EMERGENCY MEDICINE
Payer: MEDICARE

## 2021-07-02 VITALS
WEIGHT: 164.91 LBS | SYSTOLIC BLOOD PRESSURE: 136 MMHG | OXYGEN SATURATION: 99 % | TEMPERATURE: 97 F | RESPIRATION RATE: 20 BRPM | DIASTOLIC BLOOD PRESSURE: 63 MMHG | HEART RATE: 85 BPM | HEIGHT: 64 IN

## 2021-07-02 VITALS — TEMPERATURE: 98 F

## 2021-07-02 DIAGNOSIS — R07.89 OTHER CHEST PAIN: ICD-10-CM

## 2021-07-02 DIAGNOSIS — H91.90 UNSPECIFIED HEARING LOSS, UNSPECIFIED EAR: ICD-10-CM

## 2021-07-02 DIAGNOSIS — I10 ESSENTIAL (PRIMARY) HYPERTENSION: ICD-10-CM

## 2021-07-02 DIAGNOSIS — M17.12 UNILATERAL PRIMARY OSTEOARTHRITIS, LEFT KNEE: ICD-10-CM

## 2021-07-02 DIAGNOSIS — Z79.899 OTHER LONG TERM (CURRENT) DRUG THERAPY: ICD-10-CM

## 2021-07-02 DIAGNOSIS — I25.10 ATHEROSCLEROTIC HEART DISEASE OF NATIVE CORONARY ARTERY WITHOUT ANGINA PECTORIS: ICD-10-CM

## 2021-07-02 DIAGNOSIS — Z86.2 PERSONAL HISTORY OF DISEASES OF THE BLOOD AND BLOOD-FORMING ORGANS AND CERTAIN DISORDERS INVOLVING THE IMMUNE MECHANISM: ICD-10-CM

## 2021-07-02 DIAGNOSIS — I73.9 PERIPHERAL VASCULAR DISEASE, UNSPECIFIED: ICD-10-CM

## 2021-07-02 DIAGNOSIS — Z88.0 ALLERGY STATUS TO PENICILLIN: ICD-10-CM

## 2021-07-02 DIAGNOSIS — Z90.49 ACQUIRED ABSENCE OF OTHER SPECIFIED PARTS OF DIGESTIVE TRACT: Chronic | ICD-10-CM

## 2021-07-02 DIAGNOSIS — Z98.890 OTHER SPECIFIED POSTPROCEDURAL STATES: Chronic | ICD-10-CM

## 2021-07-02 DIAGNOSIS — F03.90 UNSPECIFIED DEMENTIA, UNSPECIFIED SEVERITY, WITHOUT BEHAVIORAL DISTURBANCE, PSYCHOTIC DISTURBANCE, MOOD DISTURBANCE, AND ANXIETY: ICD-10-CM

## 2021-07-02 LAB
ALBUMIN SERPL ELPH-MCNC: 3.5 G/DL — SIGNIFICANT CHANGE UP (ref 3.5–5.2)
ALP SERPL-CCNC: 139 U/L — HIGH (ref 30–115)
ALT FLD-CCNC: 15 U/L — SIGNIFICANT CHANGE UP (ref 0–41)
ANION GAP SERPL CALC-SCNC: 13 MMOL/L — SIGNIFICANT CHANGE UP (ref 7–14)
AST SERPL-CCNC: 27 U/L — SIGNIFICANT CHANGE UP (ref 0–41)
BASOPHILS # BLD AUTO: 0.02 K/UL — SIGNIFICANT CHANGE UP (ref 0–0.2)
BASOPHILS NFR BLD AUTO: 0.2 % — SIGNIFICANT CHANGE UP (ref 0–1)
BILIRUB SERPL-MCNC: 0.7 MG/DL — SIGNIFICANT CHANGE UP (ref 0.2–1.2)
BUN SERPL-MCNC: 21 MG/DL — HIGH (ref 10–20)
CALCIUM SERPL-MCNC: 9.2 MG/DL — SIGNIFICANT CHANGE UP (ref 8.5–10.1)
CHLORIDE SERPL-SCNC: 96 MMOL/L — LOW (ref 98–110)
CO2 SERPL-SCNC: 24 MMOL/L — SIGNIFICANT CHANGE UP (ref 17–32)
CREAT SERPL-MCNC: 0.5 MG/DL — LOW (ref 0.7–1.5)
EOSINOPHIL # BLD AUTO: 0.13 K/UL — SIGNIFICANT CHANGE UP (ref 0–0.7)
EOSINOPHIL NFR BLD AUTO: 1.2 % — SIGNIFICANT CHANGE UP (ref 0–8)
GLUCOSE SERPL-MCNC: 100 MG/DL — HIGH (ref 70–99)
HCT VFR BLD CALC: 38.1 % — SIGNIFICANT CHANGE UP (ref 37–47)
HGB BLD-MCNC: 12.1 G/DL — SIGNIFICANT CHANGE UP (ref 12–16)
IMM GRANULOCYTES NFR BLD AUTO: 0.5 % — HIGH (ref 0.1–0.3)
LYMPHOCYTES # BLD AUTO: 1.31 K/UL — SIGNIFICANT CHANGE UP (ref 1.2–3.4)
LYMPHOCYTES # BLD AUTO: 12.4 % — LOW (ref 20.5–51.1)
MCHC RBC-ENTMCNC: 26.9 PG — LOW (ref 27–31)
MCHC RBC-ENTMCNC: 31.8 G/DL — LOW (ref 32–37)
MCV RBC AUTO: 84.7 FL — SIGNIFICANT CHANGE UP (ref 81–99)
MONOCYTES # BLD AUTO: 1.19 K/UL — HIGH (ref 0.1–0.6)
MONOCYTES NFR BLD AUTO: 11.3 % — HIGH (ref 1.7–9.3)
NEUTROPHILS # BLD AUTO: 7.83 K/UL — HIGH (ref 1.4–6.5)
NEUTROPHILS NFR BLD AUTO: 74.4 % — SIGNIFICANT CHANGE UP (ref 42.2–75.2)
NRBC # BLD: 0 /100 WBCS — SIGNIFICANT CHANGE UP (ref 0–0)
PLATELET # BLD AUTO: 348 K/UL — SIGNIFICANT CHANGE UP (ref 130–400)
POTASSIUM SERPL-MCNC: 4.7 MMOL/L — SIGNIFICANT CHANGE UP (ref 3.5–5)
POTASSIUM SERPL-SCNC: 4.7 MMOL/L — SIGNIFICANT CHANGE UP (ref 3.5–5)
PROT SERPL-MCNC: 6.7 G/DL — SIGNIFICANT CHANGE UP (ref 6–8)
RBC # BLD: 4.5 M/UL — SIGNIFICANT CHANGE UP (ref 4.2–5.4)
RBC # FLD: 13.6 % — SIGNIFICANT CHANGE UP (ref 11.5–14.5)
SODIUM SERPL-SCNC: 133 MMOL/L — LOW (ref 135–146)
TROPONIN T SERPL-MCNC: <0.01 NG/ML — SIGNIFICANT CHANGE UP
WBC # BLD: 10.53 K/UL — SIGNIFICANT CHANGE UP (ref 4.8–10.8)
WBC # FLD AUTO: 10.53 K/UL — SIGNIFICANT CHANGE UP (ref 4.8–10.8)

## 2021-07-02 PROCEDURE — 99285 EMERGENCY DEPT VISIT HI MDM: CPT

## 2021-07-02 PROCEDURE — 71045 X-RAY EXAM CHEST 1 VIEW: CPT | Mod: 26

## 2021-07-02 PROCEDURE — 93010 ELECTROCARDIOGRAM REPORT: CPT | Mod: 76

## 2021-07-02 NOTE — ED ADULT TRIAGE NOTE - CHIEF COMPLAINT QUOTE
LEI from [facility] as per EMS the facility called for patient having chest pains this morning. Pt was recently dc from the hospital for the same issue. Per Pt "I woke up with a little chest pain this morning, but it was not like the last time. I do not have chest pain now"

## 2021-07-02 NOTE — ED PROVIDER NOTE - PATIENT PORTAL LINK FT
You can access the FollowMyHealth Patient Portal offered by [facility] by registering at the following website: [email or web address]. By joining [employer]’s FollowMyHealth portal, you will also be able to view your health information using other applications (apps) compatible with our system.

## 2021-07-02 NOTE — ED PROVIDER NOTE - OBJECTIVE STATEMENT
Pt is a 89yo female who comes in for few seconds of midsternal nonradiating chest pain earlier today.  Now has no symptoms.  Was just discharged yesterday for hospital after negative ACS/PE workup.

## 2021-07-02 NOTE — ED ADULT NURSE NOTE - CHIEF COMPLAINT QUOTE
LEI from [facility] as per EMS the facility called for patient having chest pains this morning. Pt was recently dc from the hospital for the same issue. Per Pt "I woke up with a little chest pain this morning, but it was not like the last time. I do not have chest pain now"

## 2021-07-03 DIAGNOSIS — M48.54XA COLLAPSED VERTEBRA, NOT ELSEWHERE CLASSIFIED, THORACIC REGION, INITIAL ENCOUNTER FOR FRACTURE: ICD-10-CM

## 2021-07-03 DIAGNOSIS — I50.32 CHRONIC DIASTOLIC (CONGESTIVE) HEART FAILURE: ICD-10-CM

## 2021-07-03 DIAGNOSIS — I48.20 CHRONIC ATRIAL FIBRILLATION, UNSPECIFIED: ICD-10-CM

## 2021-07-03 DIAGNOSIS — I08.3 COMBINED RHEUMATIC DISORDERS OF MITRAL, AORTIC AND TRICUSPID VALVES: ICD-10-CM

## 2021-07-03 DIAGNOSIS — Z88.8 ALLERGY STATUS TO OTHER DRUGS, MEDICAMENTS AND BIOLOGICAL SUBSTANCES: ICD-10-CM

## 2021-07-03 DIAGNOSIS — Z88.0 ALLERGY STATUS TO PENICILLIN: ICD-10-CM

## 2021-07-03 DIAGNOSIS — R07.9 CHEST PAIN, UNSPECIFIED: ICD-10-CM

## 2021-07-03 DIAGNOSIS — M17.12 UNILATERAL PRIMARY OSTEOARTHRITIS, LEFT KNEE: ICD-10-CM

## 2021-07-03 DIAGNOSIS — I11.0 HYPERTENSIVE HEART DISEASE WITH HEART FAILURE: ICD-10-CM

## 2021-07-03 DIAGNOSIS — R07.89 OTHER CHEST PAIN: ICD-10-CM

## 2021-07-03 DIAGNOSIS — I44.4 LEFT ANTERIOR FASCICULAR BLOCK: ICD-10-CM

## 2021-07-03 DIAGNOSIS — D64.9 ANEMIA, UNSPECIFIED: ICD-10-CM

## 2021-07-03 DIAGNOSIS — F03.90 UNSPECIFIED DEMENTIA, UNSPECIFIED SEVERITY, WITHOUT BEHAVIORAL DISTURBANCE, PSYCHOTIC DISTURBANCE, MOOD DISTURBANCE, AND ANXIETY: ICD-10-CM

## 2021-07-03 DIAGNOSIS — I25.10 ATHEROSCLEROTIC HEART DISEASE OF NATIVE CORONARY ARTERY WITHOUT ANGINA PECTORIS: ICD-10-CM

## 2021-07-03 DIAGNOSIS — E54 ASCORBIC ACID DEFICIENCY: ICD-10-CM

## 2021-07-29 NOTE — ED ADULT TRIAGE NOTE - SOURCE OF INFORMATION
CC:  Salvadorluciana Bryson is here today for a follow up.       Medications: medications verified, no change    Tobacco history: verified  Patient's current [facility] status: Active.    Health Maintenance Due   Topic Date Due   • Pneumococcal Vaccine 0-64 (1 of 2 - PPSV23) 09/12/2005   • COVID-19 Vaccine (1) Never done   • Cervical Cancer Screening - <30 3 year  Never done   • DTaP/Tdap/Td Vaccine (7 - Td or Tdap) 10/26/2020     Patient is overdue for above topics and will defer to pcp.    Patient would like communication of their results via:        Cell Phone:   Telephone Information:   Mobile [phone]     Okay to leave a message containing results? Yes    Patient/EMS

## 2021-09-10 ENCOUNTER — APPOINTMENT (OUTPATIENT)
Dept: CARDIOLOGY | Facility: CLINIC | Age: 86
End: 2021-09-10

## 2021-09-15 ENCOUNTER — INPATIENT (INPATIENT)
Facility: HOSPITAL | Age: 86
LOS: 4 days | Discharge: NURSING, REHAB OR LONG-TERM CARE | End: 2021-09-20
Attending: INTERNAL MEDICINE | Admitting: INTERNAL MEDICINE
Payer: MEDICARE

## 2021-09-15 VITALS
OXYGEN SATURATION: 96 % | TEMPERATURE: 98 F | SYSTOLIC BLOOD PRESSURE: 135 MMHG | DIASTOLIC BLOOD PRESSURE: 82 MMHG | WEIGHT: 119.93 LBS | HEART RATE: 80 BPM | HEIGHT: 64 IN | RESPIRATION RATE: 16 BRPM

## 2021-09-15 DIAGNOSIS — Z98.890 OTHER SPECIFIED POSTPROCEDURAL STATES: Chronic | ICD-10-CM

## 2021-09-15 DIAGNOSIS — Z90.49 ACQUIRED ABSENCE OF OTHER SPECIFIED PARTS OF DIGESTIVE TRACT: Chronic | ICD-10-CM

## 2021-09-15 LAB
ALBUMIN SERPL ELPH-MCNC: 3.3 G/DL — LOW (ref 3.5–5.2)
ALP SERPL-CCNC: 122 U/L — HIGH (ref 30–115)
ALT FLD-CCNC: 15 U/L — SIGNIFICANT CHANGE UP (ref 0–41)
ANION GAP SERPL CALC-SCNC: 12 MMOL/L — SIGNIFICANT CHANGE UP (ref 7–14)
AST SERPL-CCNC: 31 U/L — SIGNIFICANT CHANGE UP (ref 0–41)
BASOPHILS # BLD AUTO: 0.02 K/UL — SIGNIFICANT CHANGE UP (ref 0–0.2)
BASOPHILS NFR BLD AUTO: 0.2 % — SIGNIFICANT CHANGE UP (ref 0–1)
BILIRUB SERPL-MCNC: 1.3 MG/DL — HIGH (ref 0.2–1.2)
BUN SERPL-MCNC: 9 MG/DL — LOW (ref 10–20)
CALCIUM SERPL-MCNC: 9.1 MG/DL — SIGNIFICANT CHANGE UP (ref 8.5–10.1)
CHLORIDE SERPL-SCNC: 97 MMOL/L — LOW (ref 98–110)
CO2 SERPL-SCNC: 24 MMOL/L — SIGNIFICANT CHANGE UP (ref 17–32)
CREAT SERPL-MCNC: 0.6 MG/DL — LOW (ref 0.7–1.5)
EOSINOPHIL # BLD AUTO: 0.08 K/UL — SIGNIFICANT CHANGE UP (ref 0–0.7)
EOSINOPHIL NFR BLD AUTO: 0.9 % — SIGNIFICANT CHANGE UP (ref 0–8)
GLUCOSE SERPL-MCNC: 82 MG/DL — SIGNIFICANT CHANGE UP (ref 70–99)
HCT VFR BLD CALC: 31.7 % — LOW (ref 37–47)
HGB BLD-MCNC: 10 G/DL — LOW (ref 12–16)
IMM GRANULOCYTES NFR BLD AUTO: 0.5 % — HIGH (ref 0.1–0.3)
LACTATE SERPL-SCNC: 0.9 MMOL/L — SIGNIFICANT CHANGE UP (ref 0.7–2)
LYMPHOCYTES # BLD AUTO: 1.25 K/UL — SIGNIFICANT CHANGE UP (ref 1.2–3.4)
LYMPHOCYTES # BLD AUTO: 14.7 % — LOW (ref 20.5–51.1)
MAGNESIUM SERPL-MCNC: 2 MG/DL — SIGNIFICANT CHANGE UP (ref 1.8–2.4)
MCHC RBC-ENTMCNC: 26.4 PG — LOW (ref 27–31)
MCHC RBC-ENTMCNC: 31.5 G/DL — LOW (ref 32–37)
MCV RBC AUTO: 83.6 FL — SIGNIFICANT CHANGE UP (ref 81–99)
MONOCYTES # BLD AUTO: 1.04 K/UL — HIGH (ref 0.1–0.6)
MONOCYTES NFR BLD AUTO: 12.2 % — HIGH (ref 1.7–9.3)
NEUTROPHILS # BLD AUTO: 6.07 K/UL — SIGNIFICANT CHANGE UP (ref 1.4–6.5)
NEUTROPHILS NFR BLD AUTO: 71.5 % — SIGNIFICANT CHANGE UP (ref 42.2–75.2)
NRBC # BLD: 0 /100 WBCS — SIGNIFICANT CHANGE UP (ref 0–0)
NT-PROBNP SERPL-SCNC: 1735 PG/ML — HIGH (ref 0–300)
PLATELET # BLD AUTO: 278 K/UL — SIGNIFICANT CHANGE UP (ref 130–400)
POTASSIUM SERPL-MCNC: 4 MMOL/L — SIGNIFICANT CHANGE UP (ref 3.5–5)
POTASSIUM SERPL-SCNC: 4 MMOL/L — SIGNIFICANT CHANGE UP (ref 3.5–5)
PROT SERPL-MCNC: 6.2 G/DL — SIGNIFICANT CHANGE UP (ref 6–8)
RBC # BLD: 3.79 M/UL — LOW (ref 4.2–5.4)
RBC # FLD: 14.7 % — HIGH (ref 11.5–14.5)
SARS-COV-2 RNA SPEC QL NAA+PROBE: SIGNIFICANT CHANGE UP
SODIUM SERPL-SCNC: 133 MMOL/L — LOW (ref 135–146)
WBC # BLD: 8.5 K/UL — SIGNIFICANT CHANGE UP (ref 4.8–10.8)
WBC # FLD AUTO: 8.5 K/UL — SIGNIFICANT CHANGE UP (ref 4.8–10.8)

## 2021-09-15 PROCEDURE — 99285 EMERGENCY DEPT VISIT HI MDM: CPT

## 2021-09-15 PROCEDURE — 99223 1ST HOSP IP/OBS HIGH 75: CPT | Mod: AI

## 2021-09-15 PROCEDURE — 93010 ELECTROCARDIOGRAM REPORT: CPT

## 2021-09-15 PROCEDURE — 93970 EXTREMITY STUDY: CPT | Mod: 26

## 2021-09-15 RX ORDER — ACETAMINOPHEN 500 MG
650 TABLET ORAL EVERY 6 HOURS
Refills: 0 | Status: DISCONTINUED | OUTPATIENT
Start: 2021-09-15 | End: 2021-09-20

## 2021-09-15 RX ORDER — METOPROLOL TARTRATE 50 MG
25 TABLET ORAL DAILY
Refills: 0 | Status: DISCONTINUED | OUTPATIENT
Start: 2021-09-15 | End: 2021-09-20

## 2021-09-15 RX ORDER — FUROSEMIDE 40 MG
20 TABLET ORAL DAILY
Refills: 0 | Status: DISCONTINUED | OUTPATIENT
Start: 2021-09-15 | End: 2021-09-20

## 2021-09-15 RX ORDER — LEVOTHYROXINE SODIUM 125 MCG
50 TABLET ORAL DAILY
Refills: 0 | Status: DISCONTINUED | OUTPATIENT
Start: 2021-09-15 | End: 2021-09-20

## 2021-09-15 RX ORDER — LEVOTHYROXINE SODIUM 125 MCG
1 TABLET ORAL
Qty: 0 | Refills: 0 | DISCHARGE

## 2021-09-15 RX ORDER — CEFAZOLIN SODIUM 1 G
2000 VIAL (EA) INJECTION ONCE
Refills: 0 | Status: COMPLETED | OUTPATIENT
Start: 2021-09-15 | End: 2021-09-15

## 2021-09-15 RX ORDER — EZETIMIBE AND SIMVASTATIN 10; 80 MG/1; MG/1
1 TABLET, FILM COATED ORAL
Qty: 0 | Refills: 0 | DISCHARGE

## 2021-09-15 RX ORDER — HEPARIN SODIUM 5000 [USP'U]/ML
5000 INJECTION INTRAVENOUS; SUBCUTANEOUS EVERY 12 HOURS
Refills: 0 | Status: DISCONTINUED | OUTPATIENT
Start: 2021-09-15 | End: 2021-09-20

## 2021-09-15 RX ORDER — FERROUS SULFATE 325(65) MG
325 TABLET ORAL DAILY
Refills: 0 | Status: DISCONTINUED | OUTPATIENT
Start: 2021-09-15 | End: 2021-09-20

## 2021-09-15 RX ORDER — FERROUS SULFATE 325(65) MG
1 TABLET ORAL
Qty: 0 | Refills: 0 | DISCHARGE

## 2021-09-15 RX ORDER — CEFAZOLIN SODIUM 1 G
2000 VIAL (EA) INJECTION EVERY 8 HOURS
Refills: 0 | Status: DISCONTINUED | OUTPATIENT
Start: 2021-09-15 | End: 2021-09-19

## 2021-09-15 RX ORDER — SIMVASTATIN 20 MG/1
10 TABLET, FILM COATED ORAL AT BEDTIME
Refills: 0 | Status: DISCONTINUED | OUTPATIENT
Start: 2021-09-15 | End: 2021-09-20

## 2021-09-15 RX ORDER — FAMOTIDINE 10 MG/ML
20 INJECTION INTRAVENOUS
Refills: 0 | Status: DISCONTINUED | OUTPATIENT
Start: 2021-09-15 | End: 2021-09-20

## 2021-09-15 RX ORDER — ZINC SULFATE TAB 220 MG (50 MG ZINC EQUIVALENT) 220 (50 ZN) MG
220 TAB ORAL DAILY
Refills: 0 | Status: DISCONTINUED | OUTPATIENT
Start: 2021-09-15 | End: 2021-09-20

## 2021-09-15 RX ORDER — FAMOTIDINE 10 MG/ML
1 INJECTION INTRAVENOUS
Qty: 0 | Refills: 0 | DISCHARGE

## 2021-09-15 RX ADMIN — Medication 100 MILLIGRAM(S): at 22:33

## 2021-09-15 RX ADMIN — Medication 100 MILLIGRAM(S): at 13:12

## 2021-09-15 RX ADMIN — FAMOTIDINE 20 MILLIGRAM(S): 10 INJECTION INTRAVENOUS at 22:26

## 2021-09-15 RX ADMIN — Medication 325 MILLIGRAM(S): at 22:26

## 2021-09-15 RX ADMIN — SIMVASTATIN 10 MILLIGRAM(S): 20 TABLET, FILM COATED ORAL at 22:33

## 2021-09-15 RX ADMIN — HEPARIN SODIUM 5000 UNIT(S): 5000 INJECTION INTRAVENOUS; SUBCUTANEOUS at 22:26

## 2021-09-15 RX ADMIN — ZINC SULFATE TAB 220 MG (50 MG ZINC EQUIVALENT) 220 MILLIGRAM(S): 220 (50 ZN) TAB at 22:33

## 2021-09-15 NOTE — ED PROVIDER NOTE - CARE PLAN
1 Principal Discharge DX:	Cellulitis  Secondary Diagnosis:	Leg swelling  Secondary Diagnosis:	Unable to ambulate

## 2021-09-15 NOTE — ED PROVIDER NOTE - ATTENDING CONTRIBUTION TO CARE
I was present for and supervised the key and critical aspects of the procedures performed during the care of the patient. patient presents for evaluation of left lower leg swelling and redness with development of ulcerations noted over the past 1-2 days after home evaluation with visiting rn patient sent in for further evaluation she denies any fevers or chills she denies any vomiting   on exam she has left lower extremity redness with ulcerations noted no drainage noted  pedal pulses 2 capillary refill is normal   patient given iv antibiotics, iv fluids I will admit as family and visiting rn note she is unable to bear weight safely at home  +=

## 2021-09-15 NOTE — H&P ADULT - NSHPPHYSICALEXAM_GEN_ALL_CORE
VITALS:   T(C): 36.7 (09-15-21 @ 11:10), Max: 36.7 (09-15-21 @ 11:10)  HR: 80 (09-15-21 @ 11:10) (80 - 80)  BP: 135/82 (09-15-21 @ 11:10) (135/82 - 135/82)  RR: 16 (09-15-21 @ 11:10) (16 - 16)  SpO2: 96% (09-15-21 @ 11:10) (96% - 96%)    GENERAL: NAD, lying in bed comfortably  HEAD:  Atraumatic, Normocephalic  EYES: EOMI  ENT: hard of hearing, moist mucous membranes  NECK: Supple  CHEST/LUNG: Clear to auscultation bilaterally; no wheezing or rubs, unlabored respirations  HEART: Regular rate and rhythm; no murmurs, rubs, or gallops  ABDOMEN: Bowel sounds present; soft, nontender, nondistended  EXTREMITIES: 1+ pitting edema to feet and ankles bl, + erythema to bl feet, warm to touch, several small clean ulcers noted   NERVOUS SYSTEM:  Alert & Oriented to place and person, speech clear  MSK: FROM all 4 extremities, full and equal strength  SKIN: erythema to feet b/l, warm to touch VITALS:   T(C): 36.7 (09-15-21 @ 11:10), Max: 36.7 (09-15-21 @ 11:10)  HR: 80 (09-15-21 @ 11:10) (80 - 80)  BP: 135/82 (09-15-21 @ 11:10) (135/82 - 135/82)  RR: 16 (09-15-21 @ 11:10) (16 - 16)  SpO2: 96% (09-15-21 @ 11:10) (96% - 96%)    GENERAL: NAD, lying in bed comfortably  HEAD:  Atraumatic, Normocephalic  EYES: EOMI  ENT: hard of hearing, moist mucous membranes  NECK: Supple  CHEST/LUNG: Clear to auscultation bilaterally; no wheezing or rubs, unlabored respirations  HEART: Regular rate and rhythm; no murmurs, rubs, or gallops  ABDOMEN: Bowel sounds present; soft, nontender, nondistended  EXTREMITIES: 1+ pitting edema to feet and ankles bl, + erythema to LEFT heel, warm to touch, several small clean ulcers noted   NERVOUS SYSTEM:  Alert & Oriented to place and person, speech clear  MSK: FROM all 4 extremities, full and equal strength  SKIN: erythema to bl feet worse to Left foot , warm to touch

## 2021-09-15 NOTE — H&P ADULT - ASSESSMENT
89 year old female with PMH of HTN, CAD, AFIB (off AC), chronic anemia, hypothyroid, hard of hearing, PAD, and dementia was brought to ED for increased redness, pain and swelling of LEFT LE.    # LEFT LE cellulitis  - Pt afebrile, wbc wnl  - admit to medicine  - s/p Ancef 2 g in ED, will continue   - ID consult  - Podiatry consult  - follow up blood cultures  - supportive treatment    # Ambulatory dysfunction  - ambulate with assistance  - PT consult    # Normocytic Anemia, chronic  - trend H/H    # Hyponatremia  - f/u BMP in AM    # Chronic Afib, rate controlled  # CAD  - not on AC  - continue home medications    # Hypothyroid  - continue Synthroid    # GERD  - continue famotidine    # Dementia  # Hard of hearing  - outpatient follow up    DVT /GI ppx

## 2021-09-15 NOTE — PATIENT PROFILE ADULT - STATED REASON FOR ADMISSION
Sent in by family for LE Redness.  Per patient she is here because she fell.  Pt states she did not hurt herself from the fall, denies any pain at this time.

## 2021-09-15 NOTE — ED PROVIDER NOTE - OBJECTIVE STATEMENT
Shayla Patient sent by family for increased redness , pain and swelling to left lower leg, with sores. Unable to ambulate due to pain in leg past day, no chest pain, no SOB.

## 2021-09-15 NOTE — H&P ADULT - NSHPLABSRESULTS_GEN_ALL_CORE
10.0   8.50  )-----------( 278      ( 15 Sep 2021 13:10 )             31.7       09-15    133<L>  |  97<L>  |  9<L>  ----------------------------<  82  4.0   |  24  |  0.6<L>    Ca    9.1      15 Sep 2021 13:10  Mg     2.0     09-15    TPro  6.2  /  Alb  3.3<L>  /  TBili  1.3<H>  /  DBili  x   /  AST  31  /  ALT  15  /  AlkPhos  122<H>  09-15                      Lactate Trend  09-15 @ 13:10 Lactate:0.9             CAPILLARY BLOOD GLUCOSE 10.0   8.50  )-----------( 278      ( 15 Sep 2021 13:10 )             31.7       09-15    133<L>  |  97<L>  |  9<L>  ----------------------------<  82  4.0   |  24  |  0.6<L>    Ca    9.1      15 Sep 2021 13:10  Mg     2.0     09-15    TPro  6.2  /  Alb  3.3<L>  /  TBili  1.3<H>  /  DBili  x   /  AST  31  /  ALT  15  /  AlkPhos  122<H>  09-15        Lactate Trend  09-15 @ 13:10 Lactate:0.9     RADIOLOGY:  VA Duplex Lower Ext Vein Scan, Nguyen (09.15.21 @ 14:15)      IMPRESSION:  No evidence of deep venous thrombosis in either lower extremity.      DEMARCO MARTINEZ MD; Attending Vascular Surgery  This document has been electronically signed. Sep 15 2021  2:18PM

## 2021-09-15 NOTE — ED ADULT TRIAGE NOTE - CHIEF COMPLAINT QUOTE
BIBA from ambulance for complain of left leg swelling and weeping as seen by visiting nurse yesterday.

## 2021-09-15 NOTE — ED PROVIDER NOTE - CLINICAL SUMMARY MEDICAL DECISION MAKING FREE TEXT BOX
Patient presents for evaluation of left lower leg swelling redness with evidence of cellulitis patient appears confused family contacted stating that she was unable to bear weight safely at home after evaluation by visiting rn sent in for further evaluation

## 2021-09-15 NOTE — H&P ADULT - HISTORY OF PRESENT ILLNESS
89 year old female with PMH of HTN, CAD, AFIB (off AC), chronic anemia, hypothyroid, hard of hearing, PAD, and dementia was brought to ED for increased redness, pain and swelling of LE. Pt is hard of hearing, thus history obtained from son Mr. Prince. Son reports pt was seen by visiting nurse this morning who unwrapped pt's legs and noticed LE edema, erythema and sores. Son also admits to pt having progressive difficulty ambulating and getting up the chair. She also refuses keep her legs elevated as per son. Son denies pt c/o fever, chills, SOB, CP, dizziness, cough, or urinary complaints. She ambulates with a walker. Pt follows up with Dr. Lopez for podiatry.     In the ED pt afebrile, vitals stable. No leukocytosis. Venous  duplex negative for DVT. Pt received Ancef 2g x1.   89 year old female with PMH of HTN, CAD, AFIB (off AC), chronic anemia, hypothyroid, hard of hearing, PAD, and dementia was brought to ED for increased redness, pain and swelling of LEFT LE. Pt is hard of hearing, thus history obtained from son Mr. Prince. Son reports pt was seen by visiting nurse this morning who unwrapped pt's legs and noticed LE edema, erythema and sores. Son also admits to pt having progressive difficulty ambulating and getting up the chair. She also refuses keep her legs elevated as per son. Son denies pt c/o fever, chills, SOB, CP, dizziness, cough, or urinary complaints. She ambulates with a walker. Pt follows up with Dr. Lopez for podiatry.     In the ED pt afebrile, vitals stable. No leukocytosis. Venous  duplex negative for DVT. Pt received Ancef 2g x1.

## 2021-09-15 NOTE — H&P ADULT - TIME BILLING
direct patient care  -coordinated current plan of care with ED and medical staff direct patient care  -coordinated current plan of care with ED and medical staff  -labs discussed with patient

## 2021-09-15 NOTE — H&P ADULT - ATTENDING COMMENTS
Patient seen and examined independently of PA and agree with the H/P unless otherwise stated     # LLE Cellulitis   # Hyponatremia   # HTN/CAD  # PAD  # Chronic AFIB   # suspected CKD stage II  # hard of hearing    -IV abx  -check cultures + ID consult + Podiatry c/s  -continue with home meds  -rehab/pt as tolerated     Attending Physician Dr. Alejandra Tripp # [id number]

## 2021-09-16 LAB
ANION GAP SERPL CALC-SCNC: 10 MMOL/L — SIGNIFICANT CHANGE UP (ref 7–14)
BUN SERPL-MCNC: 8 MG/DL — LOW (ref 10–20)
CALCIUM SERPL-MCNC: 8.7 MG/DL — SIGNIFICANT CHANGE UP (ref 8.5–10.1)
CHLORIDE SERPL-SCNC: 98 MMOL/L — SIGNIFICANT CHANGE UP (ref 98–110)
CO2 SERPL-SCNC: 25 MMOL/L — SIGNIFICANT CHANGE UP (ref 17–32)
CREAT SERPL-MCNC: 0.6 MG/DL — LOW (ref 0.7–1.5)
GLUCOSE SERPL-MCNC: 71 MG/DL — SIGNIFICANT CHANGE UP (ref 70–99)
HCT VFR BLD CALC: 31.7 % — LOW (ref 37–47)
HGB BLD-MCNC: 10.1 G/DL — LOW (ref 12–16)
MCHC RBC-ENTMCNC: 26.7 PG — LOW (ref 27–31)
MCHC RBC-ENTMCNC: 31.9 G/DL — LOW (ref 32–37)
MCV RBC AUTO: 83.9 FL — SIGNIFICANT CHANGE UP (ref 81–99)
NRBC # BLD: 0 /100 WBCS — SIGNIFICANT CHANGE UP (ref 0–0)
PLATELET # BLD AUTO: 301 K/UL — SIGNIFICANT CHANGE UP (ref 130–400)
POTASSIUM SERPL-MCNC: 4.3 MMOL/L — SIGNIFICANT CHANGE UP (ref 3.5–5)
POTASSIUM SERPL-SCNC: 4.3 MMOL/L — SIGNIFICANT CHANGE UP (ref 3.5–5)
RBC # BLD: 3.78 M/UL — LOW (ref 4.2–5.4)
RBC # FLD: 14.6 % — HIGH (ref 11.5–14.5)
SODIUM SERPL-SCNC: 133 MMOL/L — LOW (ref 135–146)
WBC # BLD: 7.87 K/UL — SIGNIFICANT CHANGE UP (ref 4.8–10.8)
WBC # FLD AUTO: 7.87 K/UL — SIGNIFICANT CHANGE UP (ref 4.8–10.8)

## 2021-09-16 PROCEDURE — 99233 SBSQ HOSP IP/OBS HIGH 50: CPT

## 2021-09-16 RX ADMIN — FAMOTIDINE 20 MILLIGRAM(S): 10 INJECTION INTRAVENOUS at 19:03

## 2021-09-16 RX ADMIN — Medication 325 MILLIGRAM(S): at 19:03

## 2021-09-16 RX ADMIN — Medication 100 MILLIGRAM(S): at 14:30

## 2021-09-16 RX ADMIN — Medication 20 MILLIGRAM(S): at 05:55

## 2021-09-16 RX ADMIN — ZINC SULFATE TAB 220 MG (50 MG ZINC EQUIVALENT) 220 MILLIGRAM(S): 220 (50 ZN) TAB at 19:03

## 2021-09-16 RX ADMIN — Medication 25 MILLIGRAM(S): at 05:55

## 2021-09-16 RX ADMIN — Medication 100 MILLIGRAM(S): at 05:55

## 2021-09-16 RX ADMIN — SIMVASTATIN 10 MILLIGRAM(S): 20 TABLET, FILM COATED ORAL at 21:39

## 2021-09-16 RX ADMIN — Medication 50 MICROGRAM(S): at 05:55

## 2021-09-16 RX ADMIN — Medication 100 MILLIGRAM(S): at 21:39

## 2021-09-16 RX ADMIN — HEPARIN SODIUM 5000 UNIT(S): 5000 INJECTION INTRAVENOUS; SUBCUTANEOUS at 05:55

## 2021-09-16 RX ADMIN — HEPARIN SODIUM 5000 UNIT(S): 5000 INJECTION INTRAVENOUS; SUBCUTANEOUS at 19:03

## 2021-09-16 RX ADMIN — Medication 100 MILLIGRAM(S): at 12:00

## 2021-09-16 RX ADMIN — FAMOTIDINE 20 MILLIGRAM(S): 10 INJECTION INTRAVENOUS at 05:55

## 2021-09-16 NOTE — PROGRESS NOTE ADULT - ASSESSMENT
Patient is brought in for worsening LLE redness     ·	LLE Cellulitis   ·	Hyponatremia   ·	HTN/CAD  ·	PAD  ·	Chronic AFIB   ·	Suspected CKD stage II  ·	hard of hearing    -IV abx  -check cultures  -ID and Podiatry c/s appreciated   -continue with home meds  -rehab/pt as tolerated     Attending Physician Dr. Alejandra Tripp # [phone].

## 2021-09-16 NOTE — CONSULT NOTE ADULT - SUBJECTIVE AND OBJECTIVE BOX
Podiatry Consult Note    Subjective:  CHAVO PRINCE is a pleasant well-nourished, well developed 89y Female in no acute distress, alert awake, and oriented to person, place and time.   Patient is a 89y old  Female who presents with a chief complaint of BL LE wound  HPI:  89 year old female with PMH of HTN, CAD, AFIB (off AC), chronic anemia, hypothyroid, hard of hearing, PAD, and dementia was brought to ED for increased redness, pain and swelling of LEFT LE. Pt is hard of hearing, thus history obtained from son Mr. Prince. Son reports pt was seen by visiting nurse this morning who unwrapped pt's legs and noticed LE edema, erythema and sores. Son also admits to pt having progressive difficulty ambulating and getting up the chair. She also refuses keep her legs elevated as per son. Son denies pt c/o fever, chills, SOB, CP, dizziness, cough, or urinary complaints. She ambulates with a walker. Pt follows up with Dr. Lopez for podiatry.     In the ED pt afebrile, vitals stable. No leukocytosis. Venous  duplex negative for DVT. Pt received Ancef 2g x1.   (15 Sep 2021 16:10)    Seen by bedside; eval BL LE wound; Dr. Lopez's pt;     Past Medical History and Surgical History  PAST MEDICAL & SURGICAL HISTORY:  Atrial fibrillation  Anemia  CAD (coronary artery disease)  Coronary artery disease  Hearing loss  Hypertension  Dementia  Peripheral artery disease  Venous stasis ulcers of both lower extremities  OA (osteoarthritis)  Left Knee  History of appendectomy  S/P ORIF (open reduction internal fixation) fracture  RT hip Fracture    Objective:  Vital Signs Last 24 Hrs  T(C): 36 (16 Sep 2021 13:42), Max: 36 (16 Sep 2021 13:42)  T(F): 96.8 (16 Sep 2021 13:42), Max: 96.8 (16 Sep 2021 13:42)  HR: 87 (16 Sep 2021 13:42) (63 - 87)  BP: 142/67 (16 Sep 2021 13:42) (140/64 - 155/69)  BP(mean): --  RR: 16 (16 Sep 2021 13:42) (16 - 18)  SpO2: 99% (15 Sep 2021 16:28) (99% - 99%)                        10.1   7.87  )-----------( 301      ( 16 Sep 2021 08:03 )             31.7                 09-16    133<L>  |  98  |  8<L>  ----------------------------<  71  4.3   |  25  |  0.6<L>    Ca    8.7      16 Sep 2021 08:03  Mg     2.0     09-15    TPro  6.2  /  Alb  3.3<L>  /  TBili  1.3<H>  /  DBili  x   /  AST  31  /  ALT  15  /  AlkPhos  122<H>  09-15    Physical Exam - Lower Extremity Focused:   Derm:   BL LE superficial wound; stable;   Vascular: DP and PT Pulses Diminished;  Neuro: Protective Sensation Diminished;    Assessment:  BL LE superficial wound; stable    Plan:  Chart reviewed and Patient evaluated. All Questions and Concerns Addressed and Answered  Discussed diagnosis and treatment with patient  Wound Flushed w/ normal saline; Xeroform / DSD / Kerlix; q24  Local Wound Care; As Stated Above;   Stable from podiatry standpoint; follow up to Dr. Lopez a week post discharge;   Weight bearing status; WBAT BL feet;   Discussed Plan w/ Dr. Lopez    Podiatry       
Patient is a 89y old  Female with PMH of HTN, CAD, AFIB (off AC), chronic anemia, hypothyroid, hard of hearing, PAD, and dementia was brought to ER for evaluation of  increased redness, pain and swelling of LEFT LE. Patient was seen by visiting nurse who unwrapped pt's legs and noticed LE edema, erythema and sores. On admission, she has no fever or Leukocytosis. The doppler of LLE shows NO DVT. She has started on Cefazolin and the ID consult requested to assist with further evaluation and antibiotic management.       REVIEW OF SYSTEMS: Total of twelve systems have been reviewed with patient and found to be negative unless mentioned in HPI        PAST MEDICAL & SURGICAL HISTORY:  Atrial fibrillation  Anemia  CAD (coronary artery disease)  Coronary artery disease  Hearing loss  Hypertension  Dementia  Peripheral artery disease  Venous stasis ulcers of both lower extremities  OA (osteoarthritis) Left Knee  History of appendectomy  S/P ORIF (open reduction internal fixation) fracture  RT hip Fracture      SOCIAL HISTORY  Alcohol: Does not drink  Tobacco: Does not smoke  Illicit substance use: None      FAMILY HISTORY: Non contributory to the present illness        ALLERGIES: Celebrex (Unknown)  penicillins (Unknown)        Vital Signs Last 24 Hrs  T(C): 35.9 (16 Sep 2021 05:00), Max: 36.7 (15 Sep 2021 11:10)  T(F): 96.7 (16 Sep 2021 05:00), Max: 98 (15 Sep 2021 11:10)  HR: 72 (16 Sep 2021 05:00) (63 - 80)  BP: 149/67 (16 Sep 2021 05:00) (135/82 - 155/69)  BP(mean): --  RR: 16 (16 Sep 2021 05:00) (16 - 18)  SpO2: 99% (15 Sep 2021 16:28) (96% - 99%)      PHYSICAL EXAM:  GENERAL: Not in distress   CHEST/LUNG:  Not using accessory muscles   HEART: s1 and s2 present  ABDOMEN:  Nontender and  Nondistended  EXTREMITIES: LLE erythematous, swollen and warmth to touch  CNS: Awake and Alert      LABS:                        10.1   7.87  )-----------( 301      ( 16 Sep 2021 08:03 )             31.7       09-16    133<L>  |  98  |  8<L>  ----------------------------<  71  4.3   |  25  |  0.6<L>    Ca    8.7      16 Sep 2021 08:03  Mg     2.0     09-15    TPro  6.2  /  Alb  3.3<L>  /  TBili  1.3<H>  /  DBili  x   /  AST  31  /  ALT  15  /  AlkPhos  122<H>  09-15        MEDICATIONS  (STANDING):  ceFAZolin   IVPB 2000 milliGRAM(s) IV Intermittent every 8 hours  famotidine    Tablet 20 milliGRAM(s) Oral two times a day  ferrous    sulfate 325 milliGRAM(s) Oral daily  furosemide    Tablet 20 milliGRAM(s) Oral daily  heparin   Injectable 5000 Unit(s) SubCutaneous every 12 hours  levothyroxine 50 MICROGram(s) Oral daily  metoprolol succinate ER 25 milliGRAM(s) Oral daily  simvastatin 10 milliGRAM(s) Oral at bedtime  zinc sulfate 220 milliGRAM(s) Oral daily    MEDICATIONS  (PRN):  acetaminophen   Tablet .. 650 milliGRAM(s) Oral every 6 hours PRN Temp greater or equal to 38.5C (101.3F), Mild Pain (1 - 3)      RADIOLOGY & ADDITIONAL TESTS:    < from: [facility] Duplex Lower Ext Vein Scan, Bilat (09.15.21 @ 14:15) >  No evidence of deep venous thrombosis in either lower extremity.        MICROBIOLOGY DATA    COVID-19 PCR (09.15.21 @ 11:40)   COVID-19 PCR: [facility]:    Respiratory Viral Panel with COVID-19 by GARIMA (07.01.21 @ 10:45)   Rapid RVP Result: [facility]   SARS-CoV-2: [facility]:

## 2021-09-16 NOTE — PROGRESS NOTE ADULT - SUBJECTIVE AND OBJECTIVE BOX
CHAVO DYE  89y  Female      Patient is a 89y old  Female who presents with a chief complaint of     INTERVAL HPI/OVERNIGHT EVENTS:  pt seen and examined early this morning and note is written later in the day   -ID consult appreciated; continue with IV abx   -skin care as per nursing   -rehab/pt as tolerated     REVIEW OF SYSTEMS:  -no complaints to me       Vital Signs Last 24 Hrs  T(C): 36 (16 Sep 2021 13:42), Max: 36 (16 Sep 2021 13:42)  T(F): 96.8 (16 Sep 2021 13:42), Max: 96.8 (16 Sep 2021 13:42)  HR: 87 (16 Sep 2021 13:42) (72 - 87)  BP: 142/67 (16 Sep 2021 13:42) (142/67 - 155/69)  BP(mean): --  RR: 16 (16 Sep 2021 13:42) (16 - 17)  SpO2: --    PHYSICAL EXAM:  GENERAL: NAD; hard of hearing; but appears comfortably sitting up in chair   HEAD:  Atraumatic, Normocephalic  EYES: EOMI, PERRLA, conjunctiva and sclera clear  NERVOUS SYSTEM:  Alert & Oriented X 3  CHEST/LUNG: Clear to percussion bilaterally; No rales, rhonchi, wheezing, or rubs  CV/HEART: Regular rate and rhythm; No murmurs, rubs, or gallops  GI/ABDOMEN: Soft, Nontender, Nondistended; Bowel sounds present  EXTREMITIES:  2+ Peripheral Pulses, No clubbing, cyanosis, or edema  SKIN: No rashes or lesions    LAB:                        10.1   7.87  )-----------( 301      ( 16 Sep 2021 08:03 )             31.7     09-16    133<L>  |  98  |  8<L>  ----------------------------<  71  4.3   |  25  |  0.6<L>    Ca    8.7      16 Sep 2021 08:03  Mg     2.0     09-15    TPro  6.2  /  Alb  3.3<L>  /  TBili  1.3<H>  /  DBili  x   /  AST  31  /  ALT  15  /  AlkPhos  122<H>  09-15      Daily     Daily   CAPILLARY BLOOD GLUCOSE    LIVER FUNCTIONS - ( 15 Sep 2021 13:10 )  Alb: 3.3 g/dL / Pro: 6.2 g/dL / ALK PHOS: 122 U/L / ALT: 15 U/L / AST: 31 U/L / GGT: x           RADIOLOGY:    Imaging Personally visualized and Reviewed:  [ y ] YES  [ ] NO    HEALTH ISSUES - PROBLEM Dx:    MEDS:  acetaminophen   Tablet .. 650 milliGRAM(s) Oral every 6 hours PRN  ceFAZolin   IVPB 2000 milliGRAM(s) IV Intermittent every 8 hours  clindamycin IVPB      clindamycin IVPB 900 milliGRAM(s) IV Intermittent once  clindamycin IVPB 900 milliGRAM(s) IV Intermittent every 8 hours  famotidine    Tablet 20 milliGRAM(s) Oral two times a day  ferrous    sulfate 325 milliGRAM(s) Oral daily  furosemide    Tablet 20 milliGRAM(s) Oral daily  heparin   Injectable 5000 Unit(s) SubCutaneous every 12 hours  levothyroxine 50 MICROGram(s) Oral daily  metoprolol succinate ER 25 milliGRAM(s) Oral daily  simvastatin 10 milliGRAM(s) Oral at bedtime  zinc sulfate 220 milliGRAM(s) Oral daily

## 2021-09-16 NOTE — ADVANCED PRACTICE NURSE CONSULT - ASSESSMENT
Patient is a 89 year old female with PMH of HTN, CAD, AFIB (off AC), chronic anemia, hypothyroid, hard of hearing, PAD, and dementia was brought to ED for increased redness, pain and swelling of LEFT LE. Pt is hard of hearing, thus history obtained from son Mr. Prince. Son reports pt was seen by visiting nurse this morning who unwrapped pt's legs and noticed LE edema, erythema and sores. Son also admits to pt having progressive difficulty ambulating and getting up the chair. She also refuses keep her legs elevated as per son. Son denies pt c/o fever, chills, SOB, CP, dizziness, cough, or urinary complaints. She ambulates with a walker. Pt follows up with Dr. Lopez for podiatry.   In the ED pt afebrile, vitals stable. No leukocytosis. Venous  duplex negative for DVT. Pt received Ancef 2g x1.   PAST MEDICAL & SURGICAL HISTORY:  Atrial fibrillation  Anemia  CAD (coronary artery disease)  Coronary artery disease  Hearing loss  Hypertension  Dementia  Peripheral artery disease  Venous stasis ulcers of both lower extremities  OA (osteoarthritis)  Left Knee  History of appendectomy  S/P ORIF (open reduction internal fixation) fracture  RT hip Fracture    Assessment:  Patient received in recliner chair , awake but confused, response appropriately to verbal command                       Skin assessed-      Pressure injury  #1  Location: Cocxyx  Size:0.5x0.5  Tissue Description :   [ ] Necrotic   [ ] Slough   [ ] Infected   [ ] Granulation (firm, beefy red tissue)   [ ] Hypergranulation (soft, gelatinous)  [x ] Poor-Quality Granulation (pale, grey/brown/red granulation tissue)   [ ] Epithelium (pink/mauve at wound edges)  [x ] Macerated  [ ] Other:   Wound Exudate : None    Wound Edge): Intact  Periwound Condition ;   [x ] Maceration [ ] Excoriation [x ] Dry skin [ ] Hyperkeratosis [ ] Callus [ ] Eczema [x ] Other: scab  Pressure Injury stage   [ ] Stage I  [x ]  Stage II  [ ]  Stage III  [ ]  Stage VI  [ ]  Suspected Deep Tissue Injury (DTI)  [ ]  Unstageable    Other Etiology:  [ ] Aterial  [x ] Venous   B/L lower extremity chronic venous stasis ulcerations                       Dry hyperkeratosis skin lesions   [ ] Surgical Incision  [x ] Other:  sacrum dry stable scabs

## 2021-09-16 NOTE — CONSULT NOTE ADULT - ASSESSMENT
Patient is a 89y old  Female with PMH of HTN, CAD, AFIB (off AC), chronic anemia, hypothyroid, hard of hearing, PAD, and dementia was brought to ER for evaluation of  increased redness, pain and swelling of LEFT LE. Patient was seen by visiting nurse who unwrapped pt's legs and noticed LE edema, erythema and sores. On admission, she has no fever or Leukocytosis. The doppler of LLE shows NO DVT. She has started on Cefazolin and the ID consult requested to assist with further evaluation and antibiotic management.     # LE cellulitis    would recommend:    1. Follow up Blood cultures  2. Continue Cefazolin and clindamycin   3. Keep LE elevated   4. Dressing change     will follow the patient with you and make further recommendation based on the clinical course and Lab results  Thank you for the opportunity to participate in Ms. DYE's care      Attending Attestation:    Spent more than 65 minutes on total encounter, more than 50 % of the visit was spent counseling and/or coordinating care by the Attending physician.

## 2021-09-16 NOTE — PHYSICAL THERAPY INITIAL EVALUATION ADULT - GENERAL OBSERVATIONS, REHAB EVAL
9:05-9:30 Chart reviewed. Patient available to be seen for physical therapy, denies pain, confirmed with RN. Pt rec'd in bed +Primafit in place, pt in NAD

## 2021-09-17 LAB
ANION GAP SERPL CALC-SCNC: 12 MMOL/L — SIGNIFICANT CHANGE UP (ref 7–14)
BUN SERPL-MCNC: 10 MG/DL — SIGNIFICANT CHANGE UP (ref 10–20)
CALCIUM SERPL-MCNC: 8.5 MG/DL — SIGNIFICANT CHANGE UP (ref 8.5–10.1)
CHLORIDE SERPL-SCNC: 98 MMOL/L — SIGNIFICANT CHANGE UP (ref 98–110)
CO2 SERPL-SCNC: 25 MMOL/L — SIGNIFICANT CHANGE UP (ref 17–32)
COVID-19 SPIKE DOMAIN AB INTERP: POSITIVE
COVID-19 SPIKE DOMAIN ANTIBODY RESULT: >250 U/ML — HIGH
CREAT SERPL-MCNC: 0.7 MG/DL — SIGNIFICANT CHANGE UP (ref 0.7–1.5)
GLUCOSE SERPL-MCNC: 79 MG/DL — SIGNIFICANT CHANGE UP (ref 70–99)
HCT VFR BLD CALC: 29 % — LOW (ref 37–47)
HGB BLD-MCNC: 9.1 G/DL — LOW (ref 12–16)
MCHC RBC-ENTMCNC: 26.6 PG — LOW (ref 27–31)
MCHC RBC-ENTMCNC: 31.4 G/DL — LOW (ref 32–37)
MCV RBC AUTO: 84.8 FL — SIGNIFICANT CHANGE UP (ref 81–99)
NRBC # BLD: 0 /100 WBCS — SIGNIFICANT CHANGE UP (ref 0–0)
PLATELET # BLD AUTO: 292 K/UL — SIGNIFICANT CHANGE UP (ref 130–400)
POTASSIUM SERPL-MCNC: 4 MMOL/L — SIGNIFICANT CHANGE UP (ref 3.5–5)
POTASSIUM SERPL-SCNC: 4 MMOL/L — SIGNIFICANT CHANGE UP (ref 3.5–5)
RBC # BLD: 3.42 M/UL — LOW (ref 4.2–5.4)
RBC # FLD: 14.6 % — HIGH (ref 11.5–14.5)
SARS-COV-2 IGG+IGM SERPL QL IA: >250 U/ML — HIGH
SARS-COV-2 IGG+IGM SERPL QL IA: POSITIVE
SODIUM SERPL-SCNC: 135 MMOL/L — SIGNIFICANT CHANGE UP (ref 135–146)
WBC # BLD: 7.86 K/UL — SIGNIFICANT CHANGE UP (ref 4.8–10.8)
WBC # FLD AUTO: 7.86 K/UL — SIGNIFICANT CHANGE UP (ref 4.8–10.8)

## 2021-09-17 PROCEDURE — 99233 SBSQ HOSP IP/OBS HIGH 50: CPT

## 2021-09-17 RX ADMIN — Medication 20 MILLIGRAM(S): at 05:45

## 2021-09-17 RX ADMIN — FAMOTIDINE 20 MILLIGRAM(S): 10 INJECTION INTRAVENOUS at 05:45

## 2021-09-17 RX ADMIN — FAMOTIDINE 20 MILLIGRAM(S): 10 INJECTION INTRAVENOUS at 18:16

## 2021-09-17 RX ADMIN — HEPARIN SODIUM 5000 UNIT(S): 5000 INJECTION INTRAVENOUS; SUBCUTANEOUS at 18:17

## 2021-09-17 RX ADMIN — SIMVASTATIN 10 MILLIGRAM(S): 20 TABLET, FILM COATED ORAL at 21:22

## 2021-09-17 RX ADMIN — Medication 50 MICROGRAM(S): at 05:46

## 2021-09-17 RX ADMIN — Medication 100 MILLIGRAM(S): at 05:46

## 2021-09-17 RX ADMIN — Medication 100 MILLIGRAM(S): at 21:22

## 2021-09-17 RX ADMIN — Medication 25 MILLIGRAM(S): at 05:46

## 2021-09-17 RX ADMIN — HEPARIN SODIUM 5000 UNIT(S): 5000 INJECTION INTRAVENOUS; SUBCUTANEOUS at 05:46

## 2021-09-17 RX ADMIN — ZINC SULFATE TAB 220 MG (50 MG ZINC EQUIVALENT) 220 MILLIGRAM(S): 220 (50 ZN) TAB at 12:35

## 2021-09-17 RX ADMIN — Medication 325 MILLIGRAM(S): at 12:35

## 2021-09-17 RX ADMIN — Medication 100 MILLIGRAM(S): at 13:40

## 2021-09-17 NOTE — PROGRESS NOTE ADULT - SUBJECTIVE AND OBJECTIVE BOX
CHAVO DYE  89y, Female  Allergy: Celebrex (Unknown)  penicillins (Unknown)      LOS  2d    CHIEF COMPLAINT:     INTERVAL EVENTS/HPI  - No acute events overnight  - T(F): , Max: 98.9 (09-16-21 @ 20:40)  - Denies any worsening symptoms  - Tolerating medication  - WBC Count: 7.86 (09-17-21 @ 06:20)  WBC Count: 7.87 (09-16-21 @ 08:03)     - Creatinine, Serum: 0.7 (09-17-21 @ 06:20)  Creatinine, Serum: 0.6 (09-16-21 @ 08:03)       ROS  General: Denies rigors, nightsweats  HEENT: Denies headache, rhinorrhea, sore throat, eye pain  CV: Denies CP, palpitations  PULM: Denies wheezing, hemoptysis  GI: Denies hematemesis, hematochezia, melena  : Denies discharge, hematuria  MSK: Denies arthralgias, myalgias  SKIN: Denies rash, lesions  NEURO: Denies paresthesias, weakness  PSYCH: Denies depression, anxiety    VITALS:  T(F): 96.3, Max: 98.9 (09-16-21 @ 20:40)  HR: 71  BP: 120/73  RR: 16Vital Signs Last 24 Hrs  T(C): 35.7 (17 Sep 2021 05:00), Max: 37.2 (16 Sep 2021 20:40)  T(F): 96.3 (17 Sep 2021 05:00), Max: 98.9 (16 Sep 2021 20:40)  HR: 71 (17 Sep 2021 05:00) (71 - 87)  BP: 120/73 (17 Sep 2021 05:00) (120/59 - 142/67)  BP(mean): --  RR: 16 (17 Sep 2021 05:00) (16 - 16)  SpO2: --    PHYSICAL EXAM:  Gen: NAD, resting in bed  HEENT: Normocephalic, atraumatic  Neck: supple, no lymphadenopathy  CV: Regular rate & regular rhythm  Lungs: decreased BS at bases, no fremitus  Abdomen: Soft, BS present  Ext: Left lower extremity discolored - warm to touch   Neuro: non focal, awake  Skin: no rash, no erythema  Lines: no phlebitis    FH: Non-contributory  Social Hx: Non-contributory    TESTS & MEASUREMENTS:                        9.1    7.86  )-----------( 292      ( 17 Sep 2021 06:20 )             29.0     09-17    135  |  98  |  10  ----------------------------<  79  4.0   |  25  |  0.7    Ca    8.5      17 Sep 2021 06:20  Mg     2.0     09-15    TPro  6.2  /  Alb  3.3<L>  /  TBili  1.3<H>  /  DBili  x   /  AST  31  /  ALT  15  /  AlkPhos  122<H>  09-15    eGFR if Non African American: 74 mL/min/1.73M2 (09-17-21 @ 06:20)  eGFR if African American: 86 mL/min/1.73M2 (09-17-21 @ 06:20)    LIVER FUNCTIONS - ( 15 Sep 2021 13:10 )  Alb: 3.3 g/dL / Pro: 6.2 g/dL / ALK PHOS: 122 U/L / ALT: 15 U/L / AST: 31 U/L / GGT: x               Culture - Blood (collected 09-15-21 @ 13:11)  Source: .Blood Blood  Preliminary Report (09-16-21 @ 22:02):    No growth to date.    Culture - Blood (collected 09-15-21 @ 13:11)  Source: .Blood Blood  Preliminary Report (09-16-21 @ 22:02):    No growth to date.        Lactate, Blood: 0.9 mmol/L (09-15-21 @ 13:10)      INFECTIOUS DISEASES TESTING  COVID-19 PCR: [facility] (09-15-21 @ 11:40)  Rapid RVP Result: [facility] (07-01-21 @ 10:45)  Rapid RVP Result: [facility] (06-27-21 @ 23:20)      INFLAMMATORY MARKERS      RADIOLOGY & ADDITIONAL TESTS:  I have personally reviewed the last available Chest xray  CXR      CT      CARDIOLOGY TESTING  12 Lead ECG:   Ventricular Rate 62 BPM    Atrial Rate 63 BPM    QRS Duration 96 ms    Q-T Interval 452 ms    QTC Calculation(Bazett) 458 ms    R Axis -47 degrees    T Axis 4 degrees    Diagnosis Line Atrial fibrillation  Low voltage QRS in the limb leads  Left anterior fascicular block  Cannot rule out Anteroseptal infarct , age undetermined  Poor R wave progression  Abnormal ECG    Confirmed by Jr Kuhn ([id number]) on 9/16/2021 1:45:59 PM (09-15-21 @ 12:39)      MEDICATIONS  ceFAZolin   IVPB 2000 IV Intermittent every 8 hours  clindamycin IVPB     clindamycin IVPB 900 IV Intermittent every 8 hours  famotidine    Tablet 20 Oral two times a day  ferrous    sulfate 325 Oral daily  furosemide    Tablet 20 Oral daily  heparin   Injectable 5000 SubCutaneous every 12 hours  levothyroxine 50 Oral daily  metoprolol succinate ER 25 Oral daily  simvastatin 10 Oral at bedtime  zinc sulfate 220 Oral daily      WEIGHT  Weight (kg): 54.4 (09-15-21 @ 11:10)  Creatinine, Serum: 0.7 mg/dL (09-17-21 @ 06:20)      ANTIBIOTICS:  ceFAZolin   IVPB 2000 milliGRAM(s) IV Intermittent every 8 hours  clindamycin IVPB      clindamycin IVPB 900 milliGRAM(s) IV Intermittent every 8 hours      All available historical records have been reviewed

## 2021-09-17 NOTE — PROGRESS NOTE ADULT - ASSESSMENT
Patient is brought in for worsening LLE redness     ·	LLE Cellulitis   ·	Hyponatremia   ·	HTN/CAD  ·	PAD  ·	Chronic AFIB   ·	Suspected CKD stage II  ·	hard of hearing    -IV abx  -pending cultures --- will narrow antibiotics coverage accordingly   -ID and Podiatry c/s appreciated   -continue with home meds  -rehab/pt as tolerated     Attending Physician Dr. Alejandra Tripp # [id number].

## 2021-09-17 NOTE — PROGRESS NOTE ADULT - SUBJECTIVE AND OBJECTIVE BOX
CHAVO DYE  89y  Female      Patient is a 89y old  Female who presents with a chief complaint of     INTERVAL HPI/OVERNIGHT EVENTS:  pt seen and examined early this morning  -ID consult appreciated; continue with IV abx; pending cultures   -skin care as per nursing   -rehab/pt as tolerated     REVIEW OF SYSTEMS:  -no complaints to me       Vital Signs Last 24 Hrs  T(C): 35.7 (17 Sep 2021 05:00), Max: 37.2 (16 Sep 2021 20:40)  T(F): 96.3 (17 Sep 2021 05:00), Max: 98.9 (16 Sep 2021 20:40)  HR: 71 (17 Sep 2021 05:00) (71 - 87)  BP: 120/73 (17 Sep 2021 05:00) (120/59 - 142/67)  RR: 16 (17 Sep 2021 05:00) (16 - 16)  SpO2: --    PHYSICAL EXAM:  GENERAL: NAD; hard of hearing; but appears comfortably sitting up in chair   HEAD:  Atraumatic, Normocephalic  EYES: EOMI, PERRLA, conjunctiva and sclera clear  NERVOUS SYSTEM:  Alert & Oriented X 3  CHEST/LUNG: Clear to percussion bilaterally; No rales, rhonchi, wheezing, or rubs  CV/HEART: Regular rate and rhythm; No murmurs, rubs, or gallops  GI/ABDOMEN: Soft, Nontender, Nondistended; Bowel sounds present  EXTREMITIES:  2+ Peripheral Pulses, No clubbing, cyanosis, or edema  SKIN: No rashes or lesions    LAB:                                 9.1    7.86  )-----------( 292      ( 17 Sep 2021 06:20 )             29.0     09-16    133<L>  |  98  |  8<L>  ----------------------------<  71  4.3   |  25  |  0.6<L>    Ca    8.7      16 Sep 2021 08:03  Mg     2.0     09-15    TPro  6.2  /  Alb  3.3<L>  /  TBili  1.3<H>  /  DBili  x   /  AST  31  /  ALT  15  /  AlkPhos  122<H>  09-15      Daily     Daily   CAPILLARY BLOOD GLUCOSE    LIVER FUNCTIONS - ( 15 Sep 2021 13:10 )  Alb: 3.3 g/dL / Pro: 6.2 g/dL / ALK PHOS: 122 U/L / ALT: 15 U/L / AST: 31 U/L / GGT: x           RADIOLOGY:    Imaging Personally visualized and Reviewed:  [ y ] YES  [ ] NO    HEALTH ISSUES - PROBLEM Dx:    MEDICATIONS  (STANDING):  ceFAZolin   IVPB 2000 milliGRAM(s) IV Intermittent every 8 hours  clindamycin IVPB      clindamycin IVPB 900 milliGRAM(s) IV Intermittent every 8 hours  famotidine    Tablet 20 milliGRAM(s) Oral two times a day  ferrous    sulfate 325 milliGRAM(s) Oral daily  furosemide    Tablet 20 milliGRAM(s) Oral daily  heparin   Injectable 5000 Unit(s) SubCutaneous every 12 hours  levothyroxine 50 MICROGram(s) Oral daily  metoprolol succinate ER 25 milliGRAM(s) Oral daily  simvastatin 10 milliGRAM(s) Oral at bedtime  zinc sulfate 220 milliGRAM(s) Oral daily    MEDICATIONS  (PRN):  acetaminophen   Tablet .. 650 milliGRAM(s) Oral every 6 hours PRN Temp greater or equal to 38.5C (101.3F), Mild Pain (1 - 3)

## 2021-09-17 NOTE — DIETITIAN INITIAL EVALUATION ADULT. - ORAL INTAKE PTA/DIET HISTORY
as per pt good po intake PTA, denies any recent weight changes. as per pt good po intake PTA. as per EMR pt weighted 61.1 kgs on 6/28/21 vs 54.4 kgs today, indicating a weight loss of 15#  (> 7.5%) in almost 3 months.

## 2021-09-17 NOTE — DIETITIAN INITIAL EVALUATION ADULT. - PERTINENT MEDS FT
MEDICATIONS  (STANDING):  ceFAZolin   IVPB 2000 milliGRAM(s) IV Intermittent every 8 hours  clindamycin IVPB      clindamycin IVPB 900 milliGRAM(s) IV Intermittent every 8 hours  famotidine    Tablet 20 milliGRAM(s) Oral two times a day  ferrous    sulfate 325 milliGRAM(s) Oral daily  furosemide    Tablet 20 milliGRAM(s) Oral daily  heparin   Injectable 5000 Unit(s) SubCutaneous every 12 hours  levothyroxine 50 MICROGram(s) Oral daily  metoprolol succinate ER 25 milliGRAM(s) Oral daily  simvastatin 10 milliGRAM(s) Oral at bedtime  zinc sulfate 220 milliGRAM(s) Oral daily    MEDICATIONS  (PRN):  acetaminophen   Tablet .. 650 milliGRAM(s) Oral every 6 hours PRN Temp greater or equal to 38.5C (101.3F), Mild Pain (1 - 3)

## 2021-09-17 NOTE — DIETITIAN INITIAL EVALUATION ADULT. - OTHER INFO
pt is 89 year old female with hx of HTN, CAD, afib, chronic anemia, hypothyroid, [place], PAD, dementia presents with increased redness, pain and swelling to LLE admitted with cellulitis.

## 2021-09-17 NOTE — DIETITIAN INITIAL EVALUATION ADULT. - PHYSCIAL ASSESSMENT
BMI 20.6 cocxyx stage II  sacrum DTI  age related muscle wasting noted to orbital, temple, clavicle and buccala regions

## 2021-09-17 NOTE — PROGRESS NOTE ADULT - ASSESSMENT
Patient is a 89y old  Female with PMH of HTN, CAD, AFIB (off AC), chronic anemia, hypothyroid, hard of hearing, PAD, and dementia was brought to ER for evaluation of  increased redness, pain and swelling of LEFT LE. Patient was seen by visiting nurse who unwrapped pt's legs and noticed LE edema, erythema and sores. On admission, she has no fever or Leukocytosis. The doppler of LLE shows NO DVT. She has started on Cefazolin and the ID consult requested to assist with further evaluation and antibiotic management.     # LE cellulitis    Recommendations   - continue cefazolin 1g q 8 hours  - continue clindamycin 900 mg q 8 hours  - keep LE elevated   - if appears to be improving over weekend, can possibly complete with doxy + Keflex on discharge     Please call or message on [employer] Teams if with any questions.  Spectra [phone]

## 2021-09-18 PROCEDURE — 99233 SBSQ HOSP IP/OBS HIGH 50: CPT

## 2021-09-18 RX ADMIN — Medication 25 MILLIGRAM(S): at 05:30

## 2021-09-18 RX ADMIN — SIMVASTATIN 10 MILLIGRAM(S): 20 TABLET, FILM COATED ORAL at 22:16

## 2021-09-18 RX ADMIN — Medication 100 MILLIGRAM(S): at 22:16

## 2021-09-18 RX ADMIN — HEPARIN SODIUM 5000 UNIT(S): 5000 INJECTION INTRAVENOUS; SUBCUTANEOUS at 17:44

## 2021-09-18 RX ADMIN — Medication 100 MILLIGRAM(S): at 13:19

## 2021-09-18 RX ADMIN — Medication 100 MILLIGRAM(S): at 05:29

## 2021-09-18 RX ADMIN — Medication 325 MILLIGRAM(S): at 12:13

## 2021-09-18 RX ADMIN — FAMOTIDINE 20 MILLIGRAM(S): 10 INJECTION INTRAVENOUS at 05:29

## 2021-09-18 RX ADMIN — ZINC SULFATE TAB 220 MG (50 MG ZINC EQUIVALENT) 220 MILLIGRAM(S): 220 (50 ZN) TAB at 12:13

## 2021-09-18 RX ADMIN — Medication 100 MILLIGRAM(S): at 13:21

## 2021-09-18 RX ADMIN — Medication 20 MILLIGRAM(S): at 05:29

## 2021-09-18 RX ADMIN — HEPARIN SODIUM 5000 UNIT(S): 5000 INJECTION INTRAVENOUS; SUBCUTANEOUS at 05:30

## 2021-09-18 RX ADMIN — FAMOTIDINE 20 MILLIGRAM(S): 10 INJECTION INTRAVENOUS at 17:44

## 2021-09-18 RX ADMIN — Medication 50 MICROGRAM(S): at 05:30

## 2021-09-18 NOTE — PROGRESS NOTE ADULT - SUBJECTIVE AND OBJECTIVE BOX
CHAVO DYE  89y  Female      Patient is a 89y old  Female who presents with a chief complaint of     INTERVAL HPI/OVERNIGHT EVENTS:  pt seen and examined early this morning  -ID consult appreciated; continue with IV abx  -skin care as per nursing   -rehab/pt as tolerated   -d/c planning 24-48 hrs with clinical improvement     REVIEW OF SYSTEMS:  -no complaints to me     Vital Signs Last 24 Hrs  T(C): 37.2 (18 Sep 2021 05:00), Max: 37.2 (18 Sep 2021 05:00)  T(F): 99 (18 Sep 2021 05:00), Max: 99 (18 Sep 2021 05:00)  HR: 74 (18 Sep 2021 05:00) (66 - 74)  BP: 109/54 (18 Sep 2021 05:00) (103/54 - 109/54)  RR: 16 (18 Sep 2021 05:00) (16 - 16)    PHYSICAL EXAM:  GENERAL: NAD; hard of hearing; but appears comfortably sitting up in chair   HEAD:  Atraumatic, Normocephalic  EYES: EOMI, PERRLA, conjunctiva and sclera clear  NERVOUS SYSTEM:  Alert & Oriented X 3  CHEST/LUNG: Clear to percussion bilaterally; No rales, rhonchi, wheezing, or rubs  CV/HEART: Regular rate and rhythm; No murmurs, rubs, or gallops  GI/ABDOMEN: Soft, Nontender, Nondistended; Bowel sounds present  EXTREMITIES:  2+ Peripheral Pulses, No clubbing, cyanosis, or edema  SKIN: L ankle erythema; tender to touch     LAB:                                 9.1    7.86  )-----------( 292      ( 17 Sep 2021 06:20 )             29.0     09-16    133<L>  |  98  |  8<L>  ----------------------------<  71  4.3   |  25  |  0.6<L>    Ca    8.7      16 Sep 2021 08:03  Mg     2.0     09-15    TPro  6.2  /  Alb  3.3<L>  /  TBili  1.3<H>  /  DBili  x   /  AST  31  /  ALT  15  /  AlkPhos  122<H>  09-15      Daily     Daily   CAPILLARY BLOOD GLUCOSE    LIVER FUNCTIONS - ( 15 Sep 2021 13:10 )  Alb: 3.3 g/dL / Pro: 6.2 g/dL / ALK PHOS: 122 U/L / ALT: 15 U/L / AST: 31 U/L / GGT: x           RADIOLOGY:    Imaging Personally visualized and Reviewed:  [ y ] YES  [ ] NO    HEALTH ISSUES - PROBLEM Dx:    MEDICATIONS  (STANDING):  ceFAZolin   IVPB 2000 milliGRAM(s) IV Intermittent every 8 hours  clindamycin IVPB      clindamycin IVPB 900 milliGRAM(s) IV Intermittent every 8 hours  famotidine    Tablet 20 milliGRAM(s) Oral two times a day  ferrous    sulfate 325 milliGRAM(s) Oral daily  furosemide    Tablet 20 milliGRAM(s) Oral daily  heparin   Injectable 5000 Unit(s) SubCutaneous every 12 hours  levothyroxine 50 MICROGram(s) Oral daily  metoprolol succinate ER 25 milliGRAM(s) Oral daily  simvastatin 10 milliGRAM(s) Oral at bedtime  zinc sulfate 220 milliGRAM(s) Oral daily    MEDICATIONS  (PRN):  acetaminophen   Tablet .. 650 milliGRAM(s) Oral every 6 hours PRN Temp greater or equal to 38.5C (101.3F), Mild Pain (1 - 3)

## 2021-09-18 NOTE — PROGRESS NOTE ADULT - ASSESSMENT
Patient is brought in for worsening LLE redness     ·	LLE Cellulitis   ·	Hyponatremia   ·	HTN/CAD  ·	PAD  ·	Chronic AFIB   ·	Suspected CKD stage II  ·	hard of hearing    -IV abx --- no change in current meds   -ID and Podiatry c/s appreciated   -continue with home meds  -rehab/pt as tolerated       # Progress Note Handoff  PENDING as follows  consults: ID follow up   Test: cultures   Family discussion: discussed with patient; hard of hearing but comprehended her medical care   Disposition: home with A once clinically stable; still with complicated LLE cellulitis     Attending Physician Dr. Alejandra Tripp # [phone].

## 2021-09-19 LAB
ANION GAP SERPL CALC-SCNC: 13 MMOL/L — SIGNIFICANT CHANGE UP (ref 7–14)
BUN SERPL-MCNC: 12 MG/DL — SIGNIFICANT CHANGE UP (ref 10–20)
CALCIUM SERPL-MCNC: 8.6 MG/DL — SIGNIFICANT CHANGE UP (ref 8.5–10.1)
CHLORIDE SERPL-SCNC: 98 MMOL/L — SIGNIFICANT CHANGE UP (ref 98–110)
CO2 SERPL-SCNC: 24 MMOL/L — SIGNIFICANT CHANGE UP (ref 17–32)
CREAT SERPL-MCNC: 0.6 MG/DL — LOW (ref 0.7–1.5)
GLUCOSE SERPL-MCNC: 76 MG/DL — SIGNIFICANT CHANGE UP (ref 70–99)
HCT VFR BLD CALC: 29 % — LOW (ref 37–47)
HGB BLD-MCNC: 9.3 G/DL — LOW (ref 12–16)
MCHC RBC-ENTMCNC: 26.7 PG — LOW (ref 27–31)
MCHC RBC-ENTMCNC: 32.1 G/DL — SIGNIFICANT CHANGE UP (ref 32–37)
MCV RBC AUTO: 83.3 FL — SIGNIFICANT CHANGE UP (ref 81–99)
NRBC # BLD: 0 /100 WBCS — SIGNIFICANT CHANGE UP (ref 0–0)
PLATELET # BLD AUTO: 279 K/UL — SIGNIFICANT CHANGE UP (ref 130–400)
POTASSIUM SERPL-MCNC: 3.9 MMOL/L — SIGNIFICANT CHANGE UP (ref 3.5–5)
POTASSIUM SERPL-SCNC: 3.9 MMOL/L — SIGNIFICANT CHANGE UP (ref 3.5–5)
RBC # BLD: 3.48 M/UL — LOW (ref 4.2–5.4)
RBC # FLD: 14.7 % — HIGH (ref 11.5–14.5)
SODIUM SERPL-SCNC: 135 MMOL/L — SIGNIFICANT CHANGE UP (ref 135–146)
WBC # BLD: 9.37 K/UL — SIGNIFICANT CHANGE UP (ref 4.8–10.8)
WBC # FLD AUTO: 9.37 K/UL — SIGNIFICANT CHANGE UP (ref 4.8–10.8)

## 2021-09-19 PROCEDURE — 99233 SBSQ HOSP IP/OBS HIGH 50: CPT

## 2021-09-19 RX ORDER — CEPHALEXIN 500 MG
500 CAPSULE ORAL EVERY 12 HOURS
Refills: 0 | Status: DISCONTINUED | OUTPATIENT
Start: 2021-09-19 | End: 2021-09-20

## 2021-09-19 RX ADMIN — Medication 100 MILLIGRAM(S): at 05:16

## 2021-09-19 RX ADMIN — FAMOTIDINE 20 MILLIGRAM(S): 10 INJECTION INTRAVENOUS at 17:00

## 2021-09-19 RX ADMIN — Medication 20 MILLIGRAM(S): at 08:27

## 2021-09-19 RX ADMIN — Medication 100 MILLIGRAM(S): at 13:13

## 2021-09-19 RX ADMIN — Medication 100 MILLIGRAM(S): at 13:39

## 2021-09-19 RX ADMIN — ZINC SULFATE TAB 220 MG (50 MG ZINC EQUIVALENT) 220 MILLIGRAM(S): 220 (50 ZN) TAB at 12:07

## 2021-09-19 RX ADMIN — Medication 25 MILLIGRAM(S): at 08:27

## 2021-09-19 RX ADMIN — FAMOTIDINE 20 MILLIGRAM(S): 10 INJECTION INTRAVENOUS at 05:16

## 2021-09-19 RX ADMIN — SIMVASTATIN 10 MILLIGRAM(S): 20 TABLET, FILM COATED ORAL at 22:48

## 2021-09-19 RX ADMIN — Medication 100 MILLIGRAM(S): at 22:48

## 2021-09-19 RX ADMIN — Medication 50 MICROGRAM(S): at 05:16

## 2021-09-19 RX ADMIN — HEPARIN SODIUM 5000 UNIT(S): 5000 INJECTION INTRAVENOUS; SUBCUTANEOUS at 05:16

## 2021-09-19 RX ADMIN — Medication 500 MILLIGRAM(S): at 22:48

## 2021-09-19 RX ADMIN — Medication 325 MILLIGRAM(S): at 12:07

## 2021-09-19 RX ADMIN — HEPARIN SODIUM 5000 UNIT(S): 5000 INJECTION INTRAVENOUS; SUBCUTANEOUS at 17:00

## 2021-09-19 NOTE — PROGRESS NOTE ADULT - TIME BILLING
direct patient care  -coordinated current plan of care with medical and case management staff
direct patient care  -coordinated current plan of care with medical and case management staff   -updates given to patient and aware of current labs
direct patient care   -coordinated current plan of care with medical and case management staff
direct patient care  -coordinated current plan of care with medical and case management staff
I have personally seen and examined this patient.    I have reviewed all pertinent clinical information and reviewed all relevant imaging and diagnostic studies personally.   I counseled the patient about diagnostic testing and treatment plan. All questions were answered.   I discussed recommendations with the primary team.

## 2021-09-19 NOTE — PROGRESS NOTE ADULT - ASSESSMENT
Patient is a 89y old  Female with PMH of HTN, CAD, AFIB (off AC), chronic anemia, hypothyroid, hard of hearing, PAD, and dementia was brought to ER for evaluation of  increased redness, pain and swelling of LEFT LE. Patient was seen by visiting nurse who unwrapped pt's legs and noticed LE edema, erythema and sores. On admission, she has no fever or Leukocytosis. The doppler of LLE shows NO DVT. She has started on Cefazolin and the ID consult requested to assist with further evaluation and antibiotic management.     # LE cellulitis    would recommend:    1. Continue Currenbt ABx to complete th ecourse  2. Keep LE elevated   3. Dressing change     Attending Attestation:    Spent more than 35 minutes on total encounter, more than 50 % of the visit was spent counseling and/or coordinating care by the Attending physician.   Patient is a 89y old  Female with PMH of HTN, CAD, AFIB (off AC), chronic anemia, hypothyroid, hard of hearing, PAD, and dementia was brought to ER for evaluation of  increased redness, pain and swelling of LEFT LE. Patient was seen by visiting nurse who unwrapped pt's legs and noticed LE edema, erythema and sores. On admission, she has no fever or Leukocytosis. The doppler of LLE shows NO DVT. She has started on Cefazolin and the ID consult requested to assist with further evaluation and antibiotic management.     # LE cellulitis    would recommend:    1. Continue Current  ABx to complete the course  2. Keep LE elevated   3. Dressing change     Attending Attestation:    Spent more than 35 minutes on total encounter, more than 50 % of the visit was spent counseling and/or coordinating care by the Attending physician.

## 2021-09-19 NOTE — PROGRESS NOTE ADULT - SUBJECTIVE AND OBJECTIVE BOX
MARNIE CHAVO  89y  Female      Patient is a 89y old  Female who presents with a chief complaint of     INTERVAL HPI/OVERNIGHT EVENTS:  pt seen and examined early this morning  -ID consult appreciated; switch to oral antibiotics   -skin care as per nursing   -rehab/pt as tolerated   -d/c planning 24hrs home with home care tomorrow --- case management aware    REVIEW OF SYSTEMS:  -no complaints to me     Vital Signs Last 24 Hrs  T(C): 36.9 (19 Sep 2021 05:05), Max: 37.4 (18 Sep 2021 21:00)  T(F): 98.5 (19 Sep 2021 05:05), Max: 99.3 (18 Sep 2021 21:00)  HR: 66 (19 Sep 2021 05:05) (66 - 78)  BP: 105/53 (19 Sep 2021 05:05) (105/53 - 129/60)  RR: 16 (19 Sep 2021 05:05) (16 - 16)    PHYSICAL EXAM:  GENERAL: NAD; hard of hearing; but appears comfortably sitting up in chair   HEAD:  Atraumatic, Normocephalic  EYES: EOMI, PERRLA, conjunctiva and sclera clear  NERVOUS SYSTEM:  Alert & Oriented X 3  CHEST/LUNG: Clear to percussion bilaterally; No rales, rhonchi, wheezing, or rubs  CV/HEART: Regular rate and rhythm; No murmurs, rubs, or gallops  GI/ABDOMEN: Soft, Nontender, Nondistended; Bowel sounds present  EXTREMITIES:  2+ Peripheral Pulses, No clubbing, cyanosis, or edema  SKIN: L ankle erythema; tender to touch     LAB:                                      9.3    9.37  )-----------( 279      ( 19 Sep 2021 07:21 )             29.0   09-19    135  |  98  |  12  ----------------------------<  76  3.9   |  24  |  0.6<L>    Ca    8.6      19 Sep 2021 07:21      Daily   CAPILLARY BLOOD GLUCOSE    LIVER FUNCTIONS - ( 15 Sep 2021 13:10 )  Alb: 3.3 g/dL / Pro: 6.2 g/dL / ALK PHOS: 122 U/L / ALT: 15 U/L / AST: 31 U/L / GGT: x           RADIOLOGY:    Imaging Personally visualized and Reviewed:  [ y ] YES  [ ] NO    HEALTH ISSUES - PROBLEM Dx:    MEDICATIONS  (STANDING):  ceFAZolin   IVPB 2000 milliGRAM(s) IV Intermittent every 8 hours  clindamycin IVPB      clindamycin IVPB 900 milliGRAM(s) IV Intermittent every 8 hours  famotidine    Tablet 20 milliGRAM(s) Oral two times a day  ferrous    sulfate 325 milliGRAM(s) Oral daily  furosemide    Tablet 20 milliGRAM(s) Oral daily  heparin   Injectable 5000 Unit(s) SubCutaneous every 12 hours  levothyroxine 50 MICROGram(s) Oral daily  metoprolol succinate ER 25 milliGRAM(s) Oral daily  simvastatin 10 milliGRAM(s) Oral at bedtime  zinc sulfate 220 milliGRAM(s) Oral daily    MEDICATIONS  (PRN):  acetaminophen   Tablet .. 650 milliGRAM(s) Oral every 6 hours PRN Temp greater or equal to 38.5C (101.3F), Mild Pain (1 - 3)

## 2021-09-19 NOTE — PROGRESS NOTE ADULT - SUBJECTIVE AND OBJECTIVE BOX
Patient is seen and examined at the bed side, is afebrile.      REVIEW OF SYSTEMS: All other review systems are negative      FAMILY HISTORY: Non contributory to the present illness        ALLERGIES: Celebrex (Unknown)  penicillins (Unknown)        Vital Signs Last 24 Hrs  T(C): 37 (19 Sep 2021 13:42), Max: 37.4 (18 Sep 2021 21:00)  T(F): 98.6 (19 Sep 2021 13:42), Max: 99.3 (18 Sep 2021 21:00)  HR: 71 (19 Sep 2021 13:42) (66 - 78)  BP: 113/56 (19 Sep 2021 13:42) (105/53 - 129/60)  BP(mean): --  RR: 16 (19 Sep 2021 13:42) (16 - 16)  SpO2: --      PHYSICAL EXAM:  GENERAL: Not in distress   CHEST/LUNG:  Not using accessory muscles   HEART: s1 and s2 present  ABDOMEN:  Nontender and  Nondistended  EXTREMITIES: LLE erythematous, swollen and warmth to touch  CNS: Awake and Alert      LABS:                        9.3    9.37  )-----------( 279      ( 19 Sep 2021 07:21 )             29.0                           10.1   7.87  )-----------( 301      ( 16 Sep 2021 08:03 )             31.7       09-19    135  |  98  |  12  ----------------------------<  76  3.9   |  24  |  0.6<L>    Ca    8.6      19 Sep 2021 07:21      09-16    133<L>  |  98  |  8<L>  ----------------------------<  71  4.3   |  25  |  0.6<L>    Ca    8.7      16 Sep 2021 08:03  Mg     2.0     09-15    TPro  6.2  /  Alb  3.3<L>  /  TBili  1.3<H>  /  DBili  x   /  AST  31  /  ALT  15  /  AlkPhos  122<H>  09-15        MEDICATIONS  (STANDING):    cephalexin 500 milliGRAM(s) Oral every 12 hours  doxycycline hyclate Capsule 100 milliGRAM(s) Oral every 12 hours  famotidine    Tablet 20 milliGRAM(s) Oral two times a day  ferrous    sulfate 325 milliGRAM(s) Oral daily  furosemide    Tablet 20 milliGRAM(s) Oral daily  heparin   Injectable 5000 Unit(s) SubCutaneous every 12 hours  levothyroxine 50 MICROGram(s) Oral daily  metoprolol succinate ER 25 milliGRAM(s) Oral daily  simvastatin 10 milliGRAM(s) Oral at bedtime  zinc sulfate 220 milliGRAM(s) Oral daily      RADIOLOGY & ADDITIONAL TESTS:    < from: [facility] Duplex Lower Ext Vein Scan, Bilat (09.15.21 @ 14:15) >  No evidence of deep venous thrombosis in either lower extremity.        MICROBIOLOGY DATA:    Culture - Blood (09.15.21 @ 13:11)   Specimen Source: .Blood Blood   Culture Results: No growth to date.     Culture - Blood (09.15.21 @ 13:11)   Specimen Source: .Blood Blood   Culture Results: No growth to date.     COVID-19 PCR (09.15.21 @ 11:40)   [facility]:    Respiratory Viral Panel with COVID-19 by GARIMA (07.01.21 @ 10:45)   [facility]   SARS-CoV-2: NotDete:                   Patient is seen and examined at the bed side, is afebrile. She is feeling better, LE cellulitis has improved.       REVIEW OF SYSTEMS: All other review systems are negative      FAMILY HISTORY: Non contributory to the present illness      ALLERGIES: Celebrex (Unknown)  penicillins (Unknown)        Vital Signs Last 24 Hrs  T(C): 37 (19 Sep 2021 13:42), Max: 37.4 (18 Sep 2021 21:00)  T(F): 98.6 (19 Sep 2021 13:42), Max: 99.3 (18 Sep 2021 21:00)  HR: 71 (19 Sep 2021 13:42) (66 - 78)  BP: 113/56 (19 Sep 2021 13:42) (105/53 - 129/60)  BP(mean): --  RR: 16 (19 Sep 2021 13:42) (16 - 16)  SpO2: --      PHYSICAL EXAM:  GENERAL: Not in distress   CHEST/LUNG:  Not using accessory muscles   HEART: s1 and s2 present  ABDOMEN:  Nontender and  Nondistended  EXTREMITIES: LLE redness and swelling has improved  CNS: Awake and Alert      LABS:                        9.3    9.37  )-----------( 279      ( 19 Sep 2021 07:21 )             29.0                           10.1   7.87  )-----------( 301      ( 16 Sep 2021 08:03 )             31.7       09-19    135  |  98  |  12  ----------------------------<  76  3.9   |  24  |  0.6<L>    Ca    8.6      19 Sep 2021 07:21      09-16    133<L>  |  98  |  8<L>  ----------------------------<  71  4.3   |  25  |  0.6<L>    Ca    8.7      16 Sep 2021 08:03  Mg     2.0     09-15    TPro  6.2  /  Alb  3.3<L>  /  TBili  1.3<H>  /  DBili  x   /  AST  31  /  ALT  15  /  AlkPhos  122<H>  09-15        MEDICATIONS  (STANDING):    cephalexin 500 milliGRAM(s) Oral every 12 hours  doxycycline hyclate Capsule 100 milliGRAM(s) Oral every 12 hours  famotidine    Tablet 20 milliGRAM(s) Oral two times a day  ferrous    sulfate 325 milliGRAM(s) Oral daily  furosemide    Tablet 20 milliGRAM(s) Oral daily  heparin   Injectable 5000 Unit(s) SubCutaneous every 12 hours  levothyroxine 50 MICROGram(s) Oral daily  metoprolol succinate ER 25 milliGRAM(s) Oral daily  simvastatin 10 milliGRAM(s) Oral at bedtime  zinc sulfate 220 milliGRAM(s) Oral daily      RADIOLOGY & ADDITIONAL TESTS:    < from: [facility] Duplex Lower Ext Vein Scan, Bilat (09.15.21 @ 14:15) >  No evidence of deep venous thrombosis in either lower extremity.        MICROBIOLOGY DATA:    Culture - Blood (09.15.21 @ 13:11)   Specimen Source: .Blood Blood   Culture Results: No growth to date.     Culture - Blood (09.15.21 @ 13:11)   Specimen Source: .Blood Blood   Culture Results: No growth to date.     COVID-19 PCR (09.15.21 @ 11:40)   COVID-19 PCR: [facility]:    Respiratory Viral Panel with COVID-19 by GARIMA (07.01.21 @ 10:45)   Rapid RVP Result: [facility]   SARS-CoV-2: [facility]:

## 2021-09-19 NOTE — PROGRESS NOTE ADULT - ASSESSMENT
Patient is brought in for worsening LLE redness     ·	LLE Cellulitis   ·	Hyponatremia   ·	HTN/CAD  ·	PAD  ·	Chronic AFIB   ·	Suspected CKD stage II  ·	hard of hearing    -switch to oral abx   -ID and Podiatry c/s appreciated   -continue with home meds  -rehab/pt as tolerated       # Progress Note Handoff  PENDING as follows  consults: ID follow up   Test: cultures   Family discussion: discussed with patient; hard of hearing but comprehended her medical care   Disposition: home with [facility] in 24 hrs    Attending Physician Dr. Alejandra Tripp # [phone].

## 2021-09-19 NOTE — PROGRESS NOTE ADULT - NUTRITIONAL ASSESSMENT
This patient has been assessed with a concern for Malnutrition and has been determined to have a diagnosis/diagnoses of Moderate protein-calorie malnutrition.    This patient is being managed with:   Diet DASH/TLC-  Sodium & Cholesterol Restricted  Supplement Feeding Modality:  Oral  Ensure Enlive Cans or Servings Per Day:  3       Frequency:  Three Times a day  Entered: Sep 17 2021  6:18PM    
This patient has been assessed with a concern for Malnutrition and has been determined to have a diagnosis/diagnoses of Moderate protein-calorie malnutrition.    This patient is being managed with:   Diet DASH/TLC-  Sodium & Cholesterol Restricted  Supplement Feeding Modality:  Oral  Ensure Enlive Cans or Servings Per Day:  3       Frequency:  Three Times a day  Entered: Sep 17 2021  6:18PM

## 2021-09-20 ENCOUNTER — TRANSCRIPTION ENCOUNTER (OUTPATIENT)
Age: 86
End: 2021-09-20

## 2021-09-20 VITALS
DIASTOLIC BLOOD PRESSURE: 53 MMHG | RESPIRATION RATE: 16 BRPM | SYSTOLIC BLOOD PRESSURE: 110 MMHG | HEART RATE: 70 BPM | TEMPERATURE: 99 F

## 2021-09-20 LAB
CULTURE RESULTS: SIGNIFICANT CHANGE UP
CULTURE RESULTS: SIGNIFICANT CHANGE UP
FLUAV AG NPH QL: NEGATIVE — SIGNIFICANT CHANGE UP
FLUBV AG NPH QL: NEGATIVE — SIGNIFICANT CHANGE UP
RSV RNA NPH QL NAA+NON-PROBE: NEGATIVE — SIGNIFICANT CHANGE UP
SARS-COV-2 RNA SPEC QL NAA+PROBE: NEGATIVE — SIGNIFICANT CHANGE UP
SPECIMEN SOURCE: SIGNIFICANT CHANGE UP
SPECIMEN SOURCE: SIGNIFICANT CHANGE UP

## 2021-09-20 PROCEDURE — 99239 HOSP IP/OBS DSCHRG MGMT >30: CPT

## 2021-09-20 RX ORDER — ERGOCALCIFEROL 1.25 MG/1
1 CAPSULE ORAL
Qty: 0 | Refills: 0 | DISCHARGE

## 2021-09-20 RX ORDER — CEPHALEXIN 500 MG
1 CAPSULE ORAL
Qty: 0 | Refills: 0 | DISCHARGE
Start: 2021-09-20

## 2021-09-20 RX ADMIN — Medication 50 MICROGRAM(S): at 06:13

## 2021-09-20 RX ADMIN — HEPARIN SODIUM 5000 UNIT(S): 5000 INJECTION INTRAVENOUS; SUBCUTANEOUS at 06:13

## 2021-09-20 RX ADMIN — Medication 25 MILLIGRAM(S): at 06:13

## 2021-09-20 RX ADMIN — FAMOTIDINE 20 MILLIGRAM(S): 10 INJECTION INTRAVENOUS at 06:13

## 2021-09-20 RX ADMIN — ZINC SULFATE TAB 220 MG (50 MG ZINC EQUIVALENT) 220 MILLIGRAM(S): 220 (50 ZN) TAB at 11:34

## 2021-09-20 RX ADMIN — Medication 325 MILLIGRAM(S): at 11:34

## 2021-09-20 RX ADMIN — Medication 100 MILLIGRAM(S): at 09:34

## 2021-09-20 RX ADMIN — Medication 20 MILLIGRAM(S): at 06:13

## 2021-09-20 RX ADMIN — Medication 500 MILLIGRAM(S): at 09:34

## 2021-09-20 NOTE — DISCHARGE NOTE PROVIDER - NSDCCPCAREPLAN_GEN_ALL_CORE_FT
PRINCIPAL DISCHARGE DIAGNOSIS  Diagnosis: Cellulitis  Assessment and Plan of Treatment: finish course of oral antibiotics as on med rec; keep Legs elevated; outpatient follow up with PMD in NH

## 2021-09-20 NOTE — DISCHARGE NOTE PROVIDER - NSDCMRMEDTOKEN_GEN_ALL_CORE_FT
ascorbic acid 500 mg oral tablet: 1 tab(s) orally 2 times a day  cephalexin 500 mg oral capsule: 1 cap(s) orally every 12 hours for 7 days   doxycycline monohydrate 100 mg oral capsule: 1 cap(s) orally every 12 hours for 5 days   ergocalciferol 1.25 mg (50,000 intl units) oral tablet: 1 tab(s) orally once a week; please check vitamin D levels in 2 weeks   ezetimibe-simvastatin 10 mg-10 mg oral tablet: 1 tab(s) orally once a day  famotidine 20 mg oral tablet: 1 tab(s) orally 2 times a day  ferrous sulfate 324 mg (65 mg elemental iron) oral tablet: 1 tab(s) orally once a day  furosemide 20 mg oral tablet: 1 tab(s) orally once a day  levothyroxine 50 mcg (0.05 mg) oral tablet: 1 tab(s) orally once a day  metoprolol succinate 25 mg oral tablet, extended release: 1 tab(s) orally once a day  zinc sulfate 220 mg oral capsule: 1 cap(s) orally once a day

## 2021-09-20 NOTE — ADVANCED PRACTICE NURSE CONSULT - REASON FOR CONSULT
Called by  Rn for coccyx  Pressure Injury re- assessment and treatment recommendation 
Pressure Injury assessment and treatment recommendation

## 2021-09-20 NOTE — DISCHARGE NOTE PROVIDER - DETAILS OF MALNUTRITION DIAGNOSIS/DIAGNOSES
This patient has been assessed with a concern for Malnutrition and was treated during this hospitalization for the following Nutrition diagnosis/diagnoses:     -  09/17/2021: Moderate protein-calorie malnutrition

## 2021-09-20 NOTE — DISCHARGE NOTE NURSING/CASE MANAGEMENT/SOCIAL WORK - PATIENT PORTAL LINK FT
You can access the FollowMyHealth Patient Portal offered by [facility] by registering at the following website: [email or web address]. By joining [employer]’s FollowMyHealth portal, you will also be able to view your health information using other applications (apps) compatible with our system.

## 2021-09-20 NOTE — DISCHARGE NOTE PROVIDER - CARE PROVIDER_API CALL
PMD in NH,   Phone: (   )    -  Fax: (   )    -  Follow Up Time:     Amin, Mohsena F  INFECTIOUS DISEASE  [address] [place] [address]  Phone: [phone]  Fax: [phone]  Follow Up Time:

## 2021-09-20 NOTE — ADVANCED PRACTICE NURSE CONSULT - ASSESSMENT
Patient is a 89 year old female with PMH of HTN, CAD, AFIB (off AC), chronic anemia, hypothyroid, hard of hearing, PAD, and dementia was brought to ED for increased redness, pain and swelling of LEFT LE. Pt is hard of hearing, thus history obtained from son Mr. Prince. Son reports pt was seen by visiting nurse this morning who unwrapped pt's legs and noticed LE edema, erythema and sores. Son also admits to pt having progressive difficulty ambulating and getting up the chair. She also refuses keep her legs elevated as per son. Son denies pt c/o fever, chills, SOB, CP, dizziness, cough, or urinary complaints. She ambulates with a walker. Pt follows up with Dr. Lopez for podiatry.   In the ED pt afebrile, vitals stable. No leukocytosis. Venous  duplex negative for DVT. Pt received Ancef 2g x1.   PAST MEDICAL & SURGICAL HISTORY:  Atrial fibrillation  Anemia  CAD (coronary artery disease)  Coronary artery disease  Hearing loss  Hypertension  Dementia  Peripheral artery disease  Venous stasis ulcers of both lower extremities  OA (osteoarthritis)  Left Knee  History of appendectomy  S/P ORIF (open reduction internal fixation) fracture  RT hip Fracture    Assessment:  Patient received in recliner chair , awake but confused, response appropriately to verbal command                       Skin assessed-   No change note with pressure injury      Pressure injury  #1  Location: Coccyx  Size:0.5x0.5  Tissue Description :   [ ] Necrotic   [ ] Slough   [ ] Infected   [ ] Granulation (firm, beefy red tissue)   [ ] Hypergranulation (soft, gelatinous)  [x ] Poor-Quality Granulation (pale, grey/brown/red granulation tissue)   [ ] Epithelium (pink/mauve at wound edges)  [x ] Macerated  [ ] Other:   Wound Exudate : None    Wound Edge): Intact  Periwound Condition ;   [x ] Maceration [ ] Excoriation [x ] Dry skin [ ] Hyperkeratosis [ ] Callus [ ] Eczema [x ] Other: scab  Pressure Injury stage   [ ] Stage I  [x ]  Stage II  [ ]  Stage III  [ ]  Stage VI  [ ]  Suspected Deep Tissue Injury (DTI)  [ ]  Unstageable    Other Etiology:  [ ] Aterial  [x ] Venous   B/L lower extremity chronic venous stasis ulcerations                       Dry hyperkeratosis skin lesions   [ ] Surgical Incision  [x ] Other:  sacrum dry stable scabs

## 2021-09-20 NOTE — ADVANCED PRACTICE NURSE CONSULT - RECOMMEDATIONS
Plan: Clean sacrum with soap and water, pat dry then apply triad with Allevyn foam dressing           Clean lower extremity with soap and water pat dry then apply xeroform with Kerlix dressing         Continue Pressure ulcer preventive  measures         Continue skin care   Assess wound and inform primary provider of any changes   Case discussed with primary Rn   Wound/ ostomy specialist  to f/u as needed     Offloading: [ ] Frequent position changes [ ] Devices/Equipment  Cleansing: [ ] Saline [ ] Soap/Water [ ] Other: ______  Topicals: [ ] Barrier Cream [ ] Antimicrobial [ ] Enzymatic Wound Debridement  Dressings: [ ] Dry, sterile [ ] Foam [ ] Absorbant Pads [ ] Collagenase      
Plan:  Continue current treatment   Clean sacrum with soap and water, pat dry then apply triad with Allevyn foam dressing           Clean lower extremity with soap and water pat dry then apply xeroform with Kerlix dressing         Continue Pressure ulcer preventive  measures         Continue skin care   Assess wound and inform primary provider of any changes   Case discussed with primary Rn   Wound/ ostomy specialist  to f/u as needed     Offloading: [ ] Frequent position changes [ ] Devices/Equipment  Cleansing: [ ] Saline [ ] Soap/Water [ ] Other: ______  Topicals: [ ] Barrier Cream [ ] Antimicrobial [ ] Enzymatic Wound Debridement  Dressings: [ ] Dry, sterile [ ] Foam [ ] Absorbant Pads [ ] Collagenase

## 2021-09-20 NOTE — DISCHARGE NOTE NURSING/CASE MANAGEMENT/SOCIAL WORK - NSDCVIVACCINE_GEN_ALL_CORE_FT
COVID-19 vaccine, vector-nr, [id number], PF, 0.5 mL ([place]); 01-Jul-2021 17:02; Doretha Doe (NANDA); Wallace; [id number] (Exp. Date: 01-Jul-2021); IntraMuscular; Deltoid Right.; 0.5 milliLiter(s);

## 2021-09-23 DIAGNOSIS — K21.9 GASTRO-ESOPHAGEAL REFLUX DISEASE WITHOUT ESOPHAGITIS: ICD-10-CM

## 2021-09-23 DIAGNOSIS — H91.90 UNSPECIFIED HEARING LOSS, UNSPECIFIED EAR: ICD-10-CM

## 2021-09-23 DIAGNOSIS — I25.10 ATHEROSCLEROTIC HEART DISEASE OF NATIVE CORONARY ARTERY WITHOUT ANGINA PECTORIS: ICD-10-CM

## 2021-09-23 DIAGNOSIS — I12.9 HYPERTENSIVE CHRONIC KIDNEY DISEASE WITH STAGE 1 THROUGH STAGE 4 CHRONIC KIDNEY DISEASE, OR UNSPECIFIED CHRONIC KIDNEY DISEASE: ICD-10-CM

## 2021-09-23 DIAGNOSIS — F03.90 UNSPECIFIED DEMENTIA WITHOUT BEHAVIORAL DISTURBANCE: ICD-10-CM

## 2021-09-23 DIAGNOSIS — I48.20 CHRONIC ATRIAL FIBRILLATION, UNSPECIFIED: ICD-10-CM

## 2021-09-23 DIAGNOSIS — N18.2 CHRONIC KIDNEY DISEASE, STAGE 2 (MILD): ICD-10-CM

## 2021-09-23 DIAGNOSIS — D63.1 ANEMIA IN CHRONIC KIDNEY DISEASE: ICD-10-CM

## 2021-09-23 DIAGNOSIS — I73.9 PERIPHERAL VASCULAR DISEASE, UNSPECIFIED: ICD-10-CM

## 2021-09-23 DIAGNOSIS — E87.1 HYPO-OSMOLALITY AND HYPONATREMIA: ICD-10-CM

## 2021-09-23 DIAGNOSIS — Z88.0 ALLERGY STATUS TO PENICILLIN: ICD-10-CM

## 2021-09-23 DIAGNOSIS — E44.0 MODERATE PROTEIN-CALORIE MALNUTRITION: ICD-10-CM

## 2021-09-23 DIAGNOSIS — L03.116 CELLULITIS OF LEFT LOWER LIMB: ICD-10-CM

## 2021-09-23 DIAGNOSIS — E03.9 HYPOTHYROIDISM, UNSPECIFIED: ICD-10-CM

## 2022-01-03 NOTE — ED PROVIDER NOTE - IV ALTEPLASE DOOR HIDDEN
"HPI     Dry Eye(s) Follow-Up     In both eyes.  Associated signs and symptoms include burning (OU by the end of the day), irritation and eye pain.  Negative for tearing and blurred vision.  Occurring intermittently.  Duration of months.  Since onset it is stable.  Treatments tried include artificial tears and warm compresses.  Response to treatment was no improvement.              Comments     Follow up for Bilateral Dry Eyes, MGD each eye, Squamous blepharitis BUL/LL, and Sjogren's syndrome.   \"My eyes feel about the same in the last several months.\" Patient notes a scratchy feeling each eye, about 50% of the time. Patient states vision is stable each eye in the last several months.     Ocular meds:   - PFAT QID, \"I use them about twice a day.\"   - Warm compresses BID, \"I use them about 3-4 times a week.\"   - Pred Healon, \"I use them about 3 times a day.\"   - Doxy 50mg daily   - Fish oil     \"I need a refill on Doxycycline.\"     INOCENCIO Morgan 8:30 AM 01/03/2022            Last edited by Anaya Benson on 1/3/2022  8:30 AM. (History)          HPI  Maria A Ramirez is a 79 year old female with Sjogren syndrome here for follow-up of dry eyes and sicca syndrome. She feels her eyes have been about the same since last visit with persistent bilateral foreign body sensation. The symptoms are better in the morning, but worsen throughout the day.     She uses PFATs a few times a day (about BID), warm compresses 3-4 times per week, doxycycline 50mg PO daily, Pred Healon 3x daily, and PO fish oil. These measures do seem to help.    She has not used scleral lenses for the past year or so. Lens insertion and removal was difficult and not worth the minimal benefit she felt when wearing them.    She was started on Evoxac in 04/2019.    POH: Cataract surgery both eyes, s/p YAG OU, ERM  PMH: Asthma, thyroid disease, HTN, Sjogren syndrome  FH: No FH of AMD or glc  SH: Non-smoker    Assessment & Plan      (H04.123) Dry eyes, " bilateral  (primary encounter diagnosis)  (H02.89) Meibomian gland dysfunction (MGD) of both eyes  (H01.02A,  H01.02B) Squamous blepharitis of upper and lower eyelids of both eyes  (M35.00) History of Sjogren's disease (H)  Comment: Diagnosed with Sjogren's syndrome after a lip biopsy. Lower plugs in place. (S/p punctal plugs X 4 lids; uppers since removed d/t overflow). She did well with scleral lenses for awhile, but found I&R difficult, and the benefit seemed to wane. Hold off on Xiidra, no effect after 3 months and seemed to make things worse.     Plan:  - Artificial tears, preservative free, to at least 4 x daily  - Warm compresses 2 x daily  - Continue Fish oil caps  - Continue Doxycycline 50mg daily  - Continue Pred Healon 3x daily    Can consider ASEDs in the future if persistent symptoms. She feels as though she is getting by for now, so will hold off on ASEDs at this time.     (Z96.1) Pseudophakia, both eyes  Comment: Clear visual axis  Plan: Observe    (H52.203,  H52.13) Myopic astigmatism of both eyes  (H52.4) Presbyopia  Comment: Stable vision today  Plan: Ok to continue with current glasses      Patient disposition:  Return for ocular surface check and dilation in 4-6 months, or sooner as needed.     Teaching statement:  Complete documentation of historical and exam elements from today's encounter can be found in the full encounter summary report (not reduplicated in this progress note). I personally obtained the chief complaint(s) and history of present illness.  I confirmed and edited as necessary the review of systems, past medical/surgical history, family history, social history, and examination findings as documented by others; and I examined the patient myself. I personally reviewed the relevant tests, images, and reports as documented above.     I formulated and edited as necessary the assessment and plan and discussed the findings and management plan with the patient and family.    Piper SMITH  MD Suad  Comprehensive Ophthalmology & Ocular Pathology  Department of Ophthalmology and Visual Neurosciences  [email or web address]  Pager [phone]     show

## 2022-02-25 ENCOUNTER — EMERGENCY (EMERGENCY)
Facility: HOSPITAL | Age: 87
LOS: 0 days | Discharge: HOME | End: 2022-02-25
Attending: EMERGENCY MEDICINE | Admitting: EMERGENCY MEDICINE
Payer: MEDICARE

## 2022-02-25 VITALS
DIASTOLIC BLOOD PRESSURE: 82 MMHG | SYSTOLIC BLOOD PRESSURE: 158 MMHG | HEIGHT: 64 IN | WEIGHT: 154.98 LBS | OXYGEN SATURATION: 98 % | RESPIRATION RATE: 18 BRPM | HEART RATE: 67 BPM | TEMPERATURE: 98 F

## 2022-02-25 DIAGNOSIS — N18.9 CHRONIC KIDNEY DISEASE, UNSPECIFIED: ICD-10-CM

## 2022-02-25 DIAGNOSIS — I48.91 UNSPECIFIED ATRIAL FIBRILLATION: ICD-10-CM

## 2022-02-25 DIAGNOSIS — Z98.890 OTHER SPECIFIED POSTPROCEDURAL STATES: Chronic | ICD-10-CM

## 2022-02-25 DIAGNOSIS — Z90.49 ACQUIRED ABSENCE OF OTHER SPECIFIED PARTS OF DIGESTIVE TRACT: Chronic | ICD-10-CM

## 2022-02-25 DIAGNOSIS — L89.151 PRESSURE ULCER OF SACRAL REGION, STAGE 1: ICD-10-CM

## 2022-02-25 DIAGNOSIS — Z88.0 ALLERGY STATUS TO PENICILLIN: ICD-10-CM

## 2022-02-25 DIAGNOSIS — F03.90 UNSPECIFIED DEMENTIA WITHOUT BEHAVIORAL DISTURBANCE: ICD-10-CM

## 2022-02-25 DIAGNOSIS — I25.10 ATHEROSCLEROTIC HEART DISEASE OF NATIVE CORONARY ARTERY WITHOUT ANGINA PECTORIS: ICD-10-CM

## 2022-02-25 DIAGNOSIS — Z88.8 ALLERGY STATUS TO OTHER DRUGS, MEDICAMENTS AND BIOLOGICAL SUBSTANCES: ICD-10-CM

## 2022-02-25 PROCEDURE — 99283 EMERGENCY DEPT VISIT LOW MDM: CPT

## 2022-02-25 NOTE — ED PROVIDER NOTE - PHYSICAL EXAMINATION
CONSTITUTIONAL: Well-appearing; well-nourished; in no apparent distress.   EYES: PERRL; EOM intact.   ENT: normal nose; no rhinorrhea; normal pharynx with no tonsillar hypertrophy.   NECK: Supple; non-tender; no cervical lymphadenopathy.   CARDIOVASCULAR: Normal S1, S2; no murmurs, rubs, or gallops.   RESPIRATORY: Normal chest excursion with respiration; breath sounds clear and equal bilaterally; no wheezes, rhonchi, or rales.  GI/: Normal bowel sounds; non-distended; non-tender; no palpable organomegaly.   MS: No evidence of trauma or deformity. Normal ROM in all four extremities; non-tender to palpation; distal pulses are normal.   SKIN: + stage 1 sacral ulcer noted. + bony prominence overlying sacrum. No cellulitic changes, discharge noted  NEURO/PSYCH: A & O x 2; grossly unremarkable. mood and manner are appropriate.

## 2022-02-25 NOTE — ED PROVIDER NOTE - OBJECTIVE STATEMENT
89 year old F with hx of dementia, cad, a fib, ckd brought in by [facility] for evaluation of sacral ulcer. Pt able to ambulate with walker. Sts has otherwise been acting at baseline, eating/drinking well, no change in mental status, vomiting, diarrhea, fever, cough, sob, trauma/injuries. Limited hx from pt due to dementia.

## 2022-02-25 NOTE — ED PROVIDER NOTE - NSFOLLOWUPINSTRUCTIONS_ED_ALL_ED_FT
A pressure injury is an injury to the skin and tissue under the skin. A pressure injury is also called a pressure sore, bedsore, wound, or decubitus ulcer. Pressure injuries can form over any area but are most common on the back, buttocks, hips, and heels. Pressure injuries can also happen in your mouth.    DISCHARGE INSTRUCTIONS:    Call your doctor or specialist if:  •You have a fever.  •You have green or yellow drainage or a bad smell coming from your pressure injury.  •An area of your skin is very red and firm.  •You have new pain, or pain that is getting worse.  •You have questions or concerns about your condition or care.

## 2022-02-25 NOTE — ED PROVIDER NOTE - PATIENT PORTAL LINK FT
You can access the FollowMyHealth Patient Portal offered by [facility] by registering at the following website: [email or web address]. By joining [facility]’s FollowMyHealth portal, you will also be able to view your health information using other applications (apps) compatible with our system.

## 2022-02-25 NOTE — ED PROVIDER NOTE - PROGRESS NOTE DETAILS
ATTENDING NOTE: 89 y/o F with a stage 1 sacral decubiti. There is a bony prominence overlying the sacrum confirmed by CT. No fluid collection seen. HHA is present at bedside. Strategies to reduce decubiti discussed with her and Pt.

## 2022-02-25 NOTE — ED PROVIDER NOTE - CARE PROVIDER_API CALL
Andrew Hudson)  Internal Medicine  [address] [place] [address]  Phone: [phone]  Fax: [phone]  Follow Up Time:

## 2022-02-25 NOTE — ED PROVIDER NOTE - CARE PROVIDERS DIRECT ADDRESSES
[email or web address], [email or web address]

## 2022-02-25 NOTE — ED ADULT NURSE NOTE - NSPATIENTFLAG_GEN_A_ER
Purple DH (Discharge Huddle; Vulnerable Patient) Calcipotriene Counseling:  I discussed with the patient the risks of calcipotriene including but not limited to erythema, scaling, itching, and irritation.

## 2022-02-25 NOTE — ED ADULT TRIAGE NOTE - TEMPERATURE IN CELSIUS (DEGREES C)
"Well  - 24 Months Old  PHYSICAL DEVELOPMENT  Your 24-month-old may begin to show a preference for using one hand over the other. At this age he or she can:   · Walk and run.    · Kick a ball while standing without losing his or her balance.  · Jump in place and jump off a bottom step with two feet.  · Hold or pull toys while walking.    · Climb on and off furniture.    · Turn a door knob.  · Walk up and down stairs one step at a time.    · Unscrew lids that are secured loosely.    · Build a tower of five or more blocks.    · Turn the pages of a book one page at a time.  SOCIAL AND EMOTIONAL DEVELOPMENT  Your child:   · Demonstrates increasing independence exploring his or her surroundings.    · May continue to show some fear (anxiety) when  from parents and in new situations.    · Frequently communicates his or her preferences through use of the word \"no.\"    · May have temper tantrums. These are common at this age.    · Likes to imitate the behavior of adults and older children.  · Initiates play on his or her own.  · May begin to play with other children.    · Shows an interest in participating in common household activities    · Shows possessiveness for toys and understands the concept of \"mine.\" Sharing at this age is not common.    · Starts make-believe or imaginary play (such as pretending a bike is a motorcycle or pretending to cook some food).  COGNITIVE AND LANGUAGE DEVELOPMENT  At 24 months, your child:  · Can point to objects or pictures when they are named.  · Can recognize the names of familiar people, pets, and body parts.    · Can say 50 or more words and make short sentences of at least 2 words. Some of your child's speech may be difficult to understand.    · Can ask you for food, for drinks, or for more with words.  · Refers to himself or herself by name and may use I, you, and me, but not always correctly.  · May stutter. This is common.  · May repeat words overheard during other " "people's conversations.    · Can follow simple two-step commands (such as \"get the ball and throw it to me\").    · Can identify objects that are the same and sort objects by shape and color.  · Can find objects, even when they are hidden from sight.  ENCOURAGING DEVELOPMENT  · Recite nursery rhymes and sing songs to your child.    · Read to your child every day. Encourage your child to point to objects when they are named.    · Name objects consistently and describe what you are doing while bathing or dressing your child or while he or she is eating or playing.    · Use imaginative play with dolls, blocks, or common household objects.  · Allow your child to help you with household and daily chores.  · Provide your child with physical activity throughout the day. (For example, take your child on short walks or have him or her play with a ball or ac bubbles.)  · Provide your child with opportunities to play with children who are similar in age.  · Consider sending your child to .  · Minimize television and computer time to less than 1 hour each day. Children at this age need active play and social interaction. When your child does watch television or play on the computer, do it with him or her. Ensure the content is age-appropriate. Avoid any content showing violence.  · Introduce your child to a second language if one spoken in the household.    ROUTINE IMMUNIZATIONS  · Hepatitis B vaccine. Doses of this vaccine may be obtained, if needed, to catch up on missed doses.    · Diphtheria and tetanus toxoids and acellular pertussis (DTaP) vaccine. Doses of this vaccine may be obtained, if needed, to catch up on missed doses.    · Haemophilus influenzae type b (Hib) vaccine. Children with certain high-risk conditions or who have missed a dose should obtain this vaccine.    · Pneumococcal conjugate (PCV13) vaccine. Children who have certain conditions, missed doses in the past, or obtained the 7-valent " pneumococcal vaccine should obtain the vaccine as recommended.    · Pneumococcal polysaccharide (PPSV23) vaccine. Children who have certain high-risk conditions should obtain the vaccine as recommended.    · Inactivated poliovirus vaccine. Doses of this vaccine may be obtained, if needed, to catch up on missed doses.    · Influenza vaccine. Starting at age 6 months, all children should obtain the influenza vaccine every year. Children between the ages of 6 months and 8 years who receive the influenza vaccine for the first time should receive a second dose at least 4 weeks after the first dose. Thereafter, only a single annual dose is recommended.    · Measles, mumps, and rubella (MMR) vaccine. Doses should be obtained, if needed, to catch up on missed doses. A second dose of a 2-dose series should be obtained at age 4-6 years. The second dose may be obtained before 4 years of age if that second dose is obtained at least 4 weeks after the first dose.    · Varicella vaccine. Doses may be obtained, if needed, to catch up on missed doses. A second dose of a 2-dose series should be obtained at age 4-6 years. If the second dose is obtained before 4 years of age, it is recommended that the second dose be obtained at least 3 months after the first dose.    · Hepatitis A vaccine. Children who obtained 1 dose before age 24 months should obtain a second dose 6-18 months after the first dose. A child who has not obtained the vaccine before 24 months should obtain the vaccine if he or she is at risk for infection or if hepatitis A protection is desired.    · Meningococcal conjugate vaccine. Children who have certain high-risk conditions, are present during an outbreak, or are traveling to a country with a high rate of meningitis should receive this vaccine.  TESTING  Your child's health care provider may screen your child for anemia, lead poisoning, tuberculosis, high cholesterol, and autism, depending upon risk factors.  Starting at this age, your child's health care provider will measure body mass index (BMI) annually to screen for obesity.  NUTRITION  · Instead of giving your child whole milk, give him or her reduced-fat, 2%, 1%, or skim milk.    · Daily milk intake should be about 2-3 c (480-720 mL).    · Limit daily intake of juice that contains vitamin C to 4-6 oz (120-180 mL). Encourage your child to drink water.    · Provide a balanced diet. Your child's meals and snacks should be healthy.    · Encourage your child to eat vegetables and fruits.    · Do not force your child to eat or to finish everything on his or her plate.    · Do not give your child nuts, hard candies, popcorn, or chewing gum because these may cause your child to choke.    · Allow your child to feed himself or herself with utensils.  ORAL HEALTH  · Brush your child's teeth after meals and before bedtime.    · Take your child to a dentist to discuss oral health. Ask if you should start using fluoride toothpaste to clean your child's teeth.  · Give your child fluoride supplements as directed by your child's health care provider.    · Allow fluoride varnish applications to your child's teeth as directed by your child's health care provider.    · Provide all beverages in a cup and not in a bottle. This helps to prevent tooth decay.  · Check your child's teeth for brown or white spots on teeth (tooth decay).  · If your child uses a pacifier, try to stop giving it to your child when he or she is awake.  SKIN CARE  Protect your child from sun exposure by dressing your child in weather-appropriate clothing, hats, or other coverings and applying sunscreen that protects against UVA and UVB radiation (SPF 15 or higher). Reapply sunscreen every 2 hours. Avoid taking your child outdoors during peak sun hours (between 10 AM and 2 PM). A sunburn can lead to more serious skin problems later in life.  TOILET TRAINING  When your child becomes aware of wet or soiled diapers  "and stays dry for longer periods of time, he or she may be ready for toilet training. To toilet train your child:   · Let your child see others using the toilet.    · Introduce your child to a potty chair.    · Give your child lots of praise when he or she successfully uses the potty chair.    Some children will resist toiling and may not be trained until 3 years of age. It is normal for boys to become toilet trained later than girls. Talk to your health care provider if you need help toilet training your child. Do not force your child to use the toilet.  SLEEP  · Children this age typically need 12 or more hours of sleep per day and only take one nap in the afternoon.  · Keep nap and bedtime routines consistent.    · Your child should sleep in his or her own sleep space.    PARENTING TIPS  · Praise your child's good behavior with your attention.  · Spend some one-on-one time with your child daily. Vary activities. Your child's attention span should be getting longer.  · Set consistent limits. Keep rules for your child clear, short, and simple.  · Discipline should be consistent and fair. Make sure your child's caregivers are consistent with your discipline routines.    · Provide your child with choices throughout the day. When giving your child instructions (not choices), avoid asking your child yes and no questions (\"Do you want a bath?\") and instead give clear instructions (\"Time for a bath.\").  · Recognize that your child has a limited ability to understand consequences at this age.  · Interrupt your child's inappropriate behavior and show him or her what to do instead. You can also remove your child from the situation and engage your child in a more appropriate activity.  · Avoid shouting or spanking your child.  · If your child cries to get what he or she wants, wait until your child briefly calms down before giving him or her the item or activity. Also, model the words you child should use (for example " "\"cookie please\" or \"climb up\").    · Avoid situations or activities that may cause your child to develop a temper tantrum, such as shopping trips.  SAFETY  · Create a safe environment for your child.    ¨ Set your home water heater at 120°F (49°C).    ¨ Provide a tobacco-free and drug-free environment.    ¨ Equip your home with smoke detectors and change their batteries regularly.    ¨ Install a gate at the top of all stairs to help prevent falls. Install a fence with a self-latching gate around your pool, if you have one.    ¨ Keep all medicines, poisons, chemicals, and cleaning products capped and out of the reach of your child.    ¨ Keep knives out of the reach of children.  ¨ If guns and ammunition are kept in the home, make sure they are locked away separately.    ¨ Make sure that televisions, bookshelves, and other heavy items or furniture are secure and cannot fall over on your child.  · To decrease the risk of your child choking and suffocating:    ¨ Make sure all of your child's toys are larger than his or her mouth.    ¨ Keep small objects, toys with loops, strings, and cords away from your child.    ¨ Make sure the plastic piece between the ring and nipple of your child pacifier (pacifier shield) is at least 1½ inches (3.8 cm) wide.    ¨ Check all of your child's toys for loose parts that could be swallowed or choked on.    · Immediately empty water in all containers, including bathtubs, after use to prevent drowning.  · Keep plastic bags and balloons away from children.  · Keep your child away from moving vehicles. Always check behind your vehicles before backing up to ensure your child is in a safe place away from your vehicle.     · Always put a helmet on your child when he or she is riding a tricycle.    · Children 2 years or older should ride in a forward-facing car seat with a harness. Forward-facing car seats should be placed in the rear seat. A child should ride in a forward-facing car seat with a " harness until reaching the upper weight or height limit of the car seat.    · Be careful when handling hot liquids and sharp objects around your child. Make sure that handles on the stove are turned inward rather than out over the edge of the stove.    · Supervise your child at all times, including during bath time. Do not expect older children to supervise your child.    · Know the number for poison control in your area and keep it by the phone or on your refrigerator.  WHAT'S NEXT?  Your next visit should be when your child is 30 months old.      This information is not intended to replace advice given to you by your health care provider. Make sure you discuss any questions you have with your health care provider.     Document Released: 01/07/2008 Document Revised: 05/03/2016 Document Reviewed: 2014  [employer]vier Interactive Patient Education ©2016 [employer]     36.4

## 2022-05-13 NOTE — REASON FOR VISIT
[Follow-Up: _____] : a [unfilled] follow-up visit [FreeTextEntry1] : new left leg stasis ulcer. [Normal] : affect appropriate

## 2022-06-27 NOTE — DISCHARGE NOTE NURSING/CASE MANAGEMENT/SOCIAL WORK - BRAND OF COVID-19 VACCINATION
Department of Orthopedic Surgery  History and Physical      CHIEF COMPLAINT:  Left shoulder pain    HISTORY OF PRESENT ILLNESS:                The patient is a RHD 39 y.o. male who presents with left shoulder pain. Patient reports 6 months ago he started having left shoulder pain. He states this is progressively worsened. He denies any injury. He currently is not working. He denies numbness or tingling. He does report burning down his arm. He states he has 0 out of 10 pain at rest.  With activity his pain can occasionally go up to a 7 or 8 out of 10. He reports the pain is shooting and burning. He has been in therapy for the last 3 months. He reports no improvement of his symptoms. He has also taken naproxen with no improvement of his symptoms. 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 Esther Andrew- multi vessel disease    MITRAL VALVE REPAIR N/A 3/16/2020    CORONARY ARTERY BYPASS POSSIBLE LEFT RADIAL ARTERY HARVEST, PATRICE performed by Ana Lilia Bowen MD at [place] [id number] NOSE SURGERY N/A 11/22/2021    NASOPHARYNGEAL BIOPSY performed by Juan Lion DO at [place] [address]  04/28/2017    resection of mediastinal mass    TONSILLECTOMY      TRANSESOPHAGEAL ECHOCARDIOGRAM  03/03/2020    TYMPANOSTOMY TUBE PLACEMENT Right 11/22/2021    POSSIBLE RIGHT EAR TUBE POSSIBLE EUSTACHIAN TUBE DILATION performed by Chino Anthony DO at [facility] OR     Current Medications:   No current facility-administered medications for this visit.   Allergies:  Sulfa antibiotics    Social History:   TOBACCO:   reports that he quit smoking about 2 years ago. His smoking use included cigarettes. He started smoking about 28 years ago. He has a 25.00 pack-year smoking history. His smokeless tobacco use includes chew and snuff. ETOH:   reports no history of alcohol use. DRUGS:   reports no history of drug use. ACTIVITIES OF DAILY LIVING:    OCCUPATION:    Family History:       Problem Relation Age of Onset    Heart Disease Mother         MI age 39    Param Milner High Blood Pressure Mother     Diabetes Father     Heart Disease Father         MI    High Blood Pressure Father     High Cholesterol Father     Cancer Father     Stroke Father     Kidney Disease Father         dialysis    High Blood Pressure Sister     Other Brother     COPD Brother     High Blood Pressure Brother        REVIEW OF SYSTEMS:  CONSTITUTIONAL:  negative  EYES:  negative  HEENT:  negative  RESPIRATORY:  negative  CARDIOVASCULAR:  CAD, HTN  GASTROINTESTINAL:  negative  GENITOURINARY:  negative  INTEGUMENT/BREAST:  negative  HEMATOLOGIC/LYMPHATIC:  negative  ALLERGIC/IMMUNOLOGIC:  negative  ENDOCRINE:  DM  MUSCULOSKELETAL:  Left shoulder pain  NEUROLOGICAL:  negative  BEHAVIOR/PSYCH:  negative    PHYSICAL EXAM:    VITALS:  Resp 22   Ht 6' 4\" (1.93 m)   Wt (!) 361 lb (163.7 kg)   BMI 43.94 kg/m²   CONSTITUTIONAL:  awake, alert, cooperative, no apparent distress, and appears stated age  EYES:  Lids and lashes normal, pupils equal, round and reactive to light, extra ocular muscles intact, sclera clear, conjunctiva normal  ENT:  Normocephalic, without obvious abnormality, atraumatic, sinuses nontender on palpation, external ears without lesions, oral pharynx with moist mucus membranes, tonsils without erythema or exudates, gums normal and good dentition.   NECK:  Supple, symmetrical, trachea midline, no adenopathy, thyroid symmetric, not enlarged and no tenderness, skin normal  NEUROLOGIC:  Awake, alert, oriented to name, place and time. Cranial nerves II-XII are grossly intact. Motor is 5 out of 5 bilaterally. Sensory is intact.  gait is normal.  MUSCULOSKELETAL:    Left upper extremity: - tenderness over the long head of the biceps tendon. + impingement. - drop arm test. + tenderness over the periscapular musculature. 5/5 strength of the supraspinatus, 5/5 strength of the infraspinatus, 5/5 strength of the subscapularis, 5/5 strength of the teres minor. , abduction 90, IR lateral buttocks, ER 20. Median, ulnar, radial n intact to light touch. Brisk capillary refill. Otherwise NVI. DATA:    CBC:   Lab Results   Component Value Date    WBC 9.1 03/22/2022    RBC 5.42 03/22/2022    HGB 16.7 03/22/2022    HCT 50.2 03/22/2022    MCV 92.6 03/22/2022    MCH 30.8 03/22/2022    MCHC 33.3 03/22/2022    RDW 12.8 03/22/2022     03/22/2022    MPV 9.1 03/22/2022     PT/INR:    Lab Results   Component Value Date    PROTIME 13.0 03/16/2020    INR 1.2 03/16/2020       Radiology Review:  Xray: x-rays of the left shoulder were reviewed from 3/22/22. 3 views: Ap/scapular Y view/axial demonstrate no acute fractures or dislocations   Impression: no acute fractures or dislocations    MRI of the left shoulder not able to be reviewed at today's visit.   Report from 4/23/22 reviewed   FINDINGS:             There is mild increased signal along the articular surface of the supraspinatus tendon most likely      partial tear without retraction.  No atrophy of the supraspinatus muscle.             The subscapularis muscle and tendon appear intact.             The infraspinatous and teres minor muscles and tendons appear intact.             The biceps tendon is intact.             The glenoid labrum is intact.             No joint effusion is identified.             No bone bruise or bony fracture.             Mild hypertrophic changes are seen at the acromioclavicular joint with a type 1 acromion.             IMPRESSION:           3T MRI Left Shoulder:             1.  Mild increased signal along the articular surface of the supraspinatus tendon most likely      partial tear without retraction.  No atrophy.                IMPRESSION:  · Left shoulder partial rotator cuff tear with impingement  · COPD  · CAD  · HTN  · Diabetes  · Hyperlipidemia    PLAN:  Discussed findings with the patient at today's visit. Discussed conservative and surgical management with the patient. Discussed we would like to review his MRI when possible. Patient has been in therapy and has tried anti-inflammatories. He has not tried a cortisone injection. Did discuss we could consider a left shoulder cortisone injection at today's visit. He is an insulin-dependent diabetic. He is on a sliding scale. Patient will continue to monitor his symptoms. Patient to follow-up in 6 weeks to assess response of injection. Briefly discussed surgical options in the future if necessary. All questions answered. Procedure Note Cortisone Injection to Shoulder    The left shoulder was identified as the injection site. The risk and benefits of a cortisone injection were explained and the patient consented to the injection. Under sterile conditions, the shoulder SAS was injected with a mixture of 80mg of Kenelog and 4 mL of 5% Ropivacaine and 4 mL of 1% Lidocaine without complication. A sterile bandage was applied. I have seen and evaluated the patient and agree with the above assessment and plan on today's visit. I have performed the key components of the history and physical examination with significant findings of persistent left shoulder pain despite NSAID therapy and physical therapy. Patient had persistent and recalcitrant symptoms about the left shoulder. He did have an outside MRI performed. The images were not available. However the ports consistent with tendinitis and undersurface tearing of the rotator cuff supraspinatus.   Recommended cortisone injection continue home exercise program.  Follow-up 6 weeks to review the images as well as response of the injection. . I concur with the findings and plan as documented.     Rubina Back MD  6/27/2022 [place]

## 2022-06-30 NOTE — DISCHARGE NOTE NURSING/CASE MANAGEMENT/SOCIAL WORK - NURSING SECTION COMPLETE
Alert and oriented to person, place, time/situation. normal mood and affect. no apparent risk to self or others. Patient/Caregiver provided printed discharge information.

## 2022-07-14 NOTE — PATIENT PROFILE ADULT - MEDICATIONS/VISITS
Detail Level: Simple
Additional Notes: Each lesion injected with approximately 0.5cc of 2% lidocaine with epinephrine and a 1:10 solution of 8.4% sodium bicarbonate before paring.
Render Risk Assessment In Note?: no
no

## 2023-06-23 NOTE — DISCHARGE NOTE PROVIDER - REASON FOR ADMISSION
Left iliopsoas hematoma/ supra therapeutic INR , negative covid Left iliopsoas hematoma IV discontinued, cath removed intact

## 2023-08-01 NOTE — ED PROVIDER NOTE - CLINICAL SUMMARY MEDICAL DECISION MAKING FREE TEXT BOX
rebleeding contemplated: unlikely after review of diagnostic testing. pt is high risk to fall.  will sylvester for safety, rehab.  In my opinion, in patient treatment is medically justifiable and appropriate.
No

## 2023-11-30 ENCOUNTER — INPATIENT (INPATIENT)
Facility: HOSPITAL | Age: 88
LOS: 17 days | Discharge: HOME | DRG: 177 | End: 2023-12-18
Attending: INTERNAL MEDICINE | Admitting: FAMILY MEDICINE
Payer: MEDICARE

## 2023-11-30 VITALS
DIASTOLIC BLOOD PRESSURE: 80 MMHG | HEART RATE: 78 BPM | HEIGHT: 64 IN | OXYGEN SATURATION: 98 % | WEIGHT: 160.06 LBS | RESPIRATION RATE: 19 BRPM | SYSTOLIC BLOOD PRESSURE: 141 MMHG | TEMPERATURE: 98 F

## 2023-11-30 DIAGNOSIS — J90 PLEURAL EFFUSION, NOT ELSEWHERE CLASSIFIED: ICD-10-CM

## 2023-11-30 DIAGNOSIS — Z90.49 ACQUIRED ABSENCE OF OTHER SPECIFIED PARTS OF DIGESTIVE TRACT: Chronic | ICD-10-CM

## 2023-11-30 DIAGNOSIS — Z98.890 OTHER SPECIFIED POSTPROCEDURAL STATES: Chronic | ICD-10-CM

## 2023-11-30 LAB
ALBUMIN SERPL ELPH-MCNC: 3.6 G/DL — SIGNIFICANT CHANGE UP (ref 3.5–5.2)
ALBUMIN SERPL ELPH-MCNC: 3.6 G/DL — SIGNIFICANT CHANGE UP (ref 3.5–5.2)
ALP SERPL-CCNC: 102 U/L — SIGNIFICANT CHANGE UP (ref 30–115)
ALP SERPL-CCNC: 102 U/L — SIGNIFICANT CHANGE UP (ref 30–115)
ALT FLD-CCNC: 8 U/L — SIGNIFICANT CHANGE UP (ref 0–41)
ALT FLD-CCNC: 8 U/L — SIGNIFICANT CHANGE UP (ref 0–41)
ANION GAP SERPL CALC-SCNC: 12 MMOL/L — SIGNIFICANT CHANGE UP (ref 7–14)
ANION GAP SERPL CALC-SCNC: 12 MMOL/L — SIGNIFICANT CHANGE UP (ref 7–14)
AST SERPL-CCNC: 21 U/L — SIGNIFICANT CHANGE UP (ref 0–41)
AST SERPL-CCNC: 21 U/L — SIGNIFICANT CHANGE UP (ref 0–41)
BASOPHILS # BLD AUTO: 0.01 K/UL — SIGNIFICANT CHANGE UP (ref 0–0.2)
BASOPHILS # BLD AUTO: 0.01 K/UL — SIGNIFICANT CHANGE UP (ref 0–0.2)
BASOPHILS NFR BLD AUTO: 0.2 % — SIGNIFICANT CHANGE UP (ref 0–1)
BASOPHILS NFR BLD AUTO: 0.2 % — SIGNIFICANT CHANGE UP (ref 0–1)
BILIRUB SERPL-MCNC: 0.8 MG/DL — SIGNIFICANT CHANGE UP (ref 0.2–1.2)
BILIRUB SERPL-MCNC: 0.8 MG/DL — SIGNIFICANT CHANGE UP (ref 0.2–1.2)
BUN SERPL-MCNC: 9 MG/DL — LOW (ref 10–20)
BUN SERPL-MCNC: 9 MG/DL — LOW (ref 10–20)
CALCIUM SERPL-MCNC: 8.6 MG/DL — SIGNIFICANT CHANGE UP (ref 8.4–10.5)
CALCIUM SERPL-MCNC: 8.6 MG/DL — SIGNIFICANT CHANGE UP (ref 8.4–10.5)
CHLORIDE SERPL-SCNC: 93 MMOL/L — LOW (ref 98–110)
CHLORIDE SERPL-SCNC: 93 MMOL/L — LOW (ref 98–110)
CO2 SERPL-SCNC: 26 MMOL/L — SIGNIFICANT CHANGE UP (ref 17–32)
CO2 SERPL-SCNC: 26 MMOL/L — SIGNIFICANT CHANGE UP (ref 17–32)
CREAT SERPL-MCNC: 0.6 MG/DL — LOW (ref 0.7–1.5)
CREAT SERPL-MCNC: 0.6 MG/DL — LOW (ref 0.7–1.5)
EGFR: 82 ML/MIN/1.73M2 — SIGNIFICANT CHANGE UP
EGFR: 82 ML/MIN/1.73M2 — SIGNIFICANT CHANGE UP
EOSINOPHIL # BLD AUTO: 0.03 K/UL — SIGNIFICANT CHANGE UP (ref 0–0.7)
EOSINOPHIL # BLD AUTO: 0.03 K/UL — SIGNIFICANT CHANGE UP (ref 0–0.7)
EOSINOPHIL NFR BLD AUTO: 0.6 % — SIGNIFICANT CHANGE UP (ref 0–8)
EOSINOPHIL NFR BLD AUTO: 0.6 % — SIGNIFICANT CHANGE UP (ref 0–8)
FLUAV AG NPH QL: DETECTED
FLUAV AG NPH QL: DETECTED
FLUBV AG NPH QL: SIGNIFICANT CHANGE UP
FLUBV AG NPH QL: SIGNIFICANT CHANGE UP
GLUCOSE SERPL-MCNC: 90 MG/DL — SIGNIFICANT CHANGE UP (ref 70–99)
GLUCOSE SERPL-MCNC: 90 MG/DL — SIGNIFICANT CHANGE UP (ref 70–99)
HCT VFR BLD CALC: 37.2 % — SIGNIFICANT CHANGE UP (ref 37–47)
HCT VFR BLD CALC: 37.2 % — SIGNIFICANT CHANGE UP (ref 37–47)
HGB BLD-MCNC: 12.3 G/DL — SIGNIFICANT CHANGE UP (ref 12–16)
HGB BLD-MCNC: 12.3 G/DL — SIGNIFICANT CHANGE UP (ref 12–16)
IMM GRANULOCYTES NFR BLD AUTO: 0.6 % — HIGH (ref 0.1–0.3)
IMM GRANULOCYTES NFR BLD AUTO: 0.6 % — HIGH (ref 0.1–0.3)
LYMPHOCYTES # BLD AUTO: 0.9 K/UL — LOW (ref 1.2–3.4)
LYMPHOCYTES # BLD AUTO: 0.9 K/UL — LOW (ref 1.2–3.4)
LYMPHOCYTES # BLD AUTO: 16.5 % — LOW (ref 20.5–51.1)
LYMPHOCYTES # BLD AUTO: 16.5 % — LOW (ref 20.5–51.1)
MCHC RBC-ENTMCNC: 28 PG — SIGNIFICANT CHANGE UP (ref 27–31)
MCHC RBC-ENTMCNC: 28 PG — SIGNIFICANT CHANGE UP (ref 27–31)
MCHC RBC-ENTMCNC: 33.1 G/DL — SIGNIFICANT CHANGE UP (ref 32–37)
MCHC RBC-ENTMCNC: 33.1 G/DL — SIGNIFICANT CHANGE UP (ref 32–37)
MCV RBC AUTO: 84.7 FL — SIGNIFICANT CHANGE UP (ref 81–99)
MCV RBC AUTO: 84.7 FL — SIGNIFICANT CHANGE UP (ref 81–99)
MONOCYTES # BLD AUTO: 0.78 K/UL — HIGH (ref 0.1–0.6)
MONOCYTES # BLD AUTO: 0.78 K/UL — HIGH (ref 0.1–0.6)
MONOCYTES NFR BLD AUTO: 14.3 % — HIGH (ref 1.7–9.3)
MONOCYTES NFR BLD AUTO: 14.3 % — HIGH (ref 1.7–9.3)
NEUTROPHILS # BLD AUTO: 3.69 K/UL — SIGNIFICANT CHANGE UP (ref 1.4–6.5)
NEUTROPHILS # BLD AUTO: 3.69 K/UL — SIGNIFICANT CHANGE UP (ref 1.4–6.5)
NEUTROPHILS NFR BLD AUTO: 67.8 % — SIGNIFICANT CHANGE UP (ref 42.2–75.2)
NEUTROPHILS NFR BLD AUTO: 67.8 % — SIGNIFICANT CHANGE UP (ref 42.2–75.2)
NRBC # BLD: 0 /100 WBCS — SIGNIFICANT CHANGE UP (ref 0–0)
NRBC # BLD: 0 /100 WBCS — SIGNIFICANT CHANGE UP (ref 0–0)
NT-PROBNP SERPL-SCNC: 4141 PG/ML — HIGH (ref 0–300)
NT-PROBNP SERPL-SCNC: 4141 PG/ML — HIGH (ref 0–300)
PLATELET # BLD AUTO: 249 K/UL — SIGNIFICANT CHANGE UP (ref 130–400)
PLATELET # BLD AUTO: 249 K/UL — SIGNIFICANT CHANGE UP (ref 130–400)
PMV BLD: 9.8 FL — SIGNIFICANT CHANGE UP (ref 7.4–10.4)
PMV BLD: 9.8 FL — SIGNIFICANT CHANGE UP (ref 7.4–10.4)
POTASSIUM SERPL-MCNC: 4.3 MMOL/L — SIGNIFICANT CHANGE UP (ref 3.5–5)
POTASSIUM SERPL-MCNC: 4.3 MMOL/L — SIGNIFICANT CHANGE UP (ref 3.5–5)
POTASSIUM SERPL-SCNC: 4.3 MMOL/L — SIGNIFICANT CHANGE UP (ref 3.5–5)
POTASSIUM SERPL-SCNC: 4.3 MMOL/L — SIGNIFICANT CHANGE UP (ref 3.5–5)
PROT SERPL-MCNC: 6.5 G/DL — SIGNIFICANT CHANGE UP (ref 6–8)
PROT SERPL-MCNC: 6.5 G/DL — SIGNIFICANT CHANGE UP (ref 6–8)
RBC # BLD: 4.39 M/UL — SIGNIFICANT CHANGE UP (ref 4.2–5.4)
RBC # BLD: 4.39 M/UL — SIGNIFICANT CHANGE UP (ref 4.2–5.4)
RBC # FLD: 13.3 % — SIGNIFICANT CHANGE UP (ref 11.5–14.5)
RBC # FLD: 13.3 % — SIGNIFICANT CHANGE UP (ref 11.5–14.5)
RSV RNA NPH QL NAA+NON-PROBE: SIGNIFICANT CHANGE UP
RSV RNA NPH QL NAA+NON-PROBE: SIGNIFICANT CHANGE UP
SARS-COV-2 RNA SPEC QL NAA+PROBE: SIGNIFICANT CHANGE UP
SARS-COV-2 RNA SPEC QL NAA+PROBE: SIGNIFICANT CHANGE UP
SODIUM SERPL-SCNC: 131 MMOL/L — LOW (ref 135–146)
SODIUM SERPL-SCNC: 131 MMOL/L — LOW (ref 135–146)
TROPONIN T SERPL-MCNC: <0.01 NG/ML — SIGNIFICANT CHANGE UP
TROPONIN T SERPL-MCNC: <0.01 NG/ML — SIGNIFICANT CHANGE UP
WBC # BLD: 5.44 K/UL — SIGNIFICANT CHANGE UP (ref 4.8–10.8)
WBC # BLD: 5.44 K/UL — SIGNIFICANT CHANGE UP (ref 4.8–10.8)
WBC # FLD AUTO: 5.44 K/UL — SIGNIFICANT CHANGE UP (ref 4.8–10.8)
WBC # FLD AUTO: 5.44 K/UL — SIGNIFICANT CHANGE UP (ref 4.8–10.8)

## 2023-11-30 PROCEDURE — 71260 CT THORAX DX C+: CPT | Mod: 26,MA

## 2023-11-30 PROCEDURE — 87086 URINE CULTURE/COLONY COUNT: CPT

## 2023-11-30 PROCEDURE — 36415 COLL VENOUS BLD VENIPUNCTURE: CPT

## 2023-11-30 PROCEDURE — 99285 EMERGENCY DEPT VISIT HI MDM: CPT | Mod: FS

## 2023-11-30 PROCEDURE — 82550 ASSAY OF CK (CPK): CPT

## 2023-11-30 PROCEDURE — 99223 1ST HOSP IP/OBS HIGH 75: CPT

## 2023-11-30 PROCEDURE — 92526 ORAL FUNCTION THERAPY: CPT | Mod: GN

## 2023-11-30 PROCEDURE — 80048 BASIC METABOLIC PNL TOTAL CA: CPT

## 2023-11-30 PROCEDURE — 97116 GAIT TRAINING THERAPY: CPT | Mod: GP

## 2023-11-30 PROCEDURE — 84100 ASSAY OF PHOSPHORUS: CPT

## 2023-11-30 PROCEDURE — 97110 THERAPEUTIC EXERCISES: CPT | Mod: GP

## 2023-11-30 PROCEDURE — 81001 URINALYSIS AUTO W/SCOPE: CPT

## 2023-11-30 PROCEDURE — 80202 ASSAY OF VANCOMYCIN: CPT

## 2023-11-30 PROCEDURE — 84145 PROCALCITONIN (PCT): CPT

## 2023-11-30 PROCEDURE — 82803 BLOOD GASES ANY COMBINATION: CPT

## 2023-11-30 PROCEDURE — 97162 PT EVAL MOD COMPLEX 30 MIN: CPT | Mod: GP

## 2023-11-30 PROCEDURE — 92610 EVALUATE SWALLOWING FUNCTION: CPT | Mod: GN

## 2023-11-30 PROCEDURE — 83605 ASSAY OF LACTIC ACID: CPT

## 2023-11-30 PROCEDURE — 87040 BLOOD CULTURE FOR BACTERIA: CPT

## 2023-11-30 PROCEDURE — 71045 X-RAY EXAM CHEST 1 VIEW: CPT

## 2023-11-30 PROCEDURE — 71045 X-RAY EXAM CHEST 1 VIEW: CPT | Mod: 26

## 2023-11-30 PROCEDURE — 85025 COMPLETE CBC W/AUTO DIFF WBC: CPT

## 2023-11-30 PROCEDURE — 76770 US EXAM ABDO BACK WALL COMP: CPT

## 2023-11-30 PROCEDURE — 93010 ELECTROCARDIOGRAM REPORT: CPT

## 2023-11-30 PROCEDURE — 84484 ASSAY OF TROPONIN QUANT: CPT

## 2023-11-30 PROCEDURE — 80061 LIPID PANEL: CPT

## 2023-11-30 PROCEDURE — 93306 TTE W/DOPPLER COMPLETE: CPT

## 2023-11-30 PROCEDURE — 85027 COMPLETE CBC AUTOMATED: CPT

## 2023-11-30 PROCEDURE — 83735 ASSAY OF MAGNESIUM: CPT

## 2023-11-30 PROCEDURE — 71250 CT THORAX DX C-: CPT

## 2023-11-30 PROCEDURE — 80053 COMPREHEN METABOLIC PANEL: CPT

## 2023-11-30 NOTE — ED PROVIDER NOTE - DIFFERENTIAL DIAGNOSIS
ACS, PE, pneumonia, arrhythmia, interstitial lung disease, anemia, congestive heart failure, anaphylaxis, pleural effusion, bronchiectasis, pneumothorax, COPD, reactive airway disease, Asthma,  viral uri, valvular heart disease, pneumonitis, anxiety Differential Diagnosis

## 2023-11-30 NOTE — ED PROVIDER NOTE - CLINICAL SUMMARY MEDICAL DECISION MAKING FREE TEXT BOX
Patient poor historian, presented with shortness of breath.  On exam patient no distress but noted to be hypoxic with audible crackles.  X-ray concerning for large left-sided pleural effusion.  CT scan confirms this and the patient CT also demonstrate possible aspiration pneumonia.  Patient was given IV antibiotics.  At time admission patient was found to be flu a positive which might ultimately be the cause of the patient's hypoxia and shortness of breath.  Patient stable for medical admission does not need thoracentesis performed this time and is in no respiratory distress with no increased work of breathing

## 2023-11-30 NOTE — H&P ADULT - NEUROLOGICAL
normal/cranial nerves II-XII intact/sensation intact normal/cranial nerves II-XII intact/sensation intact/cranial nerves intact

## 2023-11-30 NOTE — ED PROVIDER NOTE - IV ALTEPLASE ADMIN OUTSIDE HIDDEN
----- Message from Lisa Akers MT sent at 7/5/2022  1:17 PM CDT -----  Please place orders for the next lab appt . Thank you      show

## 2023-11-30 NOTE — H&P ADULT - ASSESSMENT
89 yo F pmhx afib, htn, ckd, cad, pad, dementia BIBEMS for evaluation of cough and sob x few weeks. pt was given dexamethasone and combivent by ems prior to arrival, states she feels better after treatments. pt has been taking amoxicillin with minimal relief at home. denies fever, chills, chest pain, le swelling, calf pain. 89 yo F pmhx afib, htn, ckd, cad, pad, dementia BIBEMS for evaluation of cough and sob x few weeks. pt was given dexamethasone and combivent by ems prior to arrival, states she feels better after treatments. pt has been taking amoxicillin with minimal relief at home. denies fever, chills, chest pain, le swelling, calf pain. 89 yo F pmhx A-Fib, HTN, CKD, CAD, PAD, Dementia, BIBEMS for evaluation of cough and sob x approx one month.  Family member states pt has had an on-going cough, outpt cxr performed (unremarkable per family member).   Pt was given dexamethasone and combivent by EMS prior to arrival, states she feels better after treatments. pt has been taking amoxicillin with minimal relief at home. denies fever, chills, chest pain, leg swelling, calf pain.    I spoke with pt's daughter-in -law named Aixa to obtain home meds.  Daughter in law states she will call in the morning to give meds.      # Influenza  - Tamiflu  - tylenol prn     # Pneumonia  - ID consult  - levaquin  - flagyl  - sputum cx  - blood cx  - MRSA panel    # Pulmonary congestion  - lasix 20mg q12  - echo  - cardiology consult  - trend trops 89 yo F pmhx A-Fib, HTN, CKD, CAD, PAD, Dementia, BIBEMS for evaluation of cough and sob x approx one month.  Family member states pt has had an on-going cough, outpt cxr performed (unremarkable per family member).   Pt was given dexamethasone and combivent by EMS prior to arrival, states she feels better after treatments. pt has been taking amoxicillin with minimal relief at home. denies fever, chills, chest pain, leg swelling, calf pain.    I spoke with pt's daughter-in -law named Aixa to obtain home meds.  Daughter in law states she will call in the morning to give meds.      # Influenza  - Tamiflu  - tylenol prn     # Pneumonia  - ID consult  - levaquin  - flagyl  - sputum cx  - blood cx  - MRSA panel    # Pulmonary congestion  - lasix 20mg q12  - echo  - cardiology consult  - trend trops Patient is 89 yo F pmhx A-Fib, HTN, CKD, CAD, PAD, Dementia, presenting with       #Acute hypoxic respiratory failure  -Seems to be secondary to flu, pna, and CHF with perserved EF  -Previous TTE shows EF of 55%.  EKG shows Atrial fib with no acute ischemic changes.  trop negative X1  -Tamiflu, oxygen, and duoneb tx PRN  -Possible aspiration pna  -Levaquin +  flagyl  - sputum cx  - blood cx  - MRSA panel  -ID consult  -IV lasix,, monitor I/O, serial trop, and cardiology consult        #HTN/Atrial fib/PAD  -Continue with home medications    I spoke with pt's daughter-in -law named Aixa to obtain home meds.  Daughter in law states she will call in the morning to give meds.    #Progress Note Handoff  Pending (specify):  as above   Family discussion:  plan of care was discussed with patient and family in details.  all questions were answered.  seems to understand, and in agreement  Disposition:  PT   Patient is 89 yo F pmhx A-Fib, HTN, CKD, CAD, PAD, Dementia, presenting with       #Acute hypoxic respiratory failure  -Seems to be secondary to flu, pna, and CHF with perserved EF  -Previous TTE shows EF of 55%.  EKG shows Atrial fib with no acute ischemic changes.  trop negative X1  -Tamiflu, oxygen, and duoneb tx PRN  -Possible aspiration pna  -Levaquin +  flagyl  - sputum cx  - blood cx  - MRSA panel  -ID consult  -IV lasix,, monitor I/O, serial trop, and cardiology consult        #HTN/Atrial fib/PAD  -Continue with home medications    I spoke with pt's daughter-in -law named Aixa to obtain home meds.  Daughter in law states she will call in the morning to give meds.    #Progress Note Handoff  Pending (specify):  as above   Family discussion:  plan of care was discussed with patient and family in details.  all questions were answered.  seems to understand, and in agreement  Disposition:  PT   Patient is 89 yo F pmhx A-Fib, HTN, CKD, CAD, PAD, Dementia, presenting with       #Acute hypoxic respiratory failure  -Seems to be secondary to flu, pna, and CHF with perserved EF  -Previous TTE shows EF of 55%.  EKG shows Atrial fib with no acute ischemic changes.  trop negative X1  -Tamiflu, oxygen, and duoneb tx PRN  -Possible aspiration pna  -Levaquin +  flagyl  - sputum cx  - blood cx  - MRSA panel  -NPO Pending speech therapy evaluation   -ID consult  -IV lasix,, monitor I/O, serial trop, and cardiology consult          #HTN/Atrial fib/PAD  -Continue with home medications    I spoke with pt's daughter-in -law named Aixa to obtain home meds.  Daughter in law states she will call in the morning to give meds.    #Progress Note Handoff  Pending (specify):  as above   Family discussion:  plan of care was discussed with patient and family in details.  all questions were answered.  seems to understand, and in agreement  Disposition:  PT   Patient is 89 yo F pmhx  Chronic A-Fib, HTN, CKD, CAD, PAD, Dementia, presenting with       #Acute hypoxic respiratory failure  -Seems to be secondary to flu, pna, and CHF with perserved EF  -Previous TTE shows EF of 55%.  EKG shows Atrial fib with no acute ischemic changes.  trop negative X1  -Tamiflu, oxygen, and duoneb tx PRN  -Possible aspiration pna  -Levaquin +  flagyl  - sputum cx  - blood cx  - MRSA panel  -NPO Pending speech therapy evaluation   -ID consult  -IV lasix,, monitor I/O, serial trop, and cardiology consult          #HTN / Chronic Atrial fib / PAD  -Continue with home medications    I spoke with pt's daughter-in -law named Aixa to obtain home meds.  Daughter in law states she will call in the morning to give meds.    #Progress Note Handoff  Pending (specify):  as above   Family discussion:  plan of care was discussed with patient and family in details.  all questions were answered.  seems to understand, and in agreement  Disposition:  PT   Patient is 89 yo F pmhx  Chronic A-Fib, HTN, CKD, CAD, PAD, Dementia, presenting with       #Acute hypoxic respiratory failure  -Seems to be secondary to flu, pna, and CHF with perserved EF  -Previous TTE shows EF of 55%.  EKG shows Atrial fib with no acute ischemic changes.  trop negative X1  -Tamiflu, oxygen, and duoneb tx PRN  -Possible aspiration pna  -Levaquin +  flagyl  - sputum cx  - blood cx  - MRSA panel  -NPO Pending speech therapy evaluation   -ID consult  -IV lasix,, monitor I/O, serial trop, and cardiology consult          #HTN / Chronic Atrial fib / PAD  -Continue with home medications    I spoke with pt's daughter-in -law named Aixa to obtain home meds.  Daughter in law states she will call in the morning to give meds.    #Progress Note Handoff  Pending (specify):  as above   Family discussion:  plan of care was discussed with patient and family in details.  all questions were answered.  seems to understand, and in agreement  Disposition:  PT

## 2023-11-30 NOTE — H&P ADULT - NSHPPHYSICALEXAM_GEN_ALL_CORE
Vital Signs Last 24 Hrs  T(C): 37 (30 Nov 2023 20:00), Max: 37 (30 Nov 2023 20:00)  T(F): 98.6 (30 Nov 2023 20:00), Max: 98.6 (30 Nov 2023 20:00)  HR: 78 (30 Nov 2023 19:45) (78 - 78)  BP: 141/80 (30 Nov 2023 19:45) (141/80 - 141/80)  BP(mean): --  RR: 19 (30 Nov 2023 19:45) (19 - 19)  SpO2: 98% (30 Nov 2023 19:45) (98% - 98%)    Parameters below as of 30 Nov 2023 19:45  Patient On (Oxygen Delivery Method): nasal cannula  O2 Flow (L/min): 4

## 2023-11-30 NOTE — H&P ADULT - RESPIRATORY
rhonchi no respiratory distress/no use of accessory muscles/airway patent/breath sounds equal/good air movement/respirations non-labored/rhonchi

## 2023-11-30 NOTE — H&P ADULT - HISTORY OF PRESENT ILLNESS
89 yo F pmhx afib, htn, ckd, cad, pad, dementia BIBEMS for evaluation of cough and sob x few weeks. pt was given dexamethasone and combivent by ems prior to arrival, states she feels better after treatments. pt has been taking amoxicillin with minimal relief at home. denies fever, chills, chest pain, le swelling, calf pain. 89 yo F pmhx afib, htn, ckd, cad, pad, dementia BIBEMS for evaluation of cough and sob x few weeks. pt was given dexamethasone and combivent by ems prior to arrival, states she feels better after treatments. pt has been taking amoxicillin with minimal relief at home. denies fever, chills, chest pain, le swelling, calf pain. 89 yo F pmhx A-Fib, HTN, CKD, CAD, PAD, Dementia, BIBEMS for evaluation of cough and sob x approx one month.  Family member states pt has had an on-going cough, outpt cxr performed (unremarkable per family member).   Pt was given dexamethasone and combivent by EMS prior to arrival, states she feels better after treatments. pt has been taking amoxicillin with minimal relief at home. denies fever, chills, chest pain, leg swelling, calf pain.    I spoke with pt's daughter-in -law named Aixa to obtain home meds.  Daughter in law states she will call in the morning to give meds.   89 yo F pmhx A-Fib, HTN, CKD, CAD, PAD, Dementia, BIBEMS for evaluation of cough and sob x approx one month.  Family member states pt has had an on-going cough, outpt cxr performed (unremarkable per family member).   Pt was given dexamethasone and combivent by EMS prior to arrival, states she feels better after treatments. pt has been taking amoxicillin with minimal relief at home. denies fever, chills, chest pain, leg swelling, calf pain.    I spoke with pt's daughter-in -law named Aixa to obtain home meds.  Daughter in law states she will call in the morning to give meds.   89 yo F pmhx  (chronic) A-Fib, HTN, CKD, CAD, PAD, Dementia, BIBEMS for evaluation of cough and sob x approx one month.  Family member states pt has had an on-going cough, outpt cxr performed (unremarkable per family member).   Pt was given dexamethasone and combivent by EMS prior to arrival, states she feels better after treatments. pt has been taking amoxicillin with minimal relief at home. denies fever, chills, chest pain, leg swelling, calf pain.    I spoke with pt's daughter-in -law named Aixa to obtain home meds.  Daughter in law states she will call in the morning to give meds.   89 yo F pmhx  (chronic) A-Fib, HTN, CKD, CAD, PAD, Dementia, BIBEMS for evaluation of cough and sob x approx one month.  Family member states pt has had an on-going cough, outpt cxr performed (unremarkable per family member).   Pt was given dexamethasone and combivent by EMS prior to arrival, states she feels better after treatments. pt has been taking amoxicillin with minimal relief at home. denies fever, chills, chest pain, leg swelling, calf pain.    I spoke with pt's daughter-in -law named Aixa to obtain home meds.  Daughter in law states she will call in the morning to give meds.

## 2023-11-30 NOTE — ED PROVIDER NOTE - PHYSICAL EXAMINATION
GENERAL: Well-nourished, Well-developed. NAD.  HEAD: No visible or palpable bumps or hematomas. No ecchymosis behind ears B/L.  Eyes: PERRLA, EOMI.   ENMT: MMM. No pharyngeal erythema or exudates. Uvula midline.   Neck: Supple. FROM  CVS: Normal S1,S2. No murmurs appreciated on auscultation   RESP: No use of accessory muscles. Chest rise symmetrical with good expansion. (+)crackles b/l  GI: Normal auscultation of bowel sounds in all 4 quadrants. Soft, Nontender, Nondistended. No guarding or rebound tenderness. No CVAT B/L.  Skin: Warm, Dry. No rashes or lesions. Good cap refill < 2 sec B/L.  EXT: Radial and pedal pulses present B/L. No calf tenderness or swelling B/L. No palpable cords. No pedal edema B/L.

## 2023-11-30 NOTE — ED PROVIDER NOTE - OBJECTIVE STATEMENT
89 yo F pmhx afib, htn, ckd, cad, pad, dementia BIBEMS for evaluation of cough and sob x few weeks. pt was given dexamethasone and combivent by ems prior to arrival, states she feels better after treatments. pt has been taking amoxicillin with minimal relief at home. denies fever, chills, chest pain, le swelling, calf pain.

## 2023-12-01 LAB
ANION GAP SERPL CALC-SCNC: 17 MMOL/L — HIGH (ref 7–14)
ANION GAP SERPL CALC-SCNC: 17 MMOL/L — HIGH (ref 7–14)
BASOPHILS # BLD AUTO: 0.01 K/UL — SIGNIFICANT CHANGE UP (ref 0–0.2)
BASOPHILS # BLD AUTO: 0.01 K/UL — SIGNIFICANT CHANGE UP (ref 0–0.2)
BASOPHILS NFR BLD AUTO: 0.3 % — SIGNIFICANT CHANGE UP (ref 0–1)
BASOPHILS NFR BLD AUTO: 0.3 % — SIGNIFICANT CHANGE UP (ref 0–1)
BUN SERPL-MCNC: 8 MG/DL — LOW (ref 10–20)
BUN SERPL-MCNC: 8 MG/DL — LOW (ref 10–20)
CALCIUM SERPL-MCNC: 8.9 MG/DL — SIGNIFICANT CHANGE UP (ref 8.4–10.5)
CALCIUM SERPL-MCNC: 8.9 MG/DL — SIGNIFICANT CHANGE UP (ref 8.4–10.5)
CHLORIDE SERPL-SCNC: 95 MMOL/L — LOW (ref 98–110)
CHLORIDE SERPL-SCNC: 95 MMOL/L — LOW (ref 98–110)
CHOLEST SERPL-MCNC: 153 MG/DL — SIGNIFICANT CHANGE UP
CHOLEST SERPL-MCNC: 153 MG/DL — SIGNIFICANT CHANGE UP
CO2 SERPL-SCNC: 22 MMOL/L — SIGNIFICANT CHANGE UP (ref 17–32)
CO2 SERPL-SCNC: 22 MMOL/L — SIGNIFICANT CHANGE UP (ref 17–32)
CREAT SERPL-MCNC: 0.8 MG/DL — SIGNIFICANT CHANGE UP (ref 0.7–1.5)
CREAT SERPL-MCNC: 0.8 MG/DL — SIGNIFICANT CHANGE UP (ref 0.7–1.5)
EGFR: 67 ML/MIN/1.73M2 — SIGNIFICANT CHANGE UP
EGFR: 67 ML/MIN/1.73M2 — SIGNIFICANT CHANGE UP
EOSINOPHIL # BLD AUTO: 0 K/UL — SIGNIFICANT CHANGE UP (ref 0–0.7)
EOSINOPHIL # BLD AUTO: 0 K/UL — SIGNIFICANT CHANGE UP (ref 0–0.7)
EOSINOPHIL NFR BLD AUTO: 0 % — SIGNIFICANT CHANGE UP (ref 0–8)
EOSINOPHIL NFR BLD AUTO: 0 % — SIGNIFICANT CHANGE UP (ref 0–8)
GLUCOSE SERPL-MCNC: 111 MG/DL — HIGH (ref 70–99)
GLUCOSE SERPL-MCNC: 111 MG/DL — HIGH (ref 70–99)
HCT VFR BLD CALC: 37.8 % — SIGNIFICANT CHANGE UP (ref 37–47)
HCT VFR BLD CALC: 37.8 % — SIGNIFICANT CHANGE UP (ref 37–47)
HDLC SERPL-MCNC: 48 MG/DL — LOW
HDLC SERPL-MCNC: 48 MG/DL — LOW
HGB BLD-MCNC: 13 G/DL — SIGNIFICANT CHANGE UP (ref 12–16)
HGB BLD-MCNC: 13 G/DL — SIGNIFICANT CHANGE UP (ref 12–16)
IMM GRANULOCYTES NFR BLD AUTO: 0.3 % — SIGNIFICANT CHANGE UP (ref 0.1–0.3)
IMM GRANULOCYTES NFR BLD AUTO: 0.3 % — SIGNIFICANT CHANGE UP (ref 0.1–0.3)
LIPID PNL WITH DIRECT LDL SERPL: 93 MG/DL — SIGNIFICANT CHANGE UP
LIPID PNL WITH DIRECT LDL SERPL: 93 MG/DL — SIGNIFICANT CHANGE UP
LYMPHOCYTES # BLD AUTO: 0.55 K/UL — LOW (ref 1.2–3.4)
LYMPHOCYTES # BLD AUTO: 0.55 K/UL — LOW (ref 1.2–3.4)
LYMPHOCYTES # BLD AUTO: 19 % — LOW (ref 20.5–51.1)
LYMPHOCYTES # BLD AUTO: 19 % — LOW (ref 20.5–51.1)
MCHC RBC-ENTMCNC: 28.8 PG — SIGNIFICANT CHANGE UP (ref 27–31)
MCHC RBC-ENTMCNC: 28.8 PG — SIGNIFICANT CHANGE UP (ref 27–31)
MCHC RBC-ENTMCNC: 34.4 G/DL — SIGNIFICANT CHANGE UP (ref 32–37)
MCHC RBC-ENTMCNC: 34.4 G/DL — SIGNIFICANT CHANGE UP (ref 32–37)
MCV RBC AUTO: 83.8 FL — SIGNIFICANT CHANGE UP (ref 81–99)
MCV RBC AUTO: 83.8 FL — SIGNIFICANT CHANGE UP (ref 81–99)
MONOCYTES # BLD AUTO: 0.14 K/UL — SIGNIFICANT CHANGE UP (ref 0.1–0.6)
MONOCYTES # BLD AUTO: 0.14 K/UL — SIGNIFICANT CHANGE UP (ref 0.1–0.6)
MONOCYTES NFR BLD AUTO: 4.8 % — SIGNIFICANT CHANGE UP (ref 1.7–9.3)
MONOCYTES NFR BLD AUTO: 4.8 % — SIGNIFICANT CHANGE UP (ref 1.7–9.3)
NEUTROPHILS # BLD AUTO: 2.19 K/UL — SIGNIFICANT CHANGE UP (ref 1.4–6.5)
NEUTROPHILS # BLD AUTO: 2.19 K/UL — SIGNIFICANT CHANGE UP (ref 1.4–6.5)
NEUTROPHILS NFR BLD AUTO: 75.6 % — HIGH (ref 42.2–75.2)
NEUTROPHILS NFR BLD AUTO: 75.6 % — HIGH (ref 42.2–75.2)
NON HDL CHOLESTEROL: 105 MG/DL — SIGNIFICANT CHANGE UP
NON HDL CHOLESTEROL: 105 MG/DL — SIGNIFICANT CHANGE UP
NRBC # BLD: 0 /100 WBCS — SIGNIFICANT CHANGE UP (ref 0–0)
NRBC # BLD: 0 /100 WBCS — SIGNIFICANT CHANGE UP (ref 0–0)
PLATELET # BLD AUTO: 267 K/UL — SIGNIFICANT CHANGE UP (ref 130–400)
PLATELET # BLD AUTO: 267 K/UL — SIGNIFICANT CHANGE UP (ref 130–400)
PMV BLD: 9.7 FL — SIGNIFICANT CHANGE UP (ref 7.4–10.4)
PMV BLD: 9.7 FL — SIGNIFICANT CHANGE UP (ref 7.4–10.4)
POTASSIUM SERPL-MCNC: 4.5 MMOL/L — SIGNIFICANT CHANGE UP (ref 3.5–5)
POTASSIUM SERPL-MCNC: 4.5 MMOL/L — SIGNIFICANT CHANGE UP (ref 3.5–5)
POTASSIUM SERPL-SCNC: 4.5 MMOL/L — SIGNIFICANT CHANGE UP (ref 3.5–5)
POTASSIUM SERPL-SCNC: 4.5 MMOL/L — SIGNIFICANT CHANGE UP (ref 3.5–5)
RBC # BLD: 4.51 M/UL — SIGNIFICANT CHANGE UP (ref 4.2–5.4)
RBC # BLD: 4.51 M/UL — SIGNIFICANT CHANGE UP (ref 4.2–5.4)
RBC # FLD: 13.2 % — SIGNIFICANT CHANGE UP (ref 11.5–14.5)
RBC # FLD: 13.2 % — SIGNIFICANT CHANGE UP (ref 11.5–14.5)
SODIUM SERPL-SCNC: 134 MMOL/L — LOW (ref 135–146)
SODIUM SERPL-SCNC: 134 MMOL/L — LOW (ref 135–146)
TRIGL SERPL-MCNC: 62 MG/DL — SIGNIFICANT CHANGE UP
TRIGL SERPL-MCNC: 62 MG/DL — SIGNIFICANT CHANGE UP
TROPONIN T SERPL-MCNC: <0.01 NG/ML — SIGNIFICANT CHANGE UP
WBC # BLD: 2.9 K/UL — LOW (ref 4.8–10.8)
WBC # BLD: 2.9 K/UL — LOW (ref 4.8–10.8)
WBC # FLD AUTO: 2.9 K/UL — LOW (ref 4.8–10.8)
WBC # FLD AUTO: 2.9 K/UL — LOW (ref 4.8–10.8)

## 2023-12-01 PROCEDURE — 99233 SBSQ HOSP IP/OBS HIGH 50: CPT

## 2023-12-01 PROCEDURE — 99222 1ST HOSP IP/OBS MODERATE 55: CPT

## 2023-12-01 PROCEDURE — 93306 TTE W/DOPPLER COMPLETE: CPT | Mod: 26

## 2023-12-01 RX ORDER — PANTOPRAZOLE SODIUM 20 MG/1
40 TABLET, DELAYED RELEASE ORAL
Refills: 0 | Status: DISCONTINUED | OUTPATIENT
Start: 2023-12-01 | End: 2023-12-18

## 2023-12-01 RX ORDER — AMPICILLIN SODIUM AND SULBACTAM SODIUM 250; 125 MG/ML; MG/ML
1.5 INJECTION, POWDER, FOR SUSPENSION INTRAMUSCULAR; INTRAVENOUS EVERY 6 HOURS
Refills: 0 | Status: COMPLETED | OUTPATIENT
Start: 2023-12-01 | End: 2023-12-06

## 2023-12-01 RX ORDER — SENNA PLUS 8.6 MG/1
2 TABLET ORAL AT BEDTIME
Refills: 0 | Status: DISCONTINUED | OUTPATIENT
Start: 2023-12-01 | End: 2023-12-18

## 2023-12-01 RX ORDER — HEPARIN SODIUM 5000 [USP'U]/ML
5000 INJECTION INTRAVENOUS; SUBCUTANEOUS EVERY 12 HOURS
Refills: 0 | Status: DISCONTINUED | OUTPATIENT
Start: 2023-12-01 | End: 2023-12-12

## 2023-12-01 RX ORDER — FUROSEMIDE 40 MG
20 TABLET ORAL EVERY 12 HOURS
Refills: 0 | Status: DISCONTINUED | OUTPATIENT
Start: 2023-12-01 | End: 2023-12-06

## 2023-12-01 RX ORDER — ACETAMINOPHEN 500 MG
650 TABLET ORAL EVERY 6 HOURS
Refills: 0 | Status: DISCONTINUED | OUTPATIENT
Start: 2023-12-01 | End: 2023-12-18

## 2023-12-01 RX ORDER — LANOLIN ALCOHOL/MO/W.PET/CERES
5 CREAM (GRAM) TOPICAL AT BEDTIME
Refills: 0 | Status: DISCONTINUED | OUTPATIENT
Start: 2023-12-01 | End: 2023-12-18

## 2023-12-01 RX ORDER — METRONIDAZOLE 500 MG
500 TABLET ORAL EVERY 8 HOURS
Refills: 0 | Status: DISCONTINUED | OUTPATIENT
Start: 2023-12-01 | End: 2023-12-01

## 2023-12-01 RX ORDER — ONDANSETRON 8 MG/1
4 TABLET, FILM COATED ORAL EVERY 8 HOURS
Refills: 0 | Status: DISCONTINUED | OUTPATIENT
Start: 2023-12-01 | End: 2023-12-18

## 2023-12-01 RX ADMIN — HEPARIN SODIUM 5000 UNIT(S): 5000 INJECTION INTRAVENOUS; SUBCUTANEOUS at 05:01

## 2023-12-01 RX ADMIN — Medication 20 MILLIGRAM(S): at 18:40

## 2023-12-01 RX ADMIN — AMPICILLIN SODIUM AND SULBACTAM SODIUM 100 GRAM(S): 250; 125 INJECTION, POWDER, FOR SUSPENSION INTRAMUSCULAR; INTRAVENOUS at 18:38

## 2023-12-01 RX ADMIN — Medication 100 MILLIGRAM(S): at 05:01

## 2023-12-01 RX ADMIN — Medication 30 MILLIGRAM(S): at 18:41

## 2023-12-01 RX ADMIN — HEPARIN SODIUM 5000 UNIT(S): 5000 INJECTION INTRAVENOUS; SUBCUTANEOUS at 18:39

## 2023-12-01 RX ADMIN — AMPICILLIN SODIUM AND SULBACTAM SODIUM 100 GRAM(S): 250; 125 INJECTION, POWDER, FOR SUSPENSION INTRAMUSCULAR; INTRAVENOUS at 14:09

## 2023-12-01 RX ADMIN — Medication 20 MILLIGRAM(S): at 05:01

## 2023-12-01 RX ADMIN — Medication 75 MILLIGRAM(S): at 06:21

## 2023-12-01 NOTE — PHYSICAL THERAPY INITIAL EVALUATION ADULT - PERTINENT HX OF CURRENT PROBLEM, REHAB EVAL
89 yo F pmhx A-Fib, HTN, CKD, CAD, PAD, Dementia, BIBEMS for evaluation of cough and sob x approx one month.  Family member states pt has had an on-going cough, outpt cxr performed (unremarkable per family member).   Pt was given dexamethasone and combivent by EMS prior to arrival, states she feels better after treatments. pt has been taking amoxicillin with minimal relief at home. denies fever, chills, chest pain, leg swelling, calf pain

## 2023-12-01 NOTE — PROGRESS NOTE ADULT - SUBJECTIVE AND OBJECTIVE BOX
CHAVO DYE 89y Female  MRN#: [id number]   Hospital Day: 1d    SUBJECTIVE  Patient is a 89y old Female who presents with a chief complaint of SOB (01 Dec 2023 08:48)  Currently admitted to medicine with the primary diagnosis of Pleural effusion    INTERVAL HPI AND OVERNIGHT EVENTS:  Patient was examined and seen at bedside. This morning she is resting comfortably in bed and reports no issues or overnight events.  feels much better, denies chest pain , sob, n/v/d/c, hematuria or bloody stool.     REVIEW OF SYMPTOMS:  10 point ROS negative except as above.    OBJECTIVE  PAST MEDICAL & SURGICAL HISTORY  Atrial fibrillation    Anemia    CAD (coronary artery disease)    Coronary artery disease    Hearing loss    Hypertension    Dementia    Peripheral artery disease    Venous stasis ulcers of both lower extremities    OA (osteoarthritis)  Left Knee    History of appendectomy    S/P ORIF (open reduction internal fixation) fracture  RT hip Fracture      ALLERGIES:  penicillins (Unknown)  Celebrex (Unknown)    MEDICATIONS:  STANDING MEDICATIONS  ampicillin/sulbactam  IVPB 1.5 Gram(s) IV Intermittent every 6 hours  cloNIDine 0.1 milliGRAM(s) Oral once  furosemide   Injectable 20 milliGRAM(s) IV Push every 12 hours  heparin   Injectable 5000 Unit(s) SubCutaneous every 12 hours  oseltamivir 30 milliGRAM(s) Oral two times a day  pantoprazole    Tablet 40 milliGRAM(s) Oral before breakfast    PRN MEDICATIONS  acetaminophen     Tablet .. 650 milliGRAM(s) Oral every 6 hours PRN  aluminum hydroxide/magnesium hydroxide/simethicone Suspension 30 milliLiter(s) Oral every 4 hours PRN  bisacodyl 5 milliGRAM(s) Oral daily PRN  melatonin 5 milliGRAM(s) Oral at bedtime PRN  ondansetron Injectable 4 milliGRAM(s) IV Push every 8 hours PRN  senna 2 Tablet(s) Oral at bedtime PRN      VITAL SIGNS: Last 24 Hours  T(C): 35.1 (01 Dec 2023 04:34), Max: 37 (30 Nov 2023 20:00)  T(F): 95.1 (01 Dec 2023 04:34), Max: 98.6 (30 Nov 2023 20:00)  HR: 80 (01 Dec 2023 06:49) (78 - 88)  BP: 154/84 (01 Dec 2023 06:49) (140/85 - 180/90)  BP(mean): --  RR: 19 (30 Nov 2023 19:45) (19 - 19)  SpO2: 100% (01 Dec 2023 06:49) (98% - 100%)    PHYSICAL EXAM:  GENERAL: NAD, well-groomed, well-developed  HEENT - NC/AT, b/l poor hearing   NECK: Supple, No JVD  CHEST/LUNG: Clear to auscultation bilaterally;   HEART: Regular rate and rhythm; No murmurs,  ABDOMEN: Soft, Nontender, Nondistended; Bowel sounds present  EXTREMITIES:  no edema  NEURO:  aox2, generalized weakness   SKIN: No rashes or lesions    LABS:                        13.0   2.90  )-----------( 267      ( 01 Dec 2023 04:30 )             37.8     12-01    134<L>  |  95<L>  |  8<L>  ----------------------------<  111<H>  4.5   |  22  |  0.8    Ca    8.9      01 Dec 2023 04:30    TPro  6.5  /  Alb  3.6  /  TBili  0.8  /  DBili  x   /  AST  21  /  ALT  8   /  AlkPhos  102  11-30      Urinalysis Basic - ( 01 Dec 2023 04:30 )    Color: x / Appearance: x / SG: x / pH: x  Gluc: 111 mg/dL / Ketone: x  / Bili: x / Urobili: x   Blood: x / Protein: x / Nitrite: x   Leuk Esterase: x / RBC: x / WBC x   Sq Epi: x / Non Sq Epi: x / Bacteria: x        Troponin T, Serum: <0.01 ng/mL (12-01-23 @ 04:30)  Troponin T, Serum: <0.01 ng/mL (11-30-23 @ 20:26)      CARDIAC MARKERS ( 01 Dec 2023 04:30 )  x     / <0.01 ng/mL / x     / x     / x      CARDIAC MARKERS ( 30 Nov 2023 20:26 )  x     / <0.01 ng/mL / x     / x     / x          RADIOLOGY:  < from: Xray Chest 1 View- PORTABLE-Urgent (11.30.23 @ 20:26) >    Impression:    Bilateral opacities and effusions, large on the left.    < end of copied text >  < from: CT Chest w/ IV Cont (11.30.23 @ 22:20) >    IMPRESSION:    Large left pleural effusion with overlying compressive atelectasis. Small   right pleural effusion. Interstitial edema.    Partial occlusion of bilateral lower lobe segmental bronchi; correlation   for aspiration suggested.    < end of copied text >

## 2023-12-01 NOTE — CONSULT NOTE ADULT - SUBJECTIVE AND OBJECTIVE BOX
CARDIOLOGY CONSULT NOTE     CHIEF COMPLAINT/REASON FOR CONSULT:    HPI:  89 yo F pmhx A-Fib, HTN, CKD, CAD, PAD, Dementia, BIBEMS for evaluation of cough and sob x approx one month.  Family member states pt has had an on-going cough, outpt cxr performed (unremarkable per family member).   Pt was given dexamethasone and combivent by EMS prior to arrival, states she feels better after treatments. pt has been taking amoxicillin with minimal relief at home. denies fever, chills, chest pain, leg swelling, calf pain.    I spoke with pt's daughter-in -law named Aixa to obtain home meds.  Daughter in law states she will call in the morning to give meds.   (30 Nov 2023 23:59)      PAST MEDICAL & SURGICAL HISTORY:  Atrial fibrillation      Anemia      CAD (coronary artery disease)      Coronary artery disease      Hearing loss      Hypertension      Dementia      Peripheral artery disease      Venous stasis ulcers of both lower extremities      OA (osteoarthritis)  Left Knee      History of appendectomy      S/P ORIF (open reduction internal fixation) fracture  RT hip Fracture          Cardiac Risks:   [x ]HTN, [ ] DM, [ ] Smoking, [ ] FH,  [ ] Lipids        MEDICATIONS:  MEDICATIONS  (STANDING):  cloNIDine 0.1 milliGRAM(s) Oral once  furosemide   Injectable 20 milliGRAM(s) IV Push every 12 hours  heparin   Injectable 5000 Unit(s) SubCutaneous every 12 hours  levoFLOXacin IVPB 500 milliGRAM(s) IV Intermittent every 24 hours  metroNIDAZOLE  IVPB 500 milliGRAM(s) IV Intermittent every 8 hours  oseltamivir 75 milliGRAM(s) Oral two times a day  pantoprazole    Tablet 40 milliGRAM(s) Oral before breakfast      FAMILY HISTORY:      SOCIAL HISTORY:        Allergies    penicillins (Unknown)  Celebrex (Unknown)        	    REVIEW OF SYSTEMS:  CONSTITUTIONAL: No fever, weight loss, or fatigue  EYES: No eye pain, visual disturbances, or discharge  ENMT:  No difficulty hearing, tinnitus, vertigo; No sinus or throat pain  NECK: No pain or stiffness  RESPIRATORY: No cough, wheezing, chills or hemoptysis; No Shortness of Breath  CARDIOVASCULAR: No chest pain, palpitations, passing out, dizziness, or leg swelling  GASTROINTESTINAL: No abdominal or epigastric pain. No nausea, vomiting, or hematemesis; No diarrhea or constipation. No melena or hematochezia.  GENITOURINARY: No dysuria, frequency, hematuria, or incontinence  NEUROLOGICAL: No headaches, memory loss, loss of strength, numbness, or tremors  SKIN: No itching, burning, rashes, or lesions   	        PHYSICAL EXAM:  T(C): 35.1 (12-01-23 @ 04:34), Max: 37 (11-30-23 @ 20:00)  HR: 80 (12-01-23 @ 06:49) (78 - 88)  BP: 154/84 (12-01-23 @ 06:49) (140/85 - 180/90)  RR: 19 (11-30-23 @ 19:45) (19 - 19)  SpO2: 100% (12-01-23 @ 06:49) (98% - 100%)  Wt(kg): --  I&O's Summary      Appearance: Normal	  Psychiatry: A & O x 3, Mood & affect appropriate  HEENT:   Normal oral mucosa, PERRL, EOMI	  Lymphatic: No lymphadenopathy  Cardiovascular: Normal S1 S2,RRR, No JVD, No murmurs  Respiratory: Lungs clear to auscultation	  Gastrointestinal:  Soft, Non-tender, + BS	  Skin: No rashes, No ecchymoses, No cyanosis	  Neurologic: Non-focal  Extremities: Normal range of motion, No clubbing, cyanosis or edema  Vascular: Peripheral pulses palpable 2+ bilaterally      ECG:  	< from: 12 Lead ECG (11.30.23 @ 19:54) >  Diagnosis Line Atrial fibrillation  Right superior axis deviation  Low voltage QRS  Incomplete right bundle branch block  Septal infarct , age undetermined  Abnormal ECG    Confirmed by RAJIV CURRY, FRANCESCA ([id number]) on 12/1/2023 6:51:46 AM    < end of copied text >      	  LABS:	 	    CARDIAC MARKERS:                                    13.0   2.90  )-----------( 267      ( 01 Dec 2023 04:30 )             37.8     12-01    134<L>  |  95<L>  |  8<L>  ----------------------------<  111<H>  4.5   |  22  |  0.8    Ca    8.9      01 Dec 2023 04:30    TPro  6.5  /  Alb  3.6  /  TBili  0.8  /  DBili  x   /  AST  21  /  ALT  8   /  AlkPhos  102  11-30

## 2023-12-01 NOTE — ED ADULT NURSE NOTE - NSFALLHARMRISKINTERV_ED_ALL_ED

## 2023-12-01 NOTE — PHYSICAL THERAPY INITIAL EVALUATION ADULT - LIVES WITH, PROFILE
pt. reports she lives with son, daughter in law, grandchildren and  . Pt. reports she lives downstairs and does not have to negotiate steps./children/spouse

## 2023-12-01 NOTE — DIETITIAN INITIAL EVALUATION ADULT - PERTINENT LABORATORY DATA
12-01    134<L>  |  95<L>  |  8<L>  ----------------------------<  111<H>  4.5   |  22  |  0.8    Ca    8.9      01 Dec 2023 04:30    TPro  6.5  /  Alb  3.6  /  TBili  0.8  /  DBili  x   /  AST  21  /  ALT  8   /  AlkPhos  102  11-30                          13.0   2.90  )-----------( 267      ( 01 Dec 2023 04:30 )             37.8

## 2023-12-01 NOTE — PATIENT PROFILE ADULT - FALL HARM RISK - HARM RISK INTERVENTIONS
Assistance with ambulation/Assistance OOB with selected safe patient handling equipment/Communicate Risk of Fall with Harm to all staff/Discuss with provider need for PT consult/Monitor gait and stability/Reinforce activity limits and safety measures with patient and family/Tailored Fall Risk Interventions/Visual Cue: Yellow wristband and red socks/Bed in lowest position, wheels locked, appropriate side rails in place/Call bell, personal items and telephone in reach/Instruct patient to call for assistance before getting out of bed or chair/Non-slip footwear when patient is out of bed/[place] to call system/Physically safe environment - no spills, clutter or unnecessary equipment/Purposeful Proactive Rounding/Room/bathroom lighting operational, light cord in reach

## 2023-12-01 NOTE — CONSULT NOTE ADULT - ASSESSMENT
89 yo F pmhx A-Fib, HTN, CKD, CAD, PAD, Dementia, BIBEMS for evaluation of cough and sob x approx one month. Patient flu post. She feels better. She has pleural effusion. She has volume overload. Define goals care. Lasix IV. Follow lytes . Daily weights. Prognosis poor.

## 2023-12-01 NOTE — DIETITIAN INITIAL EVALUATION ADULT - OTHER INFO
pt is 89 year old female with hx of afib, HTN, CKD, CAD, PAD, dementia, BIBA 2/2 cough with SOB x1 month. pt admitted with acute hypoxic respiratory failure 2/2 Flu, PNA and CHF

## 2023-12-01 NOTE — PROGRESS NOTE ADULT - ASSESSMENT
a 89 yo F pmhx A-Fib, HTN, CKD, CAD, PAD, Dementia, BIBEMS for evaluation of cough and sob x approx one month.  Family member states pt has had an on-going cough, outpt cxr performed (unremarkable per family member). admitted for positive influenza A and acute respiratory failure.     positive influenza A Bronchitis  Large pleural effusion with compression atelectasis Left.   Partial occulusion of bilateral lower lobe segmental bronchi  acute respiratory failure  Hyponatremia   A-Fib, CAD, PAD  HTN, CKD  Dementia    Plan:  - follow up TTE   - Follow up with blood cultures  - tamiflu to 30mg BID for (Crcl ~50) and Unasyn 1.5 gram q 6 hours for Aspiration pneumonia.   - Speech and swallow evaluation for aspiration.   - consulted ID, pulmonary and cardiology Rec's appr.   - c/w broad spectrum antibiotics pending ID rec's  - PT/rehab, SLP, consulted   - Tylenol and pain management   - continue current medication, antihypertensive (with holding parameters),  - monitor vitals closely, daily am labs  - precaution (fall/aspiration/seizure)  - telemetry     DVT prophylaxis: SQH   GI prophylaxis: Protonix   diet: HH diet   code status: full code  Goals of care/disposition: Home VS [facility]    #Progress Note Handoff  Pending (specify):  pending medical optimization   Disposition: Home VS [facility]     a 89 yo F pmhx A-Fib, HTN, CKD, CAD, PAD, Dementia, BIBEMS for evaluation of cough and sob x approx one month.  Family member states pt has had an on-going cough, outpt cxr performed (unremarkable per family member). admitted for positive influenza A and acute respiratory failure.     positive influenza A Bronchitis  Large pleural effusion with compression atelectasis Left.   Partial occulusion of bilateral lower lobe segmental bronchi  acute respiratory failure  Hyponatremia   A-Fib, CAD, PAD  HTN, CKD  Dementia    Plan:  -c/w lasix as per cardiology iv  - follow up TTE   - Follow up with blood cultures  - tamiflu to 30mg BID for (Crcl ~50) and Unasyn 1.5 gram q 6 hours for Aspiration pneumonia.   - Speech and swallow evaluation for aspiration.   - consulted ID, pulmonary and cardiology Rec's appr.   - PT/rehab, SLP, consulted   - Tylenol and pain management   - continue current medication, antihypertensive (with holding parameters),  - monitor vitals closely, daily am labs  - precaution (fall/aspiration/seizure)  - telemetry   - complex patient     DVT prophylaxis: SQH   GI prophylaxis: Protonix   diet: HH diet   code status: full code  Goals of care/disposition: Home VS [facility]    #Progress Note Handoff  Pending (specify):  pending medical optimization   Disposition: Home VS [facility]

## 2023-12-01 NOTE — CONSULT NOTE ADULT - SUBJECTIVE AND OBJECTIVE BOX
OSMANIBETOAIXA SANTANA  89y, Female  Allergies    penicillins (Unknown)  Celebrex (Unknown)    Intolerances    LOS  1d    HPI  HPI:  89 yo F pmhx A-Fib, HTN, CKD, CAD, PAD, Dementia, BIBEMS for evaluation of cough and sob x approx one month.  Family member states pt has had an on-going cough, outpt cxr performed (unremarkable per family member).   Pt was given dexamethasone and combivent by EMS prior to arrival, states she feels better after treatments. pt has been taking amoxicillin with minimal relief at home. denies fever, chills, chest pain, leg swelling, calf pain.    I spoke with pt's daughter-in -law named Aixa to obtain home meds.  Daughter in law states she will call in the morning to give meds.   (30 Nov 2023 23:59)      INFECTIOUS DISEASE HISTORY:  ID consulted for antimicrobials.     Prior hospital charts reviewed [Yes]  Primary team notes reviewed [Yes]  Other consultant notes reviewed [Yes]    REVIEW OF SYSTEMS:  CONSTITUTIONAL: No fever or chills  HEENT: No sore throat  RESPIRATORY: No cough, no shortness of breath  CARDIOVASCULAR: No chest pain or palpitations  GASTROINTESTINAL: No abdominal or epigastric pain  GENITOURINARY: No dysuria  NEUROLOGICAL: No headache/dizziness  MSK: No joint pain, erythema, or swelling; no back pain  SKIN: No itching, rashes  All other ROS negative except noted above    PAST MEDICAL & SURGICAL HISTORY:  Atrial fibrillation      Anemia      CAD (coronary artery disease)      Coronary artery disease      Hearing loss      Hypertension      Dementia      Peripheral artery disease      Venous stasis ulcers of both lower extremities      OA (osteoarthritis)  Left Knee      History of appendectomy      S/P ORIF (open reduction internal fixation) fracture  RT hip Fracture    SOCIAL HISTORY:  - No recent travel  - Denies tobacco use, alcohol use, illicit drug use    FAMILY HISTORY:      ANTIMICROBIALS:  levoFLOXacin IVPB 500 every 24 hours  metroNIDAZOLE  IVPB 500 every 8 hours  oseltamivir 75 two times a day      ANTIMICROBIALS (past 90 days):  MEDICATIONS  (STANDING):    levoFLOXacin IVPB   50 mL/Hr IV Intermittent (12-01-23 @ 00:23)    metroNIDAZOLE  IVPB   100 mL/Hr IV Intermittent (12-01-23 @ 05:01)    oseltamivir   75 milliGRAM(s) Oral (12-01-23 @ 06:21)        OTHER MEDS:   MEDICATIONS  (STANDING):  acetaminophen     Tablet .. 650 every 6 hours PRN  aluminum hydroxide/magnesium hydroxide/simethicone Suspension 30 every 4 hours PRN  bisacodyl 5 daily PRN  cloNIDine 0.1 once  furosemide   Injectable 20 every 12 hours  heparin   Injectable 5000 every 12 hours  melatonin 5 at bedtime PRN  ondansetron Injectable 4 every 8 hours PRN  pantoprazole    Tablet 40 before breakfast  senna 2 at bedtime PRN      VITALS:  Vital Signs Last 24 Hrs  T(F): 95.1 (12-01-23 @ 04:34), Max: 98.6 (11-30-23 @ 20:00)    Vital Signs Last 24 Hrs  HR: 80 (12-01-23 @ 06:49) (78 - 88)  BP: 154/84 (12-01-23 @ 06:49) (140/85 - 180/90)  RR: 19 (11-30-23 @ 19:45)  SpO2: 100% (12-01-23 @ 06:49) (98% - 100%)  Wt(kg): --    EXAM:  GENERAL: NAD, lying in bed  HEAD: No head lesions  NECK: Supple, nontender to palpation; no JVD  CHEST/LUNG: Clear to auscultation bilaterally  HEART: S1 S2  ABDOMEN: Soft, nontender, nondistended; normoactive bowel sounds  EXTREMITIES: No clubbing, cyanosis, or petal edema  NERVOUS SYSTEM: Alert and oriented to person, time, place and situation, speech clear. No focal deficits   MSK: No joint erythema, swelling or pain  SKIN: No rashes or lesions, no superficial thrombophlebitis    Labs:                        13.0   2.90  )-----------( 267      ( 01 Dec 2023 04:30 )             37.8     12-01    134<L>  |  95<L>  |  8<L>  ----------------------------<  111<H>  4.5   |  22  |  0.8    Ca    8.9      01 Dec 2023 04:30    TPro  6.5  /  Alb  3.6  /  TBili  0.8  /  DBili  x   /  AST  21  /  ALT  8   /  AlkPhos  102  11-30      WBC Trend:  WBC Count: 2.90 (12-01-23 @ 04:30)  WBC Count: 5.44 (11-30-23 @ 20:26)      Auto Neutrophil #: 3.69 K/uL (11-30-23 @ 20:26)      Creatine Trend:  Creatinine: 0.8 (12-01)  Creatinine: 0.6 (11-30)      Liver Biochemical Testing Trend:  Alanine Aminotransferase (ALT/SGPT): 8 (11-30)  Aspartate Aminotransferase (AST/SGOT): 21 (11-30-23 @ 20:26)  Bilirubin Total: 0.8 (11-30)      Trend LDH      Auto Eosinophil %: 0.6 % (11-30-23 @ 20:26)      Urinalysis Basic - ( 01 Dec 2023 04:30 )    Color: x / Appearance: x / SG: x / pH: x  Gluc: 111 mg/dL / Ketone: x  / Bili: x / Urobili: x   Blood: x / Protein: x / Nitrite: x   Leuk Esterase: x / RBC: x / WBC x   Sq Epi: x / Non Sq Epi: x / Bacteria: x        MICROBIOLOGY:    Female    INFLAMMATORY MARKERS      RADIOLOGY & ADDITIONAL TESTS:  I have personally reviewed the imagings.  CXR      CT  CT Chest w/ IV Cont:   ACC: [id number] EXAM:  CT CHEST IC   ORDERED BY: LONA FLOWERS     PROCEDURE DATE:  11/30/2023          INTERPRETATION:  CLINICAL HISTORY / REASON FOR EXAM: Pleural effusion.    TECHNIQUE: Multislice helical sections were obtained from the thoracic  inlet to the lung bases with 90 cc Omnipaque 350 IV contrast   administration. Coronal and sagittal images have been submitted for   evaluation. 3D (MIP) images obtained. 10 cc contrast discarded    COMPARISON: CT chest 6/30/2021.    FINDINGS:    LUNGS, PLEURA, AIRWAYS: Large left pleural effusion with overlying   compressive atelectasis. Small right pleural effusion. Interstitial   edema. Partial occlusion of bilateral lower lobe segmental bronchi;   correlation for aspiration suggested.    THORACIC NODES: Nonspecific prominent mediastinal lymph nodes, which   could be attributed to CHF.    MEDIASTINUM/GREAT VESSELS: No pericardial effusion. Heart size is within   normal limits. Dilated main pulmonary artery, 3.3 cm diameter, may be   seenin pulmonary arterial hypertension. No central pulmonary embolus.   Aortic and coronary artery calcifications.    BONES/SOFT TISSUES: Degenerative change, thoracic spine. Chronic T7-T8   vertebral body compression fractures.    VISUALIZED UPPER ABDOMEN: Unremarkable.      IMPRESSION:    Large left pleural effusion with overlying compressive atelectasis. Small   right pleural effusion. Interstitial edema.    Partial occlusion of bilateral lower lobe segmental bronchi; correlation   for aspiration suggested.    --- End of Report ---            ELLIE MALDONADO MD; Attending Radiologist  This document has been electronically signed. Nov 30 2023 10:36PM (11-30-23 @ 22:20)      CARDIOLOGY TESTING  12 Lead ECG:   Ventricular Rate 85 BPM    Atrial Rate 156 BPM    QRS Duration 102 ms    Q-T Interval 374 ms    QTC Calculation(Bazett) 445 ms    R Axis 250 degrees    T Axis -4 degrees    Diagnosis Line Atrial fibrillation  Right superior axis deviation  Low voltage QRS  Incomplete right bundle branch block  Septal infarct , age undetermined  Abnormal ECG    Confirmed by FRANCESCA VANCE MD ([id number]) on 12/1/2023 6:51:46 AM (11-30-23 @ 19:54)             OSMANIBETOAIXA SANTANA  89y, Female  Allergies    penicillins (Unknown)  Celebrex (Unknown)    Intolerances    LOS  1d    HPI  HPI:  89 yo F pmhx A-Fib, HTN, CKD, CAD, PAD, Dementia, BIBEMS for evaluation of cough and sob x approx one month.  Family member states pt has had an on-going cough, outpt cxr performed (unremarkable per family member).   Pt was given dexamethasone and combivent by EMS prior to arrival, states she feels better after treatments. pt has been taking amoxicillin with minimal relief at home. denies fever, chills, chest pain, leg swelling, calf pain.    I spoke with pt's daughter-in -law named Aixa to obtain home meds.  Daughter in law states she will call in the morning to give meds.   (30 Nov 2023 23:59)      INFECTIOUS DISEASE HISTORY:  ID consulted for antimicrobials.     Prior hospital charts reviewed [Yes]  Primary team notes reviewed [Yes]  Other consultant notes reviewed [Yes]    REVIEW OF SYSTEMS:  CONSTITUTIONAL: No fever or chills  HEENT: No sore throat  RESPIRATORY: No cough, no shortness of breath  CARDIOVASCULAR: No chest pain or palpitations  GASTROINTESTINAL: No abdominal or epigastric pain  GENITOURINARY: No dysuria  NEUROLOGICAL: No headache/dizziness  MSK: No joint pain, erythema, or swelling; no back pain  SKIN: No itching, rashes  All other ROS negative except noted above    PAST MEDICAL & SURGICAL HISTORY:  Atrial fibrillation      Anemia      CAD (coronary artery disease)      Coronary artery disease      Hearing loss      Hypertension      Dementia      Peripheral artery disease      Venous stasis ulcers of both lower extremities      OA (osteoarthritis)  Left Knee      History of appendectomy      S/P ORIF (open reduction internal fixation) fracture  RT hip Fracture    SOCIAL HISTORY:  - No recent travel  - Denies tobacco use, alcohol use, illicit drug use    FAMILY HISTORY:      ANTIMICROBIALS:  levoFLOXacin IVPB 500 every 24 hours  metroNIDAZOLE  IVPB 500 every 8 hours  oseltamivir 75 two times a day      ANTIMICROBIALS (past 90 days):  MEDICATIONS  (STANDING):    levoFLOXacin IVPB   50 mL/Hr IV Intermittent (12-01-23 @ 00:23)    metroNIDAZOLE  IVPB   100 mL/Hr IV Intermittent (12-01-23 @ 05:01)    oseltamivir   75 milliGRAM(s) Oral (12-01-23 @ 06:21)        OTHER MEDS:   MEDICATIONS  (STANDING):  acetaminophen     Tablet .. 650 every 6 hours PRN  aluminum hydroxide/magnesium hydroxide/simethicone Suspension 30 every 4 hours PRN  bisacodyl 5 daily PRN  cloNIDine 0.1 once  furosemide   Injectable 20 every 12 hours  heparin   Injectable 5000 every 12 hours  melatonin 5 at bedtime PRN  ondansetron Injectable 4 every 8 hours PRN  pantoprazole    Tablet 40 before breakfast  senna 2 at bedtime PRN      VITALS:  Vital Signs Last 24 Hrs  T(F): 95.1 (12-01-23 @ 04:34), Max: 98.6 (11-30-23 @ 20:00)    Vital Signs Last 24 Hrs  HR: 80 (12-01-23 @ 06:49) (78 - 88)  BP: 154/84 (12-01-23 @ 06:49) (140/85 - 180/90)  RR: 19 (11-30-23 @ 19:45)  SpO2: 100% (12-01-23 @ 06:49) (98% - 100%)  Wt(kg): --    EXAM:  GENERAL: NAD,frail looking. on NC  HEAD: No head lesions  NECK: Supple, nontender to palpation; no JVD  CHEST/LUNG: Mild rhonchi noted L>R  HEART: S1 S2  ABDOMEN: Soft, nontender, nondistended  EXTREMITIES: No clubbing, cyanosis, or petal edema  NERVOUS SYSTEM: Alert and oriented to person. Very hard of hearing.   MSK: No joint erythema, swelling or pain  SKIN: No rashes or lesions, no superficial thrombophlebitis    Labs:                        13.0   2.90  )-----------( 267      ( 01 Dec 2023 04:30 )             37.8     12-01    134<L>  |  95<L>  |  8<L>  ----------------------------<  111<H>  4.5   |  22  |  0.8    Ca    8.9      01 Dec 2023 04:30    TPro  6.5  /  Alb  3.6  /  TBili  0.8  /  DBili  x   /  AST  21  /  ALT  8   /  AlkPhos  102  11-30      WBC Trend:  WBC Count: 2.90 (12-01-23 @ 04:30)  WBC Count: 5.44 (11-30-23 @ 20:26)      Auto Neutrophil #: 3.69 K/uL (11-30-23 @ 20:26)      Creatine Trend:  Creatinine: 0.8 (12-01)  Creatinine: 0.6 (11-30)      Liver Biochemical Testing Trend:  Alanine Aminotransferase (ALT/SGPT): 8 (11-30)  Aspartate Aminotransferase (AST/SGOT): 21 (11-30-23 @ 20:26)  Bilirubin Total: 0.8 (11-30)      Trend LDH      Auto Eosinophil %: 0.6 % (11-30-23 @ 20:26)      Urinalysis Basic - ( 01 Dec 2023 04:30 )    Color: x / Appearance: x / SG: x / pH: x  Gluc: 111 mg/dL / Ketone: x  / Bili: x / Urobili: x   Blood: x / Protein: x / Nitrite: x   Leuk Esterase: x / RBC: x / WBC x   Sq Epi: x / Non Sq Epi: x / Bacteria: x        MICROBIOLOGY:    Female    INFLAMMATORY MARKERS      RADIOLOGY & ADDITIONAL TESTS:  I have personally reviewed the imagings.  CXR      CT  CT Chest w/ IV Cont:   ACC: [id number] EXAM:  CT CHEST IC   ORDERED BY: LONA FLOWERS     PROCEDURE DATE:  11/30/2023          INTERPRETATION:  CLINICAL HISTORY / REASON FOR EXAM: Pleural effusion.    TECHNIQUE: Multislice helical sections were obtained from the thoracic  inlet to the lung bases with 90 cc Omnipaque 350 IV contrast   administration. Coronal and sagittal images have been submitted for   evaluation. 3D (MIP) images obtained. 10 cc contrast discarded    COMPARISON: CT chest 6/30/2021.    FINDINGS:    LUNGS, PLEURA, AIRWAYS: Large left pleural effusion with overlying   compressive atelectasis. Small right pleural effusion. Interstitial   edema. Partial occlusion of bilateral lower lobe segmental bronchi;   correlation for aspiration suggested.    THORACIC NODES: Nonspecific prominent mediastinal lymph nodes, which   could be attributed to CHF.    MEDIASTINUM/GREAT VESSELS: No pericardial effusion. Heart size is within   normal limits. Dilated main pulmonary artery, 3.3 cm diameter, may be   seenin pulmonary arterial hypertension. No central pulmonary embolus.   Aortic and coronary artery calcifications.    BONES/SOFT TISSUES: Degenerative change, thoracic spine. Chronic T7-T8   vertebral body compression fractures.    VISUALIZED UPPER ABDOMEN: Unremarkable.      IMPRESSION:    Large left pleural effusion with overlying compressive atelectasis. Small   right pleural effusion. Interstitial edema.    Partial occlusion of bilateral lower lobe segmental bronchi; correlation   for aspiration suggested.    --- End of Report ---            ELLIE MALDONADO MD; Attending Radiologist  This document has been electronically signed. Nov 30 2023 10:36PM (11-30-23 @ 22:20)    < from: TTE Echo w/o Contrast Follow Up Limited (06.30.21 @ 08:27) >  Summary:   1. Left ventricular ejection fraction, by visual estimation, is 55 to 60%.   2. Mild to moderately enlarged right atrium.   3. Moderately enlarged left atrium.   4. Mild mitral annular calcification.   5. Moderate mitral valve regurgitation.   6. Moderate tricuspid regurgitation.   7. Mild to moderate aortic regurgitation.  8. Sclerotic aortic valve with normal opening.   9. Peak transaortic gradient equals 32.4 mmHg, mean transaortic gradient equals 14.2 mmHg, the calculated aortic valve area equals 0.83 cm² by the continuity equation consistent with moderate aortic stenosis.  10. There is mild aortic root calcification.    < end of copied text >      CARDIOLOGY TESTING  12 Lead ECG:   Ventricular Rate 85 BPM    Atrial Rate 156 BPM    QRS Duration 102 ms    Q-T Interval 374 ms    QTC Calculation(Bazett) 445 ms    R Axis 250 degrees    T Axis -4 degrees    Diagnosis Line Atrial fibrillation  Right superior axis deviation  Low voltage QRS  Incomplete right bundle branch block  Septal infarct , age undetermined  Abnormal ECG    Confirmed by FRANCESCA VANCE MD ([id number]) on 12/1/2023 6:51:46 AM (11-30-23 @ 19:54)

## 2023-12-01 NOTE — CHART NOTE - NSCHARTNOTEFT_GEN_A_CORE
Vital Signs Last 24 Hrs  T(C): 35.1 (01 Dec 2023 04:34), Max: 37 (30 Nov 2023 20:00)  T(F): 95.1 (01 Dec 2023 04:34), Max: 98.6 (30 Nov 2023 20:00)  HR: 79 (01 Dec 2023 04:34) (78 - 88)  BP: 180/90 (01 Dec 2023 04:34) (140/85 - 180/90)  BP(mean): --  RR: 19 (30 Nov 2023 19:45) (19 - 19)  SpO2: 98% (01 Dec 2023 04:34) (98% - 100%)  Parameters below as of 30 Nov 2023 19:45  Patient On (Oxygen Delivery Method): nasal cannula  O2 Flow (L/min): 4  Catapres 0.1mg PO once now.

## 2023-12-01 NOTE — DIETITIAN INITIAL EVALUATION ADULT - ORAL INTAKE PTA/DIET HISTORY
as per family at bedside pt consumes soft foods at home with excellent po intake.  as per bedscale pt weight is 61 kgs this evening. as per EMR review last weight of 54.4 kg in 9/2021 noted. family denies any significant weight changes.     Presently on a puree diet <50% of meal consumed. S/P SLP eval today with diet upgraded to soft and bite with thins and 1;1 feed as per family at bedside pt consumes soft foods at home with excellent po intake.  as per bedscale pt weight is 61 kgs this evening. as per EMR review last weight of 54.4 kg in 9/2021 noted. family denies any significant weight changes.     Presently on a puree diet <50% of meal consumed. S/P SLP eval today with diet upgraded to soft and bite with thins and 1;1 feed. pt is receptive to supplementation to aid with poor intake

## 2023-12-01 NOTE — CONSULT NOTE ADULT - ASSESSMENT
ASSESSMENT  89y F admitted with Pleural effusion, not elsewhere classified      Atrial fibrillation    Anemia    CAD (coronary artery disease)    Coronary artery disease    Hearing loss    Hypertension    Dementia    Peripheral artery disease    Venous stasis ulcers of both lower extremities    OA (osteoarthritis)        IMPRESSION  #  #Severe Sepsis on admission  #Lactic acidosis  #Hyponatremia   #Obesity BMI (kg/m2): 27.5  #DM     RECOMMENDATIONS  This is an incomplete consult note. All final recommendations to follow after interview and examination of the patient. Please follow recommendations noted below.    If any questions, please send a message or call on [employer] Teams  Please continue to update ID with any pertinent new laboratory or radiographic findings.    Radha Pires M.D  Infectious Diseases Attending  [facility]    ASSESSMENT  This is a 89 yo F pmhx A-Fib, HTN, CKD, CAD, PAD, Dementia, BIBEMS for evaluation of cough and sob x approx one month.      IMPRESSION  #Acute hypoxic respiratory failure  #Influenza A Bronchitis  #Large pleural effusion with compression atelectasis Left.   #Partial occulusion of bilateral lower lobe segmental bronchi  #CHF with perserved EF  #Afib, HTN, CKD 3, CAD, PAD, Dementia  #Obesity BMI (kg/m2): 27.5    RECOMMENDATIONS  -Follow up with blood cultures.   -Change tamiflu to 30mg BID for (Crcl ~50)  -Speech and swallow evaluation for aspiration.   -Consider TTE. Cardiology follow for diuresis. If respiratory symptoms are profound, need to consider thoracentesis.  -Will keep on Unasyn 1.5 gram q 6 hours for Aspiration pneumonia.   -Offloading, aspiration and delirium precautions.    -Goals of care.     If any questions, please send a message or call on [employer] Teams  Please continue to update ID with any pertinent new laboratory or radiographic findings.    Radha Pires M.D  Infectious Diseases Attending  [facility]

## 2023-12-01 NOTE — DIETITIAN INITIAL EVALUATION ADULT - PERTINENT MEDS FT
MEDICATIONS  (STANDING):  ampicillin/sulbactam  IVPB 1.5 Gram(s) IV Intermittent every 6 hours  cloNIDine 0.1 milliGRAM(s) Oral once  furosemide   Injectable 20 milliGRAM(s) IV Push every 12 hours  heparin   Injectable 5000 Unit(s) SubCutaneous every 12 hours  oseltamivir 30 milliGRAM(s) Oral two times a day  pantoprazole    Tablet 40 milliGRAM(s) Oral before breakfast    MEDICATIONS  (PRN):  acetaminophen     Tablet .. 650 milliGRAM(s) Oral every 6 hours PRN Temp greater or equal to 38C (100.4F), Mild Pain (1 - 3)  aluminum hydroxide/magnesium hydroxide/simethicone Suspension 30 milliLiter(s) Oral every 4 hours PRN Dyspepsia  bisacodyl 5 milliGRAM(s) Oral daily PRN Constipation  melatonin 5 milliGRAM(s) Oral at bedtime PRN Insomnia  ondansetron Injectable 4 milliGRAM(s) IV Push every 8 hours PRN Nausea and/or Vomiting  senna 2 Tablet(s) Oral at bedtime PRN Constipation

## 2023-12-02 LAB
ANION GAP SERPL CALC-SCNC: 9 MMOL/L — SIGNIFICANT CHANGE UP (ref 7–14)
ANION GAP SERPL CALC-SCNC: 9 MMOL/L — SIGNIFICANT CHANGE UP (ref 7–14)
BUN SERPL-MCNC: 13 MG/DL — SIGNIFICANT CHANGE UP (ref 10–20)
BUN SERPL-MCNC: 13 MG/DL — SIGNIFICANT CHANGE UP (ref 10–20)
CALCIUM SERPL-MCNC: 8.9 MG/DL — SIGNIFICANT CHANGE UP (ref 8.4–10.5)
CALCIUM SERPL-MCNC: 8.9 MG/DL — SIGNIFICANT CHANGE UP (ref 8.4–10.5)
CHLORIDE SERPL-SCNC: 97 MMOL/L — LOW (ref 98–110)
CHLORIDE SERPL-SCNC: 97 MMOL/L — LOW (ref 98–110)
CO2 SERPL-SCNC: 30 MMOL/L — SIGNIFICANT CHANGE UP (ref 17–32)
CO2 SERPL-SCNC: 30 MMOL/L — SIGNIFICANT CHANGE UP (ref 17–32)
CREAT SERPL-MCNC: 0.6 MG/DL — LOW (ref 0.7–1.5)
CREAT SERPL-MCNC: 0.6 MG/DL — LOW (ref 0.7–1.5)
EGFR: 82 ML/MIN/1.73M2 — SIGNIFICANT CHANGE UP
EGFR: 82 ML/MIN/1.73M2 — SIGNIFICANT CHANGE UP
GLUCOSE SERPL-MCNC: 102 MG/DL — HIGH (ref 70–99)
GLUCOSE SERPL-MCNC: 102 MG/DL — HIGH (ref 70–99)
HCT VFR BLD CALC: 38.5 % — SIGNIFICANT CHANGE UP (ref 37–47)
HCT VFR BLD CALC: 38.5 % — SIGNIFICANT CHANGE UP (ref 37–47)
HGB BLD-MCNC: 12.4 G/DL — SIGNIFICANT CHANGE UP (ref 12–16)
HGB BLD-MCNC: 12.4 G/DL — SIGNIFICANT CHANGE UP (ref 12–16)
MCHC RBC-ENTMCNC: 27.9 PG — SIGNIFICANT CHANGE UP (ref 27–31)
MCHC RBC-ENTMCNC: 27.9 PG — SIGNIFICANT CHANGE UP (ref 27–31)
MCHC RBC-ENTMCNC: 32.2 G/DL — SIGNIFICANT CHANGE UP (ref 32–37)
MCHC RBC-ENTMCNC: 32.2 G/DL — SIGNIFICANT CHANGE UP (ref 32–37)
MCV RBC AUTO: 86.5 FL — SIGNIFICANT CHANGE UP (ref 81–99)
MCV RBC AUTO: 86.5 FL — SIGNIFICANT CHANGE UP (ref 81–99)
NRBC # BLD: 0 /100 WBCS — SIGNIFICANT CHANGE UP (ref 0–0)
NRBC # BLD: 0 /100 WBCS — SIGNIFICANT CHANGE UP (ref 0–0)
PLATELET # BLD AUTO: 270 K/UL — SIGNIFICANT CHANGE UP (ref 130–400)
PLATELET # BLD AUTO: 270 K/UL — SIGNIFICANT CHANGE UP (ref 130–400)
PMV BLD: 9.8 FL — SIGNIFICANT CHANGE UP (ref 7.4–10.4)
PMV BLD: 9.8 FL — SIGNIFICANT CHANGE UP (ref 7.4–10.4)
POTASSIUM SERPL-MCNC: 3.8 MMOL/L — SIGNIFICANT CHANGE UP (ref 3.5–5)
POTASSIUM SERPL-MCNC: 3.8 MMOL/L — SIGNIFICANT CHANGE UP (ref 3.5–5)
POTASSIUM SERPL-SCNC: 3.8 MMOL/L — SIGNIFICANT CHANGE UP (ref 3.5–5)
POTASSIUM SERPL-SCNC: 3.8 MMOL/L — SIGNIFICANT CHANGE UP (ref 3.5–5)
RBC # BLD: 4.45 M/UL — SIGNIFICANT CHANGE UP (ref 4.2–5.4)
RBC # BLD: 4.45 M/UL — SIGNIFICANT CHANGE UP (ref 4.2–5.4)
RBC # FLD: 13.4 % — SIGNIFICANT CHANGE UP (ref 11.5–14.5)
RBC # FLD: 13.4 % — SIGNIFICANT CHANGE UP (ref 11.5–14.5)
SODIUM SERPL-SCNC: 136 MMOL/L — SIGNIFICANT CHANGE UP (ref 135–146)
SODIUM SERPL-SCNC: 136 MMOL/L — SIGNIFICANT CHANGE UP (ref 135–146)
WBC # BLD: 10.39 K/UL — SIGNIFICANT CHANGE UP (ref 4.8–10.8)
WBC # BLD: 10.39 K/UL — SIGNIFICANT CHANGE UP (ref 4.8–10.8)
WBC # FLD AUTO: 10.39 K/UL — SIGNIFICANT CHANGE UP (ref 4.8–10.8)
WBC # FLD AUTO: 10.39 K/UL — SIGNIFICANT CHANGE UP (ref 4.8–10.8)

## 2023-12-02 PROCEDURE — 99222 1ST HOSP IP/OBS MODERATE 55: CPT

## 2023-12-02 PROCEDURE — 99233 SBSQ HOSP IP/OBS HIGH 50: CPT

## 2023-12-02 RX ADMIN — Medication 20 MILLIGRAM(S): at 06:23

## 2023-12-02 RX ADMIN — PANTOPRAZOLE SODIUM 40 MILLIGRAM(S): 20 TABLET, DELAYED RELEASE ORAL at 06:03

## 2023-12-02 RX ADMIN — AMPICILLIN SODIUM AND SULBACTAM SODIUM 100 GRAM(S): 250; 125 INJECTION, POWDER, FOR SUSPENSION INTRAMUSCULAR; INTRAVENOUS at 00:45

## 2023-12-02 RX ADMIN — HEPARIN SODIUM 5000 UNIT(S): 5000 INJECTION INTRAVENOUS; SUBCUTANEOUS at 06:23

## 2023-12-02 RX ADMIN — Medication 20 MILLIGRAM(S): at 17:36

## 2023-12-02 RX ADMIN — Medication 30 MILLIGRAM(S): at 06:03

## 2023-12-02 RX ADMIN — AMPICILLIN SODIUM AND SULBACTAM SODIUM 100 GRAM(S): 250; 125 INJECTION, POWDER, FOR SUSPENSION INTRAMUSCULAR; INTRAVENOUS at 06:23

## 2023-12-02 RX ADMIN — Medication 30 MILLIGRAM(S): at 17:36

## 2023-12-02 RX ADMIN — HEPARIN SODIUM 5000 UNIT(S): 5000 INJECTION INTRAVENOUS; SUBCUTANEOUS at 17:36

## 2023-12-02 RX ADMIN — AMPICILLIN SODIUM AND SULBACTAM SODIUM 100 GRAM(S): 250; 125 INJECTION, POWDER, FOR SUSPENSION INTRAMUSCULAR; INTRAVENOUS at 17:36

## 2023-12-02 RX ADMIN — AMPICILLIN SODIUM AND SULBACTAM SODIUM 100 GRAM(S): 250; 125 INJECTION, POWDER, FOR SUSPENSION INTRAMUSCULAR; INTRAVENOUS at 11:17

## 2023-12-02 NOTE — PROGRESS NOTE ADULT - SUBJECTIVE AND OBJECTIVE BOX
CHAVO DYE 89y Female  MRN#: [id number]   Hospital Day: 1d    SUBJECTIVE  Patient is a 89y old Female who presents with a chief complaint of SOB (01 Dec 2023 08:48)  Currently admitted to medicine with the primary diagnosis of Pleural effusion    INTERVAL HPI AND OVERNIGHT EVENTS:  Patient was examined and seen at bedside. This morning she is resting comfortably in bed and reports no issues or overnight events.  feels much better, denies chest pain , sob, n/v/d/c, hematuria or bloody stool.     REVIEW OF SYMPTOMS:  10 point ROS negative except as above.    OBJECTIVE  PAST MEDICAL & SURGICAL HISTORY  Atrial fibrillation    Anemia    CAD (coronary artery disease)    Coronary artery disease    Hearing loss    Hypertension    Dementia    Peripheral artery disease    Venous stasis ulcers of both lower extremities    OA (osteoarthritis)  Left Knee    History of appendectomy    S/P ORIF (open reduction internal fixation) fracture  RT hip Fracture      ALLERGIES:  penicillins (Unknown)  Celebrex (Unknown)    MEDICATIONS:  STANDING MEDICATIONS  ampicillin/sulbactam  IVPB 1.5 Gram(s) IV Intermittent every 6 hours  cloNIDine 0.1 milliGRAM(s) Oral once  furosemide   Injectable 20 milliGRAM(s) IV Push every 12 hours  heparin   Injectable 5000 Unit(s) SubCutaneous every 12 hours  oseltamivir 30 milliGRAM(s) Oral two times a day  pantoprazole    Tablet 40 milliGRAM(s) Oral before breakfast    PRN MEDICATIONS  acetaminophen     Tablet .. 650 milliGRAM(s) Oral every 6 hours PRN  aluminum hydroxide/magnesium hydroxide/simethicone Suspension 30 milliLiter(s) Oral every 4 hours PRN  bisacodyl 5 milliGRAM(s) Oral daily PRN  melatonin 5 milliGRAM(s) Oral at bedtime PRN  ondansetron Injectable 4 milliGRAM(s) IV Push every 8 hours PRN  senna 2 Tablet(s) Oral at bedtime PRN      VITAL SIGNS: Last 24 Hours  Vital Signs Last 24 Hrs  T(C): 35.8 (02 Dec 2023 04:54), Max: 35.8 (01 Dec 2023 13:30)  T(F): 96.4 (02 Dec 2023 04:54), Max: 96.5 (01 Dec 2023 13:30)  HR: 82 (02 Dec 2023 04:54) (79 - 82)  BP: 154/80 (02 Dec 2023 04:54) (145/65 - 165/72)  BP(mean): --  RR: 18 (02 Dec 2023 04:54) (18 - 20)  SpO2: 94% (02 Dec 2023 04:54) (94% - 96%)    Parameters below as of 02 Dec 2023 04:54  Patient On (Oxygen Delivery Method): nasal cannula  O2 Flow (L/min): 2    PHYSICAL EXAM:  GENERAL: NAD, well-groomed, well-developed  HEENT - NC/AT, b/l poor hearing   NECK: Supple, No JVD  CHEST/LUNG: b/l air entry, decreased lower bases, crackles b/l   HEART: Regular rate and rhythm;   ABDOMEN: Soft, Nontender, Nondistended; Bowel sounds present  EXTREMITIES:  no edema  NEURO:  aox2, generalized weakness   SKIN: No rashes or lesions    LABS:                                 13.0   2.90  )-----------( 267      ( 01 Dec 2023 04:30 )             37.8     12-01    134<L>  |  95<L>  |  8<L>  ----------------------------<  111<H>  4.5   |  22  |  0.8    Ca    8.9      01 Dec 2023 04:30    TPro  6.5  /  Alb  3.6  /  TBili  0.8  /  DBili  x   /  AST  21  /  ALT  8   /  AlkPhos  102  11-30      Urinalysis Basic - ( 01 Dec 2023 04:30 )    Color: x / Appearance: x / SG: x / pH: x  Gluc: 111 mg/dL / Ketone: x  / Bili: x / Urobili: x   Blood: x / Protein: x / Nitrite: x   Leuk Esterase: x / RBC: x / WBC x   Sq Epi: x / Non Sq Epi: x / Bacteria: x        Troponin T, Serum: <0.01 ng/mL (12-01-23 @ 15:00)      Culture - Blood (collected 30 Nov 2023 20:34)  Source: .Blood Blood-Peripheral  Preliminary Report (02 Dec 2023 04:02):    No growth at 24 hours    Culture - Blood (collected 30 Nov 2023 20:34)  Source: .Blood Blood-Peripheral  Preliminary Report (02 Dec 2023 04:02):    No growth at 24 hours      CARDIAC MARKERS ( 01 Dec 2023 15:00 )  x     / <0.01 ng/mL / x     / x     / x      CARDIAC MARKERS ( 01 Dec 2023 04:30 )  x     / <0.01 ng/mL / x     / x     / x      CARDIAC MARKERS ( 30 Nov 2023 20:26 )  x     / <0.01 ng/mL / x     / x     / x          RADIOLOGY:  < from: Xray Chest 1 View- PORTABLE-Urgent (11.30.23 @ 20:26) >    Impression:    Bilateral opacities and effusions, large on the left.    < end of copied text >  < from: CT Chest w/ IV Cont (11.30.23 @ 22:20) >    IMPRESSION:    Large left pleural effusion with overlying compressive atelectasis. Small   right pleural effusion. Interstitial edema.    Partial occlusion of bilateral lower lobe segmental bronchi; correlation   for aspiration suggested.    < end of copied text >

## 2023-12-02 NOTE — CONSULT NOTE ADULT - ASSESSMENT
Impression:  b/l effusion left more then right   ?? chf / fluid overload   flu infection         Plan:  echo reviewed mitral valve regurgitation  moderate sever tricuspid regurgitation  patient not on distress   consider diuresis monitor is ans os   keep is < os   cxr after 48 hrs from diuresis if no improve in effusion possible thoracentesis if family agree   as per now patient comfortable on 2 liters NC  keep pox >92%   follow cardiology   follow lytes   on tamiflu

## 2023-12-02 NOTE — PROGRESS NOTE ADULT - ASSESSMENT
a 89 yo F pmhx A-Fib, HTN, CKD, CAD, PAD, Dementia, BIBEMS for evaluation of cough and sob x approx one month.  Family member states pt has had an on-going cough, outpt cxr performed (unremarkable per family member). admitted for positive influenza A and acute respiratory failure.     positive influenza A Bronchitis  Large pleural effusion with compression atelectasis Left  Partial occulusion of bilateral lower lobe segmental bronchi  acute respiratory failure  Hyponatremia   A-Fib, CAD, PAD  HTN, CKD  Dementia    Plan:  - recs for duriesis for now, if no improvement thoracentesis if family agrees   -c/w lasix as per cardiology iv  - blood cultures negative >24 hrs  - tamiflu to 30mg BID for (Crcl ~50) and Unasyn 1.5 gram q 6 hours for Aspiration pneumonia.   - Speech and swallow evaluation for aspiration.   - consulted ID, pulmonary and cardiology Rec's appr.   - PT/rehab, SLP, consulted recs appre,  - Tylenol and pain management   - continue current medication, antihypertensive (with holding parameters),  - monitor vitals closely, daily am labs  - precaution (fall/aspiration/seizure)  - complex patient     DVT prophylaxis: SQH   GI prophylaxis: Protonix   diet: HH diet   code status: full code  Goals of care/disposition: Home once optimized     #Progress Note Handoff  Pending (specify):  pending medical optimization   Disposition: Home  once optimized

## 2023-12-02 NOTE — CONSULT NOTE ADULT - SUBJECTIVE AND OBJECTIVE BOX
Patient is a 89y old  Female who presents with a chief complaint of Pleural effusion, not elsewhere classified     (01 Dec 2023 20:52)      HPI:  89 yo F pmhx A-Fib, HTN, CKD, CAD, PAD, Dementia, BIBEMS for evaluation of cough and sob x approx one month.  Family member states pt has had an on-going cough, outpt cxr performed (unremarkable per family member).   Pt was given dexamethasone and combivent by EMS prior to arrival, states she feels better after treatments. pt has been taking amoxicillin with minimal relief at home. denies fever, chills, chest pain, leg swelling, calf pain.    patient laying in bed comfortable on NC       PAST MEDICAL & SURGICAL HISTORY:  Atrial fibrillation      Anemia      CAD (coronary artery disease)      Coronary artery disease      Hearing loss      Hypertension      Dementia      Peripheral artery disease      Venous stasis ulcers of both lower extremities      OA (osteoarthritis)  Left Knee      History of appendectomy      S/P ORIF (open reduction internal fixation) fracture  RT hip Fracture          FAMILY HISTORY:    Family history: No family cardiovascular system   Occupation:  Alochol: Denied  Smoking: Denied  Drug Use: Denied  Marital Status:           Allergies    penicillins (Unknown)  Celebrex (Unknown)    Intolerances        Home Medications:  ascorbic acid 500 mg oral tablet: 1 tab(s) orally 2 times a day (15 Sep 2021 16:07)  cephalexin 500 mg oral capsule: 1 cap(s) orally every 12 hours for 7 days  (20 Sep 2021 13:48)  doxycycline monohydrate 100 mg oral capsule: 1 cap(s) orally every 12 hours for 5 days  (20 Sep 2021 13:48)  ergocalciferol 1.25 mg (50,000 intl units) oral tablet: 1 tab(s) orally once a week; please check vitamin D levels in 2 weeks  (20 Sep 2021 13:48)  ezetimibe-simvastatin 10 mg-10 mg oral tablet: 1 tab(s) orally once a day (15 Sep 2021 16:07)  famotidine 20 mg oral tablet: 1 tab(s) orally 2 times a day (15 Sep 2021 16:07)  ferrous sulfate 324 mg (65 mg elemental iron) oral tablet: 1 tab(s) orally once a day (15 Sep 2021 16:07)  furosemide 20 mg oral tablet: 1 tab(s) orally once a day (15 Sep 2021 16:07)  levothyroxine 50 mcg (0.05 mg) oral tablet: 1 tab(s) orally once a day (15 Sep 2021 16:07)  metoprolol succinate 25 mg oral tablet, extended release: 1 tab(s) orally once a day (15 Sep 2021 16:07)      ROS: as in HPI; All other systems reviewed are negative        PHYSICAL EXAM:  Vital Signs Last 24 Hrs  T(C): 35.8 (02 Dec 2023 04:54), Max: 35.8 (01 Dec 2023 13:30)  T(F): 96.4 (02 Dec 2023 04:54), Max: 96.5 (01 Dec 2023 13:30)  HR: 82 (02 Dec 2023 04:54) (79 - 82)  BP: 154/80 (02 Dec 2023 04:54) (145/65 - 165/72)  BP(mean): --  RR: 18 (02 Dec 2023 04:54) (18 - 20)  SpO2: 94% (02 Dec 2023 04:54) (94% - 96%)    Parameters below as of 02 Dec 2023 04:54  Patient On (Oxygen Delivery Method): nasal cannula  O2 Flow (L/min): 2        GENERAL: awake   HEAD:  Atraumatic, Normocephalic  NERVOUS SYSTEM:  Alert & Oriented X3, Good concentration; Motor Strength 5/5 B/L upper and lower extremities; DTRs 2+ intact and symmetric  CHEST/LUNG: decrease left lwoer   HEART: Regular rate and rhythm; No murmurs, rubs, or gallops  ABDOMEN: Soft, Nontender, Nondistended; Bowel sounds present  EXTREMITIES:  2+ Peripheral Pulses, No clubbing, cyanosis, or edema    HOSPITAL MEDICATIONS:  MEDICATIONS  (STANDING):  ampicillin/sulbactam  IVPB 1.5 Gram(s) IV Intermittent every 6 hours  cloNIDine 0.1 milliGRAM(s) Oral once  furosemide   Injectable 20 milliGRAM(s) IV Push every 12 hours  heparin   Injectable 5000 Unit(s) SubCutaneous every 12 hours  oseltamivir 30 milliGRAM(s) Oral two times a day  pantoprazole    Tablet 40 milliGRAM(s) Oral before breakfast    MEDICATIONS  (PRN):  acetaminophen     Tablet .. 650 milliGRAM(s) Oral every 6 hours PRN Temp greater or equal to 38C (100.4F), Mild Pain (1 - 3)  aluminum hydroxide/magnesium hydroxide/simethicone Suspension 30 milliLiter(s) Oral every 4 hours PRN Dyspepsia  bisacodyl 5 milliGRAM(s) Oral daily PRN Constipation  melatonin 5 milliGRAM(s) Oral at bedtime PRN Insomnia  ondansetron Injectable 4 milliGRAM(s) IV Push every 8 hours PRN Nausea and/or Vomiting  senna 2 Tablet(s) Oral at bedtime PRN Constipation      LABS:                        13.0   2.90  )-----------( 267      ( 01 Dec 2023 04:30 )             37.8     12-01    134<L>  |  95<L>  |  8<L>  ----------------------------<  111<H>  4.5   |  22  |  0.8    Ca    8.9      01 Dec 2023 04:30    TPro  6.5  /  Alb  3.6  /  TBili  0.8  /  DBili  x   /  AST  21  /  ALT  8   /  AlkPhos  102  11-30      Urinalysis Basic - ( 01 Dec 2023 04:30 )    Color: x / Appearance: x / SG: x / pH: x  Gluc: 111 mg/dL / Ketone: x  / Bili: x / Urobili: x   Blood: x / Protein: x / Nitrite: x   Leuk Esterase: x / RBC: x / WBC x   Sq Epi: x / Non Sq Epi: x / Bacteria: x              Culture - Blood (collected 30 Nov 2023 20:34)  Source: .Blood Blood-Peripheral  Preliminary Report (02 Dec 2023 04:02):    No growth at 24 hours    Culture - Blood (collected 30 Nov 2023 20:34)  Source: .Blood Blood-Peripheral  Preliminary Report (02 Dec 2023 04:02):    No growth at 24 hours        RADIOLOGY:left effusion /atelectasis   [ ] Reviewed and interpreted by me    ECHO:    Point of Care Ultrasound Findings;    PFT:

## 2023-12-03 LAB
ANION GAP SERPL CALC-SCNC: 9 MMOL/L — SIGNIFICANT CHANGE UP (ref 7–14)
ANION GAP SERPL CALC-SCNC: 9 MMOL/L — SIGNIFICANT CHANGE UP (ref 7–14)
BUN SERPL-MCNC: 16 MG/DL — SIGNIFICANT CHANGE UP (ref 10–20)
BUN SERPL-MCNC: 16 MG/DL — SIGNIFICANT CHANGE UP (ref 10–20)
CALCIUM SERPL-MCNC: 8.6 MG/DL — SIGNIFICANT CHANGE UP (ref 8.4–10.5)
CALCIUM SERPL-MCNC: 8.6 MG/DL — SIGNIFICANT CHANGE UP (ref 8.4–10.5)
CHLORIDE SERPL-SCNC: 95 MMOL/L — LOW (ref 98–110)
CHLORIDE SERPL-SCNC: 95 MMOL/L — LOW (ref 98–110)
CO2 SERPL-SCNC: 32 MMOL/L — SIGNIFICANT CHANGE UP (ref 17–32)
CO2 SERPL-SCNC: 32 MMOL/L — SIGNIFICANT CHANGE UP (ref 17–32)
CREAT SERPL-MCNC: 0.7 MG/DL — SIGNIFICANT CHANGE UP (ref 0.7–1.5)
CREAT SERPL-MCNC: 0.7 MG/DL — SIGNIFICANT CHANGE UP (ref 0.7–1.5)
EGFR: 79 ML/MIN/1.73M2 — SIGNIFICANT CHANGE UP
EGFR: 79 ML/MIN/1.73M2 — SIGNIFICANT CHANGE UP
GLUCOSE SERPL-MCNC: 71 MG/DL — SIGNIFICANT CHANGE UP (ref 70–99)
GLUCOSE SERPL-MCNC: 71 MG/DL — SIGNIFICANT CHANGE UP (ref 70–99)
HCT VFR BLD CALC: 36.8 % — LOW (ref 37–47)
HCT VFR BLD CALC: 36.8 % — LOW (ref 37–47)
HGB BLD-MCNC: 12 G/DL — SIGNIFICANT CHANGE UP (ref 12–16)
HGB BLD-MCNC: 12 G/DL — SIGNIFICANT CHANGE UP (ref 12–16)
MCHC RBC-ENTMCNC: 28.1 PG — SIGNIFICANT CHANGE UP (ref 27–31)
MCHC RBC-ENTMCNC: 28.1 PG — SIGNIFICANT CHANGE UP (ref 27–31)
MCHC RBC-ENTMCNC: 32.6 G/DL — SIGNIFICANT CHANGE UP (ref 32–37)
MCHC RBC-ENTMCNC: 32.6 G/DL — SIGNIFICANT CHANGE UP (ref 32–37)
MCV RBC AUTO: 86.2 FL — SIGNIFICANT CHANGE UP (ref 81–99)
MCV RBC AUTO: 86.2 FL — SIGNIFICANT CHANGE UP (ref 81–99)
NRBC # BLD: 0 /100 WBCS — SIGNIFICANT CHANGE UP (ref 0–0)
NRBC # BLD: 0 /100 WBCS — SIGNIFICANT CHANGE UP (ref 0–0)
PLATELET # BLD AUTO: 247 K/UL — SIGNIFICANT CHANGE UP (ref 130–400)
PLATELET # BLD AUTO: 247 K/UL — SIGNIFICANT CHANGE UP (ref 130–400)
PMV BLD: 10 FL — SIGNIFICANT CHANGE UP (ref 7.4–10.4)
PMV BLD: 10 FL — SIGNIFICANT CHANGE UP (ref 7.4–10.4)
POTASSIUM SERPL-MCNC: 3.7 MMOL/L — SIGNIFICANT CHANGE UP (ref 3.5–5)
POTASSIUM SERPL-MCNC: 3.7 MMOL/L — SIGNIFICANT CHANGE UP (ref 3.5–5)
POTASSIUM SERPL-SCNC: 3.7 MMOL/L — SIGNIFICANT CHANGE UP (ref 3.5–5)
POTASSIUM SERPL-SCNC: 3.7 MMOL/L — SIGNIFICANT CHANGE UP (ref 3.5–5)
RBC # BLD: 4.27 M/UL — SIGNIFICANT CHANGE UP (ref 4.2–5.4)
RBC # BLD: 4.27 M/UL — SIGNIFICANT CHANGE UP (ref 4.2–5.4)
RBC # FLD: 13.7 % — SIGNIFICANT CHANGE UP (ref 11.5–14.5)
RBC # FLD: 13.7 % — SIGNIFICANT CHANGE UP (ref 11.5–14.5)
SODIUM SERPL-SCNC: 136 MMOL/L — SIGNIFICANT CHANGE UP (ref 135–146)
SODIUM SERPL-SCNC: 136 MMOL/L — SIGNIFICANT CHANGE UP (ref 135–146)
WBC # BLD: 7.8 K/UL — SIGNIFICANT CHANGE UP (ref 4.8–10.8)
WBC # BLD: 7.8 K/UL — SIGNIFICANT CHANGE UP (ref 4.8–10.8)
WBC # FLD AUTO: 7.8 K/UL — SIGNIFICANT CHANGE UP (ref 4.8–10.8)
WBC # FLD AUTO: 7.8 K/UL — SIGNIFICANT CHANGE UP (ref 4.8–10.8)

## 2023-12-03 PROCEDURE — 99233 SBSQ HOSP IP/OBS HIGH 50: CPT

## 2023-12-03 RX ORDER — CHLORHEXIDINE GLUCONATE 213 G/1000ML
1 SOLUTION TOPICAL
Refills: 0 | Status: DISCONTINUED | OUTPATIENT
Start: 2023-12-03 | End: 2023-12-18

## 2023-12-03 RX ADMIN — Medication 20 MILLIGRAM(S): at 06:24

## 2023-12-03 RX ADMIN — Medication 30 MILLIGRAM(S): at 06:25

## 2023-12-03 RX ADMIN — HEPARIN SODIUM 5000 UNIT(S): 5000 INJECTION INTRAVENOUS; SUBCUTANEOUS at 06:25

## 2023-12-03 RX ADMIN — HEPARIN SODIUM 5000 UNIT(S): 5000 INJECTION INTRAVENOUS; SUBCUTANEOUS at 17:25

## 2023-12-03 RX ADMIN — AMPICILLIN SODIUM AND SULBACTAM SODIUM 100 GRAM(S): 250; 125 INJECTION, POWDER, FOR SUSPENSION INTRAMUSCULAR; INTRAVENOUS at 17:25

## 2023-12-03 RX ADMIN — Medication 30 MILLIGRAM(S): at 17:25

## 2023-12-03 RX ADMIN — AMPICILLIN SODIUM AND SULBACTAM SODIUM 100 GRAM(S): 250; 125 INJECTION, POWDER, FOR SUSPENSION INTRAMUSCULAR; INTRAVENOUS at 11:20

## 2023-12-03 RX ADMIN — Medication 20 MILLIGRAM(S): at 17:25

## 2023-12-03 RX ADMIN — PANTOPRAZOLE SODIUM 40 MILLIGRAM(S): 20 TABLET, DELAYED RELEASE ORAL at 06:25

## 2023-12-03 RX ADMIN — AMPICILLIN SODIUM AND SULBACTAM SODIUM 100 GRAM(S): 250; 125 INJECTION, POWDER, FOR SUSPENSION INTRAMUSCULAR; INTRAVENOUS at 00:52

## 2023-12-03 RX ADMIN — AMPICILLIN SODIUM AND SULBACTAM SODIUM 100 GRAM(S): 250; 125 INJECTION, POWDER, FOR SUSPENSION INTRAMUSCULAR; INTRAVENOUS at 06:23

## 2023-12-03 NOTE — PROGRESS NOTE ADULT - SUBJECTIVE AND OBJECTIVE BOX
CHAVO DYE 89y Female  MRN#: [id number]   Hospital Day: 1d    SUBJECTIVE  Patient is a 89y old Female who presents with a chief complaint of SOB (01 Dec 2023 08:48)  Currently admitted to medicine with the primary diagnosis of Pleural effusion    INTERVAL HPI AND OVERNIGHT EVENTS:  Patient was examined and seen at bedside. This morning she is resting comfortably in bed and reports no issues or overnight events.  feels much better, denies chest pain , sob, n/v/d/c, hematuria or bloody stool.     REVIEW OF SYMPTOMS:  10 point ROS negative except as above.    OBJECTIVE  PAST MEDICAL & SURGICAL HISTORY  Atrial fibrillation    Anemia    CAD (coronary artery disease)    Coronary artery disease    Hearing loss    Hypertension    Dementia    Peripheral artery disease    Venous stasis ulcers of both lower extremities    OA (osteoarthritis)  Left Knee    History of appendectomy    S/P ORIF (open reduction internal fixation) fracture  RT hip Fracture      ALLERGIES:  penicillins (Unknown)  Celebrex (Unknown)    MEDICATIONS:  STANDING MEDICATIONS  ampicillin/sulbactam  IVPB 1.5 Gram(s) IV Intermittent every 6 hours  cloNIDine 0.1 milliGRAM(s) Oral once  furosemide   Injectable 20 milliGRAM(s) IV Push every 12 hours  heparin   Injectable 5000 Unit(s) SubCutaneous every 12 hours  oseltamivir 30 milliGRAM(s) Oral two times a day  pantoprazole    Tablet 40 milliGRAM(s) Oral before breakfast    PRN MEDICATIONS  acetaminophen     Tablet .. 650 milliGRAM(s) Oral every 6 hours PRN  aluminum hydroxide/magnesium hydroxide/simethicone Suspension 30 milliLiter(s) Oral every 4 hours PRN  bisacodyl 5 milliGRAM(s) Oral daily PRN  melatonin 5 milliGRAM(s) Oral at bedtime PRN  ondansetron Injectable 4 milliGRAM(s) IV Push every 8 hours PRN  senna 2 Tablet(s) Oral at bedtime PRN      VITAL SIGNS: Last 24 Hours  Vital Signs Last 24 Hrs  T(C): 36.4 (03 Dec 2023 13:04), Max: 36.6 (03 Dec 2023 04:40)  T(F): 97.5 (03 Dec 2023 13:04), Max: 97.9 (03 Dec 2023 04:40)  HR: 82 (03 Dec 2023 13:04) (82 - 93)  BP: 137/62 (03 Dec 2023 13:04) (137/62 - 146/67)  BP(mean): --  RR: 16 (03 Dec 2023 13:04) (16 - 20)  SpO2: 95% (03 Dec 2023 04:40) (95% - 98%)    Parameters below as of 03 Dec 2023 04:40    O2 Flow (L/min): 2      PHYSICAL EXAM:  GENERAL: NAD,   HEENT : NC/AT, b/l poor hearing   NECK: Supple, No JVD  CHEST/LUNG: b/l air entry, decreased lower bases,   HEART: Regular rate and rhythm;   ABDOMEN: Soft, Nontender, Nondistended; Bowel sounds present  EXTREMITIES:  no edema  NEURO:  aox2, generalized weakness   SKIN: No rashes or lesions    LABS:                                  12.0   7.80  )-----------( 247      ( 03 Dec 2023 04:30 )             36.8     12-03    136  |  95<L>  |  16  ----------------------------<  71  3.7   |  32  |  0.7    Ca    8.6      03 Dec 2023 04:30    Urinalysis Basic - ( 03 Dec 2023 04:30 )    Color: x / Appearance: x / SG: x / pH: x  Gluc: 71 mg/dL / Ketone: x  / Bili: x / Urobili: x   Blood: x / Protein: x / Nitrite: x   Leuk Esterase: x / RBC: x / WBC x   Sq Epi: x / Non Sq Epi: x / Bacteria: x    Culture - Blood (collected 01 Dec 2023 07:00)  Source: .Blood Blood-Peripheral  Preliminary Report (02 Dec 2023 20:01):    No growth at 24 hours    Culture - Blood (collected 30 Nov 2023 20:34)  Source: .Blood Blood-Peripheral  Preliminary Report (03 Dec 2023 04:01):    No growth at 48 Hours    Culture - Blood (collected 30 Nov 2023 20:34)  Source: .Blood Blood-Peripheral  Preliminary Report (03 Dec 2023 04:01):    No growth at 48 Hours      CARDIAC MARKERS ( 01 Dec 2023 15:00 )  x     / <0.01 ng/mL / x     / x     / x                  RADIOLOGY:  < from: Xray Chest 1 View- PORTABLE-Urgent (11.30.23 @ 20:26) >    Impression:    Bilateral opacities and effusions, large on the left.    < end of copied text >  < from: CT Chest w/ IV Cont (11.30.23 @ 22:20) >    IMPRESSION:    Large left pleural effusion with overlying compressive atelectasis. Small   right pleural effusion. Interstitial edema.    Partial occlusion of bilateral lower lobe segmental bronchi; correlation   for aspiration suggested.    < end of copied text >       CHAVO DYE 89y Female  MRN#: [id number]   Hospital Day: 1d    SUBJECTIVE  Patient is a 89y old Female who presents with a chief complaint of SOB (01 Dec 2023 08:48)  Currently admitted to medicine with the primary diagnosis of Pleural effusion    INTERVAL HPI AND OVERNIGHT EVENTS:  Patient was examined and seen at bedside. This morning she is resting comfortably in bed and reports no issues or overnight events.  feels much better, denies chest pain , sob, n/v/d/c, hematuria or bloody stool.     REVIEW OF SYMPTOMS:  10 point ROS negative except as above.    OBJECTIVE  PAST MEDICAL & SURGICAL HISTORY  Atrial fibrillation    Anemia    CAD (coronary artery disease)    Coronary artery disease    Hearing loss    Hypertension    Dementia    Peripheral artery disease    Venous stasis ulcers of both lower extremities    OA (osteoarthritis)  Left Knee    History of appendectomy    S/P ORIF (open reduction internal fixation) fracture  RT hip Fracture      ALLERGIES:  penicillins (Unknown)  Celebrex (Unknown)    MEDICATIONS:  STANDING MEDICATIONS  ampicillin/sulbactam  IVPB 1.5 Gram(s) IV Intermittent every 6 hours  cloNIDine 0.1 milliGRAM(s) Oral once  furosemide   Injectable 20 milliGRAM(s) IV Push every 12 hours  heparin   Injectable 5000 Unit(s) SubCutaneous every 12 hours  oseltamivir 30 milliGRAM(s) Oral two times a day  pantoprazole    Tablet 40 milliGRAM(s) Oral before breakfast    PRN MEDICATIONS  acetaminophen     Tablet .. 650 milliGRAM(s) Oral every 6 hours PRN  aluminum hydroxide/magnesium hydroxide/simethicone Suspension 30 milliLiter(s) Oral every 4 hours PRN  bisacodyl 5 milliGRAM(s) Oral daily PRN  melatonin 5 milliGRAM(s) Oral at bedtime PRN  ondansetron Injectable 4 milliGRAM(s) IV Push every 8 hours PRN  senna 2 Tablet(s) Oral at bedtime PRN      VITAL SIGNS: Last 24 Hours  Vital Signs Last 24 Hrs  T(C): 36.4 (03 Dec 2023 13:04), Max: 36.6 (03 Dec 2023 04:40)  T(F): 97.5 (03 Dec 2023 13:04), Max: 97.9 (03 Dec 2023 04:40)  HR: 82 (03 Dec 2023 13:04) (82 - 93)  BP: 137/62 (03 Dec 2023 13:04) (137/62 - 146/67)  BP(mean): --  RR: 16 (03 Dec 2023 13:04) (16 - 20)  SpO2: 95% (03 Dec 2023 04:40) (95% - 98%)    Parameters below as of 03 Dec 2023 04:40    O2 Flow (L/min): 2      PHYSICAL EXAM:  GENERAL: NAD,   HEENT : NC/AT, b/l poor hearing   NECK: Supple, No JVD  CHEST/LUNG: b/l air entry, decreased lower bases,   HEART: Regular rate and rhythm;   ABDOMEN: Soft, Nontender, Nondistended; Bowel sounds present  EXTREMITIES:  no edema  NEURO:  aox2, generalized weakness   SKIN: No rashes or lesions    LABS:                                  12.0   7.80  )-----------( 247      ( 03 Dec 2023 04:30 )             36.8     12-03    136  |  95<L>  |  16  ----------------------------<  71  3.7   |  32  |  0.7    Ca    8.6      03 Dec 2023 04:30    Urinalysis Basic - ( 03 Dec 2023 04:30 )    Color: x / Appearance: x / SG: x / pH: x  Gluc: 71 mg/dL / Ketone: x  / Bili: x / Urobili: x   Blood: x / Protein: x / Nitrite: x   Leuk Esterase: x / RBC: x / WBC x   Sq Epi: x / Non Sq Epi: x / Bacteria: x    Culture - Blood (collected 01 Dec 2023 07:00)  Source: .Blood Blood-Peripheral  Preliminary Report (02 Dec 2023 20:01):    No growth at 24 hours    Culture - Blood (collected 30 Nov 2023 20:34)  Source: .Blood Blood-Peripheral  Preliminary Report (03 Dec 2023 04:01):    No growth at 48 Hours    Culture - Blood (collected 30 Nov 2023 20:34)  Source: .Blood Blood-Peripheral  Preliminary Report (03 Dec 2023 04:01):    No growth at 48 Hours      CARDIAC MARKERS ( 01 Dec 2023 15:00 )  x     / <0.01 ng/mL / x     / x     / x                  RADIOLOGY:  < from: Xray Chest 1 View- PORTABLE-Urgent (11.30.23 @ 20:26) >    Impression:    Bilateral opacities and effusions, large on the left.    < end of copied text >  < from: CT Chest w/ IV Cont (11.30.23 @ 22:20) >    IMPRESSION:    Large left pleural effusion with overlying compressive atelectasis. Small   right pleural effusion. Interstitial edema.    Partial occlusion of bilateral lower lobe segmental bronchi; correlation   for aspiration suggested.    < end of copied text >       CHAVO DYE 89y Female  MRN#: [id number]   Hospital Day: 1d    SUBJECTIVE  Patient is a 89y old Female who presents with a chief complaint of SOB (01 Dec 2023 08:48)  Currently admitted to medicine with the primary diagnosis of Pleural effusion    INTERVAL HPI AND OVERNIGHT EVENTS:  Patient was examined and seen at bedside. This morning she is resting comfortably in bed and reports no issues or overnight events.  feels much better, denies chest pain , sob, n/v/d/c, hematuria or bloody stool.     REVIEW OF SYMPTOMS:  10 point ROS negative except as above.    OBJECTIVE  PAST MEDICAL & SURGICAL HISTORY  Atrial fibrillation    Anemia    CAD (coronary artery disease)    Coronary artery disease    Hearing loss    Hypertension    Dementia    Peripheral artery disease    Venous stasis ulcers of both lower extremities    OA (osteoarthritis)  Left Knee    History of appendectomy    S/P ORIF (open reduction internal fixation) fracture  RT hip Fracture      ALLERGIES:  penicillins (Unknown)  Celebrex (Unknown)    MEDICATIONS:  STANDING MEDICATIONS  ampicillin/sulbactam  IVPB 1.5 Gram(s) IV Intermittent every 6 hours  cloNIDine 0.1 milliGRAM(s) Oral once  furosemide   Injectable 20 milliGRAM(s) IV Push every 12 hours  heparin   Injectable 5000 Unit(s) SubCutaneous every 12 hours  oseltamivir 30 milliGRAM(s) Oral two times a day  pantoprazole    Tablet 40 milliGRAM(s) Oral before breakfast    PRN MEDICATIONS  acetaminophen     Tablet .. 650 milliGRAM(s) Oral every 6 hours PRN  aluminum hydroxide/magnesium hydroxide/simethicone Suspension 30 milliLiter(s) Oral every 4 hours PRN  bisacodyl 5 milliGRAM(s) Oral daily PRN  melatonin 5 milliGRAM(s) Oral at bedtime PRN  ondansetron Injectable 4 milliGRAM(s) IV Push every 8 hours PRN  senna 2 Tablet(s) Oral at bedtime PRN      VITAL SIGNS: Last 24 Hours  Vital Signs Last 24 Hrs  T(C): 36.4 (03 Dec 2023 13:04), Max: 36.6 (03 Dec 2023 04:40)  T(F): 97.5 (03 Dec 2023 13:04), Max: 97.9 (03 Dec 2023 04:40)  HR: 82 (03 Dec 2023 13:04) (82 - 93)  BP: 137/62 (03 Dec 2023 13:04) (137/62 - 146/67)  BP(mean): --  RR: 16 (03 Dec 2023 13:04) (16 - 20)  SpO2: 95% (03 Dec 2023 04:40) (95% - 98%)    Parameters below as of 03 Dec 2023 04:40    O2 Flow (L/min): 2      PHYSICAL EXAM:  GENERAL: NAD,   HEENT : NC/AT, b/l poor hearing   NECK: Supple, No JVD  CHEST/LUNG: b/l air entry, decreased lower bases,   HEART: Regular rate and rhythm;   ABDOMEN: Soft, Nontender, Nondistended; Bowel sounds present  EXTREMITIES:  no edema  NEURO:  aox2, generalized weakness   SKIN: No rashes or lesions    LABS:                                  12.0   7.80  )-----------( 247      ( 03 Dec 2023 04:30 )             36.8     12-03    136  |  95<L>  |  16  ----------------------------<  71  3.7   |  32  |  0.7    Ca    8.6      03 Dec 2023 04:30    Urinalysis Basic - ( 03 Dec 2023 04:30 )    Color: x / Appearance: x / SG: x / pH: x  Gluc: 71 mg/dL / Ketone: x  / Bili: x / Urobili: x   Blood: x / Protein: x / Nitrite: x   Leuk Esterase: x / RBC: x / WBC x   Sq Epi: x / Non Sq Epi: x / Bacteria: x    Culture - Blood (collected 01 Dec 2023 07:00)  Source: .Blood Blood-Peripheral  Preliminary Report (02 Dec 2023 20:01):    No growth at 24 hours    Culture - Blood (collected 30 Nov 2023 20:34)  Source: .Blood Blood-Peripheral  Preliminary Report (03 Dec 2023 04:01):    No growth at 48 Hours    Culture - Blood (collected 30 Nov 2023 20:34)  Source: .Blood Blood-Peripheral  Preliminary Report (03 Dec 2023 04:01):    No growth at 48 Hours      CARDIAC MARKERS ( 01 Dec 2023 15:00 )  x     / <0.01 ng/mL / x     / x     / x                  RADIOLOGY:  < from: Xray Chest 1 View- PORTABLE-Urgent (11.30.23 @ 20:26) >    Impression:    Bilateral opacities and effusions, large on the left.    < end of copied text >  < from: CT Chest w/ IV Cont (11.30.23 @ 22:20) >    IMPRESSION:    Large left pleural effusion with overlying compressive atelectasis. Small   right pleural effusion. Interstitial edema.    Partial occlusion of bilateral lower lobe segmental bronchi; correlation   for aspiration suggested.    < end of copied text >

## 2023-12-03 NOTE — PROGRESS NOTE ADULT - ASSESSMENT
a 89 yo F pmhx A-Fib, HTN, CKD, CAD, PAD, Dementia, BIBEMS for evaluation of cough and sob x approx one month.  Family member states pt has had an on-going cough, outpt cxr performed (unremarkable per family member). admitted for positive influenza A and acute respiratory failure.     positive influenza A Bronchitis  Large pleural effusion with compression atelectasis Left  Partial occulusion of bilateral lower lobe segmental bronchi  acute respiratory failure  Hyponatremia   A-Fib, CAD, PAD  HTN, CKD  Dementia    Plan:  - f/u Am cxr   -c/w lasix as per cardiology iv  - recs for diuresis for now, if no improvement thoracentesis if family agrees   - blood cultures negative UTD  - tamiflu to 30mg BID for (Crcl ~50) and Unasyn 1.5 gram q 6 hours for Aspiration pneumonia.   - Speech and swallow evaluation for aspiration.   - consulted ID, pulmonary and cardiology Rec's appr.   - PT/rehab, SLP, consulted recs appre,  - Tylenol and pain management   - continue current medication, antihypertensive (with holding parameters),  - monitor vitals closely, daily am labs  - precaution (fall/aspiration/seizure)  - complex patient     DVT prophylaxis: SQH   GI prophylaxis: Protonix   diet: HH diet   code status: full code  Goals of care/disposition: Home once optimized     #Progress Note Handoff  Pending (specify):  pending medical optimization, f/u cxr   Disposition: Home  once optimized      a 89 yo F pmhx A-Fib, HTN, CKD, CAD, PAD, Dementia, BIBEMS for evaluation of cough and sob x approx one month.  Family member states pt has had an on-going cough, outpt cxr performed (unremarkable per family member). admitted for positive influenza A and acute respiratory failure.     positive influenza A Bronchitis  Large pleural effusion with compression atelectasis Left  Partial occulusion of bilateral lower lobe segmental bronchi  acute respiratory failure  Hyponatremia   A-Fib, CAD, PAD  HTN, CKD  Dementia    Plan:  - f/u Am cxr   -c/w lasix as per cardiology iv  - recs for diuresis for now, if no improvement thoracentesis if family agrees   - blood cultures negative UTD  - tamiflu to 30mg BID for (Crcl ~50) and Unasyn 1.5 gram q 6 hours for Aspiration pneumonia.   - Speech and swallow evaluation for aspiration.   - consulted ID, pulmonary and cardiology Rec's appr.   - PT/rehab, SLP, consulted recs appre,  - Tylenol and pain management   - continue current medication, antihypertensive (with holding parameters),  - monitor vitals closely, daily am labs  - precaution (fall/aspiration/seizure)  - complex patient     DVT prophylaxis: SQH   GI prophylaxis: Protonix   diet: HH diet   code status: full code  Goals of care/disposition: Home once optimized     #Progress Note Handoff  Pending (specify):  pending medical optimization, f/u cxr   Disposition: Home  once optimized

## 2023-12-04 LAB
HCT VFR BLD CALC: 37.6 % — SIGNIFICANT CHANGE UP (ref 37–47)
HCT VFR BLD CALC: 37.6 % — SIGNIFICANT CHANGE UP (ref 37–47)
HGB BLD-MCNC: 12.2 G/DL — SIGNIFICANT CHANGE UP (ref 12–16)
HGB BLD-MCNC: 12.2 G/DL — SIGNIFICANT CHANGE UP (ref 12–16)
MCHC RBC-ENTMCNC: 28 PG — SIGNIFICANT CHANGE UP (ref 27–31)
MCHC RBC-ENTMCNC: 28 PG — SIGNIFICANT CHANGE UP (ref 27–31)
MCHC RBC-ENTMCNC: 32.4 G/DL — SIGNIFICANT CHANGE UP (ref 32–37)
MCHC RBC-ENTMCNC: 32.4 G/DL — SIGNIFICANT CHANGE UP (ref 32–37)
MCV RBC AUTO: 86.4 FL — SIGNIFICANT CHANGE UP (ref 81–99)
MCV RBC AUTO: 86.4 FL — SIGNIFICANT CHANGE UP (ref 81–99)
NRBC # BLD: 0 /100 WBCS — SIGNIFICANT CHANGE UP (ref 0–0)
NRBC # BLD: 0 /100 WBCS — SIGNIFICANT CHANGE UP (ref 0–0)
PLATELET # BLD AUTO: 239 K/UL — SIGNIFICANT CHANGE UP (ref 130–400)
PLATELET # BLD AUTO: 239 K/UL — SIGNIFICANT CHANGE UP (ref 130–400)
PMV BLD: 10 FL — SIGNIFICANT CHANGE UP (ref 7.4–10.4)
PMV BLD: 10 FL — SIGNIFICANT CHANGE UP (ref 7.4–10.4)
RBC # BLD: 4.35 M/UL — SIGNIFICANT CHANGE UP (ref 4.2–5.4)
RBC # BLD: 4.35 M/UL — SIGNIFICANT CHANGE UP (ref 4.2–5.4)
RBC # FLD: 13.5 % — SIGNIFICANT CHANGE UP (ref 11.5–14.5)
RBC # FLD: 13.5 % — SIGNIFICANT CHANGE UP (ref 11.5–14.5)
WBC # BLD: 9.24 K/UL — SIGNIFICANT CHANGE UP (ref 4.8–10.8)
WBC # BLD: 9.24 K/UL — SIGNIFICANT CHANGE UP (ref 4.8–10.8)
WBC # FLD AUTO: 9.24 K/UL — SIGNIFICANT CHANGE UP (ref 4.8–10.8)
WBC # FLD AUTO: 9.24 K/UL — SIGNIFICANT CHANGE UP (ref 4.8–10.8)

## 2023-12-04 PROCEDURE — 99232 SBSQ HOSP IP/OBS MODERATE 35: CPT

## 2023-12-04 PROCEDURE — 71045 X-RAY EXAM CHEST 1 VIEW: CPT | Mod: 26

## 2023-12-04 RX ADMIN — AMPICILLIN SODIUM AND SULBACTAM SODIUM 100 GRAM(S): 250; 125 INJECTION, POWDER, FOR SUSPENSION INTRAMUSCULAR; INTRAVENOUS at 23:28

## 2023-12-04 RX ADMIN — Medication 30 MILLIGRAM(S): at 18:17

## 2023-12-04 RX ADMIN — AMPICILLIN SODIUM AND SULBACTAM SODIUM 100 GRAM(S): 250; 125 INJECTION, POWDER, FOR SUSPENSION INTRAMUSCULAR; INTRAVENOUS at 12:32

## 2023-12-04 RX ADMIN — AMPICILLIN SODIUM AND SULBACTAM SODIUM 100 GRAM(S): 250; 125 INJECTION, POWDER, FOR SUSPENSION INTRAMUSCULAR; INTRAVENOUS at 00:45

## 2023-12-04 RX ADMIN — AMPICILLIN SODIUM AND SULBACTAM SODIUM 100 GRAM(S): 250; 125 INJECTION, POWDER, FOR SUSPENSION INTRAMUSCULAR; INTRAVENOUS at 18:17

## 2023-12-04 RX ADMIN — AMPICILLIN SODIUM AND SULBACTAM SODIUM 100 GRAM(S): 250; 125 INJECTION, POWDER, FOR SUSPENSION INTRAMUSCULAR; INTRAVENOUS at 06:14

## 2023-12-04 RX ADMIN — HEPARIN SODIUM 5000 UNIT(S): 5000 INJECTION INTRAVENOUS; SUBCUTANEOUS at 18:17

## 2023-12-04 RX ADMIN — HEPARIN SODIUM 5000 UNIT(S): 5000 INJECTION INTRAVENOUS; SUBCUTANEOUS at 06:13

## 2023-12-04 RX ADMIN — Medication 20 MILLIGRAM(S): at 18:16

## 2023-12-04 RX ADMIN — Medication 30 MILLIGRAM(S): at 06:13

## 2023-12-04 RX ADMIN — PANTOPRAZOLE SODIUM 40 MILLIGRAM(S): 20 TABLET, DELAYED RELEASE ORAL at 06:13

## 2023-12-04 RX ADMIN — Medication 20 MILLIGRAM(S): at 06:14

## 2023-12-04 NOTE — PROGRESS NOTE ADULT - SUBJECTIVE AND OBJECTIVE BOX
Patient is a 89y old Female who presents with a chief complaint of SOB (01 Dec 2023 08:48)  Currently admitted to medicine with the primary diagnosis of Pleural effusion    INTERVAL HPI AND OVERNIGHT EVENTS:  Patient was examined and seen at bedside. This morning she is resting comfortably in bed and reports no issues or overnight events.  feels much better, denies chest pain , sob, n/v/d/c, hematuria or bloody stool.     REVIEW OF SYMPTOMS:  10 point ROS negative except as above.    OBJECTIVE  PAST MEDICAL & SURGICAL HISTORY  Atrial fibrillation    Anemia    CAD (coronary artery disease)    Coronary artery disease    Hearing loss    Hypertension    Dementia    Peripheral artery disease    Venous stasis ulcers of both lower extremities    OA (osteoarthritis)  Left Knee    History of appendectomy    S/P ORIF (open reduction internal fixation) fracture  RT hip Fracture      ALLERGIES:  penicillins (Unknown)  Celebrex (Unknown)      PHYSICAL EXAM:  GENERAL: NAD,   HEENT : NC/AT, b/l poor hearing   NECK: Supple, No JVD  CHEST/LUNG: b/l air entry, decreased lower bases,   HEART: Regular rate and rhythm;   ABDOMEN: Soft, Nontender, Nondistended; Bowel sounds present  EXTREMITIES:  no edema  NEURO:  aox2, generalized weakness   SKIN: No rashes or lesions    Vital Signs Last 24 Hrs  T(C): 35.6 (04 Dec 2023 14:31), Max: 35.7 (04 Dec 2023 05:03)  T(F): 96.1 (04 Dec 2023 14:31), Max: 96.3 (04 Dec 2023 05:03)  HR: 87 (04 Dec 2023 14:31) (79 - 87)  BP: 128/58 (04 Dec 2023 14:31) (128/58 - 137/70)  BP(mean): --  RR: 18 (04 Dec 2023 14:31) (16 - 18)  SpO2: 95% (04 Dec 2023 08:25) (95% - 97%)    Parameters below as of 04 Dec 2023 08:25  Patient On (Oxygen Delivery Method): nasal cannula  O2 Flow (L/min): 2    I&Os:  12-03-23 @ 07:01  -  12-04-23 @ 07:00  --------------------------------------------------------  IN: 0 mL / OUT: 600 mL / NET: -600 mL    12-04-23 @ 07:01  -  12-04-23 @ 15:28  --------------------------------------------------------  IN: 0 mL / OUT: 800 mL / NET: -800 mL        MEDICATIONS:  STANDING MEDICATIONS  ampicillin/sulbactam  IVPB 1.5 Gram(s) IV Intermittent every 6 hours, 12-01-23 @ 10:50  chlorhexidine 2% Cloths 1 Application(s) Topical <User Schedule>, 12-03-23 @ 08:43  cloNIDine 0.1 milliGRAM(s) Oral once, 12-01-23 @ 05:22  furosemide   Injectable 20 milliGRAM(s) IV Push every 12 hours, 12-01-23 @ 00:22  heparin   Injectable 5000 Unit(s) SubCutaneous every 12 hours, 12-01-23 @ 01:29  oseltamivir 30 milliGRAM(s) Oral two times a day, 12-01-23 @ 10:47  pantoprazole    Tablet 40 milliGRAM(s) Oral before breakfast, 12-01-23 @ 01:28    PRN MEDICATIONS  acetaminophen     Tablet .. 650 milliGRAM(s) Oral every 6 hours, 12-01-23 @ 00:19 PRN  aluminum hydroxide/magnesium hydroxide/simethicone Suspension 30 milliLiter(s) Oral every 4 hours, 12-01-23 @ 00:19 PRN  bisacodyl 5 milliGRAM(s) Oral daily, 12-01-23 @ 01:28 PRN  melatonin 5 milliGRAM(s) Oral at bedtime, 12-01-23 @ 00:19 PRN  ondansetron Injectable 4 milliGRAM(s) IV Push every 8 hours, 12-01-23 @ 00:19 PRN  senna 2 Tablet(s) Oral at bedtime, 12-01-23 @ 01:28 PRN    Diet, Pureed (12-01-23 @ 08:52) [Active]    LABS:                                            12.2                  Neurophils% (auto):   x      (12-04 @ 07:58):    9.24 )-----------(239          Lymphocytes% (auto):  x                                             37.6                   Eosinphils% (auto):   x        Manual%: Neutrophils x    ; Lymphocytes x    ; Eosinophils x    ; Bands%: x    ; Blasts x          WBC trend: 9.24 <--, 7.80 <--, 10.39 <--, 2.90 <--, 5.44 <--  Hgb: 12.2 [12-04-23 @ 07:58]<--, 12.0 [12-03-23 @ 04:30]<--, 12.4 [12-02-23 @ 11:00]<--, 13.0 [12-01-23 @ 04:30]<--, 12.3 [11-30-23 @ 20:26]<--        Creatinine trend: 0.7<--, 0.6<--, 0.8<--, 0.6<--  SODIUM TREND: Sodium 136 [12-03 @ 04:30]<--, Sodium 136 [12-02 @ 11:00]<--, Sodium 134 [12-01 @ 04:30]<--, Sodium 131 [11-30 @ 20:26]<--      POC Glucose:             Culture - Blood (collected 12-01-23 @ 07:00)  Source: .Blood Blood-Peripheral  Preliminary Report (12-03-23 @ 20:01):    No growth at 48 Hours    Culture - Blood (collected 11-30-23 @ 20:34)  Source: .Blood Blood-Peripheral  Preliminary Report (12-04-23 @ 04:02):    No growth at 72 Hours    Culture - Blood (collected 11-30-23 @ 20:34)  Source: .Blood Blood-Peripheral  Preliminary Report (12-04-23 @ 04:02):    No growth at 72 Hours          Urinalysis Basic - ( 03 Dec 2023 04:30 )    Color: x / Appearance: x / SG: x / pH: x  Gluc: 71 mg/dL / Ketone: x  / Bili: x / Urobili: x   Blood: x / Protein: x / Nitrite: x   Leuk Esterase: x / RBC: x / WBC x   Sq Epi: x / Non Sq Epi: x / Bacteria: x                    RADIOLOGY:  < from: Xray Chest 1 View- PORTABLE-Urgent (11.30.23 @ 20:26) >    Impression:    Bilateral opacities and effusions, large on the left.    < end of copied text >  < from: CT Chest w/ IV Cont (11.30.23 @ 22:20) >    IMPRESSION:    Large left pleural effusion with overlying compressive atelectasis. Small   right pleural effusion. Interstitial edema.    Partial occlusion of bilateral lower lobe segmental bronchi; correlation   for aspiration suggested.    < end of copied text >

## 2023-12-04 NOTE — SWALLOW BEDSIDE ASSESSMENT ADULT - SWALLOW EVAL: DIAGNOSIS
Tolerated small sips of thins, puree, and soft & bite sized solid without immediate overt s/s aspiration. Delayed cough. Cough and congestion also present prior to PO intake.
Tolerated small sips of thins, puree, and soft & bite sized solid without immediate overt s/s aspiration. Delayed cough. Cough and congestion also present prior to PO intake.

## 2023-12-04 NOTE — SWALLOW BEDSIDE ASSESSMENT ADULT - SLP PERTINENT HISTORY OF CURRENT PROBLEM
89 yo F pmhx A-Fib, HTN, CKD, CAD, PAD, Dementia, BIBEMS for evaluation of cough and sob x approx one month.  Family member states pt has had an on-going cough, outpt cxr performed (unremarkable per family member). admitted for positive influenza A and acute respiratory failure.
89 yo F pmhx A-Fib, HTN, CKD, CAD, PAD, Dementia, BIBEMS for evaluation of cough and sob x approx one month.  Family member states pt has had an on-going cough, outpt cxr performed (unremarkable per family member). admitted for positive influenza A and acute respiratory failure.

## 2023-12-04 NOTE — SWALLOW BEDSIDE ASSESSMENT ADULT - NS SPL SWALLOW CLINIC TRIAL FT
Accepted small bites/sips of SBS/puree/thins w/o immediate overt s/s aspiration. Delayed coughing present.
Accepted small bites/sips of SBS/puree/thins w/o immediate overt s/s aspiration. Delayed cough present x1.

## 2023-12-04 NOTE — SWALLOW BEDSIDE ASSESSMENT ADULT - SLP PRECAUTIONS/LIMITATIONS: HEARING
[place] severe. SLP provided pt with pocket talker./assistive device in use [place] severe. SLP provided pt with pocket talker./assistive device in use

## 2023-12-04 NOTE — PROGRESS NOTE ADULT - ASSESSMENT
a 89 yo F pmhx A-Fib, HTN, CKD, CAD, PAD, Dementia, BIBEMS for evaluation of cough and sob x approx one month.  Family member states pt has had an on-going cough, outpt cxr performed (unremarkable per family member). admitted for positive influenza A and acute respiratory failure.     positive influenza A Bronchitis  Large pleural effusion with compression atelectasis Left  Partial occulusion of bilateral lower lobe segmental bronchi  acute respiratory failure  Hyponatremia   A-Fib, CAD, PAD  HTN, CKD  Dementia    Plan:  - f/u Am cxr   -c/w lasix as per cardiology iv  - recs for diuresis for now, if no improvement thoracentesis if family agrees   - blood cultures negative UTD  - tamiflu to 30mg BID for (Crcl ~50) and Unasyn 1.5 gram q 6 hours for Aspiration pneumonia.   - Speech and swallow evaluation for aspiration.   - consulted ID, pulmonary and cardiology Rec's appr.   - PT/rehab, SLP, consulted recs appre,  - Tylenol and pain management   - continue current medication, antihypertensive (with holding parameters),  - monitor vitals closely, daily am labs  - precaution (fall/aspiration/seizure)  - complex patient     DVT prophylaxis: SQH   GI prophylaxis: Protonix   diet: HH diet   code status: full code  Goals of care/disposition: Home once optimized     #Progress Note Handoff  Pending (specify):  pending medical optimization, f/u cxr   Disposition: Home  once optimized      a 89 yo F pmhx A-Fib, HTN, CKD, CAD, PAD, Dementia, BIBEMS for evaluation of cough and sob x approx one month.  Family member states pt has had an on-going cough, outpt cxr performed (unremarkable per family member). admitted for positive influenza A and acute respiratory failure.     positive influenza A Bronchitis  Large pleural effusion with compression atelectasis Left  Partial occulusion of bilateral lower lobe segmental bronchi  acute respiratory failure  Hyponatremia   A-Fib, CAD, PAD  HTN, CKD  Dementia    Plan:  - f/u Am cxr   -c/w lasix as per cardiology iv  - recs for diuresis for now, if no improvement thoracentesis if family agrees   - blood cultures negative UTD  - tamiflu to 30mg BID for (Crcl ~50) and Unasyn 1.5 gram q 6 hours for Aspiration pneumonia.   - Speech and swallow evaluation for aspiration.   - consulted ID, pulmonary and cardiology Rec's appr.   - PT/rehab, SLP, consulted recs appre,  - Tylenol and pain management   - continue current medication, antihypertensive (with holding parameters),  - monitor vitals closely, daily am labs  - precaution (fall/aspiration/seizure)  - complex patient     DVT prophylaxis: SQH   GI prophylaxis: Protonix   diet: HH diet   code status: full code  Goals of care/disposition: Home once optimized     #Progress Note Handoff  Pending (specify):  pending medical optimization, f/u cxr   Disposition: Home  once optimized      a 89 yo F pmhx A-Fib, HTN, CKD, CAD, PAD, Dementia, BIBEMS for evaluation of cough and sob x approx one month.  Family member states pt has had an on-going cough, outpt cxr performed (unremarkable per family member). admitted for positive influenza A and acute respiratory failure.     positive influenza A Bronchitis  Large pleural effusion with compression atelectasis Left  Partial occulusion of bilateral lower lobe segmental bronchi  acute respiratory failure  Hyponatremia   A-Fib, CAD, PAD  HTN, CKD  Dementia    Plan:  - repeat CXR today: persistent left side effusion and opacities. may need thora if persistent effusion  -c/w lasix as per cardiology iv  - recs for diuresis for now, if no improvement thoracentesis if family agrees   - blood cultures negative UTD  - tamiflu to 30mg BID for (Crcl ~50) and Unasyn 1.5 gram q 6 hours for Aspiration pneumonia.   - Speech and swallow evaluation for aspiration.   - consulted ID, pulmonary and cardiology Rec's appr.   - PT/rehab, SLP, consulted recs appre,  - Tylenol and pain management   - continue current medication, antihypertensive (with holding parameters),  - monitor vitals closely, daily am labs  - precaution (fall/aspiration/seizure)  - complex patient     DVT prophylaxis: SQH   GI prophylaxis: Protonix   diet: HH diet   code status: full code  Goals of care/disposition: Home once optimized     #Progress Note Handoff  Pending (specify):  pending medical optimization, f/u cxr   Disposition: Home  once optimized

## 2023-12-05 LAB
ALBUMIN SERPL ELPH-MCNC: 3.3 G/DL — LOW (ref 3.5–5.2)
ALBUMIN SERPL ELPH-MCNC: 3.3 G/DL — LOW (ref 3.5–5.2)
ALP SERPL-CCNC: 86 U/L — SIGNIFICANT CHANGE UP (ref 30–115)
ALP SERPL-CCNC: 86 U/L — SIGNIFICANT CHANGE UP (ref 30–115)
ALT FLD-CCNC: 7 U/L — SIGNIFICANT CHANGE UP (ref 0–41)
ALT FLD-CCNC: 7 U/L — SIGNIFICANT CHANGE UP (ref 0–41)
ANION GAP SERPL CALC-SCNC: 6 MMOL/L — LOW (ref 7–14)
ANION GAP SERPL CALC-SCNC: 6 MMOL/L — LOW (ref 7–14)
AST SERPL-CCNC: 17 U/L — SIGNIFICANT CHANGE UP (ref 0–41)
AST SERPL-CCNC: 17 U/L — SIGNIFICANT CHANGE UP (ref 0–41)
BASOPHILS # BLD AUTO: 0.02 K/UL — SIGNIFICANT CHANGE UP (ref 0–0.2)
BASOPHILS # BLD AUTO: 0.02 K/UL — SIGNIFICANT CHANGE UP (ref 0–0.2)
BASOPHILS NFR BLD AUTO: 0.2 % — SIGNIFICANT CHANGE UP (ref 0–1)
BASOPHILS NFR BLD AUTO: 0.2 % — SIGNIFICANT CHANGE UP (ref 0–1)
BILIRUB SERPL-MCNC: 1 MG/DL — SIGNIFICANT CHANGE UP (ref 0.2–1.2)
BILIRUB SERPL-MCNC: 1 MG/DL — SIGNIFICANT CHANGE UP (ref 0.2–1.2)
BUN SERPL-MCNC: 17 MG/DL — SIGNIFICANT CHANGE UP (ref 10–20)
BUN SERPL-MCNC: 17 MG/DL — SIGNIFICANT CHANGE UP (ref 10–20)
CALCIUM SERPL-MCNC: 8.7 MG/DL — SIGNIFICANT CHANGE UP (ref 8.4–10.5)
CALCIUM SERPL-MCNC: 8.7 MG/DL — SIGNIFICANT CHANGE UP (ref 8.4–10.5)
CHLORIDE SERPL-SCNC: 95 MMOL/L — LOW (ref 98–110)
CHLORIDE SERPL-SCNC: 95 MMOL/L — LOW (ref 98–110)
CO2 SERPL-SCNC: 36 MMOL/L — HIGH (ref 17–32)
CO2 SERPL-SCNC: 36 MMOL/L — HIGH (ref 17–32)
CREAT SERPL-MCNC: 0.6 MG/DL — LOW (ref 0.7–1.5)
CREAT SERPL-MCNC: 0.6 MG/DL — LOW (ref 0.7–1.5)
EGFR: 82 ML/MIN/1.73M2 — SIGNIFICANT CHANGE UP
EGFR: 82 ML/MIN/1.73M2 — SIGNIFICANT CHANGE UP
EOSINOPHIL # BLD AUTO: 0.08 K/UL — SIGNIFICANT CHANGE UP (ref 0–0.7)
EOSINOPHIL # BLD AUTO: 0.08 K/UL — SIGNIFICANT CHANGE UP (ref 0–0.7)
EOSINOPHIL NFR BLD AUTO: 0.9 % — SIGNIFICANT CHANGE UP (ref 0–8)
EOSINOPHIL NFR BLD AUTO: 0.9 % — SIGNIFICANT CHANGE UP (ref 0–8)
GLUCOSE SERPL-MCNC: 82 MG/DL — SIGNIFICANT CHANGE UP (ref 70–99)
GLUCOSE SERPL-MCNC: 82 MG/DL — SIGNIFICANT CHANGE UP (ref 70–99)
HCT VFR BLD CALC: 37.8 % — SIGNIFICANT CHANGE UP (ref 37–47)
HCT VFR BLD CALC: 37.8 % — SIGNIFICANT CHANGE UP (ref 37–47)
HGB BLD-MCNC: 12 G/DL — SIGNIFICANT CHANGE UP (ref 12–16)
HGB BLD-MCNC: 12 G/DL — SIGNIFICANT CHANGE UP (ref 12–16)
IMM GRANULOCYTES NFR BLD AUTO: 0.4 % — HIGH (ref 0.1–0.3)
IMM GRANULOCYTES NFR BLD AUTO: 0.4 % — HIGH (ref 0.1–0.3)
LYMPHOCYTES # BLD AUTO: 1.58 K/UL — SIGNIFICANT CHANGE UP (ref 1.2–3.4)
LYMPHOCYTES # BLD AUTO: 1.58 K/UL — SIGNIFICANT CHANGE UP (ref 1.2–3.4)
LYMPHOCYTES # BLD AUTO: 17 % — LOW (ref 20.5–51.1)
LYMPHOCYTES # BLD AUTO: 17 % — LOW (ref 20.5–51.1)
MAGNESIUM SERPL-MCNC: 2.2 MG/DL — SIGNIFICANT CHANGE UP (ref 1.8–2.4)
MAGNESIUM SERPL-MCNC: 2.2 MG/DL — SIGNIFICANT CHANGE UP (ref 1.8–2.4)
MCHC RBC-ENTMCNC: 27.7 PG — SIGNIFICANT CHANGE UP (ref 27–31)
MCHC RBC-ENTMCNC: 27.7 PG — SIGNIFICANT CHANGE UP (ref 27–31)
MCHC RBC-ENTMCNC: 31.7 G/DL — LOW (ref 32–37)
MCHC RBC-ENTMCNC: 31.7 G/DL — LOW (ref 32–37)
MCV RBC AUTO: 87.3 FL — SIGNIFICANT CHANGE UP (ref 81–99)
MCV RBC AUTO: 87.3 FL — SIGNIFICANT CHANGE UP (ref 81–99)
MONOCYTES # BLD AUTO: 0.92 K/UL — HIGH (ref 0.1–0.6)
MONOCYTES # BLD AUTO: 0.92 K/UL — HIGH (ref 0.1–0.6)
MONOCYTES NFR BLD AUTO: 9.9 % — HIGH (ref 1.7–9.3)
MONOCYTES NFR BLD AUTO: 9.9 % — HIGH (ref 1.7–9.3)
NEUTROPHILS # BLD AUTO: 6.65 K/UL — HIGH (ref 1.4–6.5)
NEUTROPHILS # BLD AUTO: 6.65 K/UL — HIGH (ref 1.4–6.5)
NEUTROPHILS NFR BLD AUTO: 71.6 % — SIGNIFICANT CHANGE UP (ref 42.2–75.2)
NEUTROPHILS NFR BLD AUTO: 71.6 % — SIGNIFICANT CHANGE UP (ref 42.2–75.2)
NRBC # BLD: 0 /100 WBCS — SIGNIFICANT CHANGE UP (ref 0–0)
NRBC # BLD: 0 /100 WBCS — SIGNIFICANT CHANGE UP (ref 0–0)
PLATELET # BLD AUTO: 233 K/UL — SIGNIFICANT CHANGE UP (ref 130–400)
PLATELET # BLD AUTO: 233 K/UL — SIGNIFICANT CHANGE UP (ref 130–400)
PMV BLD: 9.9 FL — SIGNIFICANT CHANGE UP (ref 7.4–10.4)
PMV BLD: 9.9 FL — SIGNIFICANT CHANGE UP (ref 7.4–10.4)
POTASSIUM SERPL-MCNC: 3.7 MMOL/L — SIGNIFICANT CHANGE UP (ref 3.5–5)
POTASSIUM SERPL-MCNC: 3.7 MMOL/L — SIGNIFICANT CHANGE UP (ref 3.5–5)
POTASSIUM SERPL-SCNC: 3.7 MMOL/L — SIGNIFICANT CHANGE UP (ref 3.5–5)
POTASSIUM SERPL-SCNC: 3.7 MMOL/L — SIGNIFICANT CHANGE UP (ref 3.5–5)
PROT SERPL-MCNC: 6.7 G/DL — SIGNIFICANT CHANGE UP (ref 6–8)
PROT SERPL-MCNC: 6.7 G/DL — SIGNIFICANT CHANGE UP (ref 6–8)
RBC # BLD: 4.33 M/UL — SIGNIFICANT CHANGE UP (ref 4.2–5.4)
RBC # BLD: 4.33 M/UL — SIGNIFICANT CHANGE UP (ref 4.2–5.4)
RBC # FLD: 13.5 % — SIGNIFICANT CHANGE UP (ref 11.5–14.5)
RBC # FLD: 13.5 % — SIGNIFICANT CHANGE UP (ref 11.5–14.5)
SODIUM SERPL-SCNC: 137 MMOL/L — SIGNIFICANT CHANGE UP (ref 135–146)
SODIUM SERPL-SCNC: 137 MMOL/L — SIGNIFICANT CHANGE UP (ref 135–146)
WBC # BLD: 9.29 K/UL — SIGNIFICANT CHANGE UP (ref 4.8–10.8)
WBC # BLD: 9.29 K/UL — SIGNIFICANT CHANGE UP (ref 4.8–10.8)
WBC # FLD AUTO: 9.29 K/UL — SIGNIFICANT CHANGE UP (ref 4.8–10.8)
WBC # FLD AUTO: 9.29 K/UL — SIGNIFICANT CHANGE UP (ref 4.8–10.8)

## 2023-12-05 PROCEDURE — 99233 SBSQ HOSP IP/OBS HIGH 50: CPT

## 2023-12-05 PROCEDURE — 99497 ADVNCD CARE PLAN 30 MIN: CPT

## 2023-12-05 RX ADMIN — Medication 20 MILLIGRAM(S): at 18:05

## 2023-12-05 RX ADMIN — HEPARIN SODIUM 5000 UNIT(S): 5000 INJECTION INTRAVENOUS; SUBCUTANEOUS at 06:35

## 2023-12-05 RX ADMIN — Medication 30 MILLIGRAM(S): at 06:37

## 2023-12-05 RX ADMIN — Medication 20 MILLIGRAM(S): at 06:35

## 2023-12-05 RX ADMIN — CHLORHEXIDINE GLUCONATE 1 APPLICATION(S): 213 SOLUTION TOPICAL at 06:40

## 2023-12-05 RX ADMIN — AMPICILLIN SODIUM AND SULBACTAM SODIUM 100 GRAM(S): 250; 125 INJECTION, POWDER, FOR SUSPENSION INTRAMUSCULAR; INTRAVENOUS at 18:04

## 2023-12-05 RX ADMIN — PANTOPRAZOLE SODIUM 40 MILLIGRAM(S): 20 TABLET, DELAYED RELEASE ORAL at 06:32

## 2023-12-05 RX ADMIN — AMPICILLIN SODIUM AND SULBACTAM SODIUM 100 GRAM(S): 250; 125 INJECTION, POWDER, FOR SUSPENSION INTRAMUSCULAR; INTRAVENOUS at 21:06

## 2023-12-05 RX ADMIN — AMPICILLIN SODIUM AND SULBACTAM SODIUM 100 GRAM(S): 250; 125 INJECTION, POWDER, FOR SUSPENSION INTRAMUSCULAR; INTRAVENOUS at 12:47

## 2023-12-05 RX ADMIN — AMPICILLIN SODIUM AND SULBACTAM SODIUM 100 GRAM(S): 250; 125 INJECTION, POWDER, FOR SUSPENSION INTRAMUSCULAR; INTRAVENOUS at 06:40

## 2023-12-05 RX ADMIN — HEPARIN SODIUM 5000 UNIT(S): 5000 INJECTION INTRAVENOUS; SUBCUTANEOUS at 18:04

## 2023-12-05 NOTE — PROGRESS NOTE ADULT - CONVERSATION DETAILS
I discussed the current plan of care, and prognosis were discussed with patient in details, all option were discussed ACLS process in details including CPR, shock, and intubation procedure.   Explained that duration of machines/inbutaion/pressor are unknown, and they are based on the underline issue.  Patient opted for DNR/DNI with full understanding of what it involves in details

## 2023-12-05 NOTE — PROGRESS NOTE ADULT - SUBJECTIVE AND OBJECTIVE BOX
MARNIECHAVO  89y, Female    LOS  5d    INTERVAL EVENTS/HPI  - No acute events overnight  - T(F): , Max: 98.7 (12-05-23 @ 05:00)  - Denies any worsening symptoms  - Tolerating medication  - WBC Count: 9.29 (12-05-23 @ 07:22)  WBC Count: 9.24 (12-04-23 @ 07:58)  - Creatinine: 0.6 (12-05-23 @ 07:22)    REVIEW OF SYSTEMS:  CONSTITUTIONAL: No fever or chills  HEENT: No sore throat  RESPIRATORY: No cough, no shortness of breath  CARDIOVASCULAR: No chest pain or palpitations  GASTROINTESTINAL: No abdominal or epigastric pain  GENITOURINARY: No dysuria  NEUROLOGICAL: No headache/dizziness  MSK: No joint pain, erythema, or swelling; no back pain  SKIN: No itching, rashes  All other ROS negative except noted above    Prior hospital charts reviewed [Yes]  Primary team notes reviewed [Yes]  Other consultant notes reviewed [Yes]    ANTIMICROBIALS:   ampicillin/sulbactam  IVPB 1.5 every 6 hours    MEDICATIONS  (STANDING):  ampicillin/sulbactam  IVPB   100 mL/Hr IV Intermittent (12-05-23 @ 06:40)   100 mL/Hr IV Intermittent (12-04-23 @ 23:28)   100 mL/Hr IV Intermittent (12-04-23 @ 18:17)   100 mL/Hr IV Intermittent (12-04-23 @ 12:32)   100 mL/Hr IV Intermittent (12-04-23 @ 06:14)   100 mL/Hr IV Intermittent (12-04-23 @ 00:45)   100 mL/Hr IV Intermittent (12-03-23 @ 17:25)   100 mL/Hr IV Intermittent (12-03-23 @ 11:20)   100 mL/Hr IV Intermittent (12-03-23 @ 06:23)   100 mL/Hr IV Intermittent (12-03-23 @ 00:52)   100 mL/Hr IV Intermittent (12-02-23 @ 17:36)   100 mL/Hr IV Intermittent (12-02-23 @ 11:17)   100 mL/Hr IV Intermittent (12-02-23 @ 06:23)   100 mL/Hr IV Intermittent (12-02-23 @ 00:45)   100 mL/Hr IV Intermittent (12-01-23 @ 18:38)   100 mL/Hr IV Intermittent (12-01-23 @ 14:09)    levoFLOXacin IVPB   50 mL/Hr IV Intermittent (12-01-23 @ 00:23)    metroNIDAZOLE  IVPB   100 mL/Hr IV Intermittent (12-01-23 @ 05:01)    oseltamivir   75 milliGRAM(s) Oral (12-01-23 @ 06:21)    oseltamivir   30 milliGRAM(s) Oral (12-05-23 @ 06:37)   30 milliGRAM(s) Oral (12-04-23 @ 18:17)   30 milliGRAM(s) Oral (12-04-23 @ 06:13)   30 milliGRAM(s) Oral (12-03-23 @ 17:25)   30 milliGRAM(s) Oral (12-03-23 @ 06:25)   30 milliGRAM(s) Oral (12-02-23 @ 17:36)   30 milliGRAM(s) Oral (12-02-23 @ 06:03)   30 milliGRAM(s) Oral (12-01-23 @ 18:41)      MEDICATIONS  (STANDING):  ampicillin/sulbactam  IVPB   100 mL/Hr IV Intermittent (12-05-23 @ 06:40)   100 mL/Hr IV Intermittent (12-04-23 @ 23:28)   100 mL/Hr IV Intermittent (12-04-23 @ 18:17)   100 mL/Hr IV Intermittent (12-04-23 @ 12:32)   100 mL/Hr IV Intermittent (12-04-23 @ 06:14)   100 mL/Hr IV Intermittent (12-04-23 @ 00:45)   100 mL/Hr IV Intermittent (12-03-23 @ 17:25)   100 mL/Hr IV Intermittent (12-03-23 @ 11:20)   100 mL/Hr IV Intermittent (12-03-23 @ 06:23)   100 mL/Hr IV Intermittent (12-03-23 @ 00:52)   100 mL/Hr IV Intermittent (12-02-23 @ 17:36)   100 mL/Hr IV Intermittent (12-02-23 @ 11:17)   100 mL/Hr IV Intermittent (12-02-23 @ 06:23)   100 mL/Hr IV Intermittent (12-02-23 @ 00:45)   100 mL/Hr IV Intermittent (12-01-23 @ 18:38)   100 mL/Hr IV Intermittent (12-01-23 @ 14:09)    levoFLOXacin IVPB   50 mL/Hr IV Intermittent (12-01-23 @ 00:23)    metroNIDAZOLE  IVPB   100 mL/Hr IV Intermittent (12-01-23 @ 05:01)    oseltamivir   30 milliGRAM(s) Oral (12-05-23 @ 06:37)   30 milliGRAM(s) Oral (12-04-23 @ 18:17)   30 milliGRAM(s) Oral (12-04-23 @ 06:13)   30 milliGRAM(s) Oral (12-03-23 @ 17:25)   30 milliGRAM(s) Oral (12-03-23 @ 06:25)   30 milliGRAM(s) Oral (12-02-23 @ 17:36)   30 milliGRAM(s) Oral (12-02-23 @ 06:03)   30 milliGRAM(s) Oral (12-01-23 @ 18:41)    oseltamivir   75 milliGRAM(s) Oral (12-01-23 @ 06:21)      OTHER MEDS: MEDICATIONS  (STANDING):  acetaminophen     Tablet .. 650 every 6 hours PRN  aluminum hydroxide/magnesium hydroxide/simethicone Suspension 30 every 4 hours PRN  bisacodyl 5 daily PRN  cloNIDine 0.1 once  furosemide   Injectable 20 every 12 hours  heparin   Injectable 5000 every 12 hours  melatonin 5 at bedtime PRN  ondansetron Injectable 4 every 8 hours PRN  pantoprazole    Tablet 40 before breakfast  senna 2 at bedtime PRN      Vital Signs Last 24 Hrs  T(F): 98.7 (12-05-23 @ 05:00), Max: 98.7 (12-05-23 @ 05:00)    Vital Signs Last 24 Hrs  HR: 83 (12-05-23 @ 05:00) (77 - 87)  BP: 139/76 (12-05-23 @ 05:00) (128/58 - 139/76)  RR: 18 (12-05-23 @ 05:00)  SpO2: 97% (12-05-23 @ 05:00) (96% - 97%)  Wt(kg): --    EXAM:  GENERAL: NAD,frail looking. on NC  HEAD: No head lesions  NECK: Supple, nontender to palpation; no JVD  CHEST/LUNG: Mild rhonchi noted L>R  HEART: S1 S2  ABDOMEN: Soft, nontender, nondistended  EXTREMITIES: No clubbing, cyanosis, or petal edema  NERVOUS SYSTEM: Alert and oriented to person. Very hard of hearing.   MSK: No joint erythema, swelling or pain  SKIN: No rashes or lesions, no superficial thrombophlebitis  Labs:                        12.0   9.29  )-----------( 233      ( 05 Dec 2023 07:22 )             37.8     12-05    137  |  95<L>  |  17  ----------------------------<  82  3.7   |  36<H>  |  0.6<L>    Ca    8.7      05 Dec 2023 07:22  Mg     2.2     12-05    TPro  6.7  /  Alb  3.3<L>  /  TBili  1.0  /  DBili  x   /  AST  17  /  ALT  7   /  AlkPhos  86  12-05      WBC Trend:  WBC Count: 9.29 (12-05-23 @ 07:22)  WBC Count: 9.24 (12-04-23 @ 07:58)  WBC Count: 7.80 (12-03-23 @ 04:30)  WBC Count: 10.39 (12-02-23 @ 11:00)      Creatine Trend:  Creatinine: 0.6 (12-05)  Creatinine: 0.7 (12-03)  Creatinine: 0.6 (12-02)  Creatinine: 0.8 (12-01)      Liver Biochemical Testing Trend:  Alanine Aminotransferase (ALT/SGPT): 7 (12-05)  Alanine Aminotransferase (ALT/SGPT): 8 (11-30)  Aspartate Aminotransferase (AST/SGOT): 17 (12-05-23 @ 07:22)  Aspartate Aminotransferase (AST/SGOT): 21 (11-30-23 @ 20:26)  Bilirubin Total: 1.0 (12-05)  Bilirubin Total: 0.8 (11-30)      Trend LDH      Urinalysis Basic - ( 05 Dec 2023 07:22 )    Color: x / Appearance: x / SG: x / pH: x  Gluc: 82 mg/dL / Ketone: x  / Bili: x / Urobili: x   Blood: x / Protein: x / Nitrite: x   Leuk Esterase: x / RBC: x / WBC x   Sq Epi: x / Non Sq Epi: x / Bacteria: x        MICROBIOLOGY:    Female    Culture - Blood (collected 01 Dec 2023 07:00)  Source: .Blood Blood-Peripheral  Preliminary Report:    No growth at 72 Hours    Culture - Blood (collected 30 Nov 2023 20:34)  Source: .Blood Blood-Peripheral  Preliminary Report:    No growth at 4 days    Culture - Blood (collected 30 Nov 2023 20:34)  Source: .Blood Blood-Peripheral  Preliminary Report:    No growth at 4 days                                                  RADIOLOGY & ADDITIONAL TESTS:  I have personally reviewed the relevant images.   CXR      CT        WEIGHT  Weight (kg): 72.6 (11-30-23 @ 19:45)  Creatinine: 0.6 mg/dL (12-05-23 @ 07:22)      All available historical records have been reviewed       MARNIECHAVO  89y, Female    LOS  5d    INTERVAL EVENTS/HPI  - No acute events overnight  - T(F): , Max: 98.7 (12-05-23 @ 05:00)  - Denies any worsening symptoms  - Tolerating medication  - WBC Count: 9.29 (12-05-23 @ 07:22)  WBC Count: 9.24 (12-04-23 @ 07:58)  - Creatinine: 0.6 (12-05-23 @ 07:22)    REVIEW OF SYSTEMS:  CONSTITUTIONAL: No fever or chills  HEENT: No sore throat  RESPIRATORY: No cough, no shortness of breath  CARDIOVASCULAR: No chest pain or palpitations  GASTROINTESTINAL: No abdominal or epigastric pain  GENITOURINARY: No dysuria  NEUROLOGICAL: No headache/dizziness  MSK: No joint pain, erythema, or swelling; no back pain  SKIN: No itching, rashes  All other ROS negative except noted above    Prior hospital charts reviewed [Yes]  Primary team notes reviewed [Yes]  Other consultant notes reviewed [Yes]    ANTIMICROBIALS:   ampicillin/sulbactam  IVPB 1.5 every 6 hours    MEDICATIONS  (STANDING):  ampicillin/sulbactam  IVPB   100 mL/Hr IV Intermittent (12-05-23 @ 06:40)   100 mL/Hr IV Intermittent (12-04-23 @ 23:28)   100 mL/Hr IV Intermittent (12-04-23 @ 18:17)   100 mL/Hr IV Intermittent (12-04-23 @ 12:32)   100 mL/Hr IV Intermittent (12-04-23 @ 06:14)   100 mL/Hr IV Intermittent (12-04-23 @ 00:45)   100 mL/Hr IV Intermittent (12-03-23 @ 17:25)   100 mL/Hr IV Intermittent (12-03-23 @ 11:20)   100 mL/Hr IV Intermittent (12-03-23 @ 06:23)   100 mL/Hr IV Intermittent (12-03-23 @ 00:52)   100 mL/Hr IV Intermittent (12-02-23 @ 17:36)   100 mL/Hr IV Intermittent (12-02-23 @ 11:17)   100 mL/Hr IV Intermittent (12-02-23 @ 06:23)   100 mL/Hr IV Intermittent (12-02-23 @ 00:45)   100 mL/Hr IV Intermittent (12-01-23 @ 18:38)   100 mL/Hr IV Intermittent (12-01-23 @ 14:09)    levoFLOXacin IVPB   50 mL/Hr IV Intermittent (12-01-23 @ 00:23)    metroNIDAZOLE  IVPB   100 mL/Hr IV Intermittent (12-01-23 @ 05:01)    oseltamivir   75 milliGRAM(s) Oral (12-01-23 @ 06:21)    oseltamivir   30 milliGRAM(s) Oral (12-05-23 @ 06:37)   30 milliGRAM(s) Oral (12-04-23 @ 18:17)   30 milliGRAM(s) Oral (12-04-23 @ 06:13)   30 milliGRAM(s) Oral (12-03-23 @ 17:25)   30 milliGRAM(s) Oral (12-03-23 @ 06:25)   30 milliGRAM(s) Oral (12-02-23 @ 17:36)   30 milliGRAM(s) Oral (12-02-23 @ 06:03)   30 milliGRAM(s) Oral (12-01-23 @ 18:41)      MEDICATIONS  (STANDING):  ampicillin/sulbactam  IVPB   100 mL/Hr IV Intermittent (12-05-23 @ 06:40)   100 mL/Hr IV Intermittent (12-04-23 @ 23:28)   100 mL/Hr IV Intermittent (12-04-23 @ 18:17)   100 mL/Hr IV Intermittent (12-04-23 @ 12:32)   100 mL/Hr IV Intermittent (12-04-23 @ 06:14)   100 mL/Hr IV Intermittent (12-04-23 @ 00:45)   100 mL/Hr IV Intermittent (12-03-23 @ 17:25)   100 mL/Hr IV Intermittent (12-03-23 @ 11:20)   100 mL/Hr IV Intermittent (12-03-23 @ 06:23)   100 mL/Hr IV Intermittent (12-03-23 @ 00:52)   100 mL/Hr IV Intermittent (12-02-23 @ 17:36)   100 mL/Hr IV Intermittent (12-02-23 @ 11:17)   100 mL/Hr IV Intermittent (12-02-23 @ 06:23)   100 mL/Hr IV Intermittent (12-02-23 @ 00:45)   100 mL/Hr IV Intermittent (12-01-23 @ 18:38)   100 mL/Hr IV Intermittent (12-01-23 @ 14:09)    levoFLOXacin IVPB   50 mL/Hr IV Intermittent (12-01-23 @ 00:23)    metroNIDAZOLE  IVPB   100 mL/Hr IV Intermittent (12-01-23 @ 05:01)    oseltamivir   30 milliGRAM(s) Oral (12-05-23 @ 06:37)   30 milliGRAM(s) Oral (12-04-23 @ 18:17)   30 milliGRAM(s) Oral (12-04-23 @ 06:13)   30 milliGRAM(s) Oral (12-03-23 @ 17:25)   30 milliGRAM(s) Oral (12-03-23 @ 06:25)   30 milliGRAM(s) Oral (12-02-23 @ 17:36)   30 milliGRAM(s) Oral (12-02-23 @ 06:03)   30 milliGRAM(s) Oral (12-01-23 @ 18:41)    oseltamivir   75 milliGRAM(s) Oral (12-01-23 @ 06:21)      OTHER MEDS: MEDICATIONS  (STANDING):  acetaminophen     Tablet .. 650 every 6 hours PRN  aluminum hydroxide/magnesium hydroxide/simethicone Suspension 30 every 4 hours PRN  bisacodyl 5 daily PRN  cloNIDine 0.1 once  furosemide   Injectable 20 every 12 hours  heparin   Injectable 5000 every 12 hours  melatonin 5 at bedtime PRN  ondansetron Injectable 4 every 8 hours PRN  pantoprazole    Tablet 40 before breakfast  senna 2 at bedtime PRN      Vital Signs Last 24 Hrs  T(F): 98.7 (12-05-23 @ 05:00), Max: 98.7 (12-05-23 @ 05:00)    Vital Signs Last 24 Hrs  HR: 83 (12-05-23 @ 05:00) (77 - 87)  BP: 139/76 (12-05-23 @ 05:00) (128/58 - 139/76)  RR: 18 (12-05-23 @ 05:00)  SpO2: 97% (12-05-23 @ 05:00) (96% - 97%)  Wt(kg): --    EXAM:  GENERAL: NAD,frail looking. on NC  HEAD: No head lesions  NECK: Supple, nontender to palpation; no JVD  CHEST/LUNG: Mild rhonchi noted L>R  HEART: S1 S2  ABDOMEN: Soft, nontender, nondistended  EXTREMITIES: No clubbing, cyanosis, or petal edema  NERVOUS SYSTEM: Alert and oriented to person. Very hard of hearing.   MSK: No joint erythema, swelling or pain  SKIN: No rashes or lesions, no superficial thrombophlebitis  Labs:                        12.0   9.29  )-----------( 233      ( 05 Dec 2023 07:22 )             37.8     12-05    137  |  95<L>  |  17  ----------------------------<  82  3.7   |  36<H>  |  0.6<L>    Ca    8.7      05 Dec 2023 07:22  Mg     2.2     12-05    TPro  6.7  /  Alb  3.3<L>  /  TBili  1.0  /  DBili  x   /  AST  17  /  ALT  7   /  AlkPhos  86  12-05      WBC Trend:  WBC Count: 9.29 (12-05-23 @ 07:22)  WBC Count: 9.24 (12-04-23 @ 07:58)  WBC Count: 7.80 (12-03-23 @ 04:30)  WBC Count: 10.39 (12-02-23 @ 11:00)      Creatine Trend:  Creatinine: 0.6 (12-05)  Creatinine: 0.7 (12-03)  Creatinine: 0.6 (12-02)  Creatinine: 0.8 (12-01)      Liver Biochemical Testing Trend:  Alanine Aminotransferase (ALT/SGPT): 7 (12-05)  Alanine Aminotransferase (ALT/SGPT): 8 (11-30)  Aspartate Aminotransferase (AST/SGOT): 17 (12-05-23 @ 07:22)  Aspartate Aminotransferase (AST/SGOT): 21 (11-30-23 @ 20:26)  Bilirubin Total: 1.0 (12-05)  Bilirubin Total: 0.8 (11-30)      Trend LDH      Urinalysis Basic - ( 05 Dec 2023 07:22 )    Color: x / Appearance: x / SG: x / pH: x  Gluc: 82 mg/dL / Ketone: x  / Bili: x / Urobili: x   Blood: x / Protein: x / Nitrite: x   Leuk Esterase: x / RBC: x / WBC x   Sq Epi: x / Non Sq Epi: x / Bacteria: x        MICROBIOLOGY:    Female    Culture - Blood (collected 01 Dec 2023 07:00)  Source: .Blood Blood-Peripheral  Preliminary Report:    No growth at 72 Hours    Culture - Blood (collected 30 Nov 2023 20:34)  Source: .Blood Blood-Peripheral  Preliminary Report:    No growth at 4 days    Culture - Blood (collected 30 Nov 2023 20:34)  Source: .Blood Blood-Peripheral  Preliminary Report:    No growth at 4 days      RADIOLOGY & ADDITIONAL TESTS:  I have personally reviewed the relevant images.   CXR      CT        WEIGHT  Weight (kg): 72.6 (11-30-23 @ 19:45)  Creatinine: 0.6 mg/dL (12-05-23 @ 07:22)      All available historical records have been reviewed

## 2023-12-05 NOTE — PROGRESS NOTE ADULT - ASSESSMENT
This is a 89 yo F pmhx A-Fib, HTN, CKD, CAD, PAD, Dementia, BIBEMS for evaluation of cough and sob x approx one month.      IMPRESSION  #Acute hypoxic respiratory failure  #Influenza A Bronchitis- Finished course of tamiflu.   #Large pleural effusion with compression atelectasis Left.   #Partial occulusion of bilateral lower lobe segmental bronchi  #CHF with perserved EF  #Afib, HTN, CKD 3, CAD, PAD, Dementia  #Obesity BMI (kg/m2): 27.5    RECOMMENDATIONS  -Diuresis as per the primary team.   -Will keep on Unasyn 1.5 gram q 6 hours for Aspiration pneumonia. Plan for total of 7 days. Change to PO Augmentin 875-125 BID closer to discharge.   -Offloading, aspiration and delirium precautions.    -Goals of care.     If any questions, please send a message or call on [employer] Teams  Please continue to update ID with any pertinent new laboratory or radiographic findings.    Radha Pires M.D  Infectious Diseases Attending  [facility]      This is a 89 yo F pmhx A-Fib, HTN, CKD, CAD, PAD, Dementia, BIBEMS for evaluation of cough and sob x approx one month.      IMPRESSION  #Acute hypoxic respiratory failure  #Influenza A Bronchitis- Finished course of tamiflu.   #Large pleural effusion with compression atelectasis Left.   #Partial occulusion of bilateral lower lobe segmental bronchi  #CHF with perserved EF  #Afib, HTN, CKD 3, CAD, PAD, Dementia  #Obesity BMI (kg/m2): 27.5    RECOMMENDATIONS  -Diuresis as per the primary team.   -Will keep on Unasyn 1.5 gram q 6 hours for Aspiration pneumonia. Plan for total of 7 days. Change to PO Augmentin 875-125 BID closer to discharge.   -Offloading, aspiration and delirium precautions.    -Goals of care.     If any questions, please send a message or call on [employer] Teams  Please continue to update ID with any pertinent new laboratory or radiographic findings.    Radha Pires M.D  Infectious Diseases Attending  [facility]      This is a 89 yo F pmhx A-Fib, HTN, CKD, CAD, PAD, Dementia, BIBEMS for evaluation of cough and sob x approx one month.      IMPRESSION  #Acute hypoxic respiratory failure  #Influenza A Bronchitis- Finished course of tamiflu.   #Large pleural effusion with compression atelectasis Left.   #Partial occulusion of bilateral lower lobe segmental bronchi  #CHF with perserved EF  #Afib, HTN, CKD 3, CAD, PAD, Dementia  #Obesity BMI (kg/m2): 27.5    RECOMMENDATIONS  -Diuresis as per the primary team.   -Will keep on Unasyn 1.5 gram q 6 hours for Aspiration pneumonia. Plan for total of 7 days. Change to PO Augmentin 875-125 BID closer to discharge. (ED 12/7)   -Offloading, aspiration and delirium precautions.    -Goals of care.     If any questions, please send a message or call on [employer] Teams  Please continue to update ID with any pertinent new laboratory or radiographic findings.    Radha Pires M.D  Infectious Diseases Attending  [facility]      This is a 89 yo F pmhx A-Fib, HTN, CKD, CAD, PAD, Dementia, BIBEMS for evaluation of cough and sob x approx one month.      IMPRESSION  #Acute hypoxic respiratory failure  #Influenza A Bronchitis- Finished course of tamiflu.   #Large pleural effusion with compression atelectasis Left.   #Partial occulusion of bilateral lower lobe segmental bronchi  #CHF with perserved EF  #Afib, HTN, CKD 3, CAD, PAD, Dementia  #Obesity BMI (kg/m2): 27.5    RECOMMENDATIONS  -Diuresis as per the primary team.   -Will keep on Unasyn 1.5 gram q 6 hours for Aspiration pneumonia. Plan for total of 7 days. Change to PO Augmentin 875-125 BID closer to discharge. (ED 12/7)   -Offloading, aspiration and delirium precautions.    -Goals of care.     If any questions, please send a message or call on [employer] Teams  Please continue to update ID with any pertinent new laboratory or radiographic findings.    Radha Pires M.D  Infectious Diseases Attending  [facility]

## 2023-12-05 NOTE — PROGRESS NOTE ADULT - SUBJECTIVE AND OBJECTIVE BOX
Progress note    INTERVAL HPI/OVERNIGHT EVENTS:    Patient seen and examined at bedside.     ===============incomplete===============    REVIEW OF SYSTEMS:  All other 13 Review of systems were reviewed and are negative    FAMILY HISTORY:    T(C): 37.1 (12-05-23 @ 05:00), Max: 37.1 (12-05-23 @ 05:00)  HR: 83 (12-05-23 @ 05:00) (77 - 87)  BP: 139/76 (12-05-23 @ 05:00) (128/58 - 139/76)  RR: 18 (12-05-23 @ 05:00) (18 - 18)  SpO2: 97% (12-05-23 @ 05:00) (96% - 97%)  Wt(kg): --Vital Signs Last 24 Hrs  T(C): 37.1 (05 Dec 2023 05:00), Max: 37.1 (05 Dec 2023 05:00)  T(F): 98.7 (05 Dec 2023 05:00), Max: 98.7 (05 Dec 2023 05:00)  HR: 83 (05 Dec 2023 05:00) (77 - 87)  BP: 139/76 (05 Dec 2023 05:00) (128/58 - 139/76)  BP(mean): --  RR: 18 (05 Dec 2023 05:00) (18 - 18)  SpO2: 97% (05 Dec 2023 05:00) (96% - 97%)    Parameters below as of 05 Dec 2023 05:00  Patient On (Oxygen Delivery Method): nasal cannula  O2 Flow (L/min): 2    penicillins (Unknown)  Celebrex (Unknown)      PHYSICAL EXAM:                      Consultant(s) Notes Reviewed:  [x ] YES  [ ] NO  Care Discussed with Consultants/Other Providers [ x] YES  [ ] NO    LABS:      RADIOLOGY & ADDITIONAL TESTS:    Imaging Personally Reviewed:  [ ] YES  [ ] NO  acetaminophen     Tablet .. 650 milliGRAM(s) Oral every 6 hours PRN  aluminum hydroxide/magnesium hydroxide/simethicone Suspension 30 milliLiter(s) Oral every 4 hours PRN  ampicillin/sulbactam  IVPB 1.5 Gram(s) IV Intermittent every 6 hours  bisacodyl 5 milliGRAM(s) Oral daily PRN  chlorhexidine 2% Cloths 1 Application(s) Topical <User Schedule>  cloNIDine 0.1 milliGRAM(s) Oral once  furosemide   Injectable 20 milliGRAM(s) IV Push every 12 hours  heparin   Injectable 5000 Unit(s) SubCutaneous every 12 hours  melatonin 5 milliGRAM(s) Oral at bedtime PRN  ondansetron Injectable 4 milliGRAM(s) IV Push every 8 hours PRN  pantoprazole    Tablet 40 milliGRAM(s) Oral before breakfast  senna 2 Tablet(s) Oral at bedtime PRN      HEALTH ISSUES - PROBLEM Dx:           Progress note    INTERVAL HPI/OVERNIGHT EVENTS:    Patient seen and examined at bedside.     REVIEW OF SYSTEMS:  All other 13 Review of systems were reviewed and are negative    FAMILY HISTORY:    T(C): 37.1 (12-05-23 @ 05:00), Max: 37.1 (12-05-23 @ 05:00)  HR: 83 (12-05-23 @ 05:00) (77 - 87)  BP: 139/76 (12-05-23 @ 05:00) (128/58 - 139/76)  RR: 18 (12-05-23 @ 05:00) (18 - 18)  SpO2: 97% (12-05-23 @ 05:00) (96% - 97%)  Wt(kg): --Vital Signs Last 24 Hrs  T(C): 37.1 (05 Dec 2023 05:00), Max: 37.1 (05 Dec 2023 05:00)  T(F): 98.7 (05 Dec 2023 05:00), Max: 98.7 (05 Dec 2023 05:00)  HR: 83 (05 Dec 2023 05:00) (77 - 87)  BP: 139/76 (05 Dec 2023 05:00) (128/58 - 139/76)  BP(mean): --  RR: 18 (05 Dec 2023 05:00) (18 - 18)  SpO2: 97% (05 Dec 2023 05:00) (96% - 97%)    Parameters below as of 05 Dec 2023 05:00  Patient On (Oxygen Delivery Method): nasal cannula  O2 Flow (L/min): 2    penicillins (Unknown)  Celebrex (Unknown)      PHYSICAL EXAM:                      Consultant(s) Notes Reviewed:  [x ] YES  [ ] NO  Care Discussed with Consultants/Other Providers [ x] YES  [ ] NO    LABS:      RADIOLOGY & ADDITIONAL TESTS:    Imaging Personally Reviewed:  [ ] YES  [ ] NO  acetaminophen     Tablet .. 650 milliGRAM(s) Oral every 6 hours PRN  aluminum hydroxide/magnesium hydroxide/simethicone Suspension 30 milliLiter(s) Oral every 4 hours PRN  ampicillin/sulbactam  IVPB 1.5 Gram(s) IV Intermittent every 6 hours  bisacodyl 5 milliGRAM(s) Oral daily PRN  chlorhexidine 2% Cloths 1 Application(s) Topical <User Schedule>  cloNIDine 0.1 milliGRAM(s) Oral once  furosemide   Injectable 20 milliGRAM(s) IV Push every 12 hours  heparin   Injectable 5000 Unit(s) SubCutaneous every 12 hours  melatonin 5 milliGRAM(s) Oral at bedtime PRN  ondansetron Injectable 4 milliGRAM(s) IV Push every 8 hours PRN  pantoprazole    Tablet 40 milliGRAM(s) Oral before breakfast  senna 2 Tablet(s) Oral at bedtime PRN      HEALTH ISSUES - PROBLEM Dx:           Progress note    INTERVAL HPI/OVERNIGHT EVENTS:    Patient seen and examined at bedside. =========incomplete note===========    REVIEW OF SYSTEMS:  All other 13 Review of systems were reviewed and are negative    FAMILY HISTORY:    T(C): 37.1 (12-05-23 @ 05:00), Max: 37.1 (12-05-23 @ 05:00)  HR: 83 (12-05-23 @ 05:00) (77 - 87)  BP: 139/76 (12-05-23 @ 05:00) (128/58 - 139/76)  RR: 18 (12-05-23 @ 05:00) (18 - 18)  SpO2: 97% (12-05-23 @ 05:00) (96% - 97%)  Wt(kg): --Vital Signs Last 24 Hrs  T(C): 37.1 (05 Dec 2023 05:00), Max: 37.1 (05 Dec 2023 05:00)  T(F): 98.7 (05 Dec 2023 05:00), Max: 98.7 (05 Dec 2023 05:00)  HR: 83 (05 Dec 2023 05:00) (77 - 87)  BP: 139/76 (05 Dec 2023 05:00) (128/58 - 139/76)  BP(mean): --  RR: 18 (05 Dec 2023 05:00) (18 - 18)  SpO2: 97% (05 Dec 2023 05:00) (96% - 97%)    Parameters below as of 05 Dec 2023 05:00  Patient On (Oxygen Delivery Method): nasal cannula  O2 Flow (L/min): 2    penicillins (Unknown)  Celebrex (Unknown)      PHYSICAL EXAM:                      Consultant(s) Notes Reviewed:  [x ] YES  [ ] NO  Care Discussed with Consultants/Other Providers [ x] YES  [ ] NO    LABS:      RADIOLOGY & ADDITIONAL TESTS:    Imaging Personally Reviewed:  [ ] YES  [ ] NO  acetaminophen     Tablet .. 650 milliGRAM(s) Oral every 6 hours PRN  aluminum hydroxide/magnesium hydroxide/simethicone Suspension 30 milliLiter(s) Oral every 4 hours PRN  ampicillin/sulbactam  IVPB 1.5 Gram(s) IV Intermittent every 6 hours  bisacodyl 5 milliGRAM(s) Oral daily PRN  chlorhexidine 2% Cloths 1 Application(s) Topical <User Schedule>  cloNIDine 0.1 milliGRAM(s) Oral once  furosemide   Injectable 20 milliGRAM(s) IV Push every 12 hours  heparin   Injectable 5000 Unit(s) SubCutaneous every 12 hours  melatonin 5 milliGRAM(s) Oral at bedtime PRN  ondansetron Injectable 4 milliGRAM(s) IV Push every 8 hours PRN  pantoprazole    Tablet 40 milliGRAM(s) Oral before breakfast  senna 2 Tablet(s) Oral at bedtime PRN      HEALTH ISSUES - PROBLEM Dx:           Progress note    INTERVAL HPI/OVERNIGHT EVENTS:    Patient seen and examined at bedside. She does not appear in any distress.    REVIEW OF SYSTEMS:  All other 13 Review of systems were reviewed and are negative    FAMILY HISTORY:    T(C): 37.1 (12-05-23 @ 05:00), Max: 37.1 (12-05-23 @ 05:00)  HR: 83 (12-05-23 @ 05:00) (77 - 87)  BP: 139/76 (12-05-23 @ 05:00) (128/58 - 139/76)  RR: 18 (12-05-23 @ 05:00) (18 - 18)  SpO2: 97% (12-05-23 @ 05:00) (96% - 97%)  Wt(kg): --Vital Signs Last 24 Hrs  T(C): 37.1 (05 Dec 2023 05:00), Max: 37.1 (05 Dec 2023 05:00)  T(F): 98.7 (05 Dec 2023 05:00), Max: 98.7 (05 Dec 2023 05:00)  HR: 83 (05 Dec 2023 05:00) (77 - 87)  BP: 139/76 (05 Dec 2023 05:00) (128/58 - 139/76)  BP(mean): --  RR: 18 (05 Dec 2023 05:00) (18 - 18)  SpO2: 97% (05 Dec 2023 05:00) (96% - 97%)    Parameters below as of 05 Dec 2023 05:00  Patient On (Oxygen Delivery Method): nasal cannula  O2 Flow (L/min): 2    penicillins (Unknown)  Celebrex (Unknown)      PHYSICAL EXAM:    PHYSICAL EXAM:  GENERAL: NAD,   HEENT : NC/AT, b/l poor hearing   NECK: Supple, No JVD  CHEST/LUNG: b/l air entry, decreased lower bases  HEART: Regular rate and rhythm;   ABDOMEN: Soft, Nontender, Nondistended; Bowel sounds present  EXTREMITIES:  no edema  NEURO:  aox2, generalized weakness   SKIN: No rashes or lesions    Consultant(s) Notes Reviewed:  [x ] YES  [ ] NO  Care Discussed with Consultants/Other Providers [ x] YES  [ ] NO    LABS:      RADIOLOGY & ADDITIONAL TESTS:    Imaging Personally Reviewed:  [ ] YES  [ ] NO  acetaminophen     Tablet .. 650 milliGRAM(s) Oral every 6 hours PRN  aluminum hydroxide/magnesium hydroxide/simethicone Suspension 30 milliLiter(s) Oral every 4 hours PRN  ampicillin/sulbactam  IVPB 1.5 Gram(s) IV Intermittent every 6 hours  bisacodyl 5 milliGRAM(s) Oral daily PRN  chlorhexidine 2% Cloths 1 Application(s) Topical <User Schedule>  cloNIDine 0.1 milliGRAM(s) Oral once  furosemide   Injectable 20 milliGRAM(s) IV Push every 12 hours  heparin   Injectable 5000 Unit(s) SubCutaneous every 12 hours  melatonin 5 milliGRAM(s) Oral at bedtime PRN  ondansetron Injectable 4 milliGRAM(s) IV Push every 8 hours PRN  pantoprazole    Tablet 40 milliGRAM(s) Oral before breakfast  senna 2 Tablet(s) Oral at bedtime PRN      HEALTH ISSUES - PROBLEM Dx:      a 89 yo F pmhx A-Fib, HTN, CKD, CAD, PAD, Dementia, BIBEMS for evaluation of cough and sob x approx one month.  Family member states pt has had an on-going cough, outpt cxr performed (unremarkable per family member). admitted for positive influenza A and acute respiratory failure.     positive influenza A Bronchitis  Large pleural effusion with compression atelectasis Left  Partial occulusion of bilateral lower lobe segmental bronchi  acute respiratory failure  Hyponatremia   A-Fib, CAD, PAD  HTN, CKD  Dementia    Plan:  - CXR today: persistent left side effusion and opacities, improved from prior  -c/w lasix as per cardiology iv  - recs for diuresis for now, if no improvement thoracentesis if family agrees   - blood cultures negative UTD  - S/p tamiflu x 5 days and Unasyn 1.5 gram q 6 hours for Aspiration pneumonia day 5 of 7. Augmentin on dc if needed  - Wean oxygen off daily  - Speech and swallow evaluation for aspiration.   - consulted ID, pulmonary and cardiology Rec's appr.   - PT/rehab, SLP, consulted recs appre,  - Tylenol and pain management   - continue current medication, antihypertensive (with holding parameters),  - monitor vitals closely, daily am labs  - precaution (fall/aspiration/seizure)  - complex patient     DVT prophylaxis: SQH   GI prophylaxis: Protonix   diet: HH diet   code status: full code  Goals of care/disposition: Home once optimized

## 2023-12-06 LAB
CULTURE RESULTS: SIGNIFICANT CHANGE UP
SPECIMEN SOURCE: SIGNIFICANT CHANGE UP

## 2023-12-06 PROCEDURE — 99232 SBSQ HOSP IP/OBS MODERATE 35: CPT

## 2023-12-06 RX ORDER — AMPICILLIN SODIUM AND SULBACTAM SODIUM 250; 125 MG/ML; MG/ML
1.5 INJECTION, POWDER, FOR SUSPENSION INTRAMUSCULAR; INTRAVENOUS EVERY 6 HOURS
Refills: 0 | Status: DISCONTINUED | OUTPATIENT
Start: 2023-12-06 | End: 2023-12-07

## 2023-12-06 RX ORDER — FUROSEMIDE 40 MG
20 TABLET ORAL DAILY
Refills: 0 | Status: DISCONTINUED | OUTPATIENT
Start: 2023-12-07 | End: 2023-12-08

## 2023-12-06 RX ADMIN — AMPICILLIN SODIUM AND SULBACTAM SODIUM 100 GRAM(S): 250; 125 INJECTION, POWDER, FOR SUSPENSION INTRAMUSCULAR; INTRAVENOUS at 22:01

## 2023-12-06 RX ADMIN — Medication 20 MILLIGRAM(S): at 05:07

## 2023-12-06 RX ADMIN — HEPARIN SODIUM 5000 UNIT(S): 5000 INJECTION INTRAVENOUS; SUBCUTANEOUS at 17:27

## 2023-12-06 RX ADMIN — PANTOPRAZOLE SODIUM 40 MILLIGRAM(S): 20 TABLET, DELAYED RELEASE ORAL at 05:07

## 2023-12-06 RX ADMIN — HEPARIN SODIUM 5000 UNIT(S): 5000 INJECTION INTRAVENOUS; SUBCUTANEOUS at 05:07

## 2023-12-06 RX ADMIN — CHLORHEXIDINE GLUCONATE 1 APPLICATION(S): 213 SOLUTION TOPICAL at 05:07

## 2023-12-06 RX ADMIN — AMPICILLIN SODIUM AND SULBACTAM SODIUM 100 GRAM(S): 250; 125 INJECTION, POWDER, FOR SUSPENSION INTRAMUSCULAR; INTRAVENOUS at 05:08

## 2023-12-06 NOTE — PROGRESS NOTE ADULT - SUBJECTIVE AND OBJECTIVE BOX
Progress note    INTERVAL HPI/OVERNIGHT EVENTS:    Patient seen and examined at bedside. She Denies any acute distress. She is on 2L NC.      REVIEW OF SYSTEMS:  All other 13 Review of systems were reviewed and are negative    FAMILY HISTORY:    T(C): 35.6 (12-06-23 @ 05:00), Max: 35.6 (12-05-23 @ 14:55)  HR: 76 (12-06-23 @ 05:00) (76 - 82)  BP: 120/67 (12-06-23 @ 05:00) (120/67 - 148/68)  RR: 18 (12-06-23 @ 05:00) (18 - 18)  SpO2: 96% (12-06-23 @ 05:00) (95% - 96%)  Wt(kg): --Vital Signs Last 24 Hrs  T(C): 35.6 (06 Dec 2023 05:00), Max: 35.6 (05 Dec 2023 14:55)  T(F): 96.1 (06 Dec 2023 05:00), Max: 96.1 (06 Dec 2023 05:00)  HR: 76 (06 Dec 2023 05:00) (76 - 82)  BP: 120/67 (06 Dec 2023 05:00) (120/67 - 148/68)  BP(mean): --  RR: 18 (06 Dec 2023 05:00) (18 - 18)  SpO2: 96% (06 Dec 2023 05:00) (95% - 96%)    Parameters below as of 06 Dec 2023 05:00  Patient On (Oxygen Delivery Method): nasal cannula  O2 Flow (L/min): 2    penicillins (Unknown)  Celebrex (Unknown)      PHYSICAL EXAM:    GENERAL: NAD,   HEENT : NC/AT, b/l poor hearing   NECK: Supple, No JVD  CHEST/LUNG: b/l air entry, decreased lower bases  HEART: Regular rate and rhythm;   ABDOMEN: Soft, Nontender, Nondistended; Bowel sounds present  EXTREMITIES:  no edema  NEURO:  aox2, generalized weakness   SKIN: No rashes or lesions    Consultant(s) Notes Reviewed:  [x ] YES  [ ] NO  Care Discussed with Consultants/Other Providers [ x] YES  [ ] NO    LABS:      RADIOLOGY & ADDITIONAL TESTS:    Imaging Personally Reviewed:  [ ] YES  [ ] NO  acetaminophen     Tablet .. 650 milliGRAM(s) Oral every 6 hours PRN  aluminum hydroxide/magnesium hydroxide/simethicone Suspension 30 milliLiter(s) Oral every 4 hours PRN  bisacodyl 5 milliGRAM(s) Oral daily PRN  chlorhexidine 2% Cloths 1 Application(s) Topical <User Schedule>  cloNIDine 0.1 milliGRAM(s) Oral once  furosemide   Injectable 20 milliGRAM(s) IV Push every 12 hours  heparin   Injectable 5000 Unit(s) SubCutaneous every 12 hours  melatonin 5 milliGRAM(s) Oral at bedtime PRN  ondansetron Injectable 4 milliGRAM(s) IV Push every 8 hours PRN  pantoprazole    Tablet 40 milliGRAM(s) Oral before breakfast  senna 2 Tablet(s) Oral at bedtime PRN      HEALTH ISSUES - PROBLEM Dx:    A 89 yo F pmhx A-Fib, HTN, CKD, CAD, PAD, Dementia, BIBEMS for evaluation of cough and sob x approx one month.  Family member states pt has had an on-going cough, outpt cxr performed (unremarkable per family member). admitted for positive influenza A and acute respiratory failure.     positive influenza A Bronchitis  Large pleural effusion with compression atelectasis Left  Partial occulusion of bilateral lower lobe segmental bronchi  acute respiratory failure  Hyponatremia   A-Fib, CAD, PAD  HTN, CKD  Dementia    Plan:  - Decrease IV diuresis w/ lasix 20mg BID -->qd, if no improvement, may need thoracentesis  - blood cultures negative UTD  - S/p tamiflu x 5 days and Unasyn 1.5 gram q 6 hours for Aspiration pneumonia day 6 of 7. Augmentin on dc if needed  - Wean oxygen off daily  - Speech and swallow evaluation for aspiration.   - CXR in am  - consulted ID, pulmonary and cardiology Rec's appr.   - PT/rehab, SLP, consulted recs appre,  - Tylenol and pain management   - continue current medication, antihypertensive (with holding parameters),  - monitor vitals closely, daily am labs  - precaution (fall/aspiration/seizure)  - complex patient     DVT prophylaxis: SQH   GI prophylaxis: Protonix   diet: HH diet   code status: full code  Goals of care/disposition: Home once optimized   Handoff: S/p tx for flu with tamiflu. C/w Unasyn for aspiration PNA. Titrate down lasix. Wean off oxygen. Chest X-Ray in am. Possibly medically ready in 24-48 hours

## 2023-12-07 LAB
ANION GAP SERPL CALC-SCNC: 12 MMOL/L — SIGNIFICANT CHANGE UP (ref 7–14)
ANION GAP SERPL CALC-SCNC: 12 MMOL/L — SIGNIFICANT CHANGE UP (ref 7–14)
BUN SERPL-MCNC: 20 MG/DL — SIGNIFICANT CHANGE UP (ref 10–20)
BUN SERPL-MCNC: 20 MG/DL — SIGNIFICANT CHANGE UP (ref 10–20)
CALCIUM SERPL-MCNC: 8.8 MG/DL — SIGNIFICANT CHANGE UP (ref 8.4–10.5)
CALCIUM SERPL-MCNC: 8.8 MG/DL — SIGNIFICANT CHANGE UP (ref 8.4–10.5)
CHLORIDE SERPL-SCNC: 94 MMOL/L — LOW (ref 98–110)
CHLORIDE SERPL-SCNC: 94 MMOL/L — LOW (ref 98–110)
CO2 SERPL-SCNC: 31 MMOL/L — SIGNIFICANT CHANGE UP (ref 17–32)
CO2 SERPL-SCNC: 31 MMOL/L — SIGNIFICANT CHANGE UP (ref 17–32)
CREAT SERPL-MCNC: 0.7 MG/DL — SIGNIFICANT CHANGE UP (ref 0.7–1.5)
CREAT SERPL-MCNC: 0.7 MG/DL — SIGNIFICANT CHANGE UP (ref 0.7–1.5)
EGFR: 79 ML/MIN/1.73M2 — SIGNIFICANT CHANGE UP
EGFR: 79 ML/MIN/1.73M2 — SIGNIFICANT CHANGE UP
GLUCOSE SERPL-MCNC: 78 MG/DL — SIGNIFICANT CHANGE UP (ref 70–99)
GLUCOSE SERPL-MCNC: 78 MG/DL — SIGNIFICANT CHANGE UP (ref 70–99)
MAGNESIUM SERPL-MCNC: 2.3 MG/DL — SIGNIFICANT CHANGE UP (ref 1.8–2.4)
MAGNESIUM SERPL-MCNC: 2.3 MG/DL — SIGNIFICANT CHANGE UP (ref 1.8–2.4)
POTASSIUM SERPL-MCNC: 4.1 MMOL/L — SIGNIFICANT CHANGE UP (ref 3.5–5)
POTASSIUM SERPL-MCNC: 4.1 MMOL/L — SIGNIFICANT CHANGE UP (ref 3.5–5)
POTASSIUM SERPL-SCNC: 4.1 MMOL/L — SIGNIFICANT CHANGE UP (ref 3.5–5)
POTASSIUM SERPL-SCNC: 4.1 MMOL/L — SIGNIFICANT CHANGE UP (ref 3.5–5)
SODIUM SERPL-SCNC: 137 MMOL/L — SIGNIFICANT CHANGE UP (ref 135–146)
SODIUM SERPL-SCNC: 137 MMOL/L — SIGNIFICANT CHANGE UP (ref 135–146)

## 2023-12-07 PROCEDURE — 99232 SBSQ HOSP IP/OBS MODERATE 35: CPT

## 2023-12-07 PROCEDURE — 71045 X-RAY EXAM CHEST 1 VIEW: CPT | Mod: 26

## 2023-12-07 RX ADMIN — HEPARIN SODIUM 5000 UNIT(S): 5000 INJECTION INTRAVENOUS; SUBCUTANEOUS at 05:26

## 2023-12-07 RX ADMIN — HEPARIN SODIUM 5000 UNIT(S): 5000 INJECTION INTRAVENOUS; SUBCUTANEOUS at 17:41

## 2023-12-07 RX ADMIN — CHLORHEXIDINE GLUCONATE 1 APPLICATION(S): 213 SOLUTION TOPICAL at 05:27

## 2023-12-07 RX ADMIN — Medication 20 MILLIGRAM(S): at 05:26

## 2023-12-07 RX ADMIN — PANTOPRAZOLE SODIUM 40 MILLIGRAM(S): 20 TABLET, DELAYED RELEASE ORAL at 05:27

## 2023-12-07 RX ADMIN — AMPICILLIN SODIUM AND SULBACTAM SODIUM 100 GRAM(S): 250; 125 INJECTION, POWDER, FOR SUSPENSION INTRAMUSCULAR; INTRAVENOUS at 05:27

## 2023-12-07 RX ADMIN — AMPICILLIN SODIUM AND SULBACTAM SODIUM 100 GRAM(S): 250; 125 INJECTION, POWDER, FOR SUSPENSION INTRAMUSCULAR; INTRAVENOUS at 12:52

## 2023-12-07 NOTE — PROGRESS NOTE ADULT - SUBJECTIVE AND OBJECTIVE BOX
Progress note    INTERVAL HPI/OVERNIGHT EVENTS:    Patient seen and examined at bedside. Pocket talker used. Pt stable on 2L of O2.       REVIEW OF SYSTEMS:  All other 13 Review of systems were reviewed and are negative    FAMILY HISTORY:    T(C): 35.6 (12-06-23 @ 05:00), Max: 35.6 (12-05-23 @ 14:55)  HR: 76 (12-06-23 @ 05:00) (76 - 82)  BP: 120/67 (12-06-23 @ 05:00) (120/67 - 148/68)  RR: 18 (12-06-23 @ 05:00) (18 - 18)  SpO2: 96% (12-06-23 @ 05:00) (95% - 96%)  Wt(kg): --Vital Signs Last 24 Hrs  T(C): 35.6 (06 Dec 2023 05:00), Max: 35.6 (05 Dec 2023 14:55)  T(F): 96.1 (06 Dec 2023 05:00), Max: 96.1 (06 Dec 2023 05:00)  HR: 76 (06 Dec 2023 05:00) (76 - 82)  BP: 120/67 (06 Dec 2023 05:00) (120/67 - 148/68)  BP(mean): --  RR: 18 (06 Dec 2023 05:00) (18 - 18)  SpO2: 96% (06 Dec 2023 05:00) (95% - 96%)    Parameters below as of 06 Dec 2023 05:00  Patient On (Oxygen Delivery Method): nasal cannula  O2 Flow (L/min): 2    penicillins (Unknown)  Celebrex (Unknown)      PHYSICAL EXAM:    GENERAL: NAD,   HEENT : NC/AT, b/l poor hearing   NECK: Supple, No JVD  CHEST/LUNG: b/l air entry, decreased lower bases  HEART: Regular rate and rhythm;   ABDOMEN: Soft, Nontender, Nondistended; Bowel sounds present  EXTREMITIES:  no edema  NEURO:  aox2, generalized weakness   SKIN: No rashes or lesions    Consultant(s) Notes Reviewed:  [x ] YES  [ ] NO  Care Discussed with Consultants/Other Providers [ x] YES  [ ] NO    LABS:      RADIOLOGY & ADDITIONAL TESTS:    Imaging Personally Reviewed:  [ ] YES  [ ] NO  acetaminophen     Tablet .. 650 milliGRAM(s) Oral every 6 hours PRN  aluminum hydroxide/magnesium hydroxide/simethicone Suspension 30 milliLiter(s) Oral every 4 hours PRN  bisacodyl 5 milliGRAM(s) Oral daily PRN  chlorhexidine 2% Cloths 1 Application(s) Topical <User Schedule>  cloNIDine 0.1 milliGRAM(s) Oral once  furosemide   Injectable 20 milliGRAM(s) IV Push every 12 hours  heparin   Injectable 5000 Unit(s) SubCutaneous every 12 hours  melatonin 5 milliGRAM(s) Oral at bedtime PRN  ondansetron Injectable 4 milliGRAM(s) IV Push every 8 hours PRN  pantoprazole    Tablet 40 milliGRAM(s) Oral before breakfast  senna 2 Tablet(s) Oral at bedtime PRN      HEALTH ISSUES - PROBLEM Dx:    A 89 yo F pmhx A-Fib, HTN, CKD, CAD, PAD, Dementia, BIBEMS for evaluation of cough and sob x approx one month.  Family member states pt has had an on-going cough, outpt cxr performed (unremarkable per family member). admitted for positive influenza A and acute respiratory failure.     positive influenza A Bronchitis  Large pleural effusion with compression atelectasis Left  Partial occulusion of bilateral lower lobe segmental bronchi  acute respiratory failure  Hyponatremia   A-Fib, CAD, PAD  HTN, CKD  Dementia    Plan:  - lasix 20mg qd IV  - CXR: unchanged left pleural effusion  - blood cultures negative UTD  - S/p tamiflu x 5 days and Unasyn 1.5 gram q 6 hours for Aspiration pneumonia to be completed today on 12/7  - Wean oxygen off daily  - Speech and swallow evaluation for aspiration.   - CXR in am  - consulted ID, pulmonary and cardiology Rec's appr.   - PT/rehab, SLP recs appreciated  - Tylenol and pain management   - continue current medication, antihypertensive (with holding parameters),  - monitor vitals closely, daily am labs  - precaution (fall/aspiration/seizure)  - complex patient     DVT prophylaxis: SQH   GI prophylaxis: Protonix   diet: HH diet   code status: full code  Goals of care/disposition: Home once optimized   Handoff: S/p tx for flu with tamiflu. Unasyn for aspiration PNA to be completed today. Titrate down lasix. Wean off oxygen. Chest X-Ray in am as pleural effusion unchanged.

## 2023-12-08 LAB
ANION GAP SERPL CALC-SCNC: 12 MMOL/L — SIGNIFICANT CHANGE UP (ref 7–14)
ANION GAP SERPL CALC-SCNC: 12 MMOL/L — SIGNIFICANT CHANGE UP (ref 7–14)
BASOPHILS # BLD AUTO: 0.03 K/UL — SIGNIFICANT CHANGE UP (ref 0–0.2)
BASOPHILS # BLD AUTO: 0.03 K/UL — SIGNIFICANT CHANGE UP (ref 0–0.2)
BASOPHILS NFR BLD AUTO: 0.2 % — SIGNIFICANT CHANGE UP (ref 0–1)
BASOPHILS NFR BLD AUTO: 0.2 % — SIGNIFICANT CHANGE UP (ref 0–1)
BUN SERPL-MCNC: 20 MG/DL — SIGNIFICANT CHANGE UP (ref 10–20)
BUN SERPL-MCNC: 20 MG/DL — SIGNIFICANT CHANGE UP (ref 10–20)
CALCIUM SERPL-MCNC: 8.9 MG/DL — SIGNIFICANT CHANGE UP (ref 8.4–10.5)
CALCIUM SERPL-MCNC: 8.9 MG/DL — SIGNIFICANT CHANGE UP (ref 8.4–10.5)
CHLORIDE SERPL-SCNC: 95 MMOL/L — LOW (ref 98–110)
CHLORIDE SERPL-SCNC: 95 MMOL/L — LOW (ref 98–110)
CO2 SERPL-SCNC: 33 MMOL/L — HIGH (ref 17–32)
CO2 SERPL-SCNC: 33 MMOL/L — HIGH (ref 17–32)
CREAT SERPL-MCNC: 0.7 MG/DL — SIGNIFICANT CHANGE UP (ref 0.7–1.5)
CREAT SERPL-MCNC: 0.7 MG/DL — SIGNIFICANT CHANGE UP (ref 0.7–1.5)
EGFR: 79 ML/MIN/1.73M2 — SIGNIFICANT CHANGE UP
EGFR: 79 ML/MIN/1.73M2 — SIGNIFICANT CHANGE UP
EOSINOPHIL # BLD AUTO: 0 K/UL — SIGNIFICANT CHANGE UP (ref 0–0.7)
EOSINOPHIL # BLD AUTO: 0 K/UL — SIGNIFICANT CHANGE UP (ref 0–0.7)
EOSINOPHIL NFR BLD AUTO: 0 % — SIGNIFICANT CHANGE UP (ref 0–8)
EOSINOPHIL NFR BLD AUTO: 0 % — SIGNIFICANT CHANGE UP (ref 0–8)
GLUCOSE SERPL-MCNC: 83 MG/DL — SIGNIFICANT CHANGE UP (ref 70–99)
GLUCOSE SERPL-MCNC: 83 MG/DL — SIGNIFICANT CHANGE UP (ref 70–99)
HCT VFR BLD CALC: 41.2 % — SIGNIFICANT CHANGE UP (ref 37–47)
HCT VFR BLD CALC: 41.2 % — SIGNIFICANT CHANGE UP (ref 37–47)
HGB BLD-MCNC: 13.2 G/DL — SIGNIFICANT CHANGE UP (ref 12–16)
HGB BLD-MCNC: 13.2 G/DL — SIGNIFICANT CHANGE UP (ref 12–16)
IMM GRANULOCYTES NFR BLD AUTO: 0.7 % — HIGH (ref 0.1–0.3)
IMM GRANULOCYTES NFR BLD AUTO: 0.7 % — HIGH (ref 0.1–0.3)
LYMPHOCYTES # BLD AUTO: 1.21 K/UL — SIGNIFICANT CHANGE UP (ref 1.2–3.4)
LYMPHOCYTES # BLD AUTO: 1.21 K/UL — SIGNIFICANT CHANGE UP (ref 1.2–3.4)
LYMPHOCYTES # BLD AUTO: 8.2 % — LOW (ref 20.5–51.1)
LYMPHOCYTES # BLD AUTO: 8.2 % — LOW (ref 20.5–51.1)
MCHC RBC-ENTMCNC: 27.8 PG — SIGNIFICANT CHANGE UP (ref 27–31)
MCHC RBC-ENTMCNC: 27.8 PG — SIGNIFICANT CHANGE UP (ref 27–31)
MCHC RBC-ENTMCNC: 32 G/DL — SIGNIFICANT CHANGE UP (ref 32–37)
MCHC RBC-ENTMCNC: 32 G/DL — SIGNIFICANT CHANGE UP (ref 32–37)
MCV RBC AUTO: 86.9 FL — SIGNIFICANT CHANGE UP (ref 81–99)
MCV RBC AUTO: 86.9 FL — SIGNIFICANT CHANGE UP (ref 81–99)
MONOCYTES # BLD AUTO: 1.9 K/UL — HIGH (ref 0.1–0.6)
MONOCYTES # BLD AUTO: 1.9 K/UL — HIGH (ref 0.1–0.6)
MONOCYTES NFR BLD AUTO: 12.8 % — HIGH (ref 1.7–9.3)
MONOCYTES NFR BLD AUTO: 12.8 % — HIGH (ref 1.7–9.3)
NEUTROPHILS # BLD AUTO: 11.55 K/UL — HIGH (ref 1.4–6.5)
NEUTROPHILS # BLD AUTO: 11.55 K/UL — HIGH (ref 1.4–6.5)
NEUTROPHILS NFR BLD AUTO: 78.1 % — HIGH (ref 42.2–75.2)
NEUTROPHILS NFR BLD AUTO: 78.1 % — HIGH (ref 42.2–75.2)
NRBC # BLD: 0 /100 WBCS — SIGNIFICANT CHANGE UP (ref 0–0)
NRBC # BLD: 0 /100 WBCS — SIGNIFICANT CHANGE UP (ref 0–0)
PLATELET # BLD AUTO: 287 K/UL — SIGNIFICANT CHANGE UP (ref 130–400)
PLATELET # BLD AUTO: 287 K/UL — SIGNIFICANT CHANGE UP (ref 130–400)
PMV BLD: 10.6 FL — HIGH (ref 7.4–10.4)
PMV BLD: 10.6 FL — HIGH (ref 7.4–10.4)
POTASSIUM SERPL-MCNC: 3.9 MMOL/L — SIGNIFICANT CHANGE UP (ref 3.5–5)
POTASSIUM SERPL-MCNC: 3.9 MMOL/L — SIGNIFICANT CHANGE UP (ref 3.5–5)
POTASSIUM SERPL-SCNC: 3.9 MMOL/L — SIGNIFICANT CHANGE UP (ref 3.5–5)
POTASSIUM SERPL-SCNC: 3.9 MMOL/L — SIGNIFICANT CHANGE UP (ref 3.5–5)
RBC # BLD: 4.74 M/UL — SIGNIFICANT CHANGE UP (ref 4.2–5.4)
RBC # BLD: 4.74 M/UL — SIGNIFICANT CHANGE UP (ref 4.2–5.4)
RBC # FLD: 13.3 % — SIGNIFICANT CHANGE UP (ref 11.5–14.5)
RBC # FLD: 13.3 % — SIGNIFICANT CHANGE UP (ref 11.5–14.5)
SODIUM SERPL-SCNC: 140 MMOL/L — SIGNIFICANT CHANGE UP (ref 135–146)
SODIUM SERPL-SCNC: 140 MMOL/L — SIGNIFICANT CHANGE UP (ref 135–146)
WBC # BLD: 14.79 K/UL — HIGH (ref 4.8–10.8)
WBC # BLD: 14.79 K/UL — HIGH (ref 4.8–10.8)
WBC # FLD AUTO: 14.79 K/UL — HIGH (ref 4.8–10.8)
WBC # FLD AUTO: 14.79 K/UL — HIGH (ref 4.8–10.8)

## 2023-12-08 PROCEDURE — 71250 CT THORAX DX C-: CPT | Mod: 26

## 2023-12-08 PROCEDURE — 99233 SBSQ HOSP IP/OBS HIGH 50: CPT

## 2023-12-08 PROCEDURE — 71045 X-RAY EXAM CHEST 1 VIEW: CPT | Mod: 26

## 2023-12-08 RX ORDER — FUROSEMIDE 40 MG
20 TABLET ORAL
Refills: 0 | Status: DISCONTINUED | OUTPATIENT
Start: 2023-12-08 | End: 2023-12-09

## 2023-12-08 RX ADMIN — HEPARIN SODIUM 5000 UNIT(S): 5000 INJECTION INTRAVENOUS; SUBCUTANEOUS at 17:35

## 2023-12-08 RX ADMIN — Medication 20 MILLIGRAM(S): at 17:35

## 2023-12-08 RX ADMIN — Medication 20 MILLIGRAM(S): at 05:20

## 2023-12-08 RX ADMIN — PANTOPRAZOLE SODIUM 40 MILLIGRAM(S): 20 TABLET, DELAYED RELEASE ORAL at 05:20

## 2023-12-08 RX ADMIN — HEPARIN SODIUM 5000 UNIT(S): 5000 INJECTION INTRAVENOUS; SUBCUTANEOUS at 05:24

## 2023-12-08 RX ADMIN — CHLORHEXIDINE GLUCONATE 1 APPLICATION(S): 213 SOLUTION TOPICAL at 05:24

## 2023-12-08 NOTE — PROGRESS NOTE ADULT - SUBJECTIVE AND OBJECTIVE BOX
Progress note    INTERVAL HPI/OVERNIGHT EVENTS:    Patient seen and examined at bedside. No acute distress, resting comfortably.      REVIEW OF SYSTEMS:  All other 13 Review of systems were reviewed and are negative    FAMILY HISTORY:    T(C): 37.1 (12-08-23 @ 04:44), Max: 37.1 (12-08-23 @ 04:44)  HR: 87 (12-08-23 @ 04:44) (80 - 88)  BP: 106/59 (12-08-23 @ 04:44) (106/59 - 136/61)  RR: 18 (12-07-23 @ 20:48) (18 - 18)  SpO2: 94% (12-08-23 @ 09:50) (92% - 97%)  Wt(kg): --Vital Signs Last 24 Hrs  T(C): 37.1 (08 Dec 2023 04:44), Max: 37.1 (08 Dec 2023 04:44)  T(F): 98.7 (08 Dec 2023 04:44), Max: 98.7 (08 Dec 2023 04:44)  HR: 87 (08 Dec 2023 04:44) (80 - 88)  BP: 106/59 (08 Dec 2023 04:44) (106/59 - 136/61)  BP(mean): --  RR: 18 (07 Dec 2023 20:48) (18 - 18)  SpO2: 94% (08 Dec 2023 09:50) (92% - 97%)    Parameters below as of 08 Dec 2023 09:50  Patient On (Oxygen Delivery Method): room air      Celebrex (Unknown)      PHYSICAL EXAM:    GENERAL: NAD,   HEENT : NC/AT, b/l poor hearing   NECK: Supple, No JVD  CHEST/LUNG: b/l air entry, decreased lower bases  HEART: Regular rate and rhythm;   ABDOMEN: Soft, Nontender, Nondistended; Bowel sounds present  EXTREMITIES:  no edema  NEURO:  aox2, generalized weakness   SKIN: No rashes or lesions    Consultant(s) Notes Reviewed:  [x ] YES  [ ] NO  Care Discussed with Consultants/Other Providers [ x] YES  [ ] NO    LABS:      RADIOLOGY & ADDITIONAL TESTS:    Imaging Personally Reviewed:  [ ] YES  [ ] NO  acetaminophen     Tablet .. 650 milliGRAM(s) Oral every 6 hours PRN  aluminum hydroxide/magnesium hydroxide/simethicone Suspension 30 milliLiter(s) Oral every 4 hours PRN  bisacodyl 5 milliGRAM(s) Oral daily PRN  chlorhexidine 2% Cloths 1 Application(s) Topical <User Schedule>  cloNIDine 0.1 milliGRAM(s) Oral once  furosemide   Injectable 20 milliGRAM(s) IV Push daily  heparin   Injectable 5000 Unit(s) SubCutaneous every 12 hours  melatonin 5 milliGRAM(s) Oral at bedtime PRN  ondansetron Injectable 4 milliGRAM(s) IV Push every 8 hours PRN  pantoprazole    Tablet 40 milliGRAM(s) Oral before breakfast  senna 2 Tablet(s) Oral at bedtime PRN      HEALTH ISSUES - PROBLEM Dx:    A 89 yo F pmhx A-Fib, HTN, CKD, CAD, PAD, Dementia, BIBEMS for evaluation of cough and sob x approx one month.  Family member states pt has had an on-going cough, outpt cxr performed (unremarkable per family member). admitted for positive influenza A and acute respiratory failure.     positive influenza A Bronchitis  Large pleural effusion with compression atelectasis Left  Partial occulusion of bilateral lower lobe segmental bronchi  acute respiratory failure  Hyponatremia   A-Fib, CAD, PAD  HTN, CKD  Dementia    Plan:  - lasix 20mg qd IV  - CXR: unchanged left pleural effusion  - blood cultures negative UTD  - S/p tamiflu x 5 days and Unasyn x 7 days  - Off oxygen  - Speech and swallow evaluation for aspiration.   - Chest X-Ray shows stable opacities  - consulted ID, pulmonary and cardiology Rec's appr.   - PT/rehab, SLP recs appreciated  - Tylenol and pain management   - continue current medication, antihypertensive (with holding parameters),  - monitor vitals closely, daily am labs  - precaution (fall/aspiration/seizure)  - complex patient     DVT prophylaxis: SQH   GI prophylaxis: Protonix   diet: HH diet   code status: full code  Goals of care/disposition: Home once optimized   Handoff: Dc planning, pending PT evaluation, may need SNF or home PT        Progress note    INTERVAL HPI/OVERNIGHT EVENTS:    Patient seen and examined at bedside. No acute distress, resting comfortably.      REVIEW OF SYSTEMS:  All other 13 Review of systems were reviewed and are negative    FAMILY HISTORY:    T(C): 37.1 (12-08-23 @ 04:44), Max: 37.1 (12-08-23 @ 04:44)  HR: 87 (12-08-23 @ 04:44) (80 - 88)  BP: 106/59 (12-08-23 @ 04:44) (106/59 - 136/61)  RR: 18 (12-07-23 @ 20:48) (18 - 18)  SpO2: 94% (12-08-23 @ 09:50) (92% - 97%)  Wt(kg): --Vital Signs Last 24 Hrs  T(C): 37.1 (08 Dec 2023 04:44), Max: 37.1 (08 Dec 2023 04:44)  T(F): 98.7 (08 Dec 2023 04:44), Max: 98.7 (08 Dec 2023 04:44)  HR: 87 (08 Dec 2023 04:44) (80 - 88)  BP: 106/59 (08 Dec 2023 04:44) (106/59 - 136/61)  BP(mean): --  RR: 18 (07 Dec 2023 20:48) (18 - 18)  SpO2: 94% (08 Dec 2023 09:50) (92% - 97%)    Parameters below as of 08 Dec 2023 09:50  Patient On (Oxygen Delivery Method): room air      Celebrex (Unknown)      PHYSICAL EXAM:    GENERAL: NAD,   HEENT : NC/AT, b/l poor hearing   NECK: Supple, No JVD  CHEST/LUNG: b/l air entry, decreased lower bases  HEART: Regular rate and rhythm;   ABDOMEN: Soft, Nontender, Nondistended; Bowel sounds present  EXTREMITIES:  no edema  NEURO:  aox2, generalized weakness   SKIN: No rashes or lesions    Consultant(s) Notes Reviewed:  [x ] YES  [ ] NO  Care Discussed with Consultants/Other Providers [ x] YES  [ ] NO    LABS:      RADIOLOGY & ADDITIONAL TESTS:    Imaging Personally Reviewed:  [ ] YES  [ ] NO  acetaminophen     Tablet .. 650 milliGRAM(s) Oral every 6 hours PRN  aluminum hydroxide/magnesium hydroxide/simethicone Suspension 30 milliLiter(s) Oral every 4 hours PRN  bisacodyl 5 milliGRAM(s) Oral daily PRN  chlorhexidine 2% Cloths 1 Application(s) Topical <User Schedule>  cloNIDine 0.1 milliGRAM(s) Oral once  furosemide   Injectable 20 milliGRAM(s) IV Push daily  heparin   Injectable 5000 Unit(s) SubCutaneous every 12 hours  melatonin 5 milliGRAM(s) Oral at bedtime PRN  ondansetron Injectable 4 milliGRAM(s) IV Push every 8 hours PRN  pantoprazole    Tablet 40 milliGRAM(s) Oral before breakfast  senna 2 Tablet(s) Oral at bedtime PRN      HEALTH ISSUES - PROBLEM Dx:    A 89 yo F pmhx A-Fib, HTN, CKD, CAD, PAD, Dementia, BIBEMS for evaluation of cough and sob x approx one month.  Family member states pt has had an on-going cough, outpt cxr performed (unremarkable per family member). admitted for positive influenza A and acute respiratory failure.     positive influenza A Bronchitis  Large pleural effusion with compression atelectasis Left  Partial occulusion of bilateral lower lobe segmental bronchi  acute respiratory failure  Hyponatremia   A-Fib, CAD, PAD  HTN, CKD  Dementia    Plan:  - lasix 20mg qd IV  - CXR: unchanged left pleural effusion  - blood cultures negative UTD  - S/p tamiflu x 5 days and Unasyn x 7 days  - Off oxygen  - Speech and swallow evaluation for aspiration.   - Chest X-Ray shows stable opacities  - consulted ID, pulmonary and cardiology Rec's appr.   - PT/rehab, SLP recs appreciated  - Tylenol and pain management   - continue current medication, antihypertensive (with holding parameters),  - monitor vitals closely, daily am labs  - precaution (fall/aspiration/seizure)  - complex patient     DVT prophylaxis: SQH   GI prophylaxis: Protonix   diet: HH diet   code status: full code  Goals of care/disposition: Home once optimized   Handoff: Dc planning, pending PT evaluation, may need SNF or home PT        Progress note    INTERVAL HPI/OVERNIGHT EVENTS:    Patient seen and examined at bedside. No acute distress, resting comfortably.      REVIEW OF SYSTEMS:  All other 13 Review of systems were reviewed and are negative    FAMILY HISTORY:    T(C): 37.1 (12-08-23 @ 04:44), Max: 37.1 (12-08-23 @ 04:44)  HR: 87 (12-08-23 @ 04:44) (80 - 88)  BP: 106/59 (12-08-23 @ 04:44) (106/59 - 136/61)  RR: 18 (12-07-23 @ 20:48) (18 - 18)  SpO2: 94% (12-08-23 @ 09:50) (92% - 97%)  Wt(kg): --Vital Signs Last 24 Hrs  T(C): 37.1 (08 Dec 2023 04:44), Max: 37.1 (08 Dec 2023 04:44)  T(F): 98.7 (08 Dec 2023 04:44), Max: 98.7 (08 Dec 2023 04:44)  HR: 87 (08 Dec 2023 04:44) (80 - 88)  BP: 106/59 (08 Dec 2023 04:44) (106/59 - 136/61)  BP(mean): --  RR: 18 (07 Dec 2023 20:48) (18 - 18)  SpO2: 94% (08 Dec 2023 09:50) (92% - 97%)    Parameters below as of 08 Dec 2023 09:50  Patient On (Oxygen Delivery Method): room air      Celebrex (Unknown)      PHYSICAL EXAM:    GENERAL: NAD,   HEENT : NC/AT, b/l poor hearing   NECK: Supple, No JVD  CHEST/LUNG: b/l air entry, decreased lower bases  HEART: Regular rate and rhythm;   ABDOMEN: Soft, Nontender, Nondistended; Bowel sounds present  EXTREMITIES:  no edema  NEURO:  aox2, generalized weakness   SKIN: No rashes or lesions    Consultant(s) Notes Reviewed:  [x ] YES  [ ] NO  Care Discussed with Consultants/Other Providers [ x] YES  [ ] NO    LABS:      RADIOLOGY & ADDITIONAL TESTS:    Imaging Personally Reviewed:  [ ] YES  [ ] NO  acetaminophen     Tablet .. 650 milliGRAM(s) Oral every 6 hours PRN  aluminum hydroxide/magnesium hydroxide/simethicone Suspension 30 milliLiter(s) Oral every 4 hours PRN  bisacodyl 5 milliGRAM(s) Oral daily PRN  chlorhexidine 2% Cloths 1 Application(s) Topical <User Schedule>  cloNIDine 0.1 milliGRAM(s) Oral once  furosemide   Injectable 20 milliGRAM(s) IV Push daily  heparin   Injectable 5000 Unit(s) SubCutaneous every 12 hours  melatonin 5 milliGRAM(s) Oral at bedtime PRN  ondansetron Injectable 4 milliGRAM(s) IV Push every 8 hours PRN  pantoprazole    Tablet 40 milliGRAM(s) Oral before breakfast  senna 2 Tablet(s) Oral at bedtime PRN      HEALTH ISSUES - PROBLEM Dx:    A 89 yo F pmhx A-Fib, HTN, CKD, CAD, PAD, Dementia, BIBEMS for evaluation of cough and sob x approx one month.  Family member states pt has had an on-going cough, outpt cxr performed (unremarkable per family member). admitted for positive influenza A and acute respiratory failure.     positive influenza A Bronchitis  Large pleural effusion with compression atelectasis Left  Partial occulusion of bilateral lower lobe segmental bronchi  acute respiratory failure  Hyponatremia   A-Fib, CAD, PAD  HTN, CKD  Dementia    Plan:  - lasix 20mg qd IV  - CXR: unchanged left pleural effusion  - blood cultures negative UTD  - S/p tamiflu x 5 days and Unasyn x 7 days  - Off oxygen  - Speech and swallow evaluation for aspiration.   - Chest X-Ray shows stable opacities  - consulted ID, pulmonary and cardiology Rec's appr.   - PT/rehab, SLP recs appreciated  - continue current medication, antihypertensive (with holding parameters),  - precaution (fall/aspiration/seizure)  - Patient still with significant SOB with ambulation, with check repeat CT noncontrast, if pleural effusion still significant, may need thoracentesis  - complex patient     DVT prophylaxis: SQH   GI prophylaxis: Protonix   diet: HH diet   code status: full code  Goals of care/disposition: Home once optimized   Handoff: Acute, pending improvement in pleural effusion       Progress note    INTERVAL HPI/OVERNIGHT EVENTS:    Patient seen and examined at bedside. No acute distress, resting comfortably.      REVIEW OF SYSTEMS:  All other 13 Review of systems were reviewed and are negative    FAMILY HISTORY:    T(C): 37.1 (12-08-23 @ 04:44), Max: 37.1 (12-08-23 @ 04:44)  HR: 87 (12-08-23 @ 04:44) (80 - 88)  BP: 106/59 (12-08-23 @ 04:44) (106/59 - 136/61)  RR: 18 (12-07-23 @ 20:48) (18 - 18)  SpO2: 94% (12-08-23 @ 09:50) (92% - 97%)  Wt(kg): --Vital Signs Last 24 Hrs  T(C): 37.1 (08 Dec 2023 04:44), Max: 37.1 (08 Dec 2023 04:44)  T(F): 98.7 (08 Dec 2023 04:44), Max: 98.7 (08 Dec 2023 04:44)  HR: 87 (08 Dec 2023 04:44) (80 - 88)  BP: 106/59 (08 Dec 2023 04:44) (106/59 - 136/61)  BP(mean): --  RR: 18 (07 Dec 2023 20:48) (18 - 18)  SpO2: 94% (08 Dec 2023 09:50) (92% - 97%)    Parameters below as of 08 Dec 2023 09:50  Patient On (Oxygen Delivery Method): room air      Celebrex (Unknown)      PHYSICAL EXAM:    GENERAL: NAD,   HEENT : NC/AT, b/l poor hearing   NECK: Supple, No JVD  CHEST/LUNG: b/l air entry, decreased lower bases  HEART: Regular rate and rhythm;   ABDOMEN: Soft, Nontender, Nondistended; Bowel sounds present  EXTREMITIES:  no edema  NEURO:  aox2, generalized weakness   SKIN: No rashes or lesions    Consultant(s) Notes Reviewed:  [x ] YES  [ ] NO  Care Discussed with Consultants/Other Providers [ x] YES  [ ] NO    LABS:      RADIOLOGY & ADDITIONAL TESTS:    Imaging Personally Reviewed:  [ ] YES  [ ] NO  acetaminophen     Tablet .. 650 milliGRAM(s) Oral every 6 hours PRN  aluminum hydroxide/magnesium hydroxide/simethicone Suspension 30 milliLiter(s) Oral every 4 hours PRN  bisacodyl 5 milliGRAM(s) Oral daily PRN  chlorhexidine 2% Cloths 1 Application(s) Topical <User Schedule>  cloNIDine 0.1 milliGRAM(s) Oral once  furosemide   Injectable 20 milliGRAM(s) IV Push daily  heparin   Injectable 5000 Unit(s) SubCutaneous every 12 hours  melatonin 5 milliGRAM(s) Oral at bedtime PRN  ondansetron Injectable 4 milliGRAM(s) IV Push every 8 hours PRN  pantoprazole    Tablet 40 milliGRAM(s) Oral before breakfast  senna 2 Tablet(s) Oral at bedtime PRN      HEALTH ISSUES - PROBLEM Dx:    A 89 yo F pmhx A-Fib, HTN, CKD, CAD, PAD, Dementia, BIBEMS for evaluation of cough and sob x approx one month.  Family member states pt has had an on-going cough, outpt cxr performed (unremarkable per family member). admitted for positive influenza A and acute respiratory failure.     positive influenza A Bronchitis  Large pleural effusion with compression atelectasis Left  Partial occulusion of bilateral lower lobe segmental bronchi  acute respiratory failure  Hyponatremia   A-Fib, CAD, PAD  HTN, CKD  Dementia    Plan:  - lasix 20mg qd IV  - CXR: unchanged left pleural effusion  - blood cultures negative UTD  - S/p tamiflu x 5 days and Unasyn x 7 days  - Off oxygen  - Speech and swallow evaluation for aspiration.   - Chest X-Ray shows stable opacities  - consulted ID, pulmonary and cardiology Rec's appr.   - PT/rehab, SLP recs appreciated  - continue current medication, antihypertensive (with holding parameters),  - precaution (fall/aspiration/seizure)  - Patient still with significant SOB with ambulation, with check repeat CT noncontrast, if pleural effusion still significant, may need thoracentesis  - complex patient     DVT prophylaxis: SQH   GI prophylaxis: Protonix   diet: HH diet   code status: full code  Goals of care/disposition: Home once optimized   Handoff: Acute, pending improvement in pleural effusion       Progress note    INTERVAL HPI/OVERNIGHT EVENTS:    Patient seen and examined at bedside. No acute distress, resting comfortably.      REVIEW OF SYSTEMS:  All other 13 Review of systems were reviewed and are negative    FAMILY HISTORY:    T(C): 37.1 (12-08-23 @ 04:44), Max: 37.1 (12-08-23 @ 04:44)  HR: 87 (12-08-23 @ 04:44) (80 - 88)  BP: 106/59 (12-08-23 @ 04:44) (106/59 - 136/61)  RR: 18 (12-07-23 @ 20:48) (18 - 18)  SpO2: 94% (12-08-23 @ 09:50) (92% - 97%)  Wt(kg): --Vital Signs Last 24 Hrs  T(C): 37.1 (08 Dec 2023 04:44), Max: 37.1 (08 Dec 2023 04:44)  T(F): 98.7 (08 Dec 2023 04:44), Max: 98.7 (08 Dec 2023 04:44)  HR: 87 (08 Dec 2023 04:44) (80 - 88)  BP: 106/59 (08 Dec 2023 04:44) (106/59 - 136/61)  BP(mean): --  RR: 18 (07 Dec 2023 20:48) (18 - 18)  SpO2: 94% (08 Dec 2023 09:50) (92% - 97%)    Parameters below as of 08 Dec 2023 09:50  Patient On (Oxygen Delivery Method): room air      Celebrex (Unknown)      PHYSICAL EXAM:    GENERAL: NAD,   HEENT : NC/AT, b/l poor hearing   NECK: Supple, No JVD  CHEST/LUNG: b/l air entry, decreased lower bases  HEART: Regular rate and rhythm;   ABDOMEN: Soft, Nontender, Nondistended; Bowel sounds present  EXTREMITIES:  no edema  NEURO:  aox2, generalized weakness   SKIN: No rashes or lesions    Consultant(s) Notes Reviewed:  [x ] YES  [ ] NO  Care Discussed with Consultants/Other Providers [ x] YES  [ ] NO    LABS:      RADIOLOGY & ADDITIONAL TESTS:    Imaging Personally Reviewed:  [ ] YES  [ ] NO  acetaminophen     Tablet .. 650 milliGRAM(s) Oral every 6 hours PRN  aluminum hydroxide/magnesium hydroxide/simethicone Suspension 30 milliLiter(s) Oral every 4 hours PRN  bisacodyl 5 milliGRAM(s) Oral daily PRN  chlorhexidine 2% Cloths 1 Application(s) Topical <User Schedule>  cloNIDine 0.1 milliGRAM(s) Oral once  furosemide   Injectable 20 milliGRAM(s) IV Push daily  heparin   Injectable 5000 Unit(s) SubCutaneous every 12 hours  melatonin 5 milliGRAM(s) Oral at bedtime PRN  ondansetron Injectable 4 milliGRAM(s) IV Push every 8 hours PRN  pantoprazole    Tablet 40 milliGRAM(s) Oral before breakfast  senna 2 Tablet(s) Oral at bedtime PRN      HEALTH ISSUES - PROBLEM Dx:    A 89 yo F pmhx A-Fib, HTN, CKD, CAD, PAD, Dementia, BIBEMS for evaluation of cough and sob x approx one month.  Family member states pt has had an on-going cough, outpt cxr performed (unremarkable per family member). admitted for positive influenza A and acute respiratory failure.     positive influenza A Bronchitis  Large pleural effusion with compression atelectasis Left  Partial occulusion of bilateral lower lobe segmental bronchi  acute respiratory failure  Hyponatremia   A-Fib, CAD, PAD  HTN, CKD  Dementia    Plan:  - CXR: unchanged left pleural effusion  - blood cultures negative UTD  - S/p tamiflu x 5 days and Unasyn x 7 days  - Off oxygen  - Speech and swallow evaluation for aspiration.   - Chest X-Ray shows stable opacities  - consulted ID, pulmonary and cardiology Rec's appr.   - PT/rehab, SLP recs appreciated  - continue current medication, antihypertensive (with holding parameters),  - precaution (fall/aspiration/seizure)  - CT scan still with pleural effusion but appears improved from prior CT (pending official read). Will increase IV lasix 20 qd-->BID and reassess tomorrow  -If No improvement, may need Thoracentesis if family agrees.  - complex patient     DVT prophylaxis: SQH   GI prophylaxis: Protonix   diet: HH diet   code status: full code  Goals of care/disposition: Home once optimized   Handoff: Acute, pending improvement in pleural effusion       Progress note    INTERVAL HPI/OVERNIGHT EVENTS:    Patient seen and examined at bedside. No acute distress, resting comfortably.      REVIEW OF SYSTEMS:  All other 13 Review of systems were reviewed and are negative    FAMILY HISTORY:    T(C): 37.1 (12-08-23 @ 04:44), Max: 37.1 (12-08-23 @ 04:44)  HR: 87 (12-08-23 @ 04:44) (80 - 88)  BP: 106/59 (12-08-23 @ 04:44) (106/59 - 136/61)  RR: 18 (12-07-23 @ 20:48) (18 - 18)  SpO2: 94% (12-08-23 @ 09:50) (92% - 97%)  Wt(kg): --Vital Signs Last 24 Hrs  T(C): 37.1 (08 Dec 2023 04:44), Max: 37.1 (08 Dec 2023 04:44)  T(F): 98.7 (08 Dec 2023 04:44), Max: 98.7 (08 Dec 2023 04:44)  HR: 87 (08 Dec 2023 04:44) (80 - 88)  BP: 106/59 (08 Dec 2023 04:44) (106/59 - 136/61)  BP(mean): --  RR: 18 (07 Dec 2023 20:48) (18 - 18)  SpO2: 94% (08 Dec 2023 09:50) (92% - 97%)    Parameters below as of 08 Dec 2023 09:50  Patient On (Oxygen Delivery Method): room air      Celebrex (Unknown)      PHYSICAL EXAM:    GENERAL: NAD,   HEENT : NC/AT, b/l poor hearing   NECK: Supple, No JVD  CHEST/LUNG: b/l air entry, decreased lower bases  HEART: Regular rate and rhythm;   ABDOMEN: Soft, Nontender, Nondistended; Bowel sounds present  EXTREMITIES:  no edema  NEURO:  aox2, generalized weakness   SKIN: No rashes or lesions    Consultant(s) Notes Reviewed:  [x ] YES  [ ] NO  Care Discussed with Consultants/Other Providers [ x] YES  [ ] NO    LABS:      RADIOLOGY & ADDITIONAL TESTS:    Imaging Personally Reviewed:  [ ] YES  [ ] NO  acetaminophen     Tablet .. 650 milliGRAM(s) Oral every 6 hours PRN  aluminum hydroxide/magnesium hydroxide/simethicone Suspension 30 milliLiter(s) Oral every 4 hours PRN  bisacodyl 5 milliGRAM(s) Oral daily PRN  chlorhexidine 2% Cloths 1 Application(s) Topical <User Schedule>  cloNIDine 0.1 milliGRAM(s) Oral once  furosemide   Injectable 20 milliGRAM(s) IV Push daily  heparin   Injectable 5000 Unit(s) SubCutaneous every 12 hours  melatonin 5 milliGRAM(s) Oral at bedtime PRN  ondansetron Injectable 4 milliGRAM(s) IV Push every 8 hours PRN  pantoprazole    Tablet 40 milliGRAM(s) Oral before breakfast  senna 2 Tablet(s) Oral at bedtime PRN      HEALTH ISSUES - PROBLEM Dx:    A 89 yo F pmhx A-Fib, HTN, CKD, CAD, PAD, Dementia, BIBEMS for evaluation of cough and sob x approx one month.  Family member states pt has had an on-going cough, outpt cxr performed (unremarkable per family member). admitted for positive influenza A and acute respiratory failure.     positive influenza A Bronchitis  Large pleural effusion with compression atelectasis Left  Partial occulusion of bilateral lower lobe segmental bronchi  acute respiratory failure  Hyponatremia   A-Fib, CAD, PAD  HTN, CKD  Dementia    Plan:  - CXR: unchanged left pleural effusion  - blood cultures negative UTD  - S/p tamiflu x 5 days and Unasyn x 7 days  - Off oxygen  - Speech and swallow evaluation for aspiration.   - Chest X-Ray shows stable opacities  - consulted ID, pulmonary and cardiology Rec's appr.   - PT/rehab, SLP recs appreciated  - continue current medication, antihypertensive (with holding parameters),  - precaution (fall/aspiration/seizure)  - CT scan still with pleural effusion but appears improved from prior CT (pending official read). Will increase IV lasix 20 qd-->BID and reassess tomorrow  -If No improvement, may need Thoracentesis if family agrees.  - complex patient     DVT prophylaxis: SQH   GI prophylaxis: Protonix   diet: HH diet   code status: full code  Goals of care/disposition: Home once optimized   Handoff: Acute, pending improvement in pleural effusion

## 2023-12-09 LAB
ALBUMIN SERPL ELPH-MCNC: 3.4 G/DL — LOW (ref 3.5–5.2)
ALBUMIN SERPL ELPH-MCNC: 3.4 G/DL — LOW (ref 3.5–5.2)
ALP SERPL-CCNC: 85 U/L — SIGNIFICANT CHANGE UP (ref 30–115)
ALP SERPL-CCNC: 85 U/L — SIGNIFICANT CHANGE UP (ref 30–115)
ALT FLD-CCNC: <5 U/L — SIGNIFICANT CHANGE UP (ref 0–41)
ALT FLD-CCNC: <5 U/L — SIGNIFICANT CHANGE UP (ref 0–41)
ANION GAP SERPL CALC-SCNC: 12 MMOL/L — SIGNIFICANT CHANGE UP (ref 7–14)
ANION GAP SERPL CALC-SCNC: 12 MMOL/L — SIGNIFICANT CHANGE UP (ref 7–14)
AST SERPL-CCNC: 13 U/L — SIGNIFICANT CHANGE UP (ref 0–41)
AST SERPL-CCNC: 13 U/L — SIGNIFICANT CHANGE UP (ref 0–41)
BILIRUB SERPL-MCNC: 1.9 MG/DL — HIGH (ref 0.2–1.2)
BILIRUB SERPL-MCNC: 1.9 MG/DL — HIGH (ref 0.2–1.2)
BUN SERPL-MCNC: 21 MG/DL — HIGH (ref 10–20)
BUN SERPL-MCNC: 21 MG/DL — HIGH (ref 10–20)
CALCIUM SERPL-MCNC: 9 MG/DL — SIGNIFICANT CHANGE UP (ref 8.4–10.5)
CALCIUM SERPL-MCNC: 9 MG/DL — SIGNIFICANT CHANGE UP (ref 8.4–10.5)
CHLORIDE SERPL-SCNC: 95 MMOL/L — LOW (ref 98–110)
CHLORIDE SERPL-SCNC: 95 MMOL/L — LOW (ref 98–110)
CO2 SERPL-SCNC: 33 MMOL/L — HIGH (ref 17–32)
CO2 SERPL-SCNC: 33 MMOL/L — HIGH (ref 17–32)
CREAT SERPL-MCNC: 0.7 MG/DL — SIGNIFICANT CHANGE UP (ref 0.7–1.5)
CREAT SERPL-MCNC: 0.7 MG/DL — SIGNIFICANT CHANGE UP (ref 0.7–1.5)
EGFR: 79 ML/MIN/1.73M2 — SIGNIFICANT CHANGE UP
EGFR: 79 ML/MIN/1.73M2 — SIGNIFICANT CHANGE UP
GLUCOSE SERPL-MCNC: 78 MG/DL — SIGNIFICANT CHANGE UP (ref 70–99)
GLUCOSE SERPL-MCNC: 78 MG/DL — SIGNIFICANT CHANGE UP (ref 70–99)
HCT VFR BLD CALC: 41.3 % — SIGNIFICANT CHANGE UP (ref 37–47)
HCT VFR BLD CALC: 41.3 % — SIGNIFICANT CHANGE UP (ref 37–47)
HGB BLD-MCNC: 13.3 G/DL — SIGNIFICANT CHANGE UP (ref 12–16)
HGB BLD-MCNC: 13.3 G/DL — SIGNIFICANT CHANGE UP (ref 12–16)
MAGNESIUM SERPL-MCNC: 2.1 MG/DL — SIGNIFICANT CHANGE UP (ref 1.8–2.4)
MAGNESIUM SERPL-MCNC: 2.1 MG/DL — SIGNIFICANT CHANGE UP (ref 1.8–2.4)
MCHC RBC-ENTMCNC: 27.9 PG — SIGNIFICANT CHANGE UP (ref 27–31)
MCHC RBC-ENTMCNC: 27.9 PG — SIGNIFICANT CHANGE UP (ref 27–31)
MCHC RBC-ENTMCNC: 32.2 G/DL — SIGNIFICANT CHANGE UP (ref 32–37)
MCHC RBC-ENTMCNC: 32.2 G/DL — SIGNIFICANT CHANGE UP (ref 32–37)
MCV RBC AUTO: 86.6 FL — SIGNIFICANT CHANGE UP (ref 81–99)
MCV RBC AUTO: 86.6 FL — SIGNIFICANT CHANGE UP (ref 81–99)
NRBC # BLD: 0 /100 WBCS — SIGNIFICANT CHANGE UP (ref 0–0)
NRBC # BLD: 0 /100 WBCS — SIGNIFICANT CHANGE UP (ref 0–0)
PLATELET # BLD AUTO: 276 K/UL — SIGNIFICANT CHANGE UP (ref 130–400)
PLATELET # BLD AUTO: 276 K/UL — SIGNIFICANT CHANGE UP (ref 130–400)
PMV BLD: 10.4 FL — SIGNIFICANT CHANGE UP (ref 7.4–10.4)
PMV BLD: 10.4 FL — SIGNIFICANT CHANGE UP (ref 7.4–10.4)
POTASSIUM SERPL-MCNC: 4 MMOL/L — SIGNIFICANT CHANGE UP (ref 3.5–5)
POTASSIUM SERPL-MCNC: 4 MMOL/L — SIGNIFICANT CHANGE UP (ref 3.5–5)
POTASSIUM SERPL-SCNC: 4 MMOL/L — SIGNIFICANT CHANGE UP (ref 3.5–5)
POTASSIUM SERPL-SCNC: 4 MMOL/L — SIGNIFICANT CHANGE UP (ref 3.5–5)
PROT SERPL-MCNC: 7.1 G/DL — SIGNIFICANT CHANGE UP (ref 6–8)
PROT SERPL-MCNC: 7.1 G/DL — SIGNIFICANT CHANGE UP (ref 6–8)
RBC # BLD: 4.77 M/UL — SIGNIFICANT CHANGE UP (ref 4.2–5.4)
RBC # BLD: 4.77 M/UL — SIGNIFICANT CHANGE UP (ref 4.2–5.4)
RBC # FLD: 13.4 % — SIGNIFICANT CHANGE UP (ref 11.5–14.5)
RBC # FLD: 13.4 % — SIGNIFICANT CHANGE UP (ref 11.5–14.5)
SODIUM SERPL-SCNC: 140 MMOL/L — SIGNIFICANT CHANGE UP (ref 135–146)
SODIUM SERPL-SCNC: 140 MMOL/L — SIGNIFICANT CHANGE UP (ref 135–146)
WBC # BLD: 14.5 K/UL — HIGH (ref 4.8–10.8)
WBC # BLD: 14.5 K/UL — HIGH (ref 4.8–10.8)
WBC # FLD AUTO: 14.5 K/UL — HIGH (ref 4.8–10.8)
WBC # FLD AUTO: 14.5 K/UL — HIGH (ref 4.8–10.8)

## 2023-12-09 RX ORDER — FUROSEMIDE 40 MG
40 TABLET ORAL DAILY
Refills: 0 | Status: DISCONTINUED | OUTPATIENT
Start: 2023-12-09 | End: 2023-12-11

## 2023-12-09 RX ADMIN — Medication 20 MILLIGRAM(S): at 14:59

## 2023-12-09 RX ADMIN — Medication 20 MILLIGRAM(S): at 05:58

## 2023-12-09 RX ADMIN — HEPARIN SODIUM 5000 UNIT(S): 5000 INJECTION INTRAVENOUS; SUBCUTANEOUS at 05:58

## 2023-12-09 RX ADMIN — HEPARIN SODIUM 5000 UNIT(S): 5000 INJECTION INTRAVENOUS; SUBCUTANEOUS at 18:13

## 2023-12-09 RX ADMIN — CHLORHEXIDINE GLUCONATE 1 APPLICATION(S): 213 SOLUTION TOPICAL at 05:57

## 2023-12-09 NOTE — PROGRESS NOTE ADULT - ASSESSMENT
A 89 yo F pmhx A-Fib, HTN, CKD, CAD, PAD, Dementia, BIBEMS for evaluation of cough and sob x approx one month.  Family member states pt has had an on-going cough, outpt cxr performed (unremarkable per family member). admitted for positive influenza A and acute respiratory failure.     positive influenza A Bronchitis  Large pleural effusion with compression atelectasis Left  Partial occulusion of bilateral lower lobe segmental bronchi  acute respiratory failure  Hyponatremia   A-Fib, CAD, PAD  HTN, CKD  Dementia    Plan:  - CXR: unchanged left pleural effusion  - blood cultures negative UTD  - S/p tamiflu x 5 days and Unasyn x 7 days  - Off oxygen  - Speech and swallow evaluation for aspiration.   - Chest X-Ray shows stable opacities  - consulted ID, pulmonary and cardiology Rec's appr.   - PT/rehab, SLP recs appreciated  - continue current medication, antihypertensive (with holding parameters),  - precaution (fall/aspiration/seizure)  - CT scan still with pleural effusion. Currently on PO Lasix.   - Will check with pulmonary team if she needs thoracentesis. Spoke with son, he mentioned that he can consider if it will be helpful for the patient.     DVT prophylaxis: SQH   GI prophylaxis: Protonix   diet: HH diet   code status: full code  Goals of care/disposition: Home once optimized   Handoff: Acute, pending improvement in pleural effusion A 89 yo F pmhx A-Fib, HTN, CKD, CAD, PAD, Dementia, BIBEMS for evaluation of cough and sob x approx one month.  Family member states pt has had an on-going cough, outpt cxr performed (unremarkable per family member). admitted for positive influenza A and acute respiratory failure.     positive influenza A Bronchitis  Large pleural effusion with compression atelectasis Left  Partial occulusion of bilateral lower lobe segmental bronchi  acute respiratory failure  Hyponatremia   A-Fib, CAD, PAD  HTN, CKD  Dementia    Plan:  - CXR: unchanged left pleural effusion  - blood cultures negative UTD  - S/p tamiflu x 5 days and Unasyn x 7 days  - Off oxygen  - Speech and swallow evaluation for aspiration.   - Chest X-Ray shows stable opacities  - consulted ID, pulmonary and cardiology Rec's appr.   - PT/rehab, SLP recs appreciated  - continue current medication, antihypertensive (with holding parameters),  - precaution (fall/aspiration/seizure)  - CT scan still with pleural effusion. Currently on PO Lasix.   - Will check with pulmonary team if she needs thoracentesis. Spoke with son, he mentioned that he can consider if it will be helpful for the patient.     DVT prophylaxis: SQH   GI prophylaxis: Protonix   diet: HH diet   code status: full code  Goals of care/disposition: Home once optimized   Handoff: Acute, pending improvement in pleural effusion

## 2023-12-09 NOTE — PROGRESS NOTE ADULT - SUBJECTIVE AND OBJECTIVE BOX
MARNIECHAVO  89y, Female    LOS  9d    INTERVAL EVENTS/HPI  - No acute events overnight  - T(F): , Max: 97 (12-09-23 @ 14:26)  - Tolerating medication  - WBC Count: 14.50 (12-09-23 @ 08:50)  WBC Count: 14.79 (12-08-23 @ 06:11)  - Creatinine: 0.7 (12-09-23 @ 08:50)  Creatinine: 0.7 (12-08-23 @ 06:11)     REVIEW OF SYSTEMS:  CONSTITUTIONAL: No fever or chills  HEENT: No sore throat  RESPIRATORY: No cough, no shortness of breath  CARDIOVASCULAR: No chest pain or palpitations  GASTROINTESTINAL: No abdominal or epigastric pain  GENITOURINARY: No dysuria  NEUROLOGICAL: No headache/dizziness  MSK: No joint pain, erythema, or swelling; no back pain  SKIN: No itching, rashes  All other ROS negative except noted above    Prior hospital charts reviewed [Yes]  Other consultant notes reviewed [Yes]    ANTIMICROBIALS:       OTHER MEDS: MEDICATIONS  (STANDING):  acetaminophen     Tablet .. 650 every 6 hours PRN  aluminum hydroxide/magnesium hydroxide/simethicone Suspension 30 every 4 hours PRN  bisacodyl 5 daily PRN  cloNIDine 0.1 once  furosemide   Injectable 20 two times a day  heparin   Injectable 5000 every 12 hours  melatonin 5 at bedtime PRN  ondansetron Injectable 4 every 8 hours PRN  pantoprazole    Tablet 40 before breakfast  senna 2 at bedtime PRN      Vital Signs Last 24 Hrs  T(F): 97 (12-09-23 @ 14:26), Max: 98.7 (12-05-23 @ 05:00)    Vital Signs Last 24 Hrs  HR: 90 (12-09-23 @ 14:26) (82 - 90)  BP: 136/74 (12-09-23 @ 14:26) (120/65 - 136/74)  RR: 18 (12-09-23 @ 14:26)  SpO2: 93% (12-09-23 @ 04:30) (93% - 93%)  Wt(kg): --    EXAM:  GENERAL: NAD,   HEENT : NC/AT, b/l poor hearing   NECK: Supple, No JVD  CHEST/LUNG: b/l air entry, decreased lower bases  HEART: Regular rate and rhythm;   ABDOMEN: Soft, Nontender, Nondistended; Bowel sounds present  EXTREMITIES:  no edema  NEURO:  aox2, generalized weakness   SKIN: No rashes or lesions    Labs:                        13.3   14.50 )-----------( 276      ( 09 Dec 2023 08:50 )             41.3     12-09    140  |  95<L>  |  21<H>  ----------------------------<  78  4.0   |  33<H>  |  0.7    Ca    9.0      09 Dec 2023 08:50  Mg     2.1     12-09    TPro  7.1  /  Alb  3.4<L>  /  TBili  1.9<H>  /  DBili  x   /  AST  13  /  ALT  <5  /  AlkPhos  85  12-09      WBC Trend:  WBC Count: 14.50 (12-09-23 @ 08:50)  WBC Count: 14.79 (12-08-23 @ 06:11)  WBC Count: 9.29 (12-05-23 @ 07:22)      Creatine Trend:  Creatinine: 0.7 (12-09)  Creatinine: 0.7 (12-08)  Creatinine: 0.7 (12-07)  Creatinine: 0.6 (12-05)      Liver Biochemical Testing Trend:  Alanine Aminotransferase (ALT/SGPT): <5 (12-09)  Alanine Aminotransferase (ALT/SGPT): 7 (12-05)  Alanine Aminotransferase (ALT/SGPT): 8 (11-30)  Aspartate Aminotransferase (AST/SGOT): 13 (12-09-23 @ 08:50)  Aspartate Aminotransferase (AST/SGOT): 17 (12-05-23 @ 07:22)  Aspartate Aminotransferase (AST/SGOT): 21 (11-30-23 @ 20:26)  Bilirubin Total: 1.9 (12-09)  Bilirubin Total: 1.0 (12-05)  Bilirubin Total: 0.8 (11-30)      Trend LDH      Urinalysis Basic - ( 09 Dec 2023 08:50 )    Color: x / Appearance: x / SG: x / pH: x  Gluc: 78 mg/dL / Ketone: x  / Bili: x / Urobili: x   Blood: x / Protein: x / Nitrite: x   Leuk Esterase: x / RBC: x / WBC x   Sq Epi: x / Non Sq Epi: x / Bacteria: x        MICROBIOLOGY:    Female    Culture - Blood (collected 01 Dec 2023 07:00)  Source: .Blood Blood-Peripheral  Final Report:    No growth at 5 days    Culture - Blood (collected 30 Nov 2023 20:34)  Source: .Blood Blood-Peripheral  Final Report:    No growth at 5 days    Culture - Blood (collected 30 Nov 2023 20:34)  Source: .Blood Blood-Peripheral  Final Report:    No growth at 5 days    RADIOLOGY & ADDITIONAL TESTS:  I have personally reviewed the relevant images.   CXR      CT  CT Chest No Cont:   ACC: [id number] EXAM:  CT CHEST   ORDERED BY: KATHI LOU     PROCEDURE DATE:  12/08/2023          INTERPRETATION:  CLINICAL HISTORY / REASON FOR EXAM: Pleural effusion    TECHNIQUE: CT chest without contrast. Contiguous axial CT images were   obtained from the thoracic inlet to the base of the diaphragms. Coronal,   sagittal and maximal intensity projection reformatted images were   obtained.  No intravenous contrast was administered.    COMPARISON: 11/30/2023    FINDINGS:  Lungs/Airways/Pleura: Minimally decreased moderate to large left pleural   effusion with adjacent compressive atelectasis resulting in essentially   left lower lobe collapse. Unchanged patchy scattered groundglass   opacities and areas of scarring. Resolved right pleural effusion. Patchy   right basilar atelectasis.    Heart/Vascular: Unchanged.    Mediastinum/Lymph nodes: Unchanged.    Visualized upper abdomen: Unchanged.    Bones/soft tissues: Unchanged.    IMPRESSION:  1.  Minimally decreased moderate to large left pleural effusion with   increased compressive atelectasis resulting in the central left lower   lobe collapse.  2.  Resolved right pleural effusion. Right basilar patchy atelectasis.  3.  Otherwise findings are unchanged on a short intervalfollow-up.    --- End of Report ---            MARY MONTENEGRO MD; Attending Radiologist  This document has been electronically signed. Dec  9 2023  1:11AM (12-08-23 @ 15:37)        WEIGHT  Weight (kg): 72.6 (11-30-23 @ 19:45)  Creatinine: 0.7 mg/dL (12-09-23 @ 08:50)      All available historical records have been reviewed       MARNIECHAVO  89y, Female    LOS  9d    INTERVAL EVENTS/HPI  - No acute events overnight  - T(F): , Max: 97 (12-09-23 @ 14:26)  - Tolerating medication  - WBC Count: 14.50 (12-09-23 @ 08:50)  WBC Count: 14.79 (12-08-23 @ 06:11)  - Creatinine: 0.7 (12-09-23 @ 08:50)  Creatinine: 0.7 (12-08-23 @ 06:11)     REVIEW OF SYSTEMS:  CONSTITUTIONAL: No fever or chills  HEENT: No sore throat  RESPIRATORY: No cough, no shortness of breath  CARDIOVASCULAR: No chest pain or palpitations  GASTROINTESTINAL: No abdominal or epigastric pain  GENITOURINARY: No dysuria  NEUROLOGICAL: No headache/dizziness  MSK: No joint pain, erythema, or swelling; no back pain  SKIN: No itching, rashes  All other ROS negative except noted above    Prior hospital charts reviewed [Yes]  Other consultant notes reviewed [Yes]    ANTIMICROBIALS:       OTHER MEDS: MEDICATIONS  (STANDING):  acetaminophen     Tablet .. 650 every 6 hours PRN  aluminum hydroxide/magnesium hydroxide/simethicone Suspension 30 every 4 hours PRN  bisacodyl 5 daily PRN  cloNIDine 0.1 once  furosemide   Injectable 20 two times a day  heparin   Injectable 5000 every 12 hours  melatonin 5 at bedtime PRN  ondansetron Injectable 4 every 8 hours PRN  pantoprazole    Tablet 40 before breakfast  senna 2 at bedtime PRN      Vital Signs Last 24 Hrs  T(F): 97 (12-09-23 @ 14:26), Max: 98.7 (12-05-23 @ 05:00)    Vital Signs Last 24 Hrs  HR: 90 (12-09-23 @ 14:26) (82 - 90)  BP: 136/74 (12-09-23 @ 14:26) (120/65 - 136/74)  RR: 18 (12-09-23 @ 14:26)  SpO2: 93% (12-09-23 @ 04:30) (93% - 93%)  Wt(kg): --    EXAM:  GENERAL: NAD,   HEENT : NC/AT, b/l poor hearing   NECK: Supple, No JVD  CHEST/LUNG: b/l air entry, decreased lower bases  HEART: Regular rate and rhythm;   ABDOMEN: Soft, Nontender, Nondistended; Bowel sounds present  EXTREMITIES:  no edema  NEURO:  aox2, generalized weakness   SKIN: No rashes or lesions    Labs:                        13.3   14.50 )-----------( 276      ( 09 Dec 2023 08:50 )             41.3     12-09    140  |  95<L>  |  21<H>  ----------------------------<  78  4.0   |  33<H>  |  0.7    Ca    9.0      09 Dec 2023 08:50  Mg     2.1     12-09    TPro  7.1  /  Alb  3.4<L>  /  TBili  1.9<H>  /  DBili  x   /  AST  13  /  ALT  <5  /  AlkPhos  85  12-09      WBC Trend:  WBC Count: 14.50 (12-09-23 @ 08:50)  WBC Count: 14.79 (12-08-23 @ 06:11)  WBC Count: 9.29 (12-05-23 @ 07:22)      Creatine Trend:  Creatinine: 0.7 (12-09)  Creatinine: 0.7 (12-08)  Creatinine: 0.7 (12-07)  Creatinine: 0.6 (12-05)      Liver Biochemical Testing Trend:  Alanine Aminotransferase (ALT/SGPT): <5 (12-09)  Alanine Aminotransferase (ALT/SGPT): 7 (12-05)  Alanine Aminotransferase (ALT/SGPT): 8 (11-30)  Aspartate Aminotransferase (AST/SGOT): 13 (12-09-23 @ 08:50)  Aspartate Aminotransferase (AST/SGOT): 17 (12-05-23 @ 07:22)  Aspartate Aminotransferase (AST/SGOT): 21 (11-30-23 @ 20:26)  Bilirubin Total: 1.9 (12-09)  Bilirubin Total: 1.0 (12-05)  Bilirubin Total: 0.8 (11-30)      Trend LDH      Urinalysis Basic - ( 09 Dec 2023 08:50 )    Color: x / Appearance: x / SG: x / pH: x  Gluc: 78 mg/dL / Ketone: x  / Bili: x / Urobili: x   Blood: x / Protein: x / Nitrite: x   Leuk Esterase: x / RBC: x / WBC x   Sq Epi: x / Non Sq Epi: x / Bacteria: x        MICROBIOLOGY:    Female    Culture - Blood (collected 01 Dec 2023 07:00)  Source: .Blood Blood-Peripheral  Final Report:    No growth at 5 days    Culture - Blood (collected 30 Nov 2023 20:34)  Source: .Blood Blood-Peripheral  Final Report:    No growth at 5 days    Culture - Blood (collected 30 Nov 2023 20:34)  Source: .Blood Blood-Peripheral  Final Report:    No growth at 5 days    RADIOLOGY & ADDITIONAL TESTS:  I have personally reviewed the relevant images.   CXR      CT  CT Chest No Cont:   ACC: [id number] EXAM:  CT CHEST   ORDERED BY: KATHI LOU     PROCEDURE DATE:  12/08/2023          INTERPRETATION:  CLINICAL HISTORY / REASON FOR EXAM: Pleural effusion    TECHNIQUE: CT chest without contrast. Contiguous axial CT images were   obtained from the thoracic inlet to the base of the diaphragms. Coronal,   sagittal and maximal intensity projection reformatted images were   obtained.  No intravenous contrast was administered.    COMPARISON: 11/30/2023    FINDINGS:  Lungs/Airways/Pleura: Minimally decreased moderate to large left pleural   effusion with adjacent compressive atelectasis resulting in essentially   left lower lobe collapse. Unchanged patchy scattered groundglass   opacities and areas of scarring. Resolved right pleural effusion. Patchy   right basilar atelectasis.    Heart/Vascular: Unchanged.    Mediastinum/Lymph nodes: Unchanged.    Visualized upper abdomen: Unchanged.    Bones/soft tissues: Unchanged.    IMPRESSION:  1.  Minimally decreased moderate to large left pleural effusion with   increased compressive atelectasis resulting in the central left lower   lobe collapse.  2.  Resolved right pleural effusion. Right basilar patchy atelectasis.  3.  Otherwise findings are unchanged on a short intervalfollow-up.    --- End of Report ---            MARY MONTENEGRO MD; Attending Radiologist  This document has been electronically signed. Dec  9 2023  1:11AM (12-08-23 @ 15:37)        WEIGHT  Weight (kg): 72.6 (11-30-23 @ 19:45)  Creatinine: 0.7 mg/dL (12-09-23 @ 08:50)      All available historical records have been reviewed

## 2023-12-10 RX ADMIN — HEPARIN SODIUM 5000 UNIT(S): 5000 INJECTION INTRAVENOUS; SUBCUTANEOUS at 05:54

## 2023-12-10 RX ADMIN — Medication 650 MILLIGRAM(S): at 22:29

## 2023-12-10 RX ADMIN — CHLORHEXIDINE GLUCONATE 1 APPLICATION(S): 213 SOLUTION TOPICAL at 05:54

## 2023-12-10 RX ADMIN — PANTOPRAZOLE SODIUM 40 MILLIGRAM(S): 20 TABLET, DELAYED RELEASE ORAL at 05:54

## 2023-12-10 RX ADMIN — Medication 650 MILLIGRAM(S): at 23:28

## 2023-12-10 RX ADMIN — Medication 40 MILLIGRAM(S): at 05:54

## 2023-12-10 NOTE — PROGRESS NOTE ADULT - ASSESSMENT
A 89 yo F pmhx A-Fib, HTN, CKD, CAD, PAD, Dementia, BIBEMS for evaluation of cough and sob x approx one month.  Family member states pt has had an on-going cough, outpt cxr performed (unremarkable per family member). admitted for positive influenza A and acute respiratory failure.     positive influenza A Bronchitis  Large pleural effusion with compression atelectasis Left  Partial occulusion of bilateral lower lobe segmental bronchi  acute respiratory failure  Hyponatremia   A-Fib, CAD, PAD  HTN, CKD  Dementia    Plan:  - CXR: unchanged left pleural effusion  - blood cultures negative UTD  - S/p tamiflu x 5 days and Unasyn x 7 days  - Off oxygen  - Speech and swallow evaluation for aspiration.   - Chest X-Ray shows stable opacities  - consulted ID, pulmonary and cardiology Rec's appr.   - PT/rehab, SLP recs appreciated  - continue current medication, antihypertensive (with holding parameters),  - precaution (fall/aspiration/seizure)  - CT scan still with pleural effusion. Currently on PO Lasix.   - Will check with pulmonary team if she needs thoracentesis. Spoke with son, he mentioned that he can consider if it will be helpful for the patient. If no thoracentesis, then will discuss about discharge.     DVT prophylaxis: SQH   GI prophylaxis: Protonix   diet: HH diet   code status: full code  Goals of care/disposition: Home once optimized   Handoff: Acute, pending improvement in pleural effusion   A 89 yo F pmhx A-Fib, HTN, CKD, CAD, PAD, Dementia, BIBEMS for evaluation of cough and sob x approx one month.  Family member states pt has had an on-going cough, outpt cxr performed (unremarkable per family member). admitted for positive influenza A and acute respiratory failure.     positive influenza A Bronchitis  Large pleural effusion with compression atelectasis Left  Partial occulusion of bilateral lower lobe segmental bronchi  acute respiratory failure  Hyponatremia   A-Fib, CAD, PAD  HTN, CKD  Dementia    Plan:  - CXR: unchanged left pleural effusion  - blood cultures negative UTD  - S/p tamiflu x 5 days and Unasyn x 7 days  - Off oxygen  - Speech and swallow evaluation for aspiration.   - Chest X-Ray shows stable opacities  - consulted ID, pulmonary and cardiology Rec's appr.   - PT/rehab, SLP recs appreciated  - continue current medication, antihypertensive (with holding parameters),  - precaution (fall/aspiration/seizure)  - CT scan still with pleural effusion. Currently on PO Lasix.   - Will check with pulmonary team if she needs thoracentesis. Spoke with son, he mentioned that he can consider if it will be helpful for the patient. If no thoracentesis, then will discuss about discharge.     DVT prophylaxis: SQH   GI prophylaxis: Protonix   diet: HH diet   code status: full code  Goals of care/disposition: Home once optimized   Handoff: Acute, pending improvement in pleural effusion

## 2023-12-10 NOTE — PROGRESS NOTE ADULT - SUBJECTIVE AND OBJECTIVE BOX
MARNIECHAVO  89y, Female    LOS  10d    INTERVAL EVENTS/HPI  - No acute events overnight  - T(F): , Max: 100 (12-09-23 @ 21:22)  - Denies any worsening symptoms  - Tolerating medication  - WBC Count: 14.50 (12-09-23 @ 08:50)  WBC Count: 14.79 (12-08-23 @ 06:11)  - Creatinine: 0.7 (12-09-23 @ 08:50)     REVIEW OF SYSTEMS:  CONSTITUTIONAL: No fever or chills  HEENT: No sore throat  RESPIRATORY: No cough, no shortness of breath  CARDIOVASCULAR: No chest pain or palpitations  GASTROINTESTINAL: No abdominal or epigastric pain  GENITOURINARY: No dysuria  NEUROLOGICAL: No headache/dizziness  MSK: No joint pain, erythema, or swelling; no back pain  SKIN: No itching, rashes  All other ROS negative except noted above    Prior hospital charts reviewed [Yes]  Other consultant notes reviewed [Yes]    ANTIMICROBIALS:       OTHER MEDS: MEDICATIONS  (STANDING):  acetaminophen     Tablet .. 650 every 6 hours PRN  aluminum hydroxide/magnesium hydroxide/simethicone Suspension 30 every 4 hours PRN  bisacodyl 5 daily PRN  cloNIDine 0.1 once  furosemide    Tablet 40 daily  heparin   Injectable 5000 every 12 hours  melatonin 5 at bedtime PRN  ondansetron Injectable 4 every 8 hours PRN  pantoprazole    Tablet 40 before breakfast  senna 2 at bedtime PRN      Vital Signs Last 24 Hrs  T(F): 100 (12-10-23 @ 05:00), Max: 100 (12-09-23 @ 21:22)    Vital Signs Last 24 Hrs  HR: 88 (12-10-23 @ 05:00) (88 - 95)  BP: 124/60 (12-10-23 @ 05:00) (124/60 - 146/65)  RR: 18 (12-10-23 @ 05:00)  SpO2: 95% (12-10-23 @ 05:00) (95% - 95%)  Wt(kg): --    EXAM:  GENERAL: NAD,   HEENT : NC/AT, b/l poor hearing   NECK: Supple, No JVD  CHEST/LUNG: b/l air entry, decreased lower bases  HEART: Regular rate and rhythm;   ABDOMEN: Soft, Nontender, Nondistended; Bowel sounds present  EXTREMITIES:  no edema  NEURO:  aox2, generalized weakness   SKIN: No rashes or lesions  Labs:                        13.3   14.50 )-----------( 276      ( 09 Dec 2023 08:50 )             41.3     12-09    140  |  95<L>  |  21<H>  ----------------------------<  78  4.0   |  33<H>  |  0.7    Ca    9.0      09 Dec 2023 08:50  Mg     2.1     12-09    TPro  7.1  /  Alb  3.4<L>  /  TBili  1.9<H>  /  DBili  x   /  AST  13  /  ALT  <5  /  AlkPhos  85  12-09      WBC Trend:  WBC Count: 14.50 (12-09-23 @ 08:50)  WBC Count: 14.79 (12-08-23 @ 06:11)      Creatine Trend:  Creatinine: 0.7 (12-09)  Creatinine: 0.7 (12-08)  Creatinine: 0.7 (12-07)  Creatinine: 0.6 (12-05)      Liver Biochemical Testing Trend:  Alanine Aminotransferase (ALT/SGPT): <5 (12-09)  Alanine Aminotransferase (ALT/SGPT): 7 (12-05)  Alanine Aminotransferase (ALT/SGPT): 8 (11-30)  Aspartate Aminotransferase (AST/SGOT): 13 (12-09-23 @ 08:50)  Aspartate Aminotransferase (AST/SGOT): 17 (12-05-23 @ 07:22)  Aspartate Aminotransferase (AST/SGOT): 21 (11-30-23 @ 20:26)  Bilirubin Total: 1.9 (12-09)  Bilirubin Total: 1.0 (12-05)  Bilirubin Total: 0.8 (11-30)      Trend LDH      Urinalysis Basic - ( 09 Dec 2023 08:50 )    Color: x / Appearance: x / SG: x / pH: x  Gluc: 78 mg/dL / Ketone: x  / Bili: x / Urobili: x   Blood: x / Protein: x / Nitrite: x   Leuk Esterase: x / RBC: x / WBC x   Sq Epi: x / Non Sq Epi: x / Bacteria: x        MICROBIOLOGY:    Female    Culture - Blood (collected 01 Dec 2023 07:00)  Source: .Blood Blood-Peripheral  Final Report:    No growth at 5 days    Culture - Blood (collected 30 Nov 2023 20:34)  Source: .Blood Blood-Peripheral  Final Report:    No growth at 5 days    Culture - Blood (collected 30 Nov 2023 20:34)  Source: .Blood Blood-Peripheral  Final Report:    No growth at 5 days    RADIOLOGY & ADDITIONAL TESTS:  I have personally reviewed the relevant images.   CXR      CT  CT Chest No Cont:   ACC: [id number] EXAM:  CT CHEST   ORDERED BY: KATHI LOU     PROCEDURE DATE:  12/08/2023          INTERPRETATION:  CLINICAL HISTORY / REASON FOR EXAM: Pleural effusion    TECHNIQUE: CT chest without contrast. Contiguous axial CT images were   obtained from the thoracic inlet to the base of the diaphragms. Coronal,   sagittal and maximal intensity projection reformatted images were   obtained.  No intravenous contrast was administered.    COMPARISON: 11/30/2023    FINDINGS:  Lungs/Airways/Pleura: Minimally decreased moderate to large left pleural   effusion with adjacent compressive atelectasis resulting in essentially   left lower lobe collapse. Unchanged patchy scattered groundglass   opacities and areas of scarring. Resolved right pleural effusion. Patchy   right basilar atelectasis.    Heart/Vascular: Unchanged.    Mediastinum/Lymph nodes: Unchanged.    Visualized upper abdomen: Unchanged.    Bones/soft tissues: Unchanged.    IMPRESSION:  1.  Minimally decreased moderate to large left pleural effusion with   increased compressive atelectasis resulting in the central left lower   lobe collapse.  2.  Resolved right pleural effusion. Right basilar patchy atelectasis.  3.  Otherwise findings are unchanged on a short intervalfollow-up.    --- End of Report ---            MARY MONTENEGRO MD; Attending Radiologist  This document has been electronically signed. Dec  9 2023  1:11AM (12-08-23 @ 15:37)        WEIGHT  Weight (kg): 72.6 (11-30-23 @ 19:45)      All available historical records have been reviewed       MARNIECHAVO  89y, Female    LOS  10d    INTERVAL EVENTS/HPI  - No acute events overnight  - T(F): , Max: 100 (12-09-23 @ 21:22)  - Denies any worsening symptoms  - Tolerating medication  - WBC Count: 14.50 (12-09-23 @ 08:50)  WBC Count: 14.79 (12-08-23 @ 06:11)  - Creatinine: 0.7 (12-09-23 @ 08:50)     REVIEW OF SYSTEMS:  CONSTITUTIONAL: No fever or chills  HEENT: No sore throat  RESPIRATORY: No cough, no shortness of breath  CARDIOVASCULAR: No chest pain or palpitations  GASTROINTESTINAL: No abdominal or epigastric pain  GENITOURINARY: No dysuria  NEUROLOGICAL: No headache/dizziness  MSK: No joint pain, erythema, or swelling; no back pain  SKIN: No itching, rashes  All other ROS negative except noted above    Prior hospital charts reviewed [Yes]  Other consultant notes reviewed [Yes]    ANTIMICROBIALS:       OTHER MEDS: MEDICATIONS  (STANDING):  acetaminophen     Tablet .. 650 every 6 hours PRN  aluminum hydroxide/magnesium hydroxide/simethicone Suspension 30 every 4 hours PRN  bisacodyl 5 daily PRN  cloNIDine 0.1 once  furosemide    Tablet 40 daily  heparin   Injectable 5000 every 12 hours  melatonin 5 at bedtime PRN  ondansetron Injectable 4 every 8 hours PRN  pantoprazole    Tablet 40 before breakfast  senna 2 at bedtime PRN      Vital Signs Last 24 Hrs  T(F): 100 (12-10-23 @ 05:00), Max: 100 (12-09-23 @ 21:22)    Vital Signs Last 24 Hrs  HR: 88 (12-10-23 @ 05:00) (88 - 95)  BP: 124/60 (12-10-23 @ 05:00) (124/60 - 146/65)  RR: 18 (12-10-23 @ 05:00)  SpO2: 95% (12-10-23 @ 05:00) (95% - 95%)  Wt(kg): --    EXAM:  GENERAL: NAD,   HEENT : NC/AT, b/l poor hearing   NECK: Supple, No JVD  CHEST/LUNG: b/l air entry, decreased lower bases  HEART: Regular rate and rhythm;   ABDOMEN: Soft, Nontender, Nondistended; Bowel sounds present  EXTREMITIES:  no edema  NEURO:  aox2, generalized weakness   SKIN: No rashes or lesions  Labs:                        13.3   14.50 )-----------( 276      ( 09 Dec 2023 08:50 )             41.3     12-09    140  |  95<L>  |  21<H>  ----------------------------<  78  4.0   |  33<H>  |  0.7    Ca    9.0      09 Dec 2023 08:50  Mg     2.1     12-09    TPro  7.1  /  Alb  3.4<L>  /  TBili  1.9<H>  /  DBili  x   /  AST  13  /  ALT  <5  /  AlkPhos  85  12-09      WBC Trend:  WBC Count: 14.50 (12-09-23 @ 08:50)  WBC Count: 14.79 (12-08-23 @ 06:11)      Creatine Trend:  Creatinine: 0.7 (12-09)  Creatinine: 0.7 (12-08)  Creatinine: 0.7 (12-07)  Creatinine: 0.6 (12-05)      Liver Biochemical Testing Trend:  Alanine Aminotransferase (ALT/SGPT): <5 (12-09)  Alanine Aminotransferase (ALT/SGPT): 7 (12-05)  Alanine Aminotransferase (ALT/SGPT): 8 (11-30)  Aspartate Aminotransferase (AST/SGOT): 13 (12-09-23 @ 08:50)  Aspartate Aminotransferase (AST/SGOT): 17 (12-05-23 @ 07:22)  Aspartate Aminotransferase (AST/SGOT): 21 (11-30-23 @ 20:26)  Bilirubin Total: 1.9 (12-09)  Bilirubin Total: 1.0 (12-05)  Bilirubin Total: 0.8 (11-30)      Trend LDH      Urinalysis Basic - ( 09 Dec 2023 08:50 )    Color: x / Appearance: x / SG: x / pH: x  Gluc: 78 mg/dL / Ketone: x  / Bili: x / Urobili: x   Blood: x / Protein: x / Nitrite: x   Leuk Esterase: x / RBC: x / WBC x   Sq Epi: x / Non Sq Epi: x / Bacteria: x        MICROBIOLOGY:    Female    Culture - Blood (collected 01 Dec 2023 07:00)  Source: .Blood Blood-Peripheral  Final Report:    No growth at 5 days    Culture - Blood (collected 30 Nov 2023 20:34)  Source: .Blood Blood-Peripheral  Final Report:    No growth at 5 days    Culture - Blood (collected 30 Nov 2023 20:34)  Source: .Blood Blood-Peripheral  Final Report:    No growth at 5 days    RADIOLOGY & ADDITIONAL TESTS:  I have personally reviewed the relevant images.   CXR      CT  CT Chest No Cont:   ACC: [id number] EXAM:  CT CHEST   ORDERED BY: KATHI LOU     PROCEDURE DATE:  12/08/2023          INTERPRETATION:  CLINICAL HISTORY / REASON FOR EXAM: Pleural effusion    TECHNIQUE: CT chest without contrast. Contiguous axial CT images were   obtained from the thoracic inlet to the base of the diaphragms. Coronal,   sagittal and maximal intensity projection reformatted images were   obtained.  No intravenous contrast was administered.    COMPARISON: 11/30/2023    FINDINGS:  Lungs/Airways/Pleura: Minimally decreased moderate to large left pleural   effusion with adjacent compressive atelectasis resulting in essentially   left lower lobe collapse. Unchanged patchy scattered groundglass   opacities and areas of scarring. Resolved right pleural effusion. Patchy   right basilar atelectasis.    Heart/Vascular: Unchanged.    Mediastinum/Lymph nodes: Unchanged.    Visualized upper abdomen: Unchanged.    Bones/soft tissues: Unchanged.    IMPRESSION:  1.  Minimally decreased moderate to large left pleural effusion with   increased compressive atelectasis resulting in the central left lower   lobe collapse.  2.  Resolved right pleural effusion. Right basilar patchy atelectasis.  3.  Otherwise findings are unchanged on a short intervalfollow-up.    --- End of Report ---            MARY MONTENEGRO MD; Attending Radiologist  This document has been electronically signed. Dec  9 2023  1:11AM (12-08-23 @ 15:37)        WEIGHT  Weight (kg): 72.6 (11-30-23 @ 19:45)      All available historical records have been reviewed

## 2023-12-11 LAB
ALBUMIN SERPL ELPH-MCNC: 3 G/DL — LOW (ref 3.5–5.2)
ALBUMIN SERPL ELPH-MCNC: 3 G/DL — LOW (ref 3.5–5.2)
ALP SERPL-CCNC: 130 U/L — HIGH (ref 30–115)
ALP SERPL-CCNC: 130 U/L — HIGH (ref 30–115)
ALT FLD-CCNC: <5 U/L — SIGNIFICANT CHANGE UP (ref 0–41)
ALT FLD-CCNC: <5 U/L — SIGNIFICANT CHANGE UP (ref 0–41)
ANION GAP SERPL CALC-SCNC: 13 MMOL/L — SIGNIFICANT CHANGE UP (ref 7–14)
ANION GAP SERPL CALC-SCNC: 13 MMOL/L — SIGNIFICANT CHANGE UP (ref 7–14)
AST SERPL-CCNC: 14 U/L — SIGNIFICANT CHANGE UP (ref 0–41)
AST SERPL-CCNC: 14 U/L — SIGNIFICANT CHANGE UP (ref 0–41)
BILIRUB SERPL-MCNC: 2.6 MG/DL — HIGH (ref 0.2–1.2)
BILIRUB SERPL-MCNC: 2.6 MG/DL — HIGH (ref 0.2–1.2)
BUN SERPL-MCNC: 34 MG/DL — HIGH (ref 10–20)
BUN SERPL-MCNC: 34 MG/DL — HIGH (ref 10–20)
CALCIUM SERPL-MCNC: 9 MG/DL — SIGNIFICANT CHANGE UP (ref 8.4–10.5)
CALCIUM SERPL-MCNC: 9 MG/DL — SIGNIFICANT CHANGE UP (ref 8.4–10.5)
CHLORIDE SERPL-SCNC: 95 MMOL/L — LOW (ref 98–110)
CHLORIDE SERPL-SCNC: 95 MMOL/L — LOW (ref 98–110)
CO2 SERPL-SCNC: 34 MMOL/L — HIGH (ref 17–32)
CO2 SERPL-SCNC: 34 MMOL/L — HIGH (ref 17–32)
CREAT SERPL-MCNC: 1 MG/DL — SIGNIFICANT CHANGE UP (ref 0.7–1.5)
CREAT SERPL-MCNC: 1 MG/DL — SIGNIFICANT CHANGE UP (ref 0.7–1.5)
EGFR: 51 ML/MIN/1.73M2 — LOW
EGFR: 51 ML/MIN/1.73M2 — LOW
GLUCOSE SERPL-MCNC: 86 MG/DL — SIGNIFICANT CHANGE UP (ref 70–99)
GLUCOSE SERPL-MCNC: 86 MG/DL — SIGNIFICANT CHANGE UP (ref 70–99)
HCT VFR BLD CALC: 40.2 % — SIGNIFICANT CHANGE UP (ref 37–47)
HCT VFR BLD CALC: 40.2 % — SIGNIFICANT CHANGE UP (ref 37–47)
HGB BLD-MCNC: 13.1 G/DL — SIGNIFICANT CHANGE UP (ref 12–16)
HGB BLD-MCNC: 13.1 G/DL — SIGNIFICANT CHANGE UP (ref 12–16)
MCHC RBC-ENTMCNC: 27.8 PG — SIGNIFICANT CHANGE UP (ref 27–31)
MCHC RBC-ENTMCNC: 27.8 PG — SIGNIFICANT CHANGE UP (ref 27–31)
MCHC RBC-ENTMCNC: 32.6 G/DL — SIGNIFICANT CHANGE UP (ref 32–37)
MCHC RBC-ENTMCNC: 32.6 G/DL — SIGNIFICANT CHANGE UP (ref 32–37)
MCV RBC AUTO: 85.2 FL — SIGNIFICANT CHANGE UP (ref 81–99)
MCV RBC AUTO: 85.2 FL — SIGNIFICANT CHANGE UP (ref 81–99)
NRBC # BLD: 0 /100 WBCS — SIGNIFICANT CHANGE UP (ref 0–0)
NRBC # BLD: 0 /100 WBCS — SIGNIFICANT CHANGE UP (ref 0–0)
PLATELET # BLD AUTO: 304 K/UL — SIGNIFICANT CHANGE UP (ref 130–400)
PLATELET # BLD AUTO: 304 K/UL — SIGNIFICANT CHANGE UP (ref 130–400)
PMV BLD: 11.4 FL — HIGH (ref 7.4–10.4)
PMV BLD: 11.4 FL — HIGH (ref 7.4–10.4)
POTASSIUM SERPL-MCNC: 3.7 MMOL/L — SIGNIFICANT CHANGE UP (ref 3.5–5)
POTASSIUM SERPL-MCNC: 3.7 MMOL/L — SIGNIFICANT CHANGE UP (ref 3.5–5)
POTASSIUM SERPL-SCNC: 3.7 MMOL/L — SIGNIFICANT CHANGE UP (ref 3.5–5)
POTASSIUM SERPL-SCNC: 3.7 MMOL/L — SIGNIFICANT CHANGE UP (ref 3.5–5)
PROT SERPL-MCNC: 6.8 G/DL — SIGNIFICANT CHANGE UP (ref 6–8)
PROT SERPL-MCNC: 6.8 G/DL — SIGNIFICANT CHANGE UP (ref 6–8)
RBC # BLD: 4.72 M/UL — SIGNIFICANT CHANGE UP (ref 4.2–5.4)
RBC # BLD: 4.72 M/UL — SIGNIFICANT CHANGE UP (ref 4.2–5.4)
RBC # FLD: 13.7 % — SIGNIFICANT CHANGE UP (ref 11.5–14.5)
RBC # FLD: 13.7 % — SIGNIFICANT CHANGE UP (ref 11.5–14.5)
SODIUM SERPL-SCNC: 142 MMOL/L — SIGNIFICANT CHANGE UP (ref 135–146)
SODIUM SERPL-SCNC: 142 MMOL/L — SIGNIFICANT CHANGE UP (ref 135–146)
WBC # BLD: 28.87 K/UL — HIGH (ref 4.8–10.8)
WBC # BLD: 28.87 K/UL — HIGH (ref 4.8–10.8)
WBC # FLD AUTO: 28.87 K/UL — HIGH (ref 4.8–10.8)
WBC # FLD AUTO: 28.87 K/UL — HIGH (ref 4.8–10.8)

## 2023-12-11 PROCEDURE — 99232 SBSQ HOSP IP/OBS MODERATE 35: CPT

## 2023-12-11 RX ADMIN — Medication 40 MILLIGRAM(S): at 05:41

## 2023-12-11 RX ADMIN — CHLORHEXIDINE GLUCONATE 1 APPLICATION(S): 213 SOLUTION TOPICAL at 05:42

## 2023-12-11 RX ADMIN — HEPARIN SODIUM 5000 UNIT(S): 5000 INJECTION INTRAVENOUS; SUBCUTANEOUS at 17:01

## 2023-12-11 RX ADMIN — HEPARIN SODIUM 5000 UNIT(S): 5000 INJECTION INTRAVENOUS; SUBCUTANEOUS at 05:41

## 2023-12-11 RX ADMIN — PANTOPRAZOLE SODIUM 40 MILLIGRAM(S): 20 TABLET, DELAYED RELEASE ORAL at 05:41

## 2023-12-11 NOTE — PROGRESS NOTE ADULT - ASSESSMENT
A 89 yo F pmhx A-Fib, HTN, CKD, CAD, PAD, Dementia, BIBEMS for evaluation of cough and sob x approx one month.  Family member states pt has had an on-going cough, outpt cxr performed (unremarkable per family member). admitted for positive influenza A and acute respiratory failure.     positive influenza A Bronchitis  Large pleural effusion with compression atelectasis Left  Partial occulusion of bilateral lower lobe segmental bronchi  acute respiratory failure  Hyponatremia   A-Fib, CAD, PAD  HTN, CKD  Dementia    Plan:  - CXR: unchanged left pleural effusion  - blood cultures negative UTD  - S/p tamiflu x 5 days and Unasyn x 7 days  - Off oxygen  - Speech and swallow evaluation for aspiration.   - Chest X-Ray shows stable opacities  - consulted ID, pulmonary and cardiology Rec's appr.   - PT/rehab, SLP recs appreciated  - continue current medication, antihypertensive (with holding parameters),  - precaution (fall/aspiration/seizure)  - CT scan still with pleural effusion. Currently on PO Lasix.   - pulm team less inclined to do a thoracocentesis. Unable to get a hold of anyone in the family      #KATHRYN  - Cr 1 increased from 0.7  - cant give fluids due to the pleural effusion  - will hold lasix for now  - bladder scan q 8hrs x 3  - nephro consult      DVT prophylaxis: SQH   GI prophylaxis: Protonix   diet: HH diet   code status: full code  Goals of care/disposition: Home once optimized   Handoff: Acute, pending improvement in pleural effusion

## 2023-12-11 NOTE — PROGRESS NOTE ADULT - ASSESSMENT
Impression:  b/l effusion left more then right   ?? chf / fluid overload   flu infection         Plan:  cxr and ct scan improved with lasix   continue diuretics   patient on RA comfortable   will attempt thoracentesis if family want for diagnostic to r/o any malignancy which i will not favor because of the patient age and she is comfortable   risk outweigh the benefit    follow up as outpatient   echo reviewed mitral valve regurgitation  moderate sever tricuspid regurgitation  patient not on distress

## 2023-12-11 NOTE — PROGRESS NOTE ADULT - SUBJECTIVE AND OBJECTIVE BOX
INTERVAL EVENTS/HPI: Pt not interacting with me much. Hematuria noted in the cannister. Pt is on a primafit. No hematuria noted in previous notes. Anterior auscultation was difficult but decreased breath sounds on left side.        REVIEW OF SYSTEMS:  CONSTITUTIONAL: No fever or chills  HEENT: No sore throat  RESPIRATORY: No cough, no shortness of breath  CARDIOVASCULAR: No chest pain or palpitations  GASTROINTESTINAL: No abdominal or epigastric pain  GENITOURINARY: No dysuria  NEUROLOGICAL: No headache/dizziness  MSK: No joint pain, erythema, or swelling; no back pain  SKIN: No itching, rashes  All other ROS negative except noted above    Prior hospital charts reviewed [Yes]  Other consultant notes reviewed [Yes]    ANTIMICROBIALS:       OTHER MEDS: MEDICATIONS  (STANDING):  acetaminophen     Tablet .. 650 every 6 hours PRN  aluminum hydroxide/magnesium hydroxide/simethicone Suspension 30 every 4 hours PRN  bisacodyl 5 daily PRN  cloNIDine 0.1 once  furosemide    Tablet 40 daily  heparin   Injectable 5000 every 12 hours  melatonin 5 at bedtime PRN  ondansetron Injectable 4 every 8 hours PRN  pantoprazole    Tablet 40 before breakfast  senna 2 at bedtime PRN      Vital Signs Last 24 Hrs  T(F): 100 (12-10-23 @ 05:00), Max: 100 (12-09-23 @ 21:22)    Vital Signs Last 24 Hrs  HR: 88 (12-10-23 @ 05:00) (88 - 95)  BP: 124/60 (12-10-23 @ 05:00) (124/60 - 146/65)  RR: 18 (12-10-23 @ 05:00)  SpO2: 95% (12-10-23 @ 05:00) (95% - 95%)  Wt(kg): --    EXAM:  GENERAL: NAD,   HEENT : NC/AT, b/l poor hearing   NECK: Supple, No JVD  CHEST/LUNG: b/l air entry, decreased lower bases  HEART: Regular rate and rhythm;   ABDOMEN: Soft, Nontender, Nondistended; Bowel sounds present  : primafit in place draining hematuria  EXTREMITIES:  no edema  NEURO:  aox2, generalized weakness   SKIN: No rashes or lesions  Labs:                        13.3   14.50 )-----------( 276      ( 09 Dec 2023 08:50 )             41.3     12-09    140  |  95<L>  |  21<H>  ----------------------------<  78  4.0   |  33<H>  |  0.7    Ca    9.0      09 Dec 2023 08:50  Mg     2.1     12-09    TPro  7.1  /  Alb  3.4<L>  /  TBili  1.9<H>  /  DBili  x   /  AST  13  /  ALT  <5  /  AlkPhos  85  12-09      WBC Trend:  WBC Count: 14.50 (12-09-23 @ 08:50)  WBC Count: 14.79 (12-08-23 @ 06:11)      Creatine Trend:  Creatinine: 0.7 (12-09)  Creatinine: 0.7 (12-08)  Creatinine: 0.7 (12-07)  Creatinine: 0.6 (12-05)      Liver Biochemical Testing Trend:  Alanine Aminotransferase (ALT/SGPT): <5 (12-09)  Alanine Aminotransferase (ALT/SGPT): 7 (12-05)  Alanine Aminotransferase (ALT/SGPT): 8 (11-30)  Aspartate Aminotransferase (AST/SGOT): 13 (12-09-23 @ 08:50)  Aspartate Aminotransferase (AST/SGOT): 17 (12-05-23 @ 07:22)  Aspartate Aminotransferase (AST/SGOT): 21 (11-30-23 @ 20:26)  Bilirubin Total: 1.9 (12-09)  Bilirubin Total: 1.0 (12-05)  Bilirubin Total: 0.8 (11-30)      Trend LDH      Urinalysis Basic - ( 09 Dec 2023 08:50 )    Color: x / Appearance: x / SG: x / pH: x  Gluc: 78 mg/dL / Ketone: x  / Bili: x / Urobili: x   Blood: x / Protein: x / Nitrite: x   Leuk Esterase: x / RBC: x / WBC x   Sq Epi: x / Non Sq Epi: x / Bacteria: x        MICROBIOLOGY:    Female    Culture - Blood (collected 01 Dec 2023 07:00)  Source: .Blood Blood-Peripheral  Final Report:    No growth at 5 days    Culture - Blood (collected 30 Nov 2023 20:34)  Source: .Blood Blood-Peripheral  Final Report:    No growth at 5 days    Culture - Blood (collected 30 Nov 2023 20:34)  Source: .Blood Blood-Peripheral  Final Report:    No growth at 5 days    RADIOLOGY & ADDITIONAL TESTS:  I have personally reviewed the relevant images.   CXR      CT  CT Chest No Cont:   ACC: [id number] EXAM:  CT CHEST   ORDERED BY: SHANERAÚL GREYETTAMICHAELLEDECALIXTO     PROCEDURE DATE:  12/08/2023          INTERPRETATION:  CLINICAL HISTORY / REASON FOR EXAM: Pleural effusion    TECHNIQUE: CT chest without contrast. Contiguous axial CT images were   obtained from the thoracic inlet to the base of the diaphragms. Coronal,   sagittal and maximal intensity projection reformatted images were   obtained.  No intravenous contrast was administered.    COMPARISON: 11/30/2023    FINDINGS:  Lungs/Airways/Pleura: Minimally decreased moderate to large left pleural   effusion with adjacent compressive atelectasis resulting in essentially   left lower lobe collapse. Unchanged patchy scattered groundglass   opacities and areas of scarring. Resolved right pleural effusion. Patchy   right basilar atelectasis.    Heart/Vascular: Unchanged.    Mediastinum/Lymph nodes: Unchanged.    Visualized upper abdomen: Unchanged.    Bones/soft tissues: Unchanged.    IMPRESSION:  1.  Minimally decreased moderate to large left pleural effusion with   increased compressive atelectasis resulting in the central left lower   lobe collapse.  2.  Resolved right pleural effusion. Right basilar patchy atelectasis.  3.  Otherwise findings are unchanged on a short intervalfollow-up.    --- End of Report ---            MARY MONTENEGRO MD; Attending Radiologist  This document has been electronically signed. Dec  9 2023  1:11AM (12-08-23 @ 15:37)        WEIGHT  Weight (kg): 72.6 (11-30-23 @ 19:45)      All available historical records have been reviewed           INTERVAL EVENTS/HPI: Pt not interacting with me much. Hematuria noted in the cannister. Pt is on a primafit. No hematuria noted in previous notes. Anterior auscultation was difficult but decreased breath sounds on left side.        REVIEW OF SYSTEMS:  CONSTITUTIONAL: No fever or chills  HEENT: No sore throat  RESPIRATORY: No cough, no shortness of breath  CARDIOVASCULAR: No chest pain or palpitations  GASTROINTESTINAL: No abdominal or epigastric pain  GENITOURINARY: No dysuria  NEUROLOGICAL: No headache/dizziness  MSK: No joint pain, erythema, or swelling; no back pain  SKIN: No itching, rashes  All other ROS negative except noted above    Prior hospital charts reviewed [Yes]  Other consultant notes reviewed [Yes]    ANTIMICROBIALS:       OTHER MEDS: MEDICATIONS  (STANDING):  acetaminophen     Tablet .. 650 every 6 hours PRN  aluminum hydroxide/magnesium hydroxide/simethicone Suspension 30 every 4 hours PRN  bisacodyl 5 daily PRN  cloNIDine 0.1 once  furosemide    Tablet 40 daily  heparin   Injectable 5000 every 12 hours  melatonin 5 at bedtime PRN  ondansetron Injectable 4 every 8 hours PRN  pantoprazole    Tablet 40 before breakfast  senna 2 at bedtime PRN      Vital Signs Last 24 Hrs  T(F): 100 (12-10-23 @ 05:00), Max: 100 (12-09-23 @ 21:22)    Vital Signs Last 24 Hrs  HR: 88 (12-10-23 @ 05:00) (88 - 95)  BP: 124/60 (12-10-23 @ 05:00) (124/60 - 146/65)  RR: 18 (12-10-23 @ 05:00)  SpO2: 95% (12-10-23 @ 05:00) (95% - 95%)  Wt(kg): --    EXAM:  GENERAL: NAD,   HEENT : NC/AT, b/l poor hearing   NECK: Supple, No JVD  CHEST/LUNG: b/l air entry, decreased lower bases  HEART: Regular rate and rhythm;   ABDOMEN: Soft, Nontender, Nondistended; Bowel sounds present  : primafit in place draining hematuria  EXTREMITIES:  no edema  NEURO:  aox2, generalized weakness   SKIN: No rashes or lesions  Labs:                        13.3   14.50 )-----------( 276      ( 09 Dec 2023 08:50 )             41.3     12-09    140  |  95<L>  |  21<H>  ----------------------------<  78  4.0   |  33<H>  |  0.7    Ca    9.0      09 Dec 2023 08:50  Mg     2.1     12-09    TPro  7.1  /  Alb  3.4<L>  /  TBili  1.9<H>  /  DBili  x   /  AST  13  /  ALT  <5  /  AlkPhos  85  12-09      WBC Trend:  WBC Count: 14.50 (12-09-23 @ 08:50)  WBC Count: 14.79 (12-08-23 @ 06:11)      Creatine Trend:  Creatinine: 0.7 (12-09)  Creatinine: 0.7 (12-08)  Creatinine: 0.7 (12-07)  Creatinine: 0.6 (12-05)      Liver Biochemical Testing Trend:  Alanine Aminotransferase (ALT/SGPT): <5 (12-09)  Alanine Aminotransferase (ALT/SGPT): 7 (12-05)  Alanine Aminotransferase (ALT/SGPT): 8 (11-30)  Aspartate Aminotransferase (AST/SGOT): 13 (12-09-23 @ 08:50)  Aspartate Aminotransferase (AST/SGOT): 17 (12-05-23 @ 07:22)  Aspartate Aminotransferase (AST/SGOT): 21 (11-30-23 @ 20:26)  Bilirubin Total: 1.9 (12-09)  Bilirubin Total: 1.0 (12-05)  Bilirubin Total: 0.8 (11-30)      Trend LDH      Urinalysis Basic - ( 09 Dec 2023 08:50 )    Color: x / Appearance: x / SG: x / pH: x  Gluc: 78 mg/dL / Ketone: x  / Bili: x / Urobili: x   Blood: x / Protein: x / Nitrite: x   Leuk Esterase: x / RBC: x / WBC x   Sq Epi: x / Non Sq Epi: x / Bacteria: x        MICROBIOLOGY:    Female    Culture - Blood (collected 01 Dec 2023 07:00)  Source: .Blood Blood-Peripheral  Final Report:    No growth at 5 days    Culture - Blood (collected 30 Nov 2023 20:34)  Source: .Blood Blood-Peripheral  Final Report:    No growth at 5 days    Culture - Blood (collected 30 Nov 2023 20:34)  Source: .Blood Blood-Peripheral  Final Report:    No growth at 5 days    RADIOLOGY & ADDITIONAL TESTS:  I have personally reviewed the relevant images.   CXR      CT  CT Chest No Cont:   ACC: [id number] EXAM:  CT CHEST   ORDERED BY: SHANERAÚL GREYETTAMICHAELLEDECALIXTO     PROCEDURE DATE:  12/08/2023          INTERPRETATION:  CLINICAL HISTORY / REASON FOR EXAM: Pleural effusion    TECHNIQUE: CT chest without contrast. Contiguous axial CT images were   obtained from the thoracic inlet to the base of the diaphragms. Coronal,   sagittal and maximal intensity projection reformatted images were   obtained.  No intravenous contrast was administered.    COMPARISON: 11/30/2023    FINDINGS:  Lungs/Airways/Pleura: Minimally decreased moderate to large left pleural   effusion with adjacent compressive atelectasis resulting in essentially   left lower lobe collapse. Unchanged patchy scattered groundglass   opacities and areas of scarring. Resolved right pleural effusion. Patchy   right basilar atelectasis.    Heart/Vascular: Unchanged.    Mediastinum/Lymph nodes: Unchanged.    Visualized upper abdomen: Unchanged.    Bones/soft tissues: Unchanged.    IMPRESSION:  1.  Minimally decreased moderate to large left pleural effusion with   increased compressive atelectasis resulting in the central left lower   lobe collapse.  2.  Resolved right pleural effusion. Right basilar patchy atelectasis.  3.  Otherwise findings are unchanged on a short intervalfollow-up.    --- End of Report ---            MARY MONTENEGRO MD; Attending Radiologist  This document has been electronically signed. Dec  9 2023  1:11AM (12-08-23 @ 15:37)        WEIGHT  Weight (kg): 72.6 (11-30-23 @ 19:45)      All available historical records have been reviewed

## 2023-12-11 NOTE — PROGRESS NOTE ADULT - SUBJECTIVE AND OBJECTIVE BOX
Patient is a 89y old  Female who presents with a chief complaint of SOB (10 Dec 2023 13:42)      INTERVAL HISTORY/overnight events  in bed comfortable on RA   cxr and ct scan reviewed decrease b/l effusion     Vital Signs Last 24 Hrs  T(C): 35.9 (11 Dec 2023 05:00), Max: 37.8 (10 Dec 2023 22:33)  T(F): 96.7 (11 Dec 2023 05:00), Max: 100.1 (10 Dec 2023 22:33)  HR: 95 (11 Dec 2023 05:00) (95 - 101)  BP: 146/68 (11 Dec 2023 05:00) (119/57 - 146/68)  BP(mean): --  RR: 18 (11 Dec 2023 05:00) (18 - 18)  SpO2: 94% (11 Dec 2023 05:00) (94% - 94%)    Parameters below as of 11 Dec 2023 05:00  Patient On (Oxygen Delivery Method): room air      Daily     Daily   I&O's Summary    10 Dec 2023 07:01  -  11 Dec 2023 07:00  --------------------------------------------------------  IN: 0 mL / OUT: 450 mL / NET: -450 mL        Physical Examination:    General: No acute distress.     HEENT: Pupils equal, reactive to light.  Symmetric.    PULM: decrease left lower     CVS: Regular rate and rhythm, no murmurs, rubs, or gallops    ABD: Soft, nondistended, nontender, normoactive bowel sounds, no masses    EXT: No edema, nontender    SKIN: Warm and well perfused, no rashes noted.    Neurology:    Musculoskeletal : Move all extremety         Lab Results:                        13.3   14.50 )-----------( 276      ( 09 Dec 2023 08:50 )             41.3     09 Dec 2023 08:50    140    |  95     |  21     ----------------------------<  78     4.0     |  33     |  0.7      Ca    9.0        09 Dec 2023 08:50  Mg     2.1       09 Dec 2023 08:50        Urinalysis Basic - ( 09 Dec 2023 08:50 )    Color: x / Appearance: x / SG: x / pH: x  Gluc: 78 mg/dL / Ketone: x  / Bili: x / Urobili: x   Blood: x / Protein: x / Nitrite: x   Leuk Esterase: x / RBC: x / WBC x   Sq Epi: x / Non Sq Epi: x / Bacteria: x            Microbiology      Medication:  MEDICATIONS  (STANDING):  chlorhexidine 2% Cloths 1 Application(s) Topical <User Schedule>  cloNIDine 0.1 milliGRAM(s) Oral once  furosemide    Tablet 40 milliGRAM(s) Oral daily  heparin   Injectable 5000 Unit(s) SubCutaneous every 12 hours  pantoprazole    Tablet 40 milliGRAM(s) Oral before breakfast    MEDICATIONS  (PRN):  acetaminophen     Tablet .. 650 milliGRAM(s) Oral every 6 hours PRN Temp greater or equal to 38C (100.4F), Mild Pain (1 - 3)  aluminum hydroxide/magnesium hydroxide/simethicone Suspension 30 milliLiter(s) Oral every 4 hours PRN Dyspepsia  bisacodyl 5 milliGRAM(s) Oral daily PRN Constipation  melatonin 5 milliGRAM(s) Oral at bedtime PRN Insomnia  ondansetron Injectable 4 milliGRAM(s) IV Push every 8 hours PRN Nausea and/or Vomiting  senna 2 Tablet(s) Oral at bedtime PRN Constipation        IMAGING STUDIES:

## 2023-12-12 LAB
ANION GAP SERPL CALC-SCNC: 16 MMOL/L — HIGH (ref 7–14)
ANION GAP SERPL CALC-SCNC: 16 MMOL/L — HIGH (ref 7–14)
BASE EXCESS BLDA CALC-SCNC: 12.6 MMOL/L — HIGH (ref -2–3)
BASE EXCESS BLDA CALC-SCNC: 12.6 MMOL/L — HIGH (ref -2–3)
BASOPHILS # BLD AUTO: 0 K/UL — SIGNIFICANT CHANGE UP (ref 0–0.2)
BASOPHILS # BLD AUTO: 0 K/UL — SIGNIFICANT CHANGE UP (ref 0–0.2)
BASOPHILS NFR BLD AUTO: 0 % — SIGNIFICANT CHANGE UP (ref 0–1)
BASOPHILS NFR BLD AUTO: 0 % — SIGNIFICANT CHANGE UP (ref 0–1)
BUN SERPL-MCNC: 42 MG/DL — HIGH (ref 10–20)
BUN SERPL-MCNC: 42 MG/DL — HIGH (ref 10–20)
CALCIUM SERPL-MCNC: 9.2 MG/DL — SIGNIFICANT CHANGE UP (ref 8.4–10.5)
CALCIUM SERPL-MCNC: 9.2 MG/DL — SIGNIFICANT CHANGE UP (ref 8.4–10.5)
CHLORIDE SERPL-SCNC: 96 MMOL/L — LOW (ref 98–110)
CHLORIDE SERPL-SCNC: 96 MMOL/L — LOW (ref 98–110)
CO2 SERPL-SCNC: 30 MMOL/L — SIGNIFICANT CHANGE UP (ref 17–32)
CO2 SERPL-SCNC: 30 MMOL/L — SIGNIFICANT CHANGE UP (ref 17–32)
CREAT SERPL-MCNC: 0.8 MG/DL — SIGNIFICANT CHANGE UP (ref 0.7–1.5)
CREAT SERPL-MCNC: 0.8 MG/DL — SIGNIFICANT CHANGE UP (ref 0.7–1.5)
EGFR: 67 ML/MIN/1.73M2 — SIGNIFICANT CHANGE UP
EGFR: 67 ML/MIN/1.73M2 — SIGNIFICANT CHANGE UP
EOSINOPHIL NFR BLD AUTO: 0 % — SIGNIFICANT CHANGE UP (ref 0–8)
EOSINOPHIL NFR BLD AUTO: 0 % — SIGNIFICANT CHANGE UP (ref 0–8)
GLUCOSE SERPL-MCNC: 82 MG/DL — SIGNIFICANT CHANGE UP (ref 70–99)
GLUCOSE SERPL-MCNC: 82 MG/DL — SIGNIFICANT CHANGE UP (ref 70–99)
HCO3 BLDA-SCNC: 36 MMOL/L — HIGH (ref 21–28)
HCO3 BLDA-SCNC: 36 MMOL/L — HIGH (ref 21–28)
HCT VFR BLD CALC: 42.5 % — SIGNIFICANT CHANGE UP (ref 37–47)
HCT VFR BLD CALC: 42.5 % — SIGNIFICANT CHANGE UP (ref 37–47)
HGB BLD-MCNC: 13.7 G/DL — SIGNIFICANT CHANGE UP (ref 12–16)
HGB BLD-MCNC: 13.7 G/DL — SIGNIFICANT CHANGE UP (ref 12–16)
LYMPHOCYTES # BLD AUTO: 0.34 K/UL — LOW (ref 1.2–3.4)
LYMPHOCYTES # BLD AUTO: 0.34 K/UL — LOW (ref 1.2–3.4)
LYMPHOCYTES # BLD AUTO: 1 % — LOW (ref 20.5–51.1)
LYMPHOCYTES # BLD AUTO: 1 % — LOW (ref 20.5–51.1)
MANUAL DIF COMMENT BLD-IMP: SIGNIFICANT CHANGE UP
MANUAL SMEAR VERIFICATION: SIGNIFICANT CHANGE UP
MANUAL SMEAR VERIFICATION: SIGNIFICANT CHANGE UP
MCHC RBC-ENTMCNC: 27.8 PG — SIGNIFICANT CHANGE UP (ref 27–31)
MCHC RBC-ENTMCNC: 27.8 PG — SIGNIFICANT CHANGE UP (ref 27–31)
MCHC RBC-ENTMCNC: 32.2 G/DL — SIGNIFICANT CHANGE UP (ref 32–37)
MCHC RBC-ENTMCNC: 32.2 G/DL — SIGNIFICANT CHANGE UP (ref 32–37)
MCV RBC AUTO: 86.4 FL — SIGNIFICANT CHANGE UP (ref 81–99)
MCV RBC AUTO: 86.4 FL — SIGNIFICANT CHANGE UP (ref 81–99)
MONOCYTES # BLD AUTO: 2.02 K/UL — HIGH (ref 0.1–0.6)
MONOCYTES # BLD AUTO: 2.02 K/UL — HIGH (ref 0.1–0.6)
MONOCYTES NFR BLD AUTO: 6 % — SIGNIFICANT CHANGE UP (ref 1.7–9.3)
MONOCYTES NFR BLD AUTO: 6 % — SIGNIFICANT CHANGE UP (ref 1.7–9.3)
NEUTROPHILS # BLD AUTO: 31.38 K/UL — HIGH (ref 1.4–6.5)
NEUTROPHILS # BLD AUTO: 31.38 K/UL — HIGH (ref 1.4–6.5)
NEUTROPHILS NFR BLD AUTO: 93 % — HIGH (ref 42.2–75.2)
NEUTROPHILS NFR BLD AUTO: 93 % — HIGH (ref 42.2–75.2)
NRBC # BLD: 0 /100 WBCS — SIGNIFICANT CHANGE UP (ref 0–0)
NRBC # BLD: 0 /100 WBCS — SIGNIFICANT CHANGE UP (ref 0–0)
NRBC # BLD: SIGNIFICANT CHANGE UP /100 WBCS (ref 0–0)
NRBC # BLD: SIGNIFICANT CHANGE UP /100 WBCS (ref 0–0)
PCO2 BLDA: 39 MMHG — SIGNIFICANT CHANGE UP (ref 32–45)
PCO2 BLDA: 39 MMHG — SIGNIFICANT CHANGE UP (ref 32–45)
PH BLDA: 7.57 — HIGH (ref 7.35–7.45)
PH BLDA: 7.57 — HIGH (ref 7.35–7.45)
PLAT MORPH BLD: NORMAL — SIGNIFICANT CHANGE UP
PLAT MORPH BLD: NORMAL — SIGNIFICANT CHANGE UP
PLATELET # BLD AUTO: 363 K/UL — SIGNIFICANT CHANGE UP (ref 130–400)
PLATELET # BLD AUTO: 363 K/UL — SIGNIFICANT CHANGE UP (ref 130–400)
PMV BLD: 11.2 FL — HIGH (ref 7.4–10.4)
PMV BLD: 11.2 FL — HIGH (ref 7.4–10.4)
PO2 BLDA: 69 MMHG — LOW (ref 83–108)
PO2 BLDA: 69 MMHG — LOW (ref 83–108)
POTASSIUM SERPL-MCNC: 3.7 MMOL/L — SIGNIFICANT CHANGE UP (ref 3.5–5)
POTASSIUM SERPL-MCNC: 3.7 MMOL/L — SIGNIFICANT CHANGE UP (ref 3.5–5)
POTASSIUM SERPL-SCNC: 3.7 MMOL/L — SIGNIFICANT CHANGE UP (ref 3.5–5)
POTASSIUM SERPL-SCNC: 3.7 MMOL/L — SIGNIFICANT CHANGE UP (ref 3.5–5)
RBC # BLD: 4.92 M/UL — SIGNIFICANT CHANGE UP (ref 4.2–5.4)
RBC # BLD: 4.92 M/UL — SIGNIFICANT CHANGE UP (ref 4.2–5.4)
RBC # FLD: 13.7 % — SIGNIFICANT CHANGE UP (ref 11.5–14.5)
RBC # FLD: 13.7 % — SIGNIFICANT CHANGE UP (ref 11.5–14.5)
RBC BLD AUTO: NORMAL — SIGNIFICANT CHANGE UP
RBC BLD AUTO: NORMAL — SIGNIFICANT CHANGE UP
SAO2 % BLDA: 94.9 % — SIGNIFICANT CHANGE UP (ref 94–98)
SAO2 % BLDA: 94.9 % — SIGNIFICANT CHANGE UP (ref 94–98)
SODIUM SERPL-SCNC: 142 MMOL/L — SIGNIFICANT CHANGE UP (ref 135–146)
SODIUM SERPL-SCNC: 142 MMOL/L — SIGNIFICANT CHANGE UP (ref 135–146)
WBC # BLD: 33.74 K/UL — HIGH (ref 4.8–10.8)
WBC # BLD: 33.74 K/UL — HIGH (ref 4.8–10.8)
WBC # FLD AUTO: 33.74 K/UL — HIGH (ref 4.8–10.8)
WBC # FLD AUTO: 33.74 K/UL — HIGH (ref 4.8–10.8)

## 2023-12-12 PROCEDURE — 71045 X-RAY EXAM CHEST 1 VIEW: CPT | Mod: 26

## 2023-12-12 PROCEDURE — 99232 SBSQ HOSP IP/OBS MODERATE 35: CPT

## 2023-12-12 PROCEDURE — 76770 US EXAM ABDO BACK WALL COMP: CPT | Mod: 26

## 2023-12-12 RX ORDER — CEFTRIAXONE 500 MG/1
2000 INJECTION, POWDER, FOR SOLUTION INTRAMUSCULAR; INTRAVENOUS EVERY 24 HOURS
Refills: 0 | Status: DISCONTINUED | OUTPATIENT
Start: 2023-12-13 | End: 2023-12-18

## 2023-12-12 RX ORDER — VANCOMYCIN HCL 1 G
1000 VIAL (EA) INTRAVENOUS EVERY 24 HOURS
Refills: 0 | Status: DISCONTINUED | OUTPATIENT
Start: 2023-12-13 | End: 2023-12-15

## 2023-12-12 RX ORDER — CEFTRIAXONE 500 MG/1
INJECTION, POWDER, FOR SOLUTION INTRAMUSCULAR; INTRAVENOUS
Refills: 0 | Status: DISCONTINUED | OUTPATIENT
Start: 2023-12-12 | End: 2023-12-18

## 2023-12-12 RX ORDER — POLYETHYLENE GLYCOL 3350 17 G/17G
17 POWDER, FOR SOLUTION ORAL DAILY
Refills: 0 | Status: DISCONTINUED | OUTPATIENT
Start: 2023-12-12 | End: 2023-12-18

## 2023-12-12 RX ORDER — SODIUM CHLORIDE 9 MG/ML
1000 INJECTION, SOLUTION INTRAVENOUS
Refills: 0 | Status: DISCONTINUED | OUTPATIENT
Start: 2023-12-12 | End: 2023-12-14

## 2023-12-12 RX ORDER — FUROSEMIDE 40 MG
40 TABLET ORAL DAILY
Refills: 0 | Status: DISCONTINUED | OUTPATIENT
Start: 2023-12-12 | End: 2023-12-16

## 2023-12-12 RX ORDER — VANCOMYCIN HCL 1 G
1000 VIAL (EA) INTRAVENOUS ONCE
Refills: 0 | Status: COMPLETED | OUTPATIENT
Start: 2023-12-12 | End: 2023-12-12

## 2023-12-12 RX ORDER — CEFTRIAXONE 500 MG/1
2000 INJECTION, POWDER, FOR SOLUTION INTRAMUSCULAR; INTRAVENOUS ONCE
Refills: 0 | Status: COMPLETED | OUTPATIENT
Start: 2023-12-12 | End: 2023-12-12

## 2023-12-12 RX ORDER — VANCOMYCIN HCL 1 G
VIAL (EA) INTRAVENOUS
Refills: 0 | Status: DISCONTINUED | OUTPATIENT
Start: 2023-12-12 | End: 2023-12-15

## 2023-12-12 RX ADMIN — PANTOPRAZOLE SODIUM 40 MILLIGRAM(S): 20 TABLET, DELAYED RELEASE ORAL at 05:12

## 2023-12-12 RX ADMIN — Medication 40 MILLIGRAM(S): at 16:40

## 2023-12-12 RX ADMIN — CEFTRIAXONE 2000 MILLIGRAM(S): 500 INJECTION, POWDER, FOR SOLUTION INTRAMUSCULAR; INTRAVENOUS at 10:21

## 2023-12-12 RX ADMIN — HEPARIN SODIUM 5000 UNIT(S): 5000 INJECTION INTRAVENOUS; SUBCUTANEOUS at 05:12

## 2023-12-12 RX ADMIN — POLYETHYLENE GLYCOL 3350 17 GRAM(S): 17 POWDER, FOR SOLUTION ORAL at 12:57

## 2023-12-12 RX ADMIN — SODIUM CHLORIDE 100 MILLILITER(S): 9 INJECTION, SOLUTION INTRAVENOUS at 16:40

## 2023-12-12 RX ADMIN — CHLORHEXIDINE GLUCONATE 1 APPLICATION(S): 213 SOLUTION TOPICAL at 05:12

## 2023-12-12 RX ADMIN — Medication 250 MILLIGRAM(S): at 10:30

## 2023-12-12 NOTE — CONSULT NOTE ADULT - SUBJECTIVE AND OBJECTIVE BOX
NEPHROLOGY CONSULTATION NOTE    AIXA DYE  89y  Female  MRN-[id number]    CC:   Patient is a 89y old  Female who presents with a chief complaint of SOB (12 Dec 2023 08:04)      HPI:  89 yo F pmhx  (chronic) A-Fib, HTN, CKD, CAD, PAD, Dementia, BIBEMS for evaluation of cough and sob x approx one month.  Family member states pt has had an on-going cough, outpt cxr performed (unremarkable per family member).   Pt was given dexamethasone and combivent by EMS prior to arrival, states she feels better after treatments. pt has been taking amoxicillin with minimal relief at home. denies fever, chills, chest pain, leg swelling, calf pain.    I spoke with pt's daughter-in -law named Aixa to obtain home meds.  Daughter in law states she will call in the morning to give meds.   (30 Nov 2023 23:59)      PAST MEDICAL & SURGICAL HISTORY:  Atrial fibrillation      Anemia      CAD (coronary artery disease)      Coronary artery disease      Hearing loss      Hypertension      Dementia      Peripheral artery disease      Venous stasis ulcers of both lower extremities      OA (osteoarthritis)  Left Knee      History of appendectomy      S/P ORIF (open reduction internal fixation) fracture  RT hip Fracture        Allergies:  Celebrex (Unknown)    Home Medications Reviewed  Hospital Medications:   MEDICATIONS  (STANDING):  cefTRIAXone   IVPB      chlorhexidine 2% Cloths 1 Application(s) Topical <User Schedule>  cloNIDine 0.1 milliGRAM(s) Oral once  pantoprazole    Tablet 40 milliGRAM(s) Oral before breakfast  polyethylene glycol 3350 17 Gram(s) Oral daily  vancomycin  IVPB        MEDICATIONS  (PRN):  acetaminophen     Tablet .. 650 milliGRAM(s) Oral every 6 hours PRN Temp greater or equal to 38C (100.4F), Mild Pain (1 - 3)  aluminum hydroxide/magnesium hydroxide/simethicone Suspension 30 milliLiter(s) Oral every 4 hours PRN Dyspepsia  bisacodyl 5 milliGRAM(s) Oral daily PRN Constipation  melatonin 5 milliGRAM(s) Oral at bedtime PRN Insomnia  ondansetron Injectable 4 milliGRAM(s) IV Push every 8 hours PRN Nausea and/or Vomiting  senna 2 Tablet(s) Oral at bedtime PRN Constipation    Home Medications:  ascorbic acid 500 mg oral tablet: 1 tab(s) orally 2 times a day (15 Sep 2021 16:07)  cephalexin 500 mg oral capsule: 1 cap(s) orally every 12 hours for 7 days  (20 Sep 2021 13:48)  doxycycline monohydrate 100 mg oral capsule: 1 cap(s) orally every 12 hours for 5 days  (20 Sep 2021 13:48)  ergocalciferol 1.25 mg (50,000 intl units) oral tablet: 1 tab(s) orally once a week; please check vitamin D levels in 2 weeks  (20 Sep 2021 13:48)  ezetimibe-simvastatin 10 mg-10 mg oral tablet: 1 tab(s) orally once a day (15 Sep 2021 16:07)  famotidine 20 mg oral tablet: 1 tab(s) orally 2 times a day (15 Sep 2021 16:07)  ferrous sulfate 324 mg (65 mg elemental iron) oral tablet: 1 tab(s) orally once a day (15 Sep 2021 16:07)  furosemide 20 mg oral tablet: 1 tab(s) orally once a day (15 Sep 2021 16:07)  levothyroxine 50 mcg (0.05 mg) oral tablet: 1 tab(s) orally once a day (15 Sep 2021 16:07)  metoprolol succinate 25 mg oral tablet, extended release: 1 tab(s) orally once a day (15 Sep 2021 16:07)      SOCIAL HISTORY:  Social History:      FAMILY HISTORY:      REVIEW OF SYSTEMS:  All other review of systems is negative unless indicated above.    VITALS:  T(F): 96.7 (12-12-23 @ 13:01), Max: 97.4 (12-11-23 @ 20:51)  HR: 88 (12-12-23 @ 13:01)  BP: 115/65 (12-12-23 @ 13:01)  RR: 16 (12-12-23 @ 13:01)  SpO2: 94% (12-12-23 @ 05:00)      I&O's Detail    11 Dec 2023 07:01  -  12 Dec 2023 07:00  --------------------------------------------------------  IN:  Total IN: 0 mL    OUT:    Voided (mL): 200 mL  Total OUT: 200 mL    Total NET: -200 mL            I&O's Summary    11 Dec 2023 07:01  -  12 Dec 2023 07:00  --------------------------------------------------------  IN: 0 mL / OUT: 200 mL / NET: -200 mL        PHYSICAL EXAM:  Gen: NAD  resp: b/l breath sounds  card: S1/S2  abd: soft  ext: no edema    LABS:      12-12    142  |  96<L>  |  42<H>  ----------------------------<  82  3.7   |  30  |  0.8    Ca    9.2      12 Dec 2023 08:12    TPro  6.8  /  Alb  3.0<L>  /  TBili  2.6<H>  /  DBili      /  AST  14  /  ALT  <5  /  AlkPhos  130<H>  12-11    Creatinine Trend:   Creatinine: 0.8 mg/dL (12-12-23 @ 08:12)  Creatinine: 1.0 mg/dL (12-11-23 @ 08:19)  Creatinine: 0.7 mg/dL (12-09-23 @ 08:50)  Creatinine: 0.7 mg/dL (12-08-23 @ 06:11)  Creatinine: 0.7 mg/dL (12-07-23 @ 04:30)  Creatinine: 0.6 mg/dL (12-05-23 @ 07:22)                        13.7   33.74 )-----------( 363      ( 12 Dec 2023 08:12 )             42.5     Mean Cell Volume: 86.4 fL (12-12-23 @ 08:12)    Urine Studies:  Protein, Urine: 30 mg/dL (04-25-20 @ 04:00)  Protein, Urine: 30 mg/dL (04-18-20 @ 07:40)  White Blood Cell - Urine: 1 /HPF (04-18-20 @ 07:40)  Red Blood Cell - Urine: 43 /HPF (04-18-20 @ 07:40)              RADIOLOGY & ADDITIONAL STUDIES:        Xray Chest 1 View- PORTABLE-Routine:   ACC: [id number] EXAM:  XR CHEST PORTABLE  ROUTINE 1V   ORDERED BY: SETH COOK     PROCEDURE DATE:  12/12/2023          INTERPRETATION:  Clinical History / Reason for exam: Fever    Comparison : Chest radiograph 12/8/2023.    Technique/Positioning: Frontal chest radiograph.    Findings:    Support devices: None.    Cardiac/mediastinum/hilum: Stable cardiomediastinal silhouette.    Lung parenchyma/Pleura: Left basilar opacity/effusion, which appears   mildly decreased. Scattered patchy opacities unchanged.    Skeleton/soft tissues: Unchanged osseous structures.    Impression:    Left basilar opacity/effusion which appears mildly decreased. Patchy   opacities unchanged.        --- End of Report ---            DAVID REYES MD; Attending Radiologist  This document has been electronically signed. Dec 12 2023 10:48AM (12-12-23 @ 10:03)                     NEPHROLOGY CONSULTATION NOTE    AIXA DYE  89y  Female  MRN-[id number]    CC:   Patient is a 89y old  Female who presents with a chief complaint of SOB (12 Dec 2023 08:04)      HPI:  89 yo F pmhx  (chronic) A-Fib, HTN, CKD, CAD, PAD, Dementia, BIBEMS for evaluation of cough and sob x approx one month.  Family member states pt has had an on-going cough, outpt cxr performed (unremarkable per family member).   Pt was given dexamethasone and combivent by EMS prior to arrival, states she feels better after treatments. pt has been taking amoxicillin with minimal relief at home. denies fever, chills, chest pain, leg swelling, calf pain.    I spoke with pt's daughter-in -law named Aixa to obtain home meds.  Daughter in law states she will call in the morning to give meds.   (30 Nov 2023 23:59)      PAST MEDICAL & SURGICAL HISTORY:  Atrial fibrillation      Anemia      CAD (coronary artery disease)      Coronary artery disease      Hearing loss      Hypertension      Dementia      Peripheral artery disease      Venous stasis ulcers of both lower extremities      OA (osteoarthritis)  Left Knee      History of appendectomy      S/P ORIF (open reduction internal fixation) fracture  RT hip Fracture        Allergies:  Celebrex (Unknown)    Home Medications Reviewed  Hospital Medications:   MEDICATIONS  (STANDING):  cefTRIAXone   IVPB      chlorhexidine 2% Cloths 1 Application(s) Topical <User Schedule>  cloNIDine 0.1 milliGRAM(s) Oral once  pantoprazole    Tablet 40 milliGRAM(s) Oral before breakfast  polyethylene glycol 3350 17 Gram(s) Oral daily  vancomycin  IVPB        MEDICATIONS  (PRN):  acetaminophen     Tablet .. 650 milliGRAM(s) Oral every 6 hours PRN Temp greater or equal to 38C (100.4F), Mild Pain (1 - 3)  aluminum hydroxide/magnesium hydroxide/simethicone Suspension 30 milliLiter(s) Oral every 4 hours PRN Dyspepsia  bisacodyl 5 milliGRAM(s) Oral daily PRN Constipation  melatonin 5 milliGRAM(s) Oral at bedtime PRN Insomnia  ondansetron Injectable 4 milliGRAM(s) IV Push every 8 hours PRN Nausea and/or Vomiting  senna 2 Tablet(s) Oral at bedtime PRN Constipation    Home Medications:  ascorbic acid 500 mg oral tablet: 1 tab(s) orally 2 times a day (15 Sep 2021 16:07)  cephalexin 500 mg oral capsule: 1 cap(s) orally every 12 hours for 7 days  (20 Sep 2021 13:48)  doxycycline monohydrate 100 mg oral capsule: 1 cap(s) orally every 12 hours for 5 days  (20 Sep 2021 13:48)  ergocalciferol 1.25 mg (50,000 intl units) oral tablet: 1 tab(s) orally once a week; please check vitamin D levels in 2 weeks  (20 Sep 2021 13:48)  ezetimibe-simvastatin 10 mg-10 mg oral tablet: 1 tab(s) orally once a day (15 Sep 2021 16:07)  famotidine 20 mg oral tablet: 1 tab(s) orally 2 times a day (15 Sep 2021 16:07)  ferrous sulfate 324 mg (65 mg elemental iron) oral tablet: 1 tab(s) orally once a day (15 Sep 2021 16:07)  furosemide 20 mg oral tablet: 1 tab(s) orally once a day (15 Sep 2021 16:07)  levothyroxine 50 mcg (0.05 mg) oral tablet: 1 tab(s) orally once a day (15 Sep 2021 16:07)  metoprolol succinate 25 mg oral tablet, extended release: 1 tab(s) orally once a day (15 Sep 2021 16:07)      SOCIAL HISTORY:  Social History:      FAMILY HISTORY:      REVIEW OF SYSTEMS:  All other review of systems is negative unless indicated above.    VITALS:  T(F): 96.7 (12-12-23 @ 13:01), Max: 97.4 (12-11-23 @ 20:51)  HR: 88 (12-12-23 @ 13:01)  BP: 115/65 (12-12-23 @ 13:01)  RR: 16 (12-12-23 @ 13:01)  SpO2: 94% (12-12-23 @ 05:00)      I&O's Detail    11 Dec 2023 07:01  -  12 Dec 2023 07:00  --------------------------------------------------------  IN:  Total IN: 0 mL    OUT:    Voided (mL): 200 mL  Total OUT: 200 mL    Total NET: -200 mL            I&O's Summary    11 Dec 2023 07:01  -  12 Dec 2023 07:00  --------------------------------------------------------  IN: 0 mL / OUT: 200 mL / NET: -200 mL        PHYSICAL EXAM:  Gen: NAD  resp: b/l breath sounds  card: S1/S2  abd: soft  ext: no edema    LABS:      12-12    142  |  96<L>  |  42<H>  ----------------------------<  82  3.7   |  30  |  0.8    Ca    9.2      12 Dec 2023 08:12    TPro  6.8  /  Alb  3.0<L>  /  TBili  2.6<H>  /  DBili      /  AST  14  /  ALT  <5  /  AlkPhos  130<H>  12-11    Creatinine Trend:   Creatinine: 0.8 mg/dL (12-12-23 @ 08:12)  Creatinine: 1.0 mg/dL (12-11-23 @ 08:19)  Creatinine: 0.7 mg/dL (12-09-23 @ 08:50)  Creatinine: 0.7 mg/dL (12-08-23 @ 06:11)  Creatinine: 0.7 mg/dL (12-07-23 @ 04:30)  Creatinine: 0.6 mg/dL (12-05-23 @ 07:22)                        13.7   33.74 )-----------( 363      ( 12 Dec 2023 08:12 )             42.5     Mean Cell Volume: 86.4 fL (12-12-23 @ 08:12)    Urine Studies:  Protein, Urine: 30 mg/dL (04-25-20 @ 04:00)  Protein, Urine: 30 mg/dL (04-18-20 @ 07:40)  White Blood Cell - Urine: 1 /HPF (04-18-20 @ 07:40)  Red Blood Cell - Urine: 43 /HPF (04-18-20 @ 07:40)              RADIOLOGY & ADDITIONAL STUDIES:        Xray Chest 1 View- PORTABLE-Routine:   ACC: [id number] EXAM:  XR CHEST PORTABLE  ROUTINE 1V   ORDERED BY: SETH COOK     PROCEDURE DATE:  12/12/2023          INTERPRETATION:  Clinical History / Reason for exam: Fever    Comparison : Chest radiograph 12/8/2023.    Technique/Positioning: Frontal chest radiograph.    Findings:    Support devices: None.    Cardiac/mediastinum/hilum: Stable cardiomediastinal silhouette.    Lung parenchyma/Pleura: Left basilar opacity/effusion, which appears   mildly decreased. Scattered patchy opacities unchanged.    Skeleton/soft tissues: Unchanged osseous structures.    Impression:    Left basilar opacity/effusion which appears mildly decreased. Patchy   opacities unchanged.        --- End of Report ---            DAVID REYES MD; Attending Radiologist  This document has been electronically signed. Dec 12 2023 10:48AM (12-12-23 @ 10:03)

## 2023-12-12 NOTE — PROGRESS NOTE ADULT - SUBJECTIVE AND OBJECTIVE BOX
INTERVAL EVENTS/HPI: Pt not interacting with me much.        REVIEW OF SYSTEMS:  CONSTITUTIONAL: No fever or chills  HEENT: No sore throat  RESPIRATORY: No cough, no shortness of breath  CARDIOVASCULAR: No chest pain or palpitations  GASTROINTESTINAL: No abdominal or epigastric pain  GENITOURINARY: No dysuria  NEUROLOGICAL: No headache/dizziness  MSK: No joint pain, erythema, or swelling; no back pain  SKIN: No itching, rashes  All other ROS negative except noted above    Prior hospital charts reviewed [Yes]  Other consultant notes reviewed [Yes]    ANTIMICROBIALS:       OTHER MEDS: MEDICATIONS  (STANDING):  acetaminophen     Tablet .. 650 every 6 hours PRN  aluminum hydroxide/magnesium hydroxide/simethicone Suspension 30 every 4 hours PRN  bisacodyl 5 daily PRN  cloNIDine 0.1 once  furosemide    Tablet 40 daily  heparin   Injectable 5000 every 12 hours  melatonin 5 at bedtime PRN  ondansetron Injectable 4 every 8 hours PRN  pantoprazole    Tablet 40 before breakfast  senna 2 at bedtime PRN      Vital Signs Last 24 Hrs  T(F): 100 (12-10-23 @ 05:00), Max: 100 (12-09-23 @ 21:22)    Vital Signs Last 24 Hrs  HR: 88 (12-10-23 @ 05:00) (88 - 95)  BP: 124/60 (12-10-23 @ 05:00) (124/60 - 146/65)  RR: 18 (12-10-23 @ 05:00)  SpO2: 95% (12-10-23 @ 05:00) (95% - 95%)  Wt(kg): --    EXAM:  GENERAL: NAD,   HEENT : NC/AT, b/l poor hearing   NECK: Supple, No JVD  CHEST/LUNG: b/l air entry, decreased lower bases  HEART: Regular rate and rhythm;   ABDOMEN: Soft, Nontender, Nondistended; Bowel sounds present  : primafit in place draining hematuria  EXTREMITIES:  no edema  NEURO:  aox2, generalized weakness   SKIN: No rashes or lesions  Labs:                        13.3   14.50 )-----------( 276      ( 09 Dec 2023 08:50 )             41.3     12-09    140  |  95<L>  |  21<H>  ----------------------------<  78  4.0   |  33<H>  |  0.7    Ca    9.0      09 Dec 2023 08:50  Mg     2.1     12-09    TPro  7.1  /  Alb  3.4<L>  /  TBili  1.9<H>  /  DBili  x   /  AST  13  /  ALT  <5  /  AlkPhos  85  12-09      WBC Trend:  WBC Count: 14.50 (12-09-23 @ 08:50)  WBC Count: 14.79 (12-08-23 @ 06:11)      Creatine Trend:  Creatinine: 0.7 (12-09)  Creatinine: 0.7 (12-08)  Creatinine: 0.7 (12-07)  Creatinine: 0.6 (12-05)      Liver Biochemical Testing Trend:  Alanine Aminotransferase (ALT/SGPT): <5 (12-09)  Alanine Aminotransferase (ALT/SGPT): 7 (12-05)  Alanine Aminotransferase (ALT/SGPT): 8 (11-30)  Aspartate Aminotransferase (AST/SGOT): 13 (12-09-23 @ 08:50)  Aspartate Aminotransferase (AST/SGOT): 17 (12-05-23 @ 07:22)  Aspartate Aminotransferase (AST/SGOT): 21 (11-30-23 @ 20:26)  Bilirubin Total: 1.9 (12-09)  Bilirubin Total: 1.0 (12-05)  Bilirubin Total: 0.8 (11-30)      Trend LDH      Urinalysis Basic - ( 09 Dec 2023 08:50 )    Color: x / Appearance: x / SG: x / pH: x  Gluc: 78 mg/dL / Ketone: x  / Bili: x / Urobili: x   Blood: x / Protein: x / Nitrite: x   Leuk Esterase: x / RBC: x / WBC x   Sq Epi: x / Non Sq Epi: x / Bacteria: x        MICROBIOLOGY:    Female    Culture - Blood (collected 01 Dec 2023 07:00)  Source: .Blood Blood-Peripheral  Final Report:    No growth at 5 days    Culture - Blood (collected 30 Nov 2023 20:34)  Source: .Blood Blood-Peripheral  Final Report:    No growth at 5 days    Culture - Blood (collected 30 Nov 2023 20:34)  Source: .Blood Blood-Peripheral  Final Report:    No growth at 5 days    RADIOLOGY & ADDITIONAL TESTS:  I have personally reviewed the relevant images.   CXR      CT  CT Chest No Cont:   ACC: [id number] EXAM:  CT CHEST   ORDERED BY: KATHI LOU     PROCEDURE DATE:  12/08/2023          INTERPRETATION:  CLINICAL HISTORY / REASON FOR EXAM: Pleural effusion    TECHNIQUE: CT chest without contrast. Contiguous axial CT images were   obtained from the thoracic inlet to the base of the diaphragms. Coronal,   sagittal and maximal intensity projection reformatted images were   obtained.  No intravenous contrast was administered.    COMPARISON: 11/30/2023    FINDINGS:  Lungs/Airways/Pleura: Minimally decreased moderate to large left pleural   effusion with adjacent compressive atelectasis resulting in essentially   left lower lobe collapse. Unchanged patchy scattered groundglass   opacities and areas of scarring. Resolved right pleural effusion. Patchy   right basilar atelectasis.    Heart/Vascular: Unchanged.    Mediastinum/Lymph nodes: Unchanged.    Visualized upper abdomen: Unchanged.    Bones/soft tissues: Unchanged.    IMPRESSION:  1.  Minimally decreased moderate to large left pleural effusion with   increased compressive atelectasis resulting in the central left lower   lobe collapse.  2.  Resolved right pleural effusion. Right basilar patchy atelectasis.  3.  Otherwise findings are unchanged on a short intervalfollow-up.    --- End of Report ---            MARY MONTENEGRO MD; Attending Radiologist  This document has been electronically signed. Dec  9 2023  1:11AM (12-08-23 @ 15:37)        WEIGHT  Weight (kg): 72.6 (11-30-23 @ 19:45)      All available historical records have been reviewed           INTERVAL EVENTS/HPI: Pt not interacting with me much.        REVIEW OF SYSTEMS:  CONSTITUTIONAL: No fever or chills  HEENT: No sore throat  RESPIRATORY: No cough, no shortness of breath  CARDIOVASCULAR: No chest pain or palpitations  GASTROINTESTINAL: No abdominal or epigastric pain  GENITOURINARY: No dysuria  NEUROLOGICAL: No headache/dizziness  MSK: No joint pain, erythema, or swelling; no back pain  SKIN: No itching, rashes  All other ROS negative except noted above    Prior hospital charts reviewed [Yes]  Other consultant notes reviewed [Yes]    ANTIMICROBIALS:       OTHER MEDS: MEDICATIONS  (STANDING):  acetaminophen     Tablet .. 650 every 6 hours PRN  aluminum hydroxide/magnesium hydroxide/simethicone Suspension 30 every 4 hours PRN  bisacodyl 5 daily PRN  cloNIDine 0.1 once  furosemide    Tablet 40 daily  heparin   Injectable 5000 every 12 hours  melatonin 5 at bedtime PRN  ondansetron Injectable 4 every 8 hours PRN  pantoprazole    Tablet 40 before breakfast  senna 2 at bedtime PRN      Vital Signs Last 24 Hrs  T(F): 100 (12-10-23 @ 05:00), Max: 100 (12-09-23 @ 21:22)    Vital Signs Last 24 Hrs  HR: 88 (12-10-23 @ 05:00) (88 - 95)  BP: 124/60 (12-10-23 @ 05:00) (124/60 - 146/65)  RR: 18 (12-10-23 @ 05:00)  SpO2: 95% (12-10-23 @ 05:00) (95% - 95%)  Wt(kg): --    EXAM:  GENERAL: NAD,   HEENT : NC/AT, b/l poor hearing   NECK: Supple, No JVD  CHEST/LUNG: b/l air entry, decreased lower bases  HEART: Regular rate and rhythm;   ABDOMEN: Soft, Nontender, Nondistended; Bowel sounds present  : primafit in place draining hematuria  EXTREMITIES:  no edema  NEURO:  aox2, generalized weakness   SKIN: No rashes or lesions  Labs:                        13.3   14.50 )-----------( 276      ( 09 Dec 2023 08:50 )             41.3     12-09    140  |  95<L>  |  21<H>  ----------------------------<  78  4.0   |  33<H>  |  0.7    Ca    9.0      09 Dec 2023 08:50  Mg     2.1     12-09    TPro  7.1  /  Alb  3.4<L>  /  TBili  1.9<H>  /  DBili  x   /  AST  13  /  ALT  <5  /  AlkPhos  85  12-09      WBC Trend:  WBC Count: 14.50 (12-09-23 @ 08:50)  WBC Count: 14.79 (12-08-23 @ 06:11)      Creatine Trend:  Creatinine: 0.7 (12-09)  Creatinine: 0.7 (12-08)  Creatinine: 0.7 (12-07)  Creatinine: 0.6 (12-05)      Liver Biochemical Testing Trend:  Alanine Aminotransferase (ALT/SGPT): <5 (12-09)  Alanine Aminotransferase (ALT/SGPT): 7 (12-05)  Alanine Aminotransferase (ALT/SGPT): 8 (11-30)  Aspartate Aminotransferase (AST/SGOT): 13 (12-09-23 @ 08:50)  Aspartate Aminotransferase (AST/SGOT): 17 (12-05-23 @ 07:22)  Aspartate Aminotransferase (AST/SGOT): 21 (11-30-23 @ 20:26)  Bilirubin Total: 1.9 (12-09)  Bilirubin Total: 1.0 (12-05)  Bilirubin Total: 0.8 (11-30)      Trend LDH      Urinalysis Basic - ( 09 Dec 2023 08:50 )    Color: x / Appearance: x / SG: x / pH: x  Gluc: 78 mg/dL / Ketone: x  / Bili: x / Urobili: x   Blood: x / Protein: x / Nitrite: x   Leuk Esterase: x / RBC: x / WBC x   Sq Epi: x / Non Sq Epi: x / Bacteria: x        MICROBIOLOGY:    Female    Culture - Blood (collected 01 Dec 2023 07:00)  Source: .Blood Blood-Peripheral  Final Report:    No growth at 5 days    Culture - Blood (collected 30 Nov 2023 20:34)  Source: .Blood Blood-Peripheral  Final Report:    No growth at 5 days    Culture - Blood (collected 30 Nov 2023 20:34)  Source: .Blood Blood-Peripheral  Final Report:    No growth at 5 days    RADIOLOGY & ADDITIONAL TESTS:  I have personally reviewed the relevant images.   CXR      CT  CT Chest No Cont:   ACC: [id number] EXAM:  CT CHEST   ORDERED BY: KATHI LOU     PROCEDURE DATE:  12/08/2023          INTERPRETATION:  CLINICAL HISTORY / REASON FOR EXAM: Pleural effusion    TECHNIQUE: CT chest without contrast. Contiguous axial CT images were   obtained from the thoracic inlet to the base of the diaphragms. Coronal,   sagittal and maximal intensity projection reformatted images were   obtained.  No intravenous contrast was administered.    COMPARISON: 11/30/2023    FINDINGS:  Lungs/Airways/Pleura: Minimally decreased moderate to large left pleural   effusion with adjacent compressive atelectasis resulting in essentially   left lower lobe collapse. Unchanged patchy scattered groundglass   opacities and areas of scarring. Resolved right pleural effusion. Patchy   right basilar atelectasis.    Heart/Vascular: Unchanged.    Mediastinum/Lymph nodes: Unchanged.    Visualized upper abdomen: Unchanged.    Bones/soft tissues: Unchanged.    IMPRESSION:  1.  Minimally decreased moderate to large left pleural effusion with   increased compressive atelectasis resulting in the central left lower   lobe collapse.  2.  Resolved right pleural effusion. Right basilar patchy atelectasis.  3.  Otherwise findings are unchanged on a short intervalfollow-up.    --- End of Report ---            MARY MONTENEGRO MD; Attending Radiologist  This document has been electronically signed. Dec  9 2023  1:11AM (12-08-23 @ 15:37)        WEIGHT  Weight (kg): 72.6 (11-30-23 @ 19:45)      All available historical records have been reviewed           INTERVAL EVENTS/HPI: Pt not interacting with me much. She responded to touch and I thought voice, but speaking to son he stated she is extremely hard of hearing. She opens her eyes but does not speak to me or follow directions. No urine in cannister to assess. Did not wince in pain on palpation/examination of body. She looks ill. Updated son of possible new infection and he stated she does not eat at times and was previously on marinol and not on any meds for dementia which is advanced. At baseline, talks minimally and walk with a walker and assistance. He stated it was ok to give information to his wife (pt's DIL).        REVIEW OF SYSTEMS:  CONSTITUTIONAL: No fever or chills  HEENT: No sore throat  RESPIRATORY: No cough, no shortness of breath  CARDIOVASCULAR: No chest pain or palpitations  GASTROINTESTINAL: No abdominal or epigastric pain  GENITOURINARY: No dysuria  NEUROLOGICAL: No headache/dizziness  MSK: No joint pain, erythema, or swelling; no back pain  SKIN: No itching, rashes  All other ROS negative except noted above    Prior hospital charts reviewed [Yes]  Other consultant notes reviewed [Yes]    ANTIMICROBIALS:       OTHER MEDS: MEDICATIONS  (STANDING):  acetaminophen     Tablet .. 650 every 6 hours PRN  aluminum hydroxide/magnesium hydroxide/simethicone Suspension 30 every 4 hours PRN  bisacodyl 5 daily PRN  cloNIDine 0.1 once  furosemide    Tablet 40 daily  heparin   Injectable 5000 every 12 hours  melatonin 5 at bedtime PRN  ondansetron Injectable 4 every 8 hours PRN  pantoprazole    Tablet 40 before breakfast  senna 2 at bedtime PRN      Vital Signs Last 24 Hrs  T(F): 100 (12-10-23 @ 05:00), Max: 100 (12-09-23 @ 21:22)    Vital Signs Last 24 Hrs  HR: 88 (12-10-23 @ 05:00) (88 - 95)  BP: 124/60 (12-10-23 @ 05:00) (124/60 - 146/65)  RR: 18 (12-10-23 @ 05:00)  SpO2: 95% (12-10-23 @ 05:00) (95% - 95%)  Wt(kg): --    EXAM:  GENERAL: NAD,   HEENT : NC/AT, b/l poor hearing   NECK: Supple, No JVD  CHEST/LUNG: b/l air entry, decreased lower bases  HEART: Regular rate and rhythm;   ABDOMEN: Soft, Nontender, Nondistended; Bowel sounds present  : primafit in place draining hematuria  EXTREMITIES:  no edema  NEURO:  aox2, generalized weakness   SKIN: No rashes or lesions  Labs:                        13.3   14.50 )-----------( 276      ( 09 Dec 2023 08:50 )             41.3     12-09    140  |  95<L>  |  21<H>  ----------------------------<  78  4.0   |  33<H>  |  0.7    Ca    9.0      09 Dec 2023 08:50  Mg     2.1     12-09    TPro  7.1  /  Alb  3.4<L>  /  TBili  1.9<H>  /  DBili  x   /  AST  13  /  ALT  <5  /  AlkPhos  85  12-09      WBC Trend:  WBC Count: 14.50 (12-09-23 @ 08:50)  WBC Count: 14.79 (12-08-23 @ 06:11)      Creatine Trend:  Creatinine: 0.7 (12-09)  Creatinine: 0.7 (12-08)  Creatinine: 0.7 (12-07)  Creatinine: 0.6 (12-05)      Liver Biochemical Testing Trend:  Alanine Aminotransferase (ALT/SGPT): <5 (12-09)  Alanine Aminotransferase (ALT/SGPT): 7 (12-05)  Alanine Aminotransferase (ALT/SGPT): 8 (11-30)  Aspartate Aminotransferase (AST/SGOT): 13 (12-09-23 @ 08:50)  Aspartate Aminotransferase (AST/SGOT): 17 (12-05-23 @ 07:22)  Aspartate Aminotransferase (AST/SGOT): 21 (11-30-23 @ 20:26)  Bilirubin Total: 1.9 (12-09)  Bilirubin Total: 1.0 (12-05)  Bilirubin Total: 0.8 (11-30)      Trend LDH      Urinalysis Basic - ( 09 Dec 2023 08:50 )    Color: x / Appearance: x / SG: x / pH: x  Gluc: 78 mg/dL / Ketone: x  / Bili: x / Urobili: x   Blood: x / Protein: x / Nitrite: x   Leuk Esterase: x / RBC: x / WBC x   Sq Epi: x / Non Sq Epi: x / Bacteria: x        MICROBIOLOGY:    Female    Culture - Blood (collected 01 Dec 2023 07:00)  Source: .Blood Blood-Peripheral  Final Report:    No growth at 5 days    Culture - Blood (collected 30 Nov 2023 20:34)  Source: .Blood Blood-Peripheral  Final Report:    No growth at 5 days    Culture - Blood (collected 30 Nov 2023 20:34)  Source: .Blood Blood-Peripheral  Final Report:    No growth at 5 days    RADIOLOGY & ADDITIONAL TESTS:  I have personally reviewed the relevant images.   CXR      CT  CT Chest No Cont:   ACC: [id number] EXAM:  CT CHEST   ORDERED BY: KATHI LOU     PROCEDURE DATE:  12/08/2023          INTERPRETATION:  CLINICAL HISTORY / REASON FOR EXAM: Pleural effusion    TECHNIQUE: CT chest without contrast. Contiguous axial CT images were   obtained from the thoracic inlet to the base of the diaphragms. Coronal,   sagittal and maximal intensity projection reformatted images were   obtained.  No intravenous contrast was administered.    COMPARISON: 11/30/2023    FINDINGS:  Lungs/Airways/Pleura: Minimally decreased moderate to large left pleural   effusion with adjacent compressive atelectasis resulting in essentially   left lower lobe collapse. Unchanged patchy scattered groundglass   opacities and areas of scarring. Resolved right pleural effusion. Patchy   right basilar atelectasis.    Heart/Vascular: Unchanged.    Mediastinum/Lymph nodes: Unchanged.    Visualized upper abdomen: Unchanged.    Bones/soft tissues: Unchanged.    IMPRESSION:  1.  Minimally decreased moderate to large left pleural effusion with   increased compressive atelectasis resulting in the central left lower   lobe collapse.  2.  Resolved right pleural effusion. Right basilar patchy atelectasis.  3.  Otherwise findings are unchanged on a short intervalfollow-up.    --- End of Report ---            MARY MONTENEGRO MD; Attending Radiologist  This document has been electronically signed. Dec  9 2023  1:11AM (12-08-23 @ 15:37)        WEIGHT  Weight (kg): 72.6 (11-30-23 @ 19:45)      All available historical records have been reviewed           INTERVAL EVENTS/HPI: Pt not interacting with me much. She responded to touch and I thought voice, but speaking to son he stated she is extremely hard of hearing. She opens her eyes but does not speak to me or follow directions. No urine in cannister to assess. Did not wince in pain on palpation/examination of body. She looks ill. Updated son of possible new infection and he stated she does not eat at times and was previously on marinol and not on any meds for dementia which is advanced. At baseline, talks minimally and walk with a walker and assistance. He stated it was ok to give information to his wife (pt's DIL).        REVIEW OF SYSTEMS:  CONSTITUTIONAL: No fever or chills  HEENT: No sore throat  RESPIRATORY: No cough, no shortness of breath  CARDIOVASCULAR: No chest pain or palpitations  GASTROINTESTINAL: No abdominal or epigastric pain  GENITOURINARY: No dysuria  NEUROLOGICAL: No headache/dizziness  MSK: No joint pain, erythema, or swelling; no back pain  SKIN: No itching, rashes  All other ROS negative except noted above    Prior hospital charts reviewed [Yes]  Other consultant notes reviewed [Yes]    ANTIMICROBIALS:       OTHER MEDS: MEDICATIONS  (STANDING):  acetaminophen     Tablet .. 650 every 6 hours PRN  aluminum hydroxide/magnesium hydroxide/simethicone Suspension 30 every 4 hours PRN  bisacodyl 5 daily PRN  cloNIDine 0.1 once  furosemide    Tablet 40 daily  heparin   Injectable 5000 every 12 hours  melatonin 5 at bedtime PRN  ondansetron Injectable 4 every 8 hours PRN  pantoprazole    Tablet 40 before breakfast  senna 2 at bedtime PRN      Vital Signs Last 24 Hrs  T(F): 100 (12-10-23 @ 05:00), Max: 100 (12-09-23 @ 21:22)    Vital Signs Last 24 Hrs  HR: 88 (12-10-23 @ 05:00) (88 - 95)  BP: 124/60 (12-10-23 @ 05:00) (124/60 - 146/65)  RR: 18 (12-10-23 @ 05:00)  SpO2: 95% (12-10-23 @ 05:00) (95% - 95%)  Wt(kg): --    EXAM:  GENERAL: NAD,   HEENT : NC/AT, b/l poor hearing   NECK: Supple, No JVD  CHEST/LUNG: b/l air entry, decreased lower bases  HEART: Regular rate and rhythm;   ABDOMEN: Soft, Nontender, Nondistended; Bowel sounds present  : primafit in place draining hematuria  EXTREMITIES:  no edema  NEURO:  aox2, generalized weakness   SKIN: No rashes or lesions  Labs:                        13.3   14.50 )-----------( 276      ( 09 Dec 2023 08:50 )             41.3     12-09    140  |  95<L>  |  21<H>  ----------------------------<  78  4.0   |  33<H>  |  0.7    Ca    9.0      09 Dec 2023 08:50  Mg     2.1     12-09    TPro  7.1  /  Alb  3.4<L>  /  TBili  1.9<H>  /  DBili  x   /  AST  13  /  ALT  <5  /  AlkPhos  85  12-09      WBC Trend:  WBC Count: 14.50 (12-09-23 @ 08:50)  WBC Count: 14.79 (12-08-23 @ 06:11)      Creatine Trend:  Creatinine: 0.7 (12-09)  Creatinine: 0.7 (12-08)  Creatinine: 0.7 (12-07)  Creatinine: 0.6 (12-05)      Liver Biochemical Testing Trend:  Alanine Aminotransferase (ALT/SGPT): <5 (12-09)  Alanine Aminotransferase (ALT/SGPT): 7 (12-05)  Alanine Aminotransferase (ALT/SGPT): 8 (11-30)  Aspartate Aminotransferase (AST/SGOT): 13 (12-09-23 @ 08:50)  Aspartate Aminotransferase (AST/SGOT): 17 (12-05-23 @ 07:22)  Aspartate Aminotransferase (AST/SGOT): 21 (11-30-23 @ 20:26)  Bilirubin Total: 1.9 (12-09)  Bilirubin Total: 1.0 (12-05)  Bilirubin Total: 0.8 (11-30)      Trend LDH      Urinalysis Basic - ( 09 Dec 2023 08:50 )    Color: x / Appearance: x / SG: x / pH: x  Gluc: 78 mg/dL / Ketone: x  / Bili: x / Urobili: x   Blood: x / Protein: x / Nitrite: x   Leuk Esterase: x / RBC: x / WBC x   Sq Epi: x / Non Sq Epi: x / Bacteria: x        MICROBIOLOGY:    Female    Culture - Blood (collected 01 Dec 2023 07:00)  Source: .Blood Blood-Peripheral  Final Report:    No growth at 5 days    Culture - Blood (collected 30 Nov 2023 20:34)  Source: .Blood Blood-Peripheral  Final Report:    No growth at 5 days    Culture - Blood (collected 30 Nov 2023 20:34)  Source: .Blood Blood-Peripheral  Final Report:    No growth at 5 days    RADIOLOGY & ADDITIONAL TESTS:  I have personally reviewed the relevant images.   CXR      CT  CT Chest No Cont:   ACC: [id number] EXAM:  CT CHEST   ORDERED BY: KATHI LOU     PROCEDURE DATE:  12/08/2023          INTERPRETATION:  CLINICAL HISTORY / REASON FOR EXAM: Pleural effusion    TECHNIQUE: CT chest without contrast. Contiguous axial CT images were   obtained from the thoracic inlet to the base of the diaphragms. Coronal,   sagittal and maximal intensity projection reformatted images were   obtained.  No intravenous contrast was administered.    COMPARISON: 11/30/2023    FINDINGS:  Lungs/Airways/Pleura: Minimally decreased moderate to large left pleural   effusion with adjacent compressive atelectasis resulting in essentially   left lower lobe collapse. Unchanged patchy scattered groundglass   opacities and areas of scarring. Resolved right pleural effusion. Patchy   right basilar atelectasis.    Heart/Vascular: Unchanged.    Mediastinum/Lymph nodes: Unchanged.    Visualized upper abdomen: Unchanged.    Bones/soft tissues: Unchanged.    IMPRESSION:  1.  Minimally decreased moderate to large left pleural effusion with   increased compressive atelectasis resulting in the central left lower   lobe collapse.  2.  Resolved right pleural effusion. Right basilar patchy atelectasis.  3.  Otherwise findings are unchanged on a short intervalfollow-up.    --- End of Report ---            MARY MONTENEGRO MD; Attending Radiologist  This document has been electronically signed. Dec  9 2023  1:11AM (12-08-23 @ 15:37)        WEIGHT  Weight (kg): 72.6 (11-30-23 @ 19:45)      All available historical records have been reviewed

## 2023-12-12 NOTE — PROGRESS NOTE ADULT - ASSESSMENT
A 89 yo F pmhx A-Fib, HTN, CKD, CAD, PAD, Dementia, BIBEMS for evaluation of cough and sob x approx one month.  Family member states pt has had an on-going cough, outpt cxr performed (unremarkable per family member). admitted for positive influenza A and acute respiratory failure.     #Influenzae A bronchitis  #Lt pleural effusion with compression atelectasis  #Partial occulusion of bilateral lower lobe segmental bronchi  #acute hypoxic respiratory failure  - pt's leukocytosis has been uptrending since 12/8 and had no labs on 12/10 and then wbc count went up to 28k on 12/11  - fever curve uptrending with tmax of 100.3 on 12/11  - pulm recs appreciated  - pt has been on lasix 40mg po qd for the pleural effusion but now hematuria was noted in the cannister on 12/11  - SLP: Soft & bite sized/thins. 1:1 feeds  - CXR: unchanged left pleural effusion  - CTAP: Minimally decreased moderate to large left pleural effusion with increased compressive atelectasis resulting in the central left lower lobe collapse  - blood cultures on admission NGTD  - S/p tamiflu x 5 days and Unasyn x 7 days (completed)  - on room air  - ID, pulm and cardio recs appreciated  - PT/OT  - precaution (fall/aspiration/seizure)  - pulm team less inclined to do a thoracocentesis. Unable to get a hold of anyone in the family      #KATHRYN on CKD 3A  #hematuria  - Cr increased from 0.7 to 1  - GFR decreased 4 to 3A  - cant give fluids due to the pleural effusion  - will hold lasix for now  - bladder scan q 8hrs x 3  - nephro consult  - UA, UCx  - currently on a primafit  - stopped vte proph with heparin and switched to SCD    #CAD/PAD?  - pt is not on a statin? or aspirin?    #hyponatremia, resolved      #chronic persistent afib  - rate controlled off medications  - not on any anticoagulation    #HTN  - normotensive off medications    #dementia  - not on any meds      DVT prophylaxis: SCD  GI prophylaxis: Protonix   diet: HH diet   code status: full code  Goals of care/disposition: Home once optimized   Handoff: Acute, pending improvement in pleural effusion  HCP: [facility], [phone]   Unable to get a hold of the    A 89 yo F pmhx A-Fib, HTN, CKD, CAD, PAD, Dementia, BIBEMS for evaluation of cough and sob x approx one month.  Family member states pt has had an on-going cough, outpt cxr performed (unremarkable per family member). admitted for positive influenza A and acute respiratory failure.     #Influenzae A bronchitis  #Lt pleural effusion with compression atelectasis  #Partial occulusion of bilateral lower lobe segmental bronchi  #acute hypoxic respiratory failure  - pt's leukocytosis has been uptrending since 12/8 and had no labs on 12/10 and then wbc count went up to 28k on 12/11  - fever curve uptrending with tmax of 100.3 on 12/11  - pulm recs appreciated  - pt has been on lasix 40mg po qd for the pleural effusion but now hematuria was noted in the cannister on 12/11  - SLP: Soft & bite sized/thins. 1:1 feeds  - CXR: unchanged left pleural effusion  - CTAP: Minimally decreased moderate to large left pleural effusion with increased compressive atelectasis resulting in the central left lower lobe collapse  - blood cultures on admission NGTD  - S/p tamiflu x 5 days and Unasyn x 7 days (completed)  - on room air  - ID, pulm and cardio recs appreciated  - PT/OT  - precaution (fall/aspiration/seizure)  - pulm team less inclined to do a thoracocentesis. Unable to get a hold of anyone in the family      #KATHRYN on CKD 3A  #hematuria  - Cr increased from 0.7 to 1  - GFR decreased 4 to 3A  - cant give fluids due to the pleural effusion  - will hold lasix for now  - bladder scan q 8hrs x 3  - nephro consult  - UA, UCx  - currently on a primafit  - stopped vte proph with heparin and switched to SCD    #CAD/PAD?  - pt is not on a statin? or aspirin?    #hyponatremia, resolved      #chronic persistent afib  - rate controlled off medications  - not on any anticoagulation    #HTN  - normotensive off medications    #dementia  - not on any meds      DVT prophylaxis: SCD  GI prophylaxis: Protonix   diet: HH diet   code status: full code  Goals of care/disposition: Home once optimized   Handoff: Acute, pending improvement in pleural effusion  HCP: [facility], [phone]   Unable to get a hold of the    A 89 yo F pmhx A-Fib, HTN, CKD, CAD, PAD, Dementia, BIBEMS for evaluation of cough and sob x approx one month.  Family member states pt has had an on-going cough, outpt cxr performed (unremarkable per family member). admitted for positive influenza A and acute respiratory failure.     #Influenzae A bronchitis  #Lt pleural effusion with compression atelectasis  #Partial occulusion of bilateral lower lobe segmental bronchi  #acute hypoxic respiratory failure  - pt's leukocytosis has been uptrending since 12/8 and had no labs on 12/10 and then wbc count went up to 28k on 12/11  - fever curve uptrending with tmax of 100.3 on 12/11  - pulm recs appreciated  - pt has been on lasix 40mg po qd for the pleural effusion but now hematuria was noted in the cannister on 12/11  - SLP: Soft & bite sized/thins. 1:1 feeds  - CXR: unchanged left pleural effusion  - CTAP: Minimally decreased moderate to large left pleural effusion with increased compressive atelectasis resulting in the central left lower lobe collapse  - blood cultures on admission NGTD  - S/p tamiflu x 5 days and Unasyn x 7 days (completed)  - on room air  - ID, pulm and cardio recs appreciated  - PT/OT  - precaution (fall/aspiration/seizure)  - pulm team less inclined to do a thoracocentesis. Unable to get a hold of anyone in the family  - repeat infectious workup sent on 12/12 due to temp of 100.3 and wbc of 28k      #KATHRYN on CKD 3A  #hematuria  - Cr increased from 0.7 to 1  - GFR decreased 4 to 3A  - cant give fluids due to the pleural effusion  - will hold lasix for now  - bladder scan q 8hrs x 3  - nephro consult  - UA, UCx  - currently on a primafit  - stopped vte proph with heparin and switched to SCD    #CAD/PAD?  - pt is not on a statin? or aspirin?    #hyponatremia, resolved      #chronic persistent afib  - rate controlled off medications  - not on any anticoagulation    #HTN  - normotensive off medications    #dementia  - not on any meds      DVT prophylaxis: SCD  GI prophylaxis: Protonix   diet: HH diet   code status: full code  Goals of care/disposition: Home once optimized   Handoff: Acute, pending improvement in pleural effusion  HCP: [facility], [phone]   Unable to get a hold of the    A 89 yo F pmhx A-Fib, HTN, CKD, CAD, PAD, Dementia, BIBEMS for evaluation of cough and sob x approx one month.  Family member states pt has had an on-going cough, outpt cxr performed (unremarkable per family member). admitted for positive influenza A and acute respiratory failure.     #Influenzae A bronchitis  #Lt pleural effusion with compression atelectasis  #Partial occulusion of bilateral lower lobe segmental bronchi  #acute hypoxic respiratory failure  - pt's leukocytosis has been uptrending since 12/8 and had no labs on 12/10 and then wbc count went up to 28k on 12/11  - fever curve uptrending with tmax of 100.3 on 12/11  - pulm recs appreciated  - pt has been on lasix 40mg po qd for the pleural effusion but now hematuria was noted in the cannister on 12/11  - SLP: Soft & bite sized/thins. 1:1 feeds  - CXR: unchanged left pleural effusion  - CTAP: Minimally decreased moderate to large left pleural effusion with increased compressive atelectasis resulting in the central left lower lobe collapse  - blood cultures on admission NGTD  - S/p tamiflu x 5 days and Unasyn x 7 days (completed)  - on room air  - ID, pulm and cardio recs appreciated  - PT/OT  - precaution (fall/aspiration/seizure)  - pulm team less inclined to do a thoracocentesis. Unable to get a hold of anyone in the family  - repeat infectious workup sent on 12/12 due to temp of 100.3 and wbc of 28k      #KATHRYN on CKD 3A  #hematuria  - Cr increased from 0.7 to 1  - GFR decreased 4 to 3A  - cant give fluids due to the pleural effusion  - will hold lasix for now  - bladder scan q 8hrs x 3  - nephro consult  - UA, UCx  - currently on a primafit  - stopped vte proph with heparin and switched to SCD    #CAD/PAD?  - pt is not on a statin? or aspirin?    #hyponatremia, resolved      #chronic persistent afib  - rate controlled off medications  - not on any anticoagulation    #HTN  - normotensive off medications    #dementia  - not on any meds      DVT prophylaxis: SCD  GI prophylaxis: Protonix   diet: HH diet   code status: full code  Goals of care/disposition: Home once optimized   Handoff: Acute, pending improvement in pleural effusion  HCP: [facility], [phone]   Unable to get a hold of the    A 89 yo F pmhx A-Fib, HTN, CKD, CAD, PAD, Dementia, BIBEMS for evaluation of cough and sob x approx one month.  Family member states pt has had an on-going cough, outpt cxr performed (unremarkable per family member). admitted for positive influenza A and acute respiratory failure.     #Influenzae A bronchitis  #Lt pleural effusion with compression atelectasis  #Partial occulusion of bilateral lower lobe segmental bronchi  #acute hypoxic respiratory failure  - pt's leukocytosis has been uptrending since 12/8 and had no labs on 12/10 and then wbc count went up to 28k on 12/11  - fever curve uptrending with tmax of 100.3 on 12/11  - pulm recs appreciated  - pt has been on lasix 40mg po qd for the pleural effusion but now hematuria was noted in the cannister on 12/11  - SLP: Soft & bite sized/thins. 1:1 feeds  - CXR: unchanged left pleural effusion  - CTAP: Minimally decreased moderate to large left pleural effusion with increased compressive atelectasis resulting in the central left lower lobe collapse  - blood cultures on admission NGTD  - S/p tamiflu x 5 days and Unasyn x 7 days (completed)  - on room air  - ID, pulm and cardio recs appreciated  - PT/OT  - precaution (fall/aspiration/seizure)  - pulm team less inclined to do a thoracocentesis. Unable to get a hold of anyone in the family  - repeat infectious workup sent on 12/12 due to temp of 100.3 and wbc of 28k      #KATHRYN on CKD 3A  #hematuria  - Cr increased from 0.7 to 1  - GFR decreased 4 to 3A  - cant give fluids due to the pleural effusion  - will hold lasix for now  - bladder scan q 8hrs x 3  - nephro consult  - UA, UCx  - currently on a primafit  - stopped vte proph with heparin and switched to SCD    #CAD/PAD?  - pt is not on a statin? or aspirin?    #hyponatremia, resolved      #chronic persistent afib  - rate controlled off medications  - not on any anticoagulation    #HTN  - normotensive off medications    #dementia  - not on any meds      DVT prophylaxis: SCD  GI prophylaxis: Protonix   diet: HH diet   code status: full code  Goals of care/disposition: Home once optimized   Handoff: Acute, pending improvement in pleural effusion  HCP: [facility], [phone]   Unable to get a hold of the    A 89 yo F pmhx A-Fib, HTN, CKD, CAD, PAD, Dementia, BIBEMS for evaluation of cough and sob x approx one month.  Family member states pt has had an on-going cough, outpt cxr performed (unremarkable per family member). admitted for positive influenza A and acute respiratory failure.     #Influenzae A bronchitis  #Lt pleural effusion with compression atelectasis  #Partial occulusion of bilateral lower lobe segmental bronchi  #acute hypoxic respiratory failure  - pt's leukocytosis has been uptrending since 12/8 and had no labs on 12/10 and then wbc count went up to 28k on 12/11  - fever curve uptrending with tmax of 100.3 on 12/11  - pulm recs appreciated  - pt has been on lasix 40mg po qd for the pleural effusion but now hematuria was noted in the cannister on 12/11  - SLP: Soft & bite sized/thins. 1:1 feeds  - CXR: unchanged left pleural effusion  - CTAP: Minimally decreased moderate to large left pleural effusion with increased compressive atelectasis resulting in the central left lower lobe collapse  - blood cultures on admission NGTD  - S/p tamiflu x 5 days and Unasyn x 7 days (completed)  - on room air  - ID, pulm and cardio recs appreciated  - PT/OT  - precaution (fall/aspiration/seizure)  - pulm team less inclined to do a thoracocentesis. Unable to get a hold of anyone in the family  - repeat infectious workup sent on 12/12 due to temp of 100.3 and wbc of 28k      #KATHRYN on CKD 3A  #hematuria  - Cr increased from 0.7 to 1  - GFR decreased 4 to 3A  - cant give fluids due to the pleural effusion  - will hold lasix for now  - bladder scan q 8hrs x 3  - nephro consult  - UA, UCx  - currently on a primafit  - stopped vte proph with heparin and switched to SCD    #CAD/PAD?  - pt is not on a statin? or aspirin?    #hyponatremia, resolved      #chronic persistent afib  - rate controlled off medications  - not on any anticoagulation    #HTN  - normotensive off medications    #dementia  - not on any meds      DVT prophylaxis: SCD  GI prophylaxis: Protonix   diet: HH diet   code status: full code  Goals of care/disposition: Home once optimized   Handoff: Acute, pending improvement in pleural effusion  HCP: [facility], [phone]   Unable to get a hold of the    A 89 yo F pmhx A-Fib, HTN, CKD, CAD, PAD, Dementia, BIBEMS for evaluation of cough and sob x approx one month.  Family member states pt has had an on-going cough, outpt cxr performed (unremarkable per family member). admitted for positive influenza A and acute respiratory failure.     #Influenzae A bronchitis  #Lt pleural effusion with compression atelectasis  #Partial occulusion of bilateral lower lobe segmental bronchi  #acute hypoxic respiratory failure  - pt's leukocytosis has been uptrending since 12/8 and had no labs on 12/10 and then wbc count went up to 28k on 12/11  - fever curve uptrending with tmax of 100.3 on 12/11  - pulm recs appreciated  - pt has been on lasix 40mg po qd for the pleural effusion but now hematuria was noted in the cannister on 12/11  - SLP: Soft & bite sized/thins. 1:1 feeds  - CXR: unchanged left pleural effusion  - CTAP: Minimally decreased moderate to large left pleural effusion with increased compressive atelectasis resulting in the central left lower lobe collapse  - blood cultures on admission NGTD  - S/p tamiflu x 5 days and Unasyn x 7 days (completed)  - on room air  - ID, pulm and cardio recs appreciated  - PT/OT  - precaution (fall/aspiration/seizure)  - pulm team less inclined to do a thoracocentesis. Unable to get a hold of anyone in the family  - repeat infectious workup sent on 12/12 due to temp of 100.3 and wbc of 28k  - A-a gradient 32 with expected 28 with concern of increased work of breathing  - pt is on RA; pt is DNR/DNI  - is not AOx3 to be placed on bipap  - pt is a mouth breather so ordered non-rebreather  - continued lasix IC for now as breathing takes priority over hematuria   - son felt thoracocentesis not indicated at this time after discussion      #KATHRYN on CKD 3A  #hematuria  - Cr increased from 0.7 to 1  - GFR decreased 4 to 3A  - cant give fluids due to the pleural effusion  - will hold lasix for now  - bladder scan q 8hrs x 3  - nephro consult  - UA, UCx  - currently on a primafit  - stopped vte proph with heparin and switched to SCD    #CAD/PAD?  - pt is not on a statin? or aspirin?    #hyponatremia, resolved      #chronic persistent afib  - rate controlled off medications  - not on any anticoagulation    #HTN  - normotensive off medications    #dementia  - not on any meds  - calorie count ordered on 12/12      DVT prophylaxis: SCD  GI prophylaxis: Protonix   diet: HH diet   code status: DNR/DNI  Handoff: Acute, pending improvement in pleural effusion  HCP: [facility], [phone]   Unable to get a hold of the     Handoff: f/u calorie count, f/u hematuria workup, nephro consult,  am cxr for pleural effusion, infectious workup   A 89 yo F pmhx A-Fib, HTN, CKD, CAD, PAD, Dementia, BIBEMS for evaluation of cough and sob x approx one month.  Family member states pt has had an on-going cough, outpt cxr performed (unremarkable per family member). admitted for positive influenza A and acute respiratory failure.     #Influenzae A bronchitis  #Lt pleural effusion with compression atelectasis  #Partial occulusion of bilateral lower lobe segmental bronchi  #acute hypoxic respiratory failure  - pt's leukocytosis has been uptrending since 12/8 and had no labs on 12/10 and then wbc count went up to 28k on 12/11  - fever curve uptrending with tmax of 100.3 on 12/11  - pulm recs appreciated  - pt has been on lasix 40mg po qd for the pleural effusion but now hematuria was noted in the cannister on 12/11  - SLP: Soft & bite sized/thins. 1:1 feeds  - CXR: unchanged left pleural effusion  - CTAP: Minimally decreased moderate to large left pleural effusion with increased compressive atelectasis resulting in the central left lower lobe collapse  - blood cultures on admission NGTD  - S/p tamiflu x 5 days and Unasyn x 7 days (completed)  - on room air  - ID, pulm and cardio recs appreciated  - PT/OT  - precaution (fall/aspiration/seizure)  - pulm team less inclined to do a thoracocentesis. Unable to get a hold of anyone in the family  - repeat infectious workup sent on 12/12 due to temp of 100.3 and wbc of 28k  - A-a gradient 32 with expected 28 with concern of increased work of breathing  - pt is on RA; pt is DNR/DNI  - is not AOx3 to be placed on bipap  - pt is a mouth breather so ordered non-rebreather  - continued lasix IC for now as breathing takes priority over hematuria   - son felt thoracocentesis not indicated at this time after discussion      #KATHRYN on CKD 3A  #hematuria  - Cr increased from 0.7 to 1  - GFR decreased 4 to 3A  - cant give fluids due to the pleural effusion  - will hold lasix for now  - bladder scan q 8hrs x 3  - nephro consult  - UA, UCx  - currently on a primafit  - stopped vte proph with heparin and switched to SCD    #CAD/PAD?  - pt is not on a statin? or aspirin?    #hyponatremia, resolved      #chronic persistent afib  - rate controlled off medications  - not on any anticoagulation    #HTN  - normotensive off medications    #dementia  - not on any meds  - calorie count ordered on 12/12      DVT prophylaxis: SCD  GI prophylaxis: Protonix   diet: HH diet   code status: DNR/DNI  Handoff: Acute, pending improvement in pleural effusion  HCP: [facility], [phone]   Unable to get a hold of the     Handoff: f/u calorie count, f/u hematuria workup, nephro consult,  am cxr for pleural effusion, infectious workup

## 2023-12-12 NOTE — CONSULT NOTE ADULT - ASSESSMENT
Nephrology consulted for hematuria.  No urine seen in prima-fit container  serum creatinine level is near baseline  please obtain UA and microscopic UA

## 2023-12-12 NOTE — PROGRESS NOTE ADULT - TIME BILLING
direct pt care
I have personally seen and examined this patient.    I have reviewed all pertinent clinical information and reviewed all relevant imaging and diagnostic studies personally.   I counseled the patient about diagnostic testing and treatment plan. All questions were answered.   I discussed recommendations with the primary team.

## 2023-12-13 LAB
ALBUMIN SERPL ELPH-MCNC: 2.8 G/DL — LOW (ref 3.5–5.2)
ALBUMIN SERPL ELPH-MCNC: 2.8 G/DL — LOW (ref 3.5–5.2)
ALP SERPL-CCNC: 99 U/L — SIGNIFICANT CHANGE UP (ref 30–115)
ALP SERPL-CCNC: 99 U/L — SIGNIFICANT CHANGE UP (ref 30–115)
ALT FLD-CCNC: 5 U/L — SIGNIFICANT CHANGE UP (ref 0–41)
ALT FLD-CCNC: 5 U/L — SIGNIFICANT CHANGE UP (ref 0–41)
AMORPH SED URNS QL MICRO: PRESENT
AMORPH SED URNS QL MICRO: PRESENT
ANION GAP SERPL CALC-SCNC: 11 MMOL/L — SIGNIFICANT CHANGE UP (ref 7–14)
ANION GAP SERPL CALC-SCNC: 11 MMOL/L — SIGNIFICANT CHANGE UP (ref 7–14)
APPEARANCE UR: ABNORMAL
APPEARANCE UR: ABNORMAL
AST SERPL-CCNC: 21 U/L — SIGNIFICANT CHANGE UP (ref 0–41)
AST SERPL-CCNC: 21 U/L — SIGNIFICANT CHANGE UP (ref 0–41)
BACTERIA # UR AUTO: ABNORMAL /HPF
BACTERIA # UR AUTO: ABNORMAL /HPF
BASOPHILS # BLD AUTO: 0.04 K/UL — SIGNIFICANT CHANGE UP (ref 0–0.2)
BASOPHILS # BLD AUTO: 0.04 K/UL — SIGNIFICANT CHANGE UP (ref 0–0.2)
BASOPHILS NFR BLD AUTO: 0.2 % — SIGNIFICANT CHANGE UP (ref 0–1)
BASOPHILS NFR BLD AUTO: 0.2 % — SIGNIFICANT CHANGE UP (ref 0–1)
BILIRUB SERPL-MCNC: 2 MG/DL — HIGH (ref 0.2–1.2)
BILIRUB SERPL-MCNC: 2 MG/DL — HIGH (ref 0.2–1.2)
BILIRUB UR-MCNC: ABNORMAL
BILIRUB UR-MCNC: ABNORMAL
BUN SERPL-MCNC: 41 MG/DL — HIGH (ref 10–20)
BUN SERPL-MCNC: 41 MG/DL — HIGH (ref 10–20)
CALCIUM SERPL-MCNC: 8.9 MG/DL — SIGNIFICANT CHANGE UP (ref 8.4–10.5)
CALCIUM SERPL-MCNC: 8.9 MG/DL — SIGNIFICANT CHANGE UP (ref 8.4–10.5)
CAST: 2 /LPF — SIGNIFICANT CHANGE UP (ref 0–4)
CAST: 2 /LPF — SIGNIFICANT CHANGE UP (ref 0–4)
CHLORIDE SERPL-SCNC: 98 MMOL/L — SIGNIFICANT CHANGE UP (ref 98–110)
CHLORIDE SERPL-SCNC: 98 MMOL/L — SIGNIFICANT CHANGE UP (ref 98–110)
CK SERPL-CCNC: 26 U/L — SIGNIFICANT CHANGE UP (ref 0–225)
CO2 SERPL-SCNC: 35 MMOL/L — HIGH (ref 17–32)
CO2 SERPL-SCNC: 35 MMOL/L — HIGH (ref 17–32)
COLOR SPEC: SIGNIFICANT CHANGE UP
COLOR SPEC: SIGNIFICANT CHANGE UP
CREAT SERPL-MCNC: 0.8 MG/DL — SIGNIFICANT CHANGE UP (ref 0.7–1.5)
CREAT SERPL-MCNC: 0.8 MG/DL — SIGNIFICANT CHANGE UP (ref 0.7–1.5)
DIFF PNL FLD: NEGATIVE — SIGNIFICANT CHANGE UP
DIFF PNL FLD: NEGATIVE — SIGNIFICANT CHANGE UP
EGFR: 67 ML/MIN/1.73M2 — SIGNIFICANT CHANGE UP
EGFR: 67 ML/MIN/1.73M2 — SIGNIFICANT CHANGE UP
EOSINOPHIL # BLD AUTO: 0.03 K/UL — SIGNIFICANT CHANGE UP (ref 0–0.7)
EOSINOPHIL # BLD AUTO: 0.03 K/UL — SIGNIFICANT CHANGE UP (ref 0–0.7)
EOSINOPHIL NFR BLD AUTO: 0.1 % — SIGNIFICANT CHANGE UP (ref 0–8)
EOSINOPHIL NFR BLD AUTO: 0.1 % — SIGNIFICANT CHANGE UP (ref 0–8)
EPI CELLS # UR: PRESENT
EPI CELLS # UR: PRESENT
GLUCOSE SERPL-MCNC: 83 MG/DL — SIGNIFICANT CHANGE UP (ref 70–99)
GLUCOSE SERPL-MCNC: 83 MG/DL — SIGNIFICANT CHANGE UP (ref 70–99)
GLUCOSE UR QL: NEGATIVE MG/DL — SIGNIFICANT CHANGE UP
GLUCOSE UR QL: NEGATIVE MG/DL — SIGNIFICANT CHANGE UP
GRAN CASTS # UR COMP ASSIST: PRESENT
GRAN CASTS # UR COMP ASSIST: PRESENT
HCT VFR BLD CALC: 41.5 % — SIGNIFICANT CHANGE UP (ref 37–47)
HCT VFR BLD CALC: 41.5 % — SIGNIFICANT CHANGE UP (ref 37–47)
HGB BLD-MCNC: 13.1 G/DL — SIGNIFICANT CHANGE UP (ref 12–16)
HGB BLD-MCNC: 13.1 G/DL — SIGNIFICANT CHANGE UP (ref 12–16)
IMM GRANULOCYTES NFR BLD AUTO: 0.7 % — HIGH (ref 0.1–0.3)
IMM GRANULOCYTES NFR BLD AUTO: 0.7 % — HIGH (ref 0.1–0.3)
KETONES UR-MCNC: NEGATIVE MG/DL — SIGNIFICANT CHANGE UP
KETONES UR-MCNC: NEGATIVE MG/DL — SIGNIFICANT CHANGE UP
LACTATE SERPL-SCNC: 1.8 MMOL/L — SIGNIFICANT CHANGE UP (ref 0.7–2)
LACTATE SERPL-SCNC: 1.8 MMOL/L — SIGNIFICANT CHANGE UP (ref 0.7–2)
LEUKOCYTE ESTERASE UR-ACNC: ABNORMAL
LEUKOCYTE ESTERASE UR-ACNC: ABNORMAL
LYMPHOCYTES # BLD AUTO: 0.99 K/UL — LOW (ref 1.2–3.4)
LYMPHOCYTES # BLD AUTO: 0.99 K/UL — LOW (ref 1.2–3.4)
LYMPHOCYTES # BLD AUTO: 4.4 % — LOW (ref 20.5–51.1)
LYMPHOCYTES # BLD AUTO: 4.4 % — LOW (ref 20.5–51.1)
MCHC RBC-ENTMCNC: 27.7 PG — SIGNIFICANT CHANGE UP (ref 27–31)
MCHC RBC-ENTMCNC: 27.7 PG — SIGNIFICANT CHANGE UP (ref 27–31)
MCHC RBC-ENTMCNC: 31.6 G/DL — LOW (ref 32–37)
MCHC RBC-ENTMCNC: 31.6 G/DL — LOW (ref 32–37)
MCV RBC AUTO: 87.7 FL — SIGNIFICANT CHANGE UP (ref 81–99)
MCV RBC AUTO: 87.7 FL — SIGNIFICANT CHANGE UP (ref 81–99)
MONOCYTES # BLD AUTO: 1.43 K/UL — HIGH (ref 0.1–0.6)
MONOCYTES # BLD AUTO: 1.43 K/UL — HIGH (ref 0.1–0.6)
MONOCYTES NFR BLD AUTO: 6.4 % — SIGNIFICANT CHANGE UP (ref 1.7–9.3)
MONOCYTES NFR BLD AUTO: 6.4 % — SIGNIFICANT CHANGE UP (ref 1.7–9.3)
MUCOUS THREADS # UR AUTO: PRESENT
MUCOUS THREADS # UR AUTO: PRESENT
NEUTROPHILS # BLD AUTO: 19.66 K/UL — HIGH (ref 1.4–6.5)
NEUTROPHILS # BLD AUTO: 19.66 K/UL — HIGH (ref 1.4–6.5)
NEUTROPHILS NFR BLD AUTO: 88.2 % — HIGH (ref 42.2–75.2)
NEUTROPHILS NFR BLD AUTO: 88.2 % — HIGH (ref 42.2–75.2)
NITRITE UR-MCNC: POSITIVE
NITRITE UR-MCNC: POSITIVE
NRBC # BLD: 0 /100 WBCS — SIGNIFICANT CHANGE UP (ref 0–0)
NRBC # BLD: 0 /100 WBCS — SIGNIFICANT CHANGE UP (ref 0–0)
PH UR: 6 — SIGNIFICANT CHANGE UP (ref 5–8)
PH UR: 6 — SIGNIFICANT CHANGE UP (ref 5–8)
PLATELET # BLD AUTO: 341 K/UL — SIGNIFICANT CHANGE UP (ref 130–400)
PLATELET # BLD AUTO: 341 K/UL — SIGNIFICANT CHANGE UP (ref 130–400)
PMV BLD: 11 FL — HIGH (ref 7.4–10.4)
PMV BLD: 11 FL — HIGH (ref 7.4–10.4)
POTASSIUM SERPL-MCNC: 4.1 MMOL/L — SIGNIFICANT CHANGE UP (ref 3.5–5)
POTASSIUM SERPL-MCNC: 4.1 MMOL/L — SIGNIFICANT CHANGE UP (ref 3.5–5)
POTASSIUM SERPL-SCNC: 4.1 MMOL/L — SIGNIFICANT CHANGE UP (ref 3.5–5)
POTASSIUM SERPL-SCNC: 4.1 MMOL/L — SIGNIFICANT CHANGE UP (ref 3.5–5)
PROT SERPL-MCNC: 6.7 G/DL — SIGNIFICANT CHANGE UP (ref 6–8)
PROT SERPL-MCNC: 6.7 G/DL — SIGNIFICANT CHANGE UP (ref 6–8)
PROT UR-MCNC: 30 MG/DL
PROT UR-MCNC: 30 MG/DL
RBC # BLD: 4.73 M/UL — SIGNIFICANT CHANGE UP (ref 4.2–5.4)
RBC # BLD: 4.73 M/UL — SIGNIFICANT CHANGE UP (ref 4.2–5.4)
RBC # FLD: 13.9 % — SIGNIFICANT CHANGE UP (ref 11.5–14.5)
RBC # FLD: 13.9 % — SIGNIFICANT CHANGE UP (ref 11.5–14.5)
RBC CASTS # UR COMP ASSIST: 3 /HPF — SIGNIFICANT CHANGE UP (ref 0–4)
RBC CASTS # UR COMP ASSIST: 3 /HPF — SIGNIFICANT CHANGE UP (ref 0–4)
SODIUM SERPL-SCNC: 144 MMOL/L — SIGNIFICANT CHANGE UP (ref 135–146)
SODIUM SERPL-SCNC: 144 MMOL/L — SIGNIFICANT CHANGE UP (ref 135–146)
SP GR SPEC: 1.02 — SIGNIFICANT CHANGE UP (ref 1–1.03)
SP GR SPEC: 1.02 — SIGNIFICANT CHANGE UP (ref 1–1.03)
SQUAMOUS # UR AUTO: 10 /HPF — HIGH (ref 0–5)
SQUAMOUS # UR AUTO: 10 /HPF — HIGH (ref 0–5)
UROBILINOGEN FLD QL: 1 MG/DL — SIGNIFICANT CHANGE UP (ref 0.2–1)
UROBILINOGEN FLD QL: 1 MG/DL — SIGNIFICANT CHANGE UP (ref 0.2–1)
WBC # BLD: 22.31 K/UL — HIGH (ref 4.8–10.8)
WBC # BLD: 22.31 K/UL — HIGH (ref 4.8–10.8)
WBC # FLD AUTO: 22.31 K/UL — HIGH (ref 4.8–10.8)
WBC # FLD AUTO: 22.31 K/UL — HIGH (ref 4.8–10.8)
WBC UR QL: 10 /HPF — HIGH (ref 0–5)
WBC UR QL: 10 /HPF — HIGH (ref 0–5)

## 2023-12-13 PROCEDURE — 71045 X-RAY EXAM CHEST 1 VIEW: CPT | Mod: 26

## 2023-12-13 PROCEDURE — 99232 SBSQ HOSP IP/OBS MODERATE 35: CPT

## 2023-12-13 RX ADMIN — Medication 250 MILLIGRAM(S): at 10:59

## 2023-12-13 RX ADMIN — PANTOPRAZOLE SODIUM 40 MILLIGRAM(S): 20 TABLET, DELAYED RELEASE ORAL at 05:20

## 2023-12-13 RX ADMIN — SODIUM CHLORIDE 100 MILLILITER(S): 9 INJECTION, SOLUTION INTRAVENOUS at 14:10

## 2023-12-13 RX ADMIN — CHLORHEXIDINE GLUCONATE 1 APPLICATION(S): 213 SOLUTION TOPICAL at 05:21

## 2023-12-13 RX ADMIN — Medication 40 MILLIGRAM(S): at 05:20

## 2023-12-13 RX ADMIN — CEFTRIAXONE 100 MILLIGRAM(S): 500 INJECTION, POWDER, FOR SOLUTION INTRAMUSCULAR; INTRAVENOUS at 10:15

## 2023-12-13 NOTE — PROGRESS NOTE ADULT - SUBJECTIVE AND OBJECTIVE BOX
89 yo F pmhx A-Fib, HTN, CKD, CAD, PAD, Dementia, BIBEMS for evaluation of cough and sob x approx one month.  Family member states pt has had an on-going cough, outpt cxr performed (unremarkable per family member). admitted for positive influenza A and acute respiratory failure.     Today:  Seen at bedside, discussed case with son at bedside.          REVIEW OF SYSTEMS:  Not a reliable historian      MEDICATIONS  (STANDING):  cefTRIAXone   IVPB 2000 milliGRAM(s) IV Intermittent every 24 hours  cefTRIAXone   IVPB      chlorhexidine 2% Cloths 1 Application(s) Topical <User Schedule>  cloNIDine 0.1 milliGRAM(s) Oral once  furosemide   Injectable 40 milliGRAM(s) IV Push daily  lactated ringers. 1000 milliLiter(s) (100 mL/Hr) IV Continuous <Continuous>  pantoprazole    Tablet 40 milliGRAM(s) Oral before breakfast  polyethylene glycol 3350 17 Gram(s) Oral daily  vancomycin  IVPB      vancomycin  IVPB 1000 milliGRAM(s) IV Intermittent every 24 hours    MEDICATIONS  (PRN):  acetaminophen     Tablet .. 650 milliGRAM(s) Oral every 6 hours PRN Temp greater or equal to 38C (100.4F), Mild Pain (1 - 3)  aluminum hydroxide/magnesium hydroxide/simethicone Suspension 30 milliLiter(s) Oral every 4 hours PRN Dyspepsia  bisacodyl 5 milliGRAM(s) Oral daily PRN Constipation  melatonin 5 milliGRAM(s) Oral at bedtime PRN Insomnia  ondansetron Injectable 4 milliGRAM(s) IV Push every 8 hours PRN Nausea and/or Vomiting  senna 2 Tablet(s) Oral at bedtime PRN Constipation      Allergies  Celebrex (Unknown)          Vital Signs Last 24 Hrs  T(C): 36 (13 Dec 2023 05:52), Max: 37 (12 Dec 2023 21:00)  T(F): 96.8 (13 Dec 2023 05:52), Max: 98.6 (12 Dec 2023 21:00)  HR: 86 (13 Dec 2023 05:52) (86 - 88)  BP: 139/73 (13 Dec 2023 05:52) (118/70 - 139/73)  RR: 16 (13 Dec 2023 05:52) (16 - 16)  SpO2: 93% (12 Dec 2023 21:00) (93% - 93%)    Parameters below as of 12 Dec 2023 21:00  Patient On (Oxygen Delivery Method): room air        PHYSICAL EXAM:  GENERAL: NAD,   HEENT : NC/AT, b/l poor hearing   NECK: Supple, No JVD  CHEST/LUNG: b/l air entry, decreased lower bases  HEART: Regular rate and rhythm;   ABDOMEN: Soft, Nontender, Nondistended; Bowel sounds present  : primafit in place draining hematuria  EXTREMITIES:  no edema  NEURO:  aox2, generalized weakness   SKIN: No rashes or lesions      LABS:                        13.1   22.31 )-----------( 341      ( 13 Dec 2023 08:02 )             41.5         144  |  98  |  41<H>  ----------------------------<  83  4.1   |  35<H>  |  0.8    Ca    8.9      13 Dec 2023 08:02    TPro  6.7  /  Alb  2.8<L>  /  TBili  2.0<H>  /  DBili  x   /  AST  21  /  ALT  5   /  AlkPhos  99  12-13      Urinalysis Basic - ( 13 Dec 2023 08:20 )    Color: Dark Yellow / Appearance: Cloudy / S.019 / pH: x  Gluc: x / Ketone: Negative mg/dL  / Bili: Small / Urobili: 1.0 mg/dL   Blood: x / Protein: 30 mg/dL / Nitrite: Positive   Leuk Esterase: Small / RBC: x / WBC x   Sq Epi: x / Non Sq Epi: x / Bacteria: x       89 yo F pmhx A-Fib, HTN, CKD, CAD, PAD, Dementia, BIBEMS for evaluation of cough and sob x approx one month.  Family member states pt has had an on-going cough, outpt cxr performed (unremarkable per family member). admitted for positive influenza A and acute respiratory failure.     Today:  Seen at bedside, discussed case with son at bedside.          REVIEW OF SYSTEMS:  Not a reliable historian      MEDICATIONS  (STANDING):  cefTRIAXone   IVPB 2000 milliGRAM(s) IV Intermittent every 24 hours  cefTRIAXone   IVPB      chlorhexidine 2% Cloths 1 Application(s) Topical <User Schedule>  cloNIDine 0.1 milliGRAM(s) Oral once  furosemide   Injectable 40 milliGRAM(s) IV Push daily  lactated ringers. 1000 milliLiter(s) (100 mL/Hr) IV Continuous <Continuous>  pantoprazole    Tablet 40 milliGRAM(s) Oral before breakfast  polyethylene glycol 3350 17 Gram(s) Oral daily  vancomycin  IVPB      vancomycin  IVPB 1000 milliGRAM(s) IV Intermittent every 24 hours    MEDICATIONS  (PRN):  acetaminophen     Tablet .. 650 milliGRAM(s) Oral every 6 hours PRN Temp greater or equal to 38C (100.4F), Mild Pain (1 - 3)  aluminum hydroxide/magnesium hydroxide/simethicone Suspension 30 milliLiter(s) Oral every 4 hours PRN Dyspepsia  bisacodyl 5 milliGRAM(s) Oral daily PRN Constipation  melatonin 5 milliGRAM(s) Oral at bedtime PRN Insomnia  ondansetron Injectable 4 milliGRAM(s) IV Push every 8 hours PRN Nausea and/or Vomiting  senna 2 Tablet(s) Oral at bedtime PRN Constipation      Allergies  Celebrex (Unknown)          Vital Signs Last 24 Hrs  T(C): 36 (13 Dec 2023 05:52), Max: 37 (12 Dec 2023 21:00)  T(F): 96.8 (13 Dec 2023 05:52), Max: 98.6 (12 Dec 2023 21:00)  HR: 86 (13 Dec 2023 05:52) (86 - 88)  BP: 139/73 (13 Dec 2023 05:52) (118/70 - 139/73)  RR: 16 (13 Dec 2023 05:52) (16 - 16)  SpO2: 93% (12 Dec 2023 21:00) (93% - 93%)    Parameters below as of 12 Dec 2023 21:00  Patient On (Oxygen Delivery Method): room air        PHYSICAL EXAM:  GENERAL: NAD,   HEENT : NC/AT, b/l poor hearing   NECK: Supple, No JVD  CHEST/LUNG: b/l air entry, decreased lower bases  HEART: Regular rate and rhythm;   ABDOMEN: Soft, Nontender, Nondistended; Bowel sounds present  : primafit in place draining hematuria  EXTREMITIES:  no edema  NEURO:  aox2, generalized weakness   SKIN: No rashes or lesions      LABS:                        13.1   22.31 )-----------( 341      ( 13 Dec 2023 08:02 )             41.5         144  |  98  |  41<H>  ----------------------------<  83  4.1   |  35<H>  |  0.8    Ca    8.9      13 Dec 2023 08:02    TPro  6.7  /  Alb  2.8<L>  /  TBili  2.0<H>  /  DBili  x   /  AST  21  /  ALT  5   /  AlkPhos  99  12-13      Urinalysis Basic - ( 13 Dec 2023 08:20 )    Color: Dark Yellow / Appearance: Cloudy / S.019 / pH: x  Gluc: x / Ketone: Negative mg/dL  / Bili: Small / Urobili: 1.0 mg/dL   Blood: x / Protein: 30 mg/dL / Nitrite: Positive   Leuk Esterase: Small / RBC: x / WBC x   Sq Epi: x / Non Sq Epi: x / Bacteria: x

## 2023-12-13 NOTE — PROGRESS NOTE ADULT - ASSESSMENT
89 yo F pmhx A-Fib, HTN, CKD, CAD, PAD, Dementia, BIBEMS for evaluation of cough and sob x approx one month.  Family member states pt has had an on-going cough, outpt cxr performed (unremarkable per family member). admitted for positive influenza A and acute respiratory failure.     #Influenzae A bronchitis  #Lt pleural effusion with compression atelectasis  #Partial occulusion of bilateral lower lobe segmental bronchi  #acute hypoxic respiratory failure  - pt's leukocytosis has been uptrending since 12/8 and had no labs on 12/10 and then wbc count went up to 28k on 12/11  - fever curve uptrending with tmax of 100.3 on 12/11  - pulm recs appreciated  - SLP: Soft & bite sized/thins. 1:1 feeds  - CTAP: Minimally decreased moderate to large left pleural effusion with increased compressive atelectasis resulting in the central left lower lobe collapse  -TTE shows pulm HTN  - blood cultures on admission NGTD  - S/p tamiflu x 5 days and Unasyn x 7 days (completed)  - on room air  - ID, pulm and cardio recs appreciated  - PT/OT  - precaution (fall/aspiration/seizure)  - pulm team less inclined to do a thoracocentesis.  - repeat infectious workup sent on 12/12 due to temp of 100.3 and wbc of 28k  - A-a gradient 32 with expected 28 with concern of increased work of breathing  - pt is on RA; pt is DNR/DNI  - is not AOx3 to be placed on bipap  - continued lasix IV as CXR this AM improved    #Dark urine:  -No blood on UA, urine in canister appears jose ramon in color  -Will send CK  -Nephrology consult appreciated    #KATHRYN on CKD 3A  - Cr increased from 0.7 to 1  - GFR decreased 4 to 3A  - nephro consult appreciated  - UA, UCx  - currently on a primafit    #CAD/PAD?  - pt is not on a statin? or aspirin?    #hyponatremia, resolved      #chronic persistent afib  - rate controlled off medications  - not on any anticoagulation    #HTN  - normotensive off medications    #dementia  - not on any meds  - calorie count ordered on 12/12      DVT prophylaxis: SCD  GI prophylaxis: Protonix   diet: HH diet   code status: DNR/DNI  Handoff: Acute, pending improvement in pleural effusion  HCP: [facility], [phone]    89 yo F pmhx A-Fib, HTN, CKD, CAD, PAD, Dementia, BIBEMS for evaluation of cough and sob x approx one month.  Family member states pt has had an on-going cough, outpt cxr performed (unremarkable per family member). admitted for positive influenza A and acute respiratory failure.     #Influenzae A bronchitis  #Lt pleural effusion with compression atelectasis  #Partial occulusion of bilateral lower lobe segmental bronchi  #acute hypoxic respiratory failure  - pt's leukocytosis has been uptrending since 12/8 and had no labs on 12/10 and then wbc count went up to 28k on 12/11  - fever curve uptrending with tmax of 100.3 on 12/11  - pulm recs appreciated  - SLP: Soft & bite sized/thins. 1:1 feeds  - CTAP: Minimally decreased moderate to large left pleural effusion with increased compressive atelectasis resulting in the central left lower lobe collapse  -TTE shows pulm HTN  - blood cultures on admission NGTD  - S/p tamiflu x 5 days and Unasyn x 7 days (completed)  - on room air  - ID, pulm and cardio recs appreciated  - PT/OT  - precaution (fall/aspiration/seizure)  - pulm team less inclined to do a thoracocentesis.  - repeat infectious workup sent on 12/12 due to temp of 100.3 and wbc of 28k  - A-a gradient 32 with expected 28 with concern of increased work of breathing  - pt is on RA; pt is DNR/DNI  - is not AOx3 to be placed on bipap  - continued lasix IV as CXR this AM improved    #Dark urine:  -No blood on UA, urine in canister appears jose ramon in color  -Will send CK  -Nephrology consult appreciated    #KATHRYN on CKD 3A  - Cr increased from 0.7 to 1  - GFR decreased 4 to 3A  - nephro consult appreciated  - UA, UCx  - currently on a primafit    #CAD/PAD?  - pt is not on a statin? or aspirin?    #hyponatremia, resolved      #chronic persistent afib  - rate controlled off medications  - not on any anticoagulation    #HTN  - normotensive off medications    #dementia  - not on any meds  - calorie count ordered on 12/12      DVT prophylaxis: SCD  GI prophylaxis: Protonix   diet: HH diet   code status: DNR/DNI  Handoff: Acute, pending improvement in pleural effusion  HCP: [facility], [phone]

## 2023-12-14 DIAGNOSIS — J90 PLEURAL EFFUSION, NOT ELSEWHERE CLASSIFIED: ICD-10-CM

## 2023-12-14 DIAGNOSIS — Z71.89 OTHER SPECIFIED COUNSELING: ICD-10-CM

## 2023-12-14 DIAGNOSIS — Z51.5 ENCOUNTER FOR PALLIATIVE CARE: ICD-10-CM

## 2023-12-14 DIAGNOSIS — J96.01 ACUTE RESPIRATORY FAILURE WITH HYPOXIA: ICD-10-CM

## 2023-12-14 LAB
ALBUMIN SERPL ELPH-MCNC: 2.7 G/DL — LOW (ref 3.5–5.2)
ALBUMIN SERPL ELPH-MCNC: 2.7 G/DL — LOW (ref 3.5–5.2)
ALP SERPL-CCNC: 109 U/L — SIGNIFICANT CHANGE UP (ref 30–115)
ALP SERPL-CCNC: 109 U/L — SIGNIFICANT CHANGE UP (ref 30–115)
ALT FLD-CCNC: 7 U/L — SIGNIFICANT CHANGE UP (ref 0–41)
ALT FLD-CCNC: 7 U/L — SIGNIFICANT CHANGE UP (ref 0–41)
ANION GAP SERPL CALC-SCNC: 10 MMOL/L — SIGNIFICANT CHANGE UP (ref 7–14)
ANION GAP SERPL CALC-SCNC: 10 MMOL/L — SIGNIFICANT CHANGE UP (ref 7–14)
AST SERPL-CCNC: 26 U/L — SIGNIFICANT CHANGE UP (ref 0–41)
AST SERPL-CCNC: 26 U/L — SIGNIFICANT CHANGE UP (ref 0–41)
BASOPHILS # BLD AUTO: 0.02 K/UL — SIGNIFICANT CHANGE UP (ref 0–0.2)
BASOPHILS # BLD AUTO: 0.02 K/UL — SIGNIFICANT CHANGE UP (ref 0–0.2)
BASOPHILS NFR BLD AUTO: 0.2 % — SIGNIFICANT CHANGE UP (ref 0–1)
BASOPHILS NFR BLD AUTO: 0.2 % — SIGNIFICANT CHANGE UP (ref 0–1)
BILIRUB SERPL-MCNC: 1.1 MG/DL — SIGNIFICANT CHANGE UP (ref 0.2–1.2)
BILIRUB SERPL-MCNC: 1.1 MG/DL — SIGNIFICANT CHANGE UP (ref 0.2–1.2)
BUN SERPL-MCNC: 33 MG/DL — HIGH (ref 10–20)
BUN SERPL-MCNC: 33 MG/DL — HIGH (ref 10–20)
CALCIUM SERPL-MCNC: 9 MG/DL — SIGNIFICANT CHANGE UP (ref 8.4–10.5)
CALCIUM SERPL-MCNC: 9 MG/DL — SIGNIFICANT CHANGE UP (ref 8.4–10.5)
CHLORIDE SERPL-SCNC: 97 MMOL/L — LOW (ref 98–110)
CHLORIDE SERPL-SCNC: 97 MMOL/L — LOW (ref 98–110)
CO2 SERPL-SCNC: 35 MMOL/L — HIGH (ref 17–32)
CO2 SERPL-SCNC: 35 MMOL/L — HIGH (ref 17–32)
CREAT SERPL-MCNC: 0.5 MG/DL — LOW (ref 0.7–1.5)
CREAT SERPL-MCNC: 0.5 MG/DL — LOW (ref 0.7–1.5)
EGFR: 85 ML/MIN/1.73M2 — SIGNIFICANT CHANGE UP
EGFR: 85 ML/MIN/1.73M2 — SIGNIFICANT CHANGE UP
EOSINOPHIL # BLD AUTO: 0.23 K/UL — SIGNIFICANT CHANGE UP (ref 0–0.7)
EOSINOPHIL # BLD AUTO: 0.23 K/UL — SIGNIFICANT CHANGE UP (ref 0–0.7)
EOSINOPHIL NFR BLD AUTO: 1.8 % — SIGNIFICANT CHANGE UP (ref 0–8)
EOSINOPHIL NFR BLD AUTO: 1.8 % — SIGNIFICANT CHANGE UP (ref 0–8)
GLUCOSE SERPL-MCNC: 75 MG/DL — SIGNIFICANT CHANGE UP (ref 70–99)
GLUCOSE SERPL-MCNC: 75 MG/DL — SIGNIFICANT CHANGE UP (ref 70–99)
HCT VFR BLD CALC: 40.5 % — SIGNIFICANT CHANGE UP (ref 37–47)
HCT VFR BLD CALC: 40.5 % — SIGNIFICANT CHANGE UP (ref 37–47)
HGB BLD-MCNC: 13.2 G/DL — SIGNIFICANT CHANGE UP (ref 12–16)
HGB BLD-MCNC: 13.2 G/DL — SIGNIFICANT CHANGE UP (ref 12–16)
IMM GRANULOCYTES NFR BLD AUTO: 0.9 % — HIGH (ref 0.1–0.3)
IMM GRANULOCYTES NFR BLD AUTO: 0.9 % — HIGH (ref 0.1–0.3)
LYMPHOCYTES # BLD AUTO: 1.03 K/UL — LOW (ref 1.2–3.4)
LYMPHOCYTES # BLD AUTO: 1.03 K/UL — LOW (ref 1.2–3.4)
LYMPHOCYTES # BLD AUTO: 7.9 % — LOW (ref 20.5–51.1)
LYMPHOCYTES # BLD AUTO: 7.9 % — LOW (ref 20.5–51.1)
MCHC RBC-ENTMCNC: 28.1 PG — SIGNIFICANT CHANGE UP (ref 27–31)
MCHC RBC-ENTMCNC: 28.1 PG — SIGNIFICANT CHANGE UP (ref 27–31)
MCHC RBC-ENTMCNC: 32.6 G/DL — SIGNIFICANT CHANGE UP (ref 32–37)
MCHC RBC-ENTMCNC: 32.6 G/DL — SIGNIFICANT CHANGE UP (ref 32–37)
MCV RBC AUTO: 86.4 FL — SIGNIFICANT CHANGE UP (ref 81–99)
MCV RBC AUTO: 86.4 FL — SIGNIFICANT CHANGE UP (ref 81–99)
MONOCYTES # BLD AUTO: 1.1 K/UL — HIGH (ref 0.1–0.6)
MONOCYTES # BLD AUTO: 1.1 K/UL — HIGH (ref 0.1–0.6)
MONOCYTES NFR BLD AUTO: 8.4 % — SIGNIFICANT CHANGE UP (ref 1.7–9.3)
MONOCYTES NFR BLD AUTO: 8.4 % — SIGNIFICANT CHANGE UP (ref 1.7–9.3)
NEUTROPHILS # BLD AUTO: 10.61 K/UL — HIGH (ref 1.4–6.5)
NEUTROPHILS # BLD AUTO: 10.61 K/UL — HIGH (ref 1.4–6.5)
NEUTROPHILS NFR BLD AUTO: 80.8 % — HIGH (ref 42.2–75.2)
NEUTROPHILS NFR BLD AUTO: 80.8 % — HIGH (ref 42.2–75.2)
NRBC # BLD: 0 /100 WBCS — SIGNIFICANT CHANGE UP (ref 0–0)
NRBC # BLD: 0 /100 WBCS — SIGNIFICANT CHANGE UP (ref 0–0)
PLATELET # BLD AUTO: 341 K/UL — SIGNIFICANT CHANGE UP (ref 130–400)
PLATELET # BLD AUTO: 341 K/UL — SIGNIFICANT CHANGE UP (ref 130–400)
PMV BLD: 10.8 FL — HIGH (ref 7.4–10.4)
PMV BLD: 10.8 FL — HIGH (ref 7.4–10.4)
POTASSIUM SERPL-MCNC: 3.4 MMOL/L — LOW (ref 3.5–5)
POTASSIUM SERPL-MCNC: 3.4 MMOL/L — LOW (ref 3.5–5)
POTASSIUM SERPL-SCNC: 3.4 MMOL/L — LOW (ref 3.5–5)
POTASSIUM SERPL-SCNC: 3.4 MMOL/L — LOW (ref 3.5–5)
PROCALCITONIN SERPL-MCNC: 0.37 NG/ML — HIGH (ref 0.02–0.1)
PROCALCITONIN SERPL-MCNC: 0.37 NG/ML — HIGH (ref 0.02–0.1)
PROT SERPL-MCNC: 6.3 G/DL — SIGNIFICANT CHANGE UP (ref 6–8)
PROT SERPL-MCNC: 6.3 G/DL — SIGNIFICANT CHANGE UP (ref 6–8)
RBC # BLD: 4.69 M/UL — SIGNIFICANT CHANGE UP (ref 4.2–5.4)
RBC # BLD: 4.69 M/UL — SIGNIFICANT CHANGE UP (ref 4.2–5.4)
RBC # FLD: 13.9 % — SIGNIFICANT CHANGE UP (ref 11.5–14.5)
RBC # FLD: 13.9 % — SIGNIFICANT CHANGE UP (ref 11.5–14.5)
SODIUM SERPL-SCNC: 142 MMOL/L — SIGNIFICANT CHANGE UP (ref 135–146)
SODIUM SERPL-SCNC: 142 MMOL/L — SIGNIFICANT CHANGE UP (ref 135–146)
WBC # BLD: 13.11 K/UL — HIGH (ref 4.8–10.8)
WBC # BLD: 13.11 K/UL — HIGH (ref 4.8–10.8)
WBC # FLD AUTO: 13.11 K/UL — HIGH (ref 4.8–10.8)
WBC # FLD AUTO: 13.11 K/UL — HIGH (ref 4.8–10.8)

## 2023-12-14 PROCEDURE — 99223 1ST HOSP IP/OBS HIGH 75: CPT

## 2023-12-14 PROCEDURE — 99232 SBSQ HOSP IP/OBS MODERATE 35: CPT

## 2023-12-14 PROCEDURE — 71045 X-RAY EXAM CHEST 1 VIEW: CPT | Mod: 26

## 2023-12-14 RX ORDER — POTASSIUM CHLORIDE 20 MEQ
20 PACKET (EA) ORAL EVERY 4 HOURS
Refills: 0 | Status: COMPLETED | OUTPATIENT
Start: 2023-12-14 | End: 2023-12-14

## 2023-12-14 RX ADMIN — CHLORHEXIDINE GLUCONATE 1 APPLICATION(S): 213 SOLUTION TOPICAL at 05:02

## 2023-12-14 RX ADMIN — POLYETHYLENE GLYCOL 3350 17 GRAM(S): 17 POWDER, FOR SOLUTION ORAL at 11:35

## 2023-12-14 RX ADMIN — Medication 250 MILLIGRAM(S): at 08:52

## 2023-12-14 RX ADMIN — Medication 20 MILLIEQUIVALENT(S): at 09:06

## 2023-12-14 RX ADMIN — CEFTRIAXONE 100 MILLIGRAM(S): 500 INJECTION, POWDER, FOR SOLUTION INTRAMUSCULAR; INTRAVENOUS at 08:51

## 2023-12-14 RX ADMIN — PANTOPRAZOLE SODIUM 40 MILLIGRAM(S): 20 TABLET, DELAYED RELEASE ORAL at 05:02

## 2023-12-14 RX ADMIN — Medication 20 MILLIEQUIVALENT(S): at 10:22

## 2023-12-14 RX ADMIN — Medication 40 MILLIGRAM(S): at 05:01

## 2023-12-14 NOTE — PROGRESS NOTE ADULT - SUBJECTIVE AND OBJECTIVE BOX
89 yo F pmhx A-Fib, HTN, CKD, CAD, PAD, Dementia, BIBEMS for evaluation of cough and sob x approx one month.  Family member states pt has had an on-going cough, outpt cxr performed (unremarkable per family member). admitted for positive influenza A and acute respiratory failure.     Today:  Seen at bedside, no overnight events.      REVIEW OF SYSTEMS:  Not a reliable historian      MEDICATIONS  (STANDING):  cefTRIAXone   IVPB      cefTRIAXone   IVPB 2000 milliGRAM(s) IV Intermittent every 24 hours  chlorhexidine 2% Cloths 1 Application(s) Topical <User Schedule>  cloNIDine 0.1 milliGRAM(s) Oral once  furosemide   Injectable 40 milliGRAM(s) IV Push daily  pantoprazole    Tablet 40 milliGRAM(s) Oral before breakfast  polyethylene glycol 3350 17 Gram(s) Oral daily  vancomycin  IVPB 1000 milliGRAM(s) IV Intermittent every 24 hours  vancomycin  IVPB        MEDICATIONS  (PRN):  acetaminophen     Tablet .. 650 milliGRAM(s) Oral every 6 hours PRN Temp greater or equal to 38C (100.4F), Mild Pain (1 - 3)  aluminum hydroxide/magnesium hydroxide/simethicone Suspension 30 milliLiter(s) Oral every 4 hours PRN Dyspepsia  bisacodyl 5 milliGRAM(s) Oral daily PRN Constipation  melatonin 5 milliGRAM(s) Oral at bedtime PRN Insomnia  ondansetron Injectable 4 milliGRAM(s) IV Push every 8 hours PRN Nausea and/or Vomiting  senna 2 Tablet(s) Oral at bedtime PRN Constipation      Allergies  Celebrex (Unknown)          Vital Signs Last 24 Hrs  T(C): 35.9 (14 Dec 2023 05:22), Max: 35.9 (14 Dec 2023 05:22)  T(F): 96.7 (14 Dec 2023 05:22), Max: 96.7 (14 Dec 2023 05:22)  HR: 75 (14 Dec 2023 05:22) (75 - 83)  BP: 143/73 (14 Dec 2023 05:22) (113/57 - 143/73)  RR: 16 (14 Dec 2023 05:22) (16 - 16)  SpO2: 95% (14 Dec 2023 05:22) (94% - 95%)    Parameters below as of 14 Dec 2023 05:22  Patient On (Oxygen Delivery Method): room air        PHYSICAL EXAM:  GENERAL: NAD,   HEENT : NC/AT, b/l poor hearing   NECK: Supple, No JVD  CHEST/LUNG: b/l air entry, decreased lower bases  HEART: Regular rate and rhythm;   ABDOMEN: Soft, Nontender, Nondistended; Bowel sounds present  : primafit in place draining hematuria  EXTREMITIES:  no edema  NEURO:  aox2, generalized weakness   SKIN: No rashes or lesions      LABS:                        13.2   13.11 )-----------( 341      ( 14 Dec 2023 04:30 )             40.5     12-14    142  |  97<L>  |  33<H>  ----------------------------<  75  3.4<L>   |  35<H>  |  0.5<L>    Ca    9.0      14 Dec 2023 04:30    TPro  6.3  /  Alb  2.7<L>  /  TBili  1.1  /  DBili  x   /  AST  26  /  ALT  7   /  AlkPhos  109  12-14      Urinalysis Basic - ( 14 Dec 2023 04:30 )    Color: x / Appearance: x / SG: x / pH: x  Gluc: 75 mg/dL / Ketone: x  / Bili: x / Urobili: x   Blood: x / Protein: x / Nitrite: x   Leuk Esterase: x / RBC: x / WBC x   Sq Epi: x / Non Sq Epi: x / Bacteria: x

## 2023-12-14 NOTE — CONSULT NOTE ADULT - PROBLEM SELECTOR RECOMMENDATION 4
- will follow  ______________  Cesar Morgan MD  Palliative Medicine  [facility]   of Geriatric and Palliative Medicine  [phone] - will follow  ______________  Cesar Morgan MD  Palliative Medicine  [facility]   of Geriatric and Palliative Medicine  [phone]

## 2023-12-14 NOTE — CONSULT NOTE ADULT - ASSESSMENT
89F with PMH of AF, HTN, CKD, CAD, PAD, dementia, here with acute hypoxic respiratory failure from influenza A, s/p tamiflu and unasyn. Has been on room air. Noted to have R pleural effusion on lasix. ALso with KATHRYN on CKD, being monitored. Patient is DNR/DNI. Palliative consulted for C.

## 2023-12-14 NOTE — PROGRESS NOTE ADULT - ASSESSMENT
89 yo F pmhx A-Fib, HTN, CKD, CAD, PAD, Dementia, BIBEMS for evaluation of cough and sob x approx one month.  Family member states pt has had an on-going cough, outpt cxr performed (unremarkable per family member). admitted for positive influenza A and acute respiratory failure.     #Influenzae A bronchitis  #Lt pleural effusion with compression atelectasis  #Partial occulusion of bilateral lower lobe segmental bronchi  #acute hypoxic respiratory failure  - fever curve uptrending with tmax of 100.3 on 12/11  - pulm recs appreciated  - SLP: Soft & bite sized/thins. 1:1 feeds  - CTAP: Minimally decreased moderate to large left pleural effusion with increased compressive atelectasis resulting in the central left lower lobe collapse  -TTE shows pulm HTN  - blood cultures on admission NGTD  - S/p tamiflu x 5 days and Unasyn x 7 days (completed)  - on room air  - ID, pulm and cardio recs appreciated  - no need for thoracentesis as per pulmonary  - pt is on RA; pt is DNR/DNI  - is not AOx3 to be placed on bipap  - AM CXR shows worsening right effusion  -Stop IVF, continue Lasix, repeat AM CXR    #Dark urine:  -No blood on UA, urine in canister appears jose ramon in color  -CK normal  -Nephrology consult appreciated  -Likely concentrated from diuresis    #KATHRYN on CKD 3A  - Cr increased from 0.7 to 1  - GFR decreased 4 to 3A  - nephro consult appreciated  - UA, UCx  - currently on a primafit    #CAD/PAD?  - pt is not on a statin? or aspirin?    #hyponatremia, resolved    #chronic persistent afib  - rate controlled off medications  - not on any anticoagulation    #HTN  - normotensive off medications    #dementia  - not on any meds  - calorie count ordered on 12/12      DVT prophylaxis: SCD  GI prophylaxis: Protonix   diet: HH diet   code status: DNR/DNI  Handoff: Acute, pending improvement in pleural effusion  HCP: [facility], [phone]  89 yo F pmhx A-Fib, HTN, CKD, CAD, PAD, Dementia, BIBEMS for evaluation of cough and sob x approx one month.  Family member states pt has had an on-going cough, outpt cxr performed (unremarkable per family member). admitted for positive influenza A and acute respiratory failure.     #Influenzae A bronchitis  #Lt pleural effusion with compression atelectasis  #Partial occulusion of bilateral lower lobe segmental bronchi  #acute hypoxic respiratory failure  - fever curve uptrending with tmax of 100.3 on 12/11  - pulm recs appreciated  - SLP: Soft & bite sized/thins. 1:1 feeds  - CTAP: Minimally decreased moderate to large left pleural effusion with increased compressive atelectasis resulting in the central left lower lobe collapse  -TTE shows pulm HTN  - blood cultures on admission NGTD  - S/p tamiflu x 5 days and Unasyn x 7 days (completed)  - on room air  - ID, pulm and cardio recs appreciated  - no need for thoracentesis as per pulmonary  - pt is on RA; pt is DNR/DNI  - is not AOx3 to be placed on bipap  - AM CXR shows worsening right effusion  -Stop IVF, continue Lasix, repeat AM CXR    #Dark urine:  -No blood on UA, urine in canister appears jose ramon in color  -CK normal  -Nephrology consult appreciated  -Likely concentrated from diuresis    #KATHRYN on CKD 3A  - Cr increased from 0.7 to 1  - GFR decreased 4 to 3A  - nephro consult appreciated  - UA, UCx  - currently on a primafit    #CAD/PAD?  - pt is not on a statin? or aspirin?    #hyponatremia, resolved    #chronic persistent afib  - rate controlled off medications  - not on any anticoagulation    #HTN  - normotensive off medications    #dementia  - not on any meds  - calorie count ordered on 12/12      DVT prophylaxis: SCD  GI prophylaxis: Protonix   diet: HH diet   code status: DNR/DNI  Handoff: Acute, pending improvement in pleural effusion  HCP: [facility], [phone]

## 2023-12-14 NOTE — CHART NOTE - NSCHARTNOTEFT_GEN_A_CORE
Patient was SOB and desaturated to 85% while working with PT.  Patient was immediately placed on NC O2, now more comfortable saturating 94% on 3L.  Dr. Greenberg aware.

## 2023-12-14 NOTE — CHART NOTE - NSCHARTNOTEFT_GEN_A_CORE
Provider:                                              Met with: [  x ] Patient  [   ] Family  [   ] Other:         Primary Language: [x   ] English [   ] Other*:                      *Interpretation provided by:         SUPPORT DIAGNOSES            (Check all that apply)         [   ] EOL issues    [   ] Advanced Illness    [   ] Cultural / spiritual concerns    [x   ] Pain / suffering    [  x ] Dementia / AMS    [   ] Other:    [   ] AD issues    [   ] Grief / loss / sadness    [   ] Discharge issues    [ x  ] Distress / coping         PSYCHOSOCIAL ASSESSMENT OF PATIENT         (Check all that apply)         [ X  ] Initial Assessment            [   ] Reassessment          [   ] Not Applicable this visit         Pain/suffering acuity:    [ x  ] None to mild (0-3)           [   ] Moderate (4-6)        [   ] High (7-10)         Mental Status:    [   ] Alert/oriented (x3)          [  x ] Confused/Altered(x2/x1)         [   ] Non-resp         Functional status:    [   ] Independent w ADLs      [ x  ] Needs Assistance             [   ] Bedbound/Full Care         Coping:    [ x  ] Coping well                     [  ] Coping w/difficulty            [   ] Poor coping         Support system:    [ x  ] Strong                              [   ] Adequate                        [   ] Inadequate              Past history and medications for:         [ ] Anxiety       [ ] Depression    [ ] Sleep disorders              SERVICE PROVIDED    [   ]Discharge support / facilitation    [   ]AD / goals of care counseling                                  [   ]EOL / death / bereavement counseling    [ X  ]Counseling / support                                                [   ] Family meeting    [   ]Prayer / sacrament / ritual                                      [   ] Referral    [   ]Other                                                                           NOTE and Plan of Care (PoC):  89 yo F pmhx  (chronic) A-Fib, HTN, CKD, CAD, PAD, Dementia, BIBEMS for evaluation of cough and sob x approx one month.  Family member states pt has had an on-going cough, outpt cxr performed (unremarkable per family member).   Pt was given dexamethasone and combivent by EMS prior to arrival, states she feels better after treatments. pt has been taking amoxicillin with minimal relief at home. denies fever, chills, chest pain, leg swelling, calf pain. Palliative Care consulted for GOC.  LMSW and Palliative Care MD met with Pt via Telehealth. Pt presented alert and pleasantly confused. No family present. Pt was able to express no pain at time of assessment. Support rendered. Will contact family for introduction to Palliative Care services and support. Will continue to follow. [phone] Provider:                                              Met with: [  x ] Patient  [   ] Family  [   ] Other:         Primary Language: [x   ] English [   ] Other*:                      *Interpretation provided by:         SUPPORT DIAGNOSES            (Check all that apply)         [   ] EOL issues    [   ] Advanced Illness    [   ] Cultural / spiritual concerns    [x   ] Pain / suffering    [  x ] Dementia / AMS    [   ] Other:    [   ] AD issues    [   ] Grief / loss / sadness    [   ] Discharge issues    [ x  ] Distress / coping         PSYCHOSOCIAL ASSESSMENT OF PATIENT         (Check all that apply)         [ X  ] Initial Assessment            [   ] Reassessment          [   ] Not Applicable this visit         Pain/suffering acuity:    [ x  ] None to mild (0-3)           [   ] Moderate (4-6)        [   ] High (7-10)         Mental Status:    [   ] Alert/oriented (x3)          [  x ] Confused/Altered(x2/x1)         [   ] Non-resp         Functional status:    [   ] Independent w ADLs      [ x  ] Needs Assistance             [   ] Bedbound/Full Care         Coping:    [ x  ] Coping well                     [  ] Coping w/difficulty            [   ] Poor coping         Support system:    [ x  ] Strong                              [   ] Adequate                        [   ] Inadequate              Past history and medications for:         [ ] Anxiety       [ ] Depression    [ ] Sleep disorders              SERVICE PROVIDED    [   ]Discharge support / facilitation    [   ]AD / goals of care counseling                                  [   ]EOL / death / bereavement counseling    [ X  ]Counseling / support                                                [   ] Family meeting    [   ]Prayer / sacrament / ritual                                      [   ] Referral    [   ]Other                                                                           NOTE and Plan of Care (PoC):  89 yo F pmhx  (chronic) A-Fib, HTN, CKD, CAD, PAD, Dementia, BIBEMS for evaluation of cough and sob x approx one month.  Family member states pt has had an on-going cough, outpt cxr performed (unremarkable per family member).   Pt was given dexamethasone and combivent by EMS prior to arrival, states she feels better after treatments. pt has been taking amoxicillin with minimal relief at home. denies fever, chills, chest pain, leg swelling, calf pain. Palliative Care consulted for GOC.  LMSW and Palliative Care MD met with Pt via Telehealth. Pt presented alert and pleasantly confused. No family present. Pt was able to express no pain at time of assessment. Support rendered. Will contact family for introduction to Palliative Care services and support. Will continue to follow. [phone]

## 2023-12-14 NOTE — CHART NOTE - NSCHARTNOTEFT_GEN_A_CORE
Registered Dietitian Follow-Up     Patient Profile Reviewed                           Yes [X]   No []     Nutrition History Previously Obtained        Yes []  No []       Pertinent  Information:  pt is 89 year old female with hx of afib, HTN, CKD, CAD, PAD, dementia, BIBA 2/2 cough with SOB x1 month. pt admitted with acute hypoxic respiratory failure 2/2 Flu, PNA and CHF. 12/4 SLP eval recommends soft and bite sized with thins and 1;1 feed. calorie count ordered for 12/12-14.   average daily intake: 900 kcals, 30g protein. pt tends to eat more at lunch meal.       Diet, Pureed (12-01-23 @ 08:52) [Active]         Anthropometrics:  - Ht. 162.6 cm  - Wt. 61 kgs  - %wt change  - BMI   - IBW      Pertinent Lab Data:  12-14    142  |  97<L>  |  33<H>  ----------------------------<  75  3.4<L>   |  35<H>  |  0.5<L>    Ca    9.0      14 Dec 2023 04:30    TPro  6.3  /  Alb  2.7<L>  /  TBili  1.1  /  DBili  x   /  AST  26  /  ALT  7   /  AlkPhos  109  12-14                            13.2   13.11 )-----------( 341      ( 14 Dec 2023 04:30 )             40.5       MEDICATIONS  (STANDING):  cefTRIAXone   IVPB 2000 milliGRAM(s) IV Intermittent every 24 hours  cloNIDine 0.1 milliGRAM(s) Oral once  furosemide   Injectable 40 milliGRAM(s) IV Push daily  pantoprazole    Tablet 40 milliGRAM(s) Oral before breakfast  polyethylene glycol 3350 17 Gram(s) Oral daily      vancomycin  IVPB 1000 milliGRAM(s) IV Intermittent every 24 hours    MEDICATIONS  (PRN):  acetaminophen     Tablet .. 650 milliGRAM(s) Oral every 6 hours PRN Temp greater or equal to 38C (100.4F), Mild Pain (1 - 3)  aluminum hydroxide/magnesium hydroxide/simethicone Suspension 30 milliLiter(s) Oral every 4 hours PRN Dyspepsia  bisacodyl 5 milliGRAM(s) Oral daily PRN Constipation  melatonin 5 milliGRAM(s) Oral at bedtime PRN Insomnia  ondansetron Injectable 4 milliGRAM(s) IV Push every 8 hours PRN Nausea and/or Vomiting  senna 2 Tablet(s) Oral at bedtime PRN Constipation       Physical Findings:  - Appearance: alert  - GI function: +BS   - Tubes: n/a   - Oral/Mouth cavity:  - Skin: no pressure ulcers noted      Nutrition Requirements  Weight Used: 61 kgs     Estimated Energy Needs:  8643-5800 kcals (25-30 kcal/kg/BW)  61-73g protein (1.0-1.2g/kg/BW)  1;1 kcal for est fluid needs        Nutrient Intake: fair at ~50% of meals consumed         [] Previous Nutrition Diagnosis:            [X] Ongoing          [] Resolved    inadequate pro-energy intake remains-improvement noted      Nutrition Intervention: change diet to soft and bite sized with thins, 1:1 feed, add ensure compact with meals     Goal/Expected Outcome: pt to tolerate po diet and meet >50% of estimated needs within 3-5 days      Indicator/Monitoring: RD to monitor tolerance to po diet, labs/meds, NFPF and f/u as needed within 3-5 days  high risk Registered Dietitian Follow-Up     Patient Profile Reviewed                           Yes [X]   No []     Nutrition History Previously Obtained        Yes []  No []       Pertinent  Information:  pt is 89 year old female with hx of afib, HTN, CKD, CAD, PAD, dementia, BIBA 2/2 cough with SOB x1 month. pt admitted with acute hypoxic respiratory failure 2/2 Flu, PNA and CHF. 12/4 SLP eval recommends soft and bite sized with thins and 1;1 feed. calorie count ordered for 12/12-14.   average daily intake: 900 kcals, 30g protein. pt tends to eat more at lunch meal.       Diet, Pureed (12-01-23 @ 08:52) [Active]         Anthropometrics:  - Ht. 162.6 cm  - Wt. 61 kgs  - %wt change  - BMI   - IBW      Pertinent Lab Data:  12-14    142  |  97<L>  |  33<H>  ----------------------------<  75  3.4<L>   |  35<H>  |  0.5<L>    Ca    9.0      14 Dec 2023 04:30    TPro  6.3  /  Alb  2.7<L>  /  TBili  1.1  /  DBili  x   /  AST  26  /  ALT  7   /  AlkPhos  109  12-14                            13.2   13.11 )-----------( 341      ( 14 Dec 2023 04:30 )             40.5       MEDICATIONS  (STANDING):  cefTRIAXone   IVPB 2000 milliGRAM(s) IV Intermittent every 24 hours  cloNIDine 0.1 milliGRAM(s) Oral once  furosemide   Injectable 40 milliGRAM(s) IV Push daily  pantoprazole    Tablet 40 milliGRAM(s) Oral before breakfast  polyethylene glycol 3350 17 Gram(s) Oral daily      vancomycin  IVPB 1000 milliGRAM(s) IV Intermittent every 24 hours    MEDICATIONS  (PRN):  acetaminophen     Tablet .. 650 milliGRAM(s) Oral every 6 hours PRN Temp greater or equal to 38C (100.4F), Mild Pain (1 - 3)  aluminum hydroxide/magnesium hydroxide/simethicone Suspension 30 milliLiter(s) Oral every 4 hours PRN Dyspepsia  bisacodyl 5 milliGRAM(s) Oral daily PRN Constipation  melatonin 5 milliGRAM(s) Oral at bedtime PRN Insomnia  ondansetron Injectable 4 milliGRAM(s) IV Push every 8 hours PRN Nausea and/or Vomiting  senna 2 Tablet(s) Oral at bedtime PRN Constipation       Physical Findings:  - Appearance: alert  - GI function: +BS   - Tubes: n/a   - Oral/Mouth cavity:  - Skin: no pressure ulcers noted      Nutrition Requirements  Weight Used: 61 kgs     Estimated Energy Needs:  2477-1158 kcals (25-30 kcal/kg/BW)  61-73g protein (1.0-1.2g/kg/BW)  1;1 kcal for est fluid needs        Nutrient Intake: fair at ~50% of meals consumed         [] Previous Nutrition Diagnosis:            [X] Ongoing          [] Resolved    inadequate pro-energy intake remains-improvement noted      Nutrition Intervention: change diet to soft and bite sized with thins, 1:1 feed, add ensure compact with meals     Goal/Expected Outcome: pt to tolerate po diet and meet >50% of estimated needs within 3-5 days      Indicator/Monitoring: RD to monitor tolerance to po diet, labs/meds, NFPF and f/u as needed within 3-5 days  high risk

## 2023-12-15 LAB
ALBUMIN SERPL ELPH-MCNC: 3 G/DL — LOW (ref 3.5–5.2)
ALBUMIN SERPL ELPH-MCNC: 3 G/DL — LOW (ref 3.5–5.2)
ALP SERPL-CCNC: 142 U/L — HIGH (ref 30–115)
ALP SERPL-CCNC: 142 U/L — HIGH (ref 30–115)
ALT FLD-CCNC: 14 U/L — SIGNIFICANT CHANGE UP (ref 0–41)
ALT FLD-CCNC: 14 U/L — SIGNIFICANT CHANGE UP (ref 0–41)
ANION GAP SERPL CALC-SCNC: 10 MMOL/L — SIGNIFICANT CHANGE UP (ref 7–14)
ANION GAP SERPL CALC-SCNC: 10 MMOL/L — SIGNIFICANT CHANGE UP (ref 7–14)
AST SERPL-CCNC: 43 U/L — HIGH (ref 0–41)
AST SERPL-CCNC: 43 U/L — HIGH (ref 0–41)
BASOPHILS # BLD AUTO: 0.03 K/UL — SIGNIFICANT CHANGE UP (ref 0–0.2)
BASOPHILS # BLD AUTO: 0.03 K/UL — SIGNIFICANT CHANGE UP (ref 0–0.2)
BASOPHILS NFR BLD AUTO: 0.3 % — SIGNIFICANT CHANGE UP (ref 0–1)
BASOPHILS NFR BLD AUTO: 0.3 % — SIGNIFICANT CHANGE UP (ref 0–1)
BILIRUB SERPL-MCNC: 0.9 MG/DL — SIGNIFICANT CHANGE UP (ref 0.2–1.2)
BILIRUB SERPL-MCNC: 0.9 MG/DL — SIGNIFICANT CHANGE UP (ref 0.2–1.2)
BUN SERPL-MCNC: 26 MG/DL — HIGH (ref 10–20)
BUN SERPL-MCNC: 26 MG/DL — HIGH (ref 10–20)
CALCIUM SERPL-MCNC: 8.9 MG/DL — SIGNIFICANT CHANGE UP (ref 8.4–10.5)
CALCIUM SERPL-MCNC: 8.9 MG/DL — SIGNIFICANT CHANGE UP (ref 8.4–10.5)
CHLORIDE SERPL-SCNC: 97 MMOL/L — LOW (ref 98–110)
CHLORIDE SERPL-SCNC: 97 MMOL/L — LOW (ref 98–110)
CO2 SERPL-SCNC: 32 MMOL/L — SIGNIFICANT CHANGE UP (ref 17–32)
CO2 SERPL-SCNC: 32 MMOL/L — SIGNIFICANT CHANGE UP (ref 17–32)
CREAT SERPL-MCNC: 0.5 MG/DL — LOW (ref 0.7–1.5)
CREAT SERPL-MCNC: 0.5 MG/DL — LOW (ref 0.7–1.5)
CULTURE RESULTS: SIGNIFICANT CHANGE UP
CULTURE RESULTS: SIGNIFICANT CHANGE UP
EGFR: 85 ML/MIN/1.73M2 — SIGNIFICANT CHANGE UP
EGFR: 85 ML/MIN/1.73M2 — SIGNIFICANT CHANGE UP
EOSINOPHIL # BLD AUTO: 0.23 K/UL — SIGNIFICANT CHANGE UP (ref 0–0.7)
EOSINOPHIL # BLD AUTO: 0.23 K/UL — SIGNIFICANT CHANGE UP (ref 0–0.7)
EOSINOPHIL NFR BLD AUTO: 2 % — SIGNIFICANT CHANGE UP (ref 0–8)
EOSINOPHIL NFR BLD AUTO: 2 % — SIGNIFICANT CHANGE UP (ref 0–8)
GLUCOSE SERPL-MCNC: 75 MG/DL — SIGNIFICANT CHANGE UP (ref 70–99)
GLUCOSE SERPL-MCNC: 75 MG/DL — SIGNIFICANT CHANGE UP (ref 70–99)
HCT VFR BLD CALC: 39.6 % — SIGNIFICANT CHANGE UP (ref 37–47)
HCT VFR BLD CALC: 39.6 % — SIGNIFICANT CHANGE UP (ref 37–47)
HGB BLD-MCNC: 12.6 G/DL — SIGNIFICANT CHANGE UP (ref 12–16)
HGB BLD-MCNC: 12.6 G/DL — SIGNIFICANT CHANGE UP (ref 12–16)
IMM GRANULOCYTES NFR BLD AUTO: 1.2 % — HIGH (ref 0.1–0.3)
IMM GRANULOCYTES NFR BLD AUTO: 1.2 % — HIGH (ref 0.1–0.3)
LYMPHOCYTES # BLD AUTO: 1.24 K/UL — SIGNIFICANT CHANGE UP (ref 1.2–3.4)
LYMPHOCYTES # BLD AUTO: 1.24 K/UL — SIGNIFICANT CHANGE UP (ref 1.2–3.4)
LYMPHOCYTES # BLD AUTO: 11 % — LOW (ref 20.5–51.1)
LYMPHOCYTES # BLD AUTO: 11 % — LOW (ref 20.5–51.1)
MCHC RBC-ENTMCNC: 27.6 PG — SIGNIFICANT CHANGE UP (ref 27–31)
MCHC RBC-ENTMCNC: 27.6 PG — SIGNIFICANT CHANGE UP (ref 27–31)
MCHC RBC-ENTMCNC: 31.8 G/DL — LOW (ref 32–37)
MCHC RBC-ENTMCNC: 31.8 G/DL — LOW (ref 32–37)
MCV RBC AUTO: 86.7 FL — SIGNIFICANT CHANGE UP (ref 81–99)
MCV RBC AUTO: 86.7 FL — SIGNIFICANT CHANGE UP (ref 81–99)
MONOCYTES # BLD AUTO: 0.87 K/UL — HIGH (ref 0.1–0.6)
MONOCYTES # BLD AUTO: 0.87 K/UL — HIGH (ref 0.1–0.6)
MONOCYTES NFR BLD AUTO: 7.7 % — SIGNIFICANT CHANGE UP (ref 1.7–9.3)
MONOCYTES NFR BLD AUTO: 7.7 % — SIGNIFICANT CHANGE UP (ref 1.7–9.3)
NEUTROPHILS # BLD AUTO: 8.74 K/UL — HIGH (ref 1.4–6.5)
NEUTROPHILS # BLD AUTO: 8.74 K/UL — HIGH (ref 1.4–6.5)
NEUTROPHILS NFR BLD AUTO: 77.8 % — HIGH (ref 42.2–75.2)
NEUTROPHILS NFR BLD AUTO: 77.8 % — HIGH (ref 42.2–75.2)
NRBC # BLD: 0 /100 WBCS — SIGNIFICANT CHANGE UP (ref 0–0)
NRBC # BLD: 0 /100 WBCS — SIGNIFICANT CHANGE UP (ref 0–0)
PLATELET # BLD AUTO: 381 K/UL — SIGNIFICANT CHANGE UP (ref 130–400)
PLATELET # BLD AUTO: 381 K/UL — SIGNIFICANT CHANGE UP (ref 130–400)
PMV BLD: 10.8 FL — HIGH (ref 7.4–10.4)
PMV BLD: 10.8 FL — HIGH (ref 7.4–10.4)
POTASSIUM SERPL-MCNC: 3.4 MMOL/L — LOW (ref 3.5–5)
POTASSIUM SERPL-MCNC: 3.4 MMOL/L — LOW (ref 3.5–5)
POTASSIUM SERPL-SCNC: 3.4 MMOL/L — LOW (ref 3.5–5)
POTASSIUM SERPL-SCNC: 3.4 MMOL/L — LOW (ref 3.5–5)
PROT SERPL-MCNC: 6.4 G/DL — SIGNIFICANT CHANGE UP (ref 6–8)
PROT SERPL-MCNC: 6.4 G/DL — SIGNIFICANT CHANGE UP (ref 6–8)
RBC # BLD: 4.57 M/UL — SIGNIFICANT CHANGE UP (ref 4.2–5.4)
RBC # BLD: 4.57 M/UL — SIGNIFICANT CHANGE UP (ref 4.2–5.4)
RBC # FLD: 13.8 % — SIGNIFICANT CHANGE UP (ref 11.5–14.5)
RBC # FLD: 13.8 % — SIGNIFICANT CHANGE UP (ref 11.5–14.5)
SODIUM SERPL-SCNC: 139 MMOL/L — SIGNIFICANT CHANGE UP (ref 135–146)
SODIUM SERPL-SCNC: 139 MMOL/L — SIGNIFICANT CHANGE UP (ref 135–146)
SPECIMEN SOURCE: SIGNIFICANT CHANGE UP
SPECIMEN SOURCE: SIGNIFICANT CHANGE UP
VANCOMYCIN TROUGH SERPL-MCNC: 10.5 UG/ML — HIGH (ref 5–10)
VANCOMYCIN TROUGH SERPL-MCNC: 10.5 UG/ML — HIGH (ref 5–10)
WBC # BLD: 11.25 K/UL — HIGH (ref 4.8–10.8)
WBC # BLD: 11.25 K/UL — HIGH (ref 4.8–10.8)
WBC # FLD AUTO: 11.25 K/UL — HIGH (ref 4.8–10.8)
WBC # FLD AUTO: 11.25 K/UL — HIGH (ref 4.8–10.8)

## 2023-12-15 PROCEDURE — 71045 X-RAY EXAM CHEST 1 VIEW: CPT | Mod: 26

## 2023-12-15 PROCEDURE — 99232 SBSQ HOSP IP/OBS MODERATE 35: CPT

## 2023-12-15 RX ORDER — POTASSIUM CHLORIDE 20 MEQ
20 PACKET (EA) ORAL EVERY 4 HOURS
Refills: 0 | Status: COMPLETED | OUTPATIENT
Start: 2023-12-15 | End: 2023-12-15

## 2023-12-15 RX ADMIN — Medication 20 MILLIEQUIVALENT(S): at 14:06

## 2023-12-15 RX ADMIN — CEFTRIAXONE 100 MILLIGRAM(S): 500 INJECTION, POWDER, FOR SOLUTION INTRAMUSCULAR; INTRAVENOUS at 09:14

## 2023-12-15 RX ADMIN — Medication 40 MILLIGRAM(S): at 05:32

## 2023-12-15 RX ADMIN — CHLORHEXIDINE GLUCONATE 1 APPLICATION(S): 213 SOLUTION TOPICAL at 05:32

## 2023-12-15 RX ADMIN — Medication 20 MILLIEQUIVALENT(S): at 10:10

## 2023-12-15 RX ADMIN — Medication 250 MILLIGRAM(S): at 09:15

## 2023-12-15 RX ADMIN — POLYETHYLENE GLYCOL 3350 17 GRAM(S): 17 POWDER, FOR SOLUTION ORAL at 12:06

## 2023-12-15 NOTE — PROGRESS NOTE ADULT - ASSESSMENT
89 yo F pmhx A-Fib, HTN, CKD, CAD, PAD, Dementia, BIBEMS for evaluation of cough and sob x approx one month.  Family member states pt has had an on-going cough, outpt cxr performed (unremarkable per family member). admitted for positive influenza A and acute respiratory failure.     #Influenzae A bronchitis  #Lt pleural effusion with compression atelectasis  #Partial occulusion of bilateral lower lobe segmental bronchi  #acute hypoxic respiratory failure  - fever curve uptrending with tmax of 100.3 on 12/11  -Can continue Rocephin to complete course, stop vancomycin  - pulm recs appreciated  - SLP: Soft & bite sized/thins. 1:1 feeds  - CTAP: Minimally decreased moderate to large left pleural effusion with increased compressive atelectasis resulting in the central left lower lobe collapse  -TTE shows pulm HTN  - blood cultures on admission NGTD  - S/p tamiflu x 5 days and Unasyn x 7 days (completed)  - on room air  - ID, pulm and cardio recs appreciated  - no need for thoracentesis as per pulmonary  - pt is on RA; pt is DNR/DNI  - is not AOx3 to be placed on bipap  - AM CXR shows worsening right effusion, repeat  -Stop IVF, continue Lasix, repeat AM CXR    #Dark urine:  -No blood on UA, urine in canister appears jose ramon in color  -CK normal  -Nephrology consult appreciated  -Likely concentrated from diuresis    #KATHRYN on CKD 3A  - Cr increased from 0.7 to 1  - GFR decreased 4 to 3A  - nephro consult appreciated  - UA, UCx  - currently on a primafit    #CAD/PAD?  - pt is not on a statin? or aspirin?    #hyponatremia, resolved    #chronic persistent afib  - rate controlled off medications  - not on any anticoagulation    #HTN  - normotensive off medications    #dementia  - not on any meds  - calorie count ordered on 12/12      DVT prophylaxis: SCD  GI prophylaxis: Protonix   diet: HH diet   code status: DNR/DNI  Handoff: Acute, pending improvement in pleural effusion  HCP: [facility], [phone]  89 yo F pmhx A-Fib, HTN, CKD, CAD, PAD, Dementia, BIBEMS for evaluation of cough and sob x approx one month.  Family member states pt has had an on-going cough, outpt cxr performed (unremarkable per family member). admitted for positive influenza A and acute respiratory failure.     #Influenzae A bronchitis  #Lt pleural effusion with compression atelectasis  #Partial occulusion of bilateral lower lobe segmental bronchi  #acute hypoxic respiratory failure  - fever curve uptrending with tmax of 100.3 on 12/11  -Can continue Rocephin to complete course, stop vancomycin  - pulm recs appreciated  - SLP: Soft & bite sized/thins. 1:1 feeds  - CTAP: Minimally decreased moderate to large left pleural effusion with increased compressive atelectasis resulting in the central left lower lobe collapse  -TTE shows pulm HTN  - blood cultures on admission NGTD  - S/p tamiflu x 5 days and Unasyn x 7 days (completed)  - on room air  - ID, pulm and cardio recs appreciated  - no need for thoracentesis as per pulmonary  - pt is on RA; pt is DNR/DNI  - is not AOx3 to be placed on bipap  - AM CXR shows worsening right effusion, repeat  -Stop IVF, continue Lasix, repeat AM CXR    #Dark urine:  -No blood on UA, urine in canister appears jose ramon in color  -CK normal  -Nephrology consult appreciated  -Likely concentrated from diuresis    #KATHRYN on CKD 3A  - Cr increased from 0.7 to 1  - GFR decreased 4 to 3A  - nephro consult appreciated  - UA, UCx  - currently on a primafit    #CAD/PAD?  - pt is not on a statin? or aspirin?    #hyponatremia, resolved    #chronic persistent afib  - rate controlled off medications  - not on any anticoagulation    #HTN  - normotensive off medications    #dementia  - not on any meds  - calorie count ordered on 12/12      DVT prophylaxis: SCD  GI prophylaxis: Protonix   diet: HH diet   code status: DNR/DNI  Handoff: Acute, pending improvement in pleural effusion  HCP: [facility], [phone]

## 2023-12-15 NOTE — PROGRESS NOTE ADULT - SUBJECTIVE AND OBJECTIVE BOX
89 yo F pmhx A-Fib, HTN, CKD, CAD, PAD, Dementia, BIBEMS for evaluation of cough and sob x approx one month.  Family member states pt has had an on-going cough, outpt cxr performed (unremarkable per family member). admitted for positive influenza A and acute respiratory failure.     Today:  Seen at bedside, no new complaints.          REVIEW OF SYSTEMS:  No new complaints      MEDICATIONS  (STANDING):  cefTRIAXone   IVPB      cefTRIAXone   IVPB 2000 milliGRAM(s) IV Intermittent every 24 hours  chlorhexidine 2% Cloths 1 Application(s) Topical <User Schedule>  cloNIDine 0.1 milliGRAM(s) Oral once  furosemide   Injectable 40 milliGRAM(s) IV Push daily  pantoprazole    Tablet 40 milliGRAM(s) Oral before breakfast  polyethylene glycol 3350 17 Gram(s) Oral daily  potassium chloride   Powder 20 milliEquivalent(s) Oral every 4 hours    MEDICATIONS  (PRN):  acetaminophen     Tablet .. 650 milliGRAM(s) Oral every 6 hours PRN Temp greater or equal to 38C (100.4F), Mild Pain (1 - 3)  aluminum hydroxide/magnesium hydroxide/simethicone Suspension 30 milliLiter(s) Oral every 4 hours PRN Dyspepsia  bisacodyl 5 milliGRAM(s) Oral daily PRN Constipation  melatonin 5 milliGRAM(s) Oral at bedtime PRN Insomnia  ondansetron Injectable 4 milliGRAM(s) IV Push every 8 hours PRN Nausea and/or Vomiting  senna 2 Tablet(s) Oral at bedtime PRN Constipation      Allergies  Celebrex (Unknown)          Vital Signs Last 24 Hrs  T(C): 35.8 (15 Dec 2023 05:14), Max: 36.3 (14 Dec 2023 21:05)  T(F): 96.5 (15 Dec 2023 05:14), Max: 97.4 (14 Dec 2023 21:05)  HR: 79 (15 Dec 2023 05:14) (79 - 86)  BP: 126/61 (15 Dec 2023 05:14) (110/61 - 151/68)  RR: 18 (15 Dec 2023 10:42) (16 - 20)  SpO2: 95% (15 Dec 2023 10:42) (78% - 97%)    Parameters below as of 15 Dec 2023 10:42  Patient On (Oxygen Delivery Method): nasal cannula  O2 Flow (L/min): 3      PHYSICAL EXAM:  GENERAL: NAD,   HEENT : NC/AT, b/l poor hearing   NECK: Supple, No JVD  CHEST/LUNG: b/l air entry, decreased lower bases  HEART: Regular rate and rhythm;   ABDOMEN: Soft, Nontender, Nondistended; Bowel sounds present  : primafit in place draining hematuria  EXTREMITIES:  no edema  NEURO:  aox2, generalized weakness   SKIN: No rashes or lesions    LABS:                        12.6   11.25 )-----------( 381      ( 15 Dec 2023 04:30 )             39.6     12-15    139  |  97<L>  |  26<H>  ----------------------------<  75  3.4<L>   |  32  |  0.5<L>    Ca    8.9      15 Dec 2023 04:30    TPro  6.4  /  Alb  3.0<L>  /  TBili  0.9  /  DBili  x   /  AST  43<H>  /  ALT  14  /  AlkPhos  142<H>  12-15      Urinalysis Basic - ( 15 Dec 2023 04:30 )    Color: x / Appearance: x / SG: x / pH: x  Gluc: 75 mg/dL / Ketone: x  / Bili: x / Urobili: x   Blood: x / Protein: x / Nitrite: x   Leuk Esterase: x / RBC: x / WBC x   Sq Epi: x / Non Sq Epi: x / Bacteria: x

## 2023-12-16 LAB
ALBUMIN SERPL ELPH-MCNC: 3 G/DL — LOW (ref 3.5–5.2)
ALBUMIN SERPL ELPH-MCNC: 3 G/DL — LOW (ref 3.5–5.2)
ALP SERPL-CCNC: 133 U/L — HIGH (ref 30–115)
ALP SERPL-CCNC: 133 U/L — HIGH (ref 30–115)
ALT FLD-CCNC: 13 U/L — SIGNIFICANT CHANGE UP (ref 0–41)
ALT FLD-CCNC: 13 U/L — SIGNIFICANT CHANGE UP (ref 0–41)
ANION GAP SERPL CALC-SCNC: 10 MMOL/L — SIGNIFICANT CHANGE UP (ref 7–14)
ANION GAP SERPL CALC-SCNC: 10 MMOL/L — SIGNIFICANT CHANGE UP (ref 7–14)
AST SERPL-CCNC: 35 U/L — SIGNIFICANT CHANGE UP (ref 0–41)
AST SERPL-CCNC: 35 U/L — SIGNIFICANT CHANGE UP (ref 0–41)
BASOPHILS # BLD AUTO: 0 K/UL — SIGNIFICANT CHANGE UP (ref 0–0.2)
BASOPHILS # BLD AUTO: 0 K/UL — SIGNIFICANT CHANGE UP (ref 0–0.2)
BASOPHILS NFR BLD AUTO: 0 % — SIGNIFICANT CHANGE UP (ref 0–1)
BASOPHILS NFR BLD AUTO: 0 % — SIGNIFICANT CHANGE UP (ref 0–1)
BILIRUB SERPL-MCNC: 0.9 MG/DL — SIGNIFICANT CHANGE UP (ref 0.2–1.2)
BILIRUB SERPL-MCNC: 0.9 MG/DL — SIGNIFICANT CHANGE UP (ref 0.2–1.2)
BUN SERPL-MCNC: 23 MG/DL — HIGH (ref 10–20)
BUN SERPL-MCNC: 23 MG/DL — HIGH (ref 10–20)
CALCIUM SERPL-MCNC: 8.8 MG/DL — SIGNIFICANT CHANGE UP (ref 8.4–10.5)
CALCIUM SERPL-MCNC: 8.8 MG/DL — SIGNIFICANT CHANGE UP (ref 8.4–10.5)
CHLORIDE SERPL-SCNC: 95 MMOL/L — LOW (ref 98–110)
CHLORIDE SERPL-SCNC: 95 MMOL/L — LOW (ref 98–110)
CO2 SERPL-SCNC: 33 MMOL/L — HIGH (ref 17–32)
CO2 SERPL-SCNC: 33 MMOL/L — HIGH (ref 17–32)
CREAT SERPL-MCNC: 0.6 MG/DL — LOW (ref 0.7–1.5)
CREAT SERPL-MCNC: 0.6 MG/DL — LOW (ref 0.7–1.5)
EGFR: 82 ML/MIN/1.73M2 — SIGNIFICANT CHANGE UP
EGFR: 82 ML/MIN/1.73M2 — SIGNIFICANT CHANGE UP
EOSINOPHIL # BLD AUTO: 0.43 K/UL — SIGNIFICANT CHANGE UP (ref 0–0.7)
EOSINOPHIL # BLD AUTO: 0.43 K/UL — SIGNIFICANT CHANGE UP (ref 0–0.7)
EOSINOPHIL NFR BLD AUTO: 4 % — SIGNIFICANT CHANGE UP (ref 0–8)
EOSINOPHIL NFR BLD AUTO: 4 % — SIGNIFICANT CHANGE UP (ref 0–8)
GLUCOSE SERPL-MCNC: 78 MG/DL — SIGNIFICANT CHANGE UP (ref 70–99)
GLUCOSE SERPL-MCNC: 78 MG/DL — SIGNIFICANT CHANGE UP (ref 70–99)
HCT VFR BLD CALC: 37.9 % — SIGNIFICANT CHANGE UP (ref 37–47)
HCT VFR BLD CALC: 37.9 % — SIGNIFICANT CHANGE UP (ref 37–47)
HGB BLD-MCNC: 12.7 G/DL — SIGNIFICANT CHANGE UP (ref 12–16)
HGB BLD-MCNC: 12.7 G/DL — SIGNIFICANT CHANGE UP (ref 12–16)
LYMPHOCYTES # BLD AUTO: 1.39 K/UL — SIGNIFICANT CHANGE UP (ref 1.2–3.4)
LYMPHOCYTES # BLD AUTO: 1.39 K/UL — SIGNIFICANT CHANGE UP (ref 1.2–3.4)
LYMPHOCYTES # BLD AUTO: 13 % — LOW (ref 20.5–51.1)
LYMPHOCYTES # BLD AUTO: 13 % — LOW (ref 20.5–51.1)
MCHC RBC-ENTMCNC: 28.5 PG — SIGNIFICANT CHANGE UP (ref 27–31)
MCHC RBC-ENTMCNC: 28.5 PG — SIGNIFICANT CHANGE UP (ref 27–31)
MCHC RBC-ENTMCNC: 33.5 G/DL — SIGNIFICANT CHANGE UP (ref 32–37)
MCHC RBC-ENTMCNC: 33.5 G/DL — SIGNIFICANT CHANGE UP (ref 32–37)
MCV RBC AUTO: 85.2 FL — SIGNIFICANT CHANGE UP (ref 81–99)
MCV RBC AUTO: 85.2 FL — SIGNIFICANT CHANGE UP (ref 81–99)
MONOCYTES # BLD AUTO: 0.64 K/UL — HIGH (ref 0.1–0.6)
MONOCYTES # BLD AUTO: 0.64 K/UL — HIGH (ref 0.1–0.6)
MONOCYTES NFR BLD AUTO: 6 % — SIGNIFICANT CHANGE UP (ref 1.7–9.3)
MONOCYTES NFR BLD AUTO: 6 % — SIGNIFICANT CHANGE UP (ref 1.7–9.3)
NEUTROPHILS # BLD AUTO: 8 K/UL — HIGH (ref 1.4–6.5)
NEUTROPHILS # BLD AUTO: 8 K/UL — HIGH (ref 1.4–6.5)
NEUTROPHILS NFR BLD AUTO: 73 % — SIGNIFICANT CHANGE UP (ref 42.2–75.2)
NEUTROPHILS NFR BLD AUTO: 73 % — SIGNIFICANT CHANGE UP (ref 42.2–75.2)
NEUTS BAND # BLD: 2 % — SIGNIFICANT CHANGE UP (ref 0–6)
NEUTS BAND # BLD: 2 % — SIGNIFICANT CHANGE UP (ref 0–6)
NEUTS VAC BLD QL SMEAR: SLIGHT — SIGNIFICANT CHANGE UP
NEUTS VAC BLD QL SMEAR: SLIGHT — SIGNIFICANT CHANGE UP
NRBC # BLD: 0 /100 WBCS — SIGNIFICANT CHANGE UP (ref 0–0)
NRBC # BLD: 0 /100 WBCS — SIGNIFICANT CHANGE UP (ref 0–0)
NRBC # BLD: SIGNIFICANT CHANGE UP /100 WBCS (ref 0–0)
NRBC # BLD: SIGNIFICANT CHANGE UP /100 WBCS (ref 0–0)
PLAT MORPH BLD: NORMAL — SIGNIFICANT CHANGE UP
PLAT MORPH BLD: NORMAL — SIGNIFICANT CHANGE UP
PLATELET # BLD AUTO: 391 K/UL — SIGNIFICANT CHANGE UP (ref 130–400)
PLATELET # BLD AUTO: 391 K/UL — SIGNIFICANT CHANGE UP (ref 130–400)
PLATELET COUNT - ESTIMATE: NORMAL — SIGNIFICANT CHANGE UP
PLATELET COUNT - ESTIMATE: NORMAL — SIGNIFICANT CHANGE UP
PMV BLD: 10.5 FL — HIGH (ref 7.4–10.4)
PMV BLD: 10.5 FL — HIGH (ref 7.4–10.4)
POTASSIUM SERPL-MCNC: 3.6 MMOL/L — SIGNIFICANT CHANGE UP (ref 3.5–5)
POTASSIUM SERPL-MCNC: 3.6 MMOL/L — SIGNIFICANT CHANGE UP (ref 3.5–5)
POTASSIUM SERPL-SCNC: 3.6 MMOL/L — SIGNIFICANT CHANGE UP (ref 3.5–5)
POTASSIUM SERPL-SCNC: 3.6 MMOL/L — SIGNIFICANT CHANGE UP (ref 3.5–5)
PROT SERPL-MCNC: 6.5 G/DL — SIGNIFICANT CHANGE UP (ref 6–8)
PROT SERPL-MCNC: 6.5 G/DL — SIGNIFICANT CHANGE UP (ref 6–8)
RBC # BLD: 4.45 M/UL — SIGNIFICANT CHANGE UP (ref 4.2–5.4)
RBC # BLD: 4.45 M/UL — SIGNIFICANT CHANGE UP (ref 4.2–5.4)
RBC # FLD: 13.7 % — SIGNIFICANT CHANGE UP (ref 11.5–14.5)
RBC # FLD: 13.7 % — SIGNIFICANT CHANGE UP (ref 11.5–14.5)
RBC BLD AUTO: NORMAL — SIGNIFICANT CHANGE UP
RBC BLD AUTO: NORMAL — SIGNIFICANT CHANGE UP
SODIUM SERPL-SCNC: 138 MMOL/L — SIGNIFICANT CHANGE UP (ref 135–146)
SODIUM SERPL-SCNC: 138 MMOL/L — SIGNIFICANT CHANGE UP (ref 135–146)
TOXIC GRANULES BLD QL SMEAR: PRESENT — SIGNIFICANT CHANGE UP
TOXIC GRANULES BLD QL SMEAR: PRESENT — SIGNIFICANT CHANGE UP
VARIANT LYMPHS # BLD: 2 % — SIGNIFICANT CHANGE UP (ref 0–5)
VARIANT LYMPHS # BLD: 2 % — SIGNIFICANT CHANGE UP (ref 0–5)
WBC # BLD: 10.66 K/UL — SIGNIFICANT CHANGE UP (ref 4.8–10.8)
WBC # BLD: 10.66 K/UL — SIGNIFICANT CHANGE UP (ref 4.8–10.8)
WBC # FLD AUTO: 10.66 K/UL — SIGNIFICANT CHANGE UP (ref 4.8–10.8)
WBC # FLD AUTO: 10.66 K/UL — SIGNIFICANT CHANGE UP (ref 4.8–10.8)

## 2023-12-16 PROCEDURE — 71045 X-RAY EXAM CHEST 1 VIEW: CPT | Mod: 26

## 2023-12-16 PROCEDURE — 99232 SBSQ HOSP IP/OBS MODERATE 35: CPT

## 2023-12-16 RX ORDER — FUROSEMIDE 40 MG
40 TABLET ORAL
Refills: 0 | Status: DISCONTINUED | OUTPATIENT
Start: 2023-12-16 | End: 2023-12-18

## 2023-12-16 RX ADMIN — CEFTRIAXONE 100 MILLIGRAM(S): 500 INJECTION, POWDER, FOR SOLUTION INTRAMUSCULAR; INTRAVENOUS at 08:46

## 2023-12-16 RX ADMIN — Medication 40 MILLIGRAM(S): at 05:22

## 2023-12-16 RX ADMIN — POLYETHYLENE GLYCOL 3350 17 GRAM(S): 17 POWDER, FOR SOLUTION ORAL at 13:03

## 2023-12-16 RX ADMIN — CHLORHEXIDINE GLUCONATE 1 APPLICATION(S): 213 SOLUTION TOPICAL at 05:21

## 2023-12-16 RX ADMIN — Medication 40 MILLIGRAM(S): at 13:02

## 2023-12-16 NOTE — PROGRESS NOTE ADULT - ASSESSMENT
89 yo F pmhx A-Fib, HTN, CKD, CAD, PAD, Dementia, BIBEMS for evaluation of cough and sob x approx one month.  Family member states pt has had an on-going cough, outpt cxr performed (unremarkable per family member). admitted for positive influenza A and acute respiratory failure.     #Influenzae A bronchitis  #Lt pleural effusion with compression atelectasis  #Partial occulusion of bilateral lower lobe segmental bronchi  #acute hypoxic respiratory failure  - fever curve uptrending with tmax of 100.3 on 12/11  -Can continue Rocephin to complete course, stop vancomycin  - pulm recs appreciated  - SLP: Soft & bite sized/thins. 1:1 feeds  - CTAP: Minimally decreased moderate to large left pleural effusion with increased compressive atelectasis resulting in the central left lower lobe collapse  -TTE shows pulm HTN  - blood cultures on admission NGTD  - S/p tamiflu x 5 days and Unasyn x 7 days (completed)  - ID, pulm and cardio recs appreciated  - no need for thoracentesis as per pulmonary  - is not AOx3 to be placed on bipap  -Supplemental O2 as needed  - continue Lasix, repeat AM CXR today shows improvement    #Dark urine:  -No blood on UA, urine in canister appears jose ramon in color  -CK normal  -Nephrology consult appreciated  -Likely concentrated from diuresis    #KATHRYN on CKD 3A  - Cr increased from 0.7 to 1  - GFR decreased 4 to 3A  - nephro consult appreciated  - UA, UCx  - currently on a primafit    #CAD/PAD?  - pt is not on a statin? or aspirin?    #hyponatremia, resolved    #chronic persistent afib  - rate controlled off medications  - not on any anticoagulation    #HTN  - normotensive off medications    #dementia  - not on any meds  - calorie count ordered on 12/12      DVT prophylaxis: SCD  GI prophylaxis: Protonix   diet: HH diet   code status: DNR/DNI  Handoff: Acute, pending improvement in pleural effusion  HCP: [facility], [phone]  89 yo F pmhx A-Fib, HTN, CKD, CAD, PAD, Dementia, BIBEMS for evaluation of cough and sob x approx one month.  Family member states pt has had an on-going cough, outpt cxr performed (unremarkable per family member). admitted for positive influenza A and acute respiratory failure.     #Influenzae A bronchitis  #Lt pleural effusion with compression atelectasis  #Partial occulusion of bilateral lower lobe segmental bronchi  #acute hypoxic respiratory failure  - fever curve uptrending with tmax of 100.3 on 12/11  -Can continue Rocephin to complete course, stop vancomycin  - pulm recs appreciated  - SLP: Soft & bite sized/thins. 1:1 feeds  - CTAP: Minimally decreased moderate to large left pleural effusion with increased compressive atelectasis resulting in the central left lower lobe collapse  -TTE shows pulm HTN  - blood cultures on admission NGTD  - S/p tamiflu x 5 days and Unasyn x 7 days (completed)  - ID, pulm and cardio recs appreciated  - no need for thoracentesis as per pulmonary  - is not AOx3 to be placed on bipap  -Supplemental O2 as needed  - continue Lasix, repeat AM CXR today shows improvement    #Dark urine:  -No blood on UA, urine in canister appears jose ramon in color  -CK normal  -Nephrology consult appreciated  -Likely concentrated from diuresis    #KATHRYN on CKD 3A  - Cr increased from 0.7 to 1  - GFR decreased 4 to 3A  - nephro consult appreciated  - UA, UCx  - currently on a primafit    #CAD/PAD?  - pt is not on a statin? or aspirin?    #hyponatremia, resolved    #chronic persistent afib  - rate controlled off medications  - not on any anticoagulation    #HTN  - normotensive off medications    #dementia  - not on any meds  - calorie count ordered on 12/12      DVT prophylaxis: SCD  GI prophylaxis: Protonix   diet: HH diet   code status: DNR/DNI  Handoff: Acute, pending improvement in pleural effusion  HCP: [facility], [phone]

## 2023-12-16 NOTE — PROGRESS NOTE ADULT - SUBJECTIVE AND OBJECTIVE BOX
89 yo F pmhx A-Fib, HTN, CKD, CAD, PAD, Dementia, BIBEMS for evaluation of cough and sob x approx one month.  Family member states pt has had an on-going cough, outpt cxr performed (unremarkable per family member). admitted for positive influenza A and acute respiratory failure.     Today:  Seen at bedside, no new complaints.          REVIEW OF SYSTEMS:  No new complaints      MEDICATIONS  (STANDING):  cefTRIAXone   IVPB 2000 milliGRAM(s) IV Intermittent every 24 hours  cefTRIAXone   IVPB      chlorhexidine 2% Cloths 1 Application(s) Topical <User Schedule>  cloNIDine 0.1 milliGRAM(s) Oral once  furosemide   Injectable 40 milliGRAM(s) IV Push two times a day  pantoprazole    Tablet 40 milliGRAM(s) Oral before breakfast  polyethylene glycol 3350 17 Gram(s) Oral daily    MEDICATIONS  (PRN):  acetaminophen     Tablet .. 650 milliGRAM(s) Oral every 6 hours PRN Temp greater or equal to 38C (100.4F), Mild Pain (1 - 3)  aluminum hydroxide/magnesium hydroxide/simethicone Suspension 30 milliLiter(s) Oral every 4 hours PRN Dyspepsia  bisacodyl 5 milliGRAM(s) Oral daily PRN Constipation  melatonin 5 milliGRAM(s) Oral at bedtime PRN Insomnia  ondansetron Injectable 4 milliGRAM(s) IV Push every 8 hours PRN Nausea and/or Vomiting  senna 2 Tablet(s) Oral at bedtime PRN Constipation      Allergies  Celebrex (Unknown)      Vital Signs Last 24 Hrs  T(C): 35.8 (16 Dec 2023 04:48), Max: 35.8 (16 Dec 2023 04:48)  T(F): 96.4 (16 Dec 2023 04:48), Max: 96.4 (16 Dec 2023 04:48)  HR: 77 (16 Dec 2023 04:48) (77 - 86)  BP: 124/64 (16 Dec 2023 04:48) (124/64 - 134/64)  RR: 18 (16 Dec 2023 10:52) (18 - 18)  SpO2: 96% (16 Dec 2023 10:52) (93% - 96%)    Parameters below as of 16 Dec 2023 10:52    O2 Flow (L/min): 1      PHYSICAL EXAM:  GENERAL: NAD,   HEENT : NC/AT, b/l poor hearing   NECK: Supple, No JVD  CHEST/LUNG: b/l air entry, decreased lower bases  HEART: Regular rate and rhythm;   ABDOMEN: Soft, Nontender, Nondistended; Bowel sounds present  : primafit in place draining hematuria  EXTREMITIES:  no edema  NEURO:  aox2, generalized weakness   SKIN: No rashes or lesions      LABS:                        12.7   10.66 )-----------( 391      ( 16 Dec 2023 04:30 )             37.9     12-16    138  |  95<L>  |  23<H>  ----------------------------<  78  3.6   |  33<H>  |  0.6<L>    Ca    8.8      16 Dec 2023 04:30    TPro  6.5  /  Alb  3.0<L>  /  TBili  0.9  /  DBili  x   /  AST  35  /  ALT  13  /  AlkPhos  133<H>  12-16      Urinalysis Basic - ( 16 Dec 2023 04:30 )    Color: x / Appearance: x / SG: x / pH: x  Gluc: 78 mg/dL / Ketone: x  / Bili: x / Urobili: x   Blood: x / Protein: x / Nitrite: x   Leuk Esterase: x / RBC: x / WBC x   Sq Epi: x / Non Sq Epi: x / Bacteria: x

## 2023-12-17 LAB
ALBUMIN SERPL ELPH-MCNC: 3.2 G/DL — LOW (ref 3.5–5.2)
ALBUMIN SERPL ELPH-MCNC: 3.2 G/DL — LOW (ref 3.5–5.2)
ALP SERPL-CCNC: 128 U/L — HIGH (ref 30–115)
ALP SERPL-CCNC: 128 U/L — HIGH (ref 30–115)
ALT FLD-CCNC: 11 U/L — SIGNIFICANT CHANGE UP (ref 0–41)
ALT FLD-CCNC: 11 U/L — SIGNIFICANT CHANGE UP (ref 0–41)
ANION GAP SERPL CALC-SCNC: 10 MMOL/L — SIGNIFICANT CHANGE UP (ref 7–14)
ANION GAP SERPL CALC-SCNC: 10 MMOL/L — SIGNIFICANT CHANGE UP (ref 7–14)
AST SERPL-CCNC: 25 U/L — SIGNIFICANT CHANGE UP (ref 0–41)
AST SERPL-CCNC: 25 U/L — SIGNIFICANT CHANGE UP (ref 0–41)
BASOPHILS # BLD AUTO: 0.03 K/UL — SIGNIFICANT CHANGE UP (ref 0–0.2)
BASOPHILS # BLD AUTO: 0.03 K/UL — SIGNIFICANT CHANGE UP (ref 0–0.2)
BASOPHILS NFR BLD AUTO: 0.3 % — SIGNIFICANT CHANGE UP (ref 0–1)
BASOPHILS NFR BLD AUTO: 0.3 % — SIGNIFICANT CHANGE UP (ref 0–1)
BILIRUB SERPL-MCNC: 0.8 MG/DL — SIGNIFICANT CHANGE UP (ref 0.2–1.2)
BILIRUB SERPL-MCNC: 0.8 MG/DL — SIGNIFICANT CHANGE UP (ref 0.2–1.2)
BUN SERPL-MCNC: 21 MG/DL — HIGH (ref 10–20)
BUN SERPL-MCNC: 21 MG/DL — HIGH (ref 10–20)
CALCIUM SERPL-MCNC: 8.8 MG/DL — SIGNIFICANT CHANGE UP (ref 8.4–10.5)
CALCIUM SERPL-MCNC: 8.8 MG/DL — SIGNIFICANT CHANGE UP (ref 8.4–10.5)
CHLORIDE SERPL-SCNC: 94 MMOL/L — LOW (ref 98–110)
CHLORIDE SERPL-SCNC: 94 MMOL/L — LOW (ref 98–110)
CO2 SERPL-SCNC: 32 MMOL/L — SIGNIFICANT CHANGE UP (ref 17–32)
CO2 SERPL-SCNC: 32 MMOL/L — SIGNIFICANT CHANGE UP (ref 17–32)
CREAT SERPL-MCNC: 0.6 MG/DL — LOW (ref 0.7–1.5)
CREAT SERPL-MCNC: 0.6 MG/DL — LOW (ref 0.7–1.5)
EGFR: 82 ML/MIN/1.73M2 — SIGNIFICANT CHANGE UP
EGFR: 82 ML/MIN/1.73M2 — SIGNIFICANT CHANGE UP
EOSINOPHIL # BLD AUTO: 0.14 K/UL — SIGNIFICANT CHANGE UP (ref 0–0.7)
EOSINOPHIL # BLD AUTO: 0.14 K/UL — SIGNIFICANT CHANGE UP (ref 0–0.7)
EOSINOPHIL NFR BLD AUTO: 1.6 % — SIGNIFICANT CHANGE UP (ref 0–8)
EOSINOPHIL NFR BLD AUTO: 1.6 % — SIGNIFICANT CHANGE UP (ref 0–8)
GLUCOSE SERPL-MCNC: 78 MG/DL — SIGNIFICANT CHANGE UP (ref 70–99)
GLUCOSE SERPL-MCNC: 78 MG/DL — SIGNIFICANT CHANGE UP (ref 70–99)
HCT VFR BLD CALC: 41.2 % — SIGNIFICANT CHANGE UP (ref 37–47)
HCT VFR BLD CALC: 41.2 % — SIGNIFICANT CHANGE UP (ref 37–47)
HGB BLD-MCNC: 13.6 G/DL — SIGNIFICANT CHANGE UP (ref 12–16)
HGB BLD-MCNC: 13.6 G/DL — SIGNIFICANT CHANGE UP (ref 12–16)
IMM GRANULOCYTES NFR BLD AUTO: 1.7 % — HIGH (ref 0.1–0.3)
IMM GRANULOCYTES NFR BLD AUTO: 1.7 % — HIGH (ref 0.1–0.3)
LYMPHOCYTES # BLD AUTO: 0.87 K/UL — LOW (ref 1.2–3.4)
LYMPHOCYTES # BLD AUTO: 0.87 K/UL — LOW (ref 1.2–3.4)
LYMPHOCYTES # BLD AUTO: 10 % — LOW (ref 20.5–51.1)
LYMPHOCYTES # BLD AUTO: 10 % — LOW (ref 20.5–51.1)
MAGNESIUM SERPL-MCNC: 2.2 MG/DL — SIGNIFICANT CHANGE UP (ref 1.8–2.4)
MAGNESIUM SERPL-MCNC: 2.2 MG/DL — SIGNIFICANT CHANGE UP (ref 1.8–2.4)
MCHC RBC-ENTMCNC: 28 PG — SIGNIFICANT CHANGE UP (ref 27–31)
MCHC RBC-ENTMCNC: 28 PG — SIGNIFICANT CHANGE UP (ref 27–31)
MCHC RBC-ENTMCNC: 33 G/DL — SIGNIFICANT CHANGE UP (ref 32–37)
MCHC RBC-ENTMCNC: 33 G/DL — SIGNIFICANT CHANGE UP (ref 32–37)
MCV RBC AUTO: 84.9 FL — SIGNIFICANT CHANGE UP (ref 81–99)
MCV RBC AUTO: 84.9 FL — SIGNIFICANT CHANGE UP (ref 81–99)
MONOCYTES # BLD AUTO: 0.82 K/UL — HIGH (ref 0.1–0.6)
MONOCYTES # BLD AUTO: 0.82 K/UL — HIGH (ref 0.1–0.6)
MONOCYTES NFR BLD AUTO: 9.4 % — HIGH (ref 1.7–9.3)
MONOCYTES NFR BLD AUTO: 9.4 % — HIGH (ref 1.7–9.3)
NEUTROPHILS # BLD AUTO: 6.7 K/UL — HIGH (ref 1.4–6.5)
NEUTROPHILS # BLD AUTO: 6.7 K/UL — HIGH (ref 1.4–6.5)
NEUTROPHILS NFR BLD AUTO: 77 % — HIGH (ref 42.2–75.2)
NEUTROPHILS NFR BLD AUTO: 77 % — HIGH (ref 42.2–75.2)
NRBC # BLD: 0 /100 WBCS — SIGNIFICANT CHANGE UP (ref 0–0)
NRBC # BLD: 0 /100 WBCS — SIGNIFICANT CHANGE UP (ref 0–0)
PHOSPHATE SERPL-MCNC: 3.4 MG/DL — SIGNIFICANT CHANGE UP (ref 2.1–4.9)
PHOSPHATE SERPL-MCNC: 3.4 MG/DL — SIGNIFICANT CHANGE UP (ref 2.1–4.9)
PLATELET # BLD AUTO: 393 K/UL — SIGNIFICANT CHANGE UP (ref 130–400)
PLATELET # BLD AUTO: 393 K/UL — SIGNIFICANT CHANGE UP (ref 130–400)
PMV BLD: 10.2 FL — SIGNIFICANT CHANGE UP (ref 7.4–10.4)
PMV BLD: 10.2 FL — SIGNIFICANT CHANGE UP (ref 7.4–10.4)
POTASSIUM SERPL-MCNC: 3.7 MMOL/L — SIGNIFICANT CHANGE UP (ref 3.5–5)
POTASSIUM SERPL-MCNC: 3.7 MMOL/L — SIGNIFICANT CHANGE UP (ref 3.5–5)
POTASSIUM SERPL-SCNC: 3.7 MMOL/L — SIGNIFICANT CHANGE UP (ref 3.5–5)
POTASSIUM SERPL-SCNC: 3.7 MMOL/L — SIGNIFICANT CHANGE UP (ref 3.5–5)
PROT SERPL-MCNC: 6.6 G/DL — SIGNIFICANT CHANGE UP (ref 6–8)
PROT SERPL-MCNC: 6.6 G/DL — SIGNIFICANT CHANGE UP (ref 6–8)
RBC # BLD: 4.85 M/UL — SIGNIFICANT CHANGE UP (ref 4.2–5.4)
RBC # BLD: 4.85 M/UL — SIGNIFICANT CHANGE UP (ref 4.2–5.4)
RBC # FLD: 14 % — SIGNIFICANT CHANGE UP (ref 11.5–14.5)
RBC # FLD: 14 % — SIGNIFICANT CHANGE UP (ref 11.5–14.5)
SODIUM SERPL-SCNC: 136 MMOL/L — SIGNIFICANT CHANGE UP (ref 135–146)
SODIUM SERPL-SCNC: 136 MMOL/L — SIGNIFICANT CHANGE UP (ref 135–146)
WBC # BLD: 8.71 K/UL — SIGNIFICANT CHANGE UP (ref 4.8–10.8)
WBC # BLD: 8.71 K/UL — SIGNIFICANT CHANGE UP (ref 4.8–10.8)
WBC # FLD AUTO: 8.71 K/UL — SIGNIFICANT CHANGE UP (ref 4.8–10.8)
WBC # FLD AUTO: 8.71 K/UL — SIGNIFICANT CHANGE UP (ref 4.8–10.8)

## 2023-12-17 PROCEDURE — 99232 SBSQ HOSP IP/OBS MODERATE 35: CPT

## 2023-12-17 RX ADMIN — Medication 40 MILLIGRAM(S): at 06:28

## 2023-12-17 RX ADMIN — Medication 40 MILLIGRAM(S): at 13:00

## 2023-12-17 RX ADMIN — PANTOPRAZOLE SODIUM 40 MILLIGRAM(S): 20 TABLET, DELAYED RELEASE ORAL at 06:28

## 2023-12-17 RX ADMIN — CHLORHEXIDINE GLUCONATE 1 APPLICATION(S): 213 SOLUTION TOPICAL at 06:29

## 2023-12-17 RX ADMIN — CEFTRIAXONE 100 MILLIGRAM(S): 500 INJECTION, POWDER, FOR SOLUTION INTRAMUSCULAR; INTRAVENOUS at 08:37

## 2023-12-17 NOTE — PROGRESS NOTE ADULT - SUBJECTIVE AND OBJECTIVE BOX
89 yo F pmhx A-Fib, HTN, CKD, CAD, PAD, Dementia, BIBEMS for evaluation of cough and sob x approx one month.  Family member states pt has had an on-going cough, outpt cxr performed (unremarkable per family member). admitted for positive influenza A and acute respiratory failure.     Today:  Seen at bedside, no new complaints.      REVIEW OF SYSTEMS:  No new complaints      MEDICATIONS  (STANDING):  cefTRIAXone   IVPB 2000 milliGRAM(s) IV Intermittent every 24 hours  cefTRIAXone   IVPB      chlorhexidine 2% Cloths 1 Application(s) Topical <User Schedule>  cloNIDine 0.1 milliGRAM(s) Oral once  furosemide   Injectable 40 milliGRAM(s) IV Push two times a day  pantoprazole    Tablet 40 milliGRAM(s) Oral before breakfast  polyethylene glycol 3350 17 Gram(s) Oral daily    MEDICATIONS  (PRN):  acetaminophen     Tablet .. 650 milliGRAM(s) Oral every 6 hours PRN Temp greater or equal to 38C (100.4F), Mild Pain (1 - 3)  aluminum hydroxide/magnesium hydroxide/simethicone Suspension 30 milliLiter(s) Oral every 4 hours PRN Dyspepsia  bisacodyl 5 milliGRAM(s) Oral daily PRN Constipation  melatonin 5 milliGRAM(s) Oral at bedtime PRN Insomnia  ondansetron Injectable 4 milliGRAM(s) IV Push every 8 hours PRN Nausea and/or Vomiting  senna 2 Tablet(s) Oral at bedtime PRN Constipation      Allergies  Celebrex (Unknown)        Vital Signs Last 24 Hrs  T(C): 35.8 (17 Dec 2023 04:47), Max: 35.8 (16 Dec 2023 14:41)  T(F): 96.5 (17 Dec 2023 04:47), Max: 96.5 (16 Dec 2023 14:41)  HR: 81 (17 Dec 2023 04:47) (73 - 81)  BP: 134/69 (17 Dec 2023 04:47) (120/74 - 134/69)  RR: 18 (17 Dec 2023 04:47) (18 - 18)  SpO2: 94% (17 Dec 2023 04:47) (94% - 97%)    Parameters below as of 17 Dec 2023 04:47  Patient On (Oxygen Delivery Method): nasal cannula        PHYSICAL EXAM:  GENERAL: NAD,   HEENT : NC/AT, b/l poor hearing   NECK: Supple, No JVD  CHEST/LUNG: b/l air entry, decreased lower bases  HEART: Regular rate and rhythm;   ABDOMEN: Soft, Nontender, Nondistended; Bowel sounds present  : primafit in place draining hematuria  EXTREMITIES:  no edema  NEURO:  aox2, generalized weakness   SKIN: No rashes or lesions      LABS:                        13.6   8.71  )-----------( 393      ( 17 Dec 2023 04:30 )             41.2     12-17    136  |  94<L>  |  21<H>  ----------------------------<  78  3.7   |  32  |  0.6<L>    Ca    8.8      17 Dec 2023 04:30  Phos  3.4     12-17  Mg     2.2     12-17    TPro  6.6  /  Alb  3.2<L>  /  TBili  0.8  /  DBili  x   /  AST  25  /  ALT  11  /  AlkPhos  128<H>  12-17      Urinalysis Basic - ( 17 Dec 2023 04:30 )    Color: x / Appearance: x / SG: x / pH: x  Gluc: 78 mg/dL / Ketone: x  / Bili: x / Urobili: x   Blood: x / Protein: x / Nitrite: x   Leuk Esterase: x / RBC: x / WBC x   Sq Epi: x / Non Sq Epi: x / Bacteria: x       89 yo F pmhx A-Fib, HTN, CKD, CAD, PAD, Dementia, BIBEMS for evaluation of cough and sob x approx one month.  Family member states pt has had an on-going cough, outpt cxr performed (unremarkable per family member). admitted for positive influenza A and acute respiratory failure.     Today:  Seen at bedside, no new complaints.      REVIEW OF SYSTEMS:  No new complaints      MEDICATIONS  (STANDING):  cefTRIAXone   IVPB 2000 milliGRAM(s) IV Intermittent every 24 hours  cefTRIAXone   IVPB      chlorhexidine 2% Cloths 1 Application(s) Topical <User Schedule>  cloNIDine 0.1 milliGRAM(s) Oral once  furosemide   Injectable 40 milliGRAM(s) IV Push two times a day  pantoprazole    Tablet 40 milliGRAM(s) Oral before breakfast  polyethylene glycol 3350 17 Gram(s) Oral daily    MEDICATIONS  (PRN):  acetaminophen     Tablet .. 650 milliGRAM(s) Oral every 6 hours PRN Temp greater or equal to 38C (100.4F), Mild Pain (1 - 3)  aluminum hydroxide/magnesium hydroxide/simethicone Suspension 30 milliLiter(s) Oral every 4 hours PRN Dyspepsia  bisacodyl 5 milliGRAM(s) Oral daily PRN Constipation  melatonin 5 milliGRAM(s) Oral at bedtime PRN Insomnia  ondansetron Injectable 4 milliGRAM(s) IV Push every 8 hours PRN Nausea and/or Vomiting  senna 2 Tablet(s) Oral at bedtime PRN Constipation      Allergies  Celebrex (Unknown)        Vital Signs Last 24 Hrs  T(C): 35.8 (17 Dec 2023 04:47), Max: 35.8 (16 Dec 2023 14:41)  T(F): 96.5 (17 Dec 2023 04:47), Max: 96.5 (16 Dec 2023 14:41)  HR: 81 (17 Dec 2023 04:47) (73 - 81)  BP: 134/69 (17 Dec 2023 04:47) (120/74 - 134/69)  RR: 18 (17 Dec 2023 04:47) (18 - 18)  SpO2: 94% (17 Dec 2023 04:47) (94% - 97%)    Parameters below as of 17 Dec 2023 04:47  Patient On (Oxygen Delivery Method): nasal cannula        PHYSICAL EXAM:  GENERAL: NAD,   HEENT : NC/AT, b/l poor hearing   NECK: Supple, No JVD  CHEST/LUNG: b/l air entry, decreased lower bases  HEART: Regular rate and rhythm;   ABDOMEN: Soft, Nontender, Nondistended; Bowel sounds present  : primafit in place draining hematuria  EXTREMITIES:  no edema  NEURO:  aox2, generalized weakness   SKIN: No rashes or lesions      LABS:                        13.6   8.71  )-----------( 393      ( 17 Dec 2023 04:30 )             41.2     12-17    136  |  94<L>  |  21<H>  ----------------------------<  78  3.7   |  32  |  0.6<L>    Ca    8.8      17 Dec 2023 04:30  Phos  3.4     12-17  Mg     2.2     12-17    TPro  6.6  /  Alb  3.2<L>  /  TBili  0.8  /  DBili  x   /  AST  25  /  ALT  11  /  AlkPhos  128<H>  12-17      Urinalysis Basic - ( 17 Dec 2023 04:30 )    Color: x / Appearance: x / SG: x / pH: x  Gluc: 78 mg/dL / Ketone: x  / Bili: x / Urobili: x   Blood: x / Protein: x / Nitrite: x   Leuk Esterase: x / RBC: x / WBC x   Sq Epi: x / Non Sq Epi: x / Bacteria: x

## 2023-12-17 NOTE — PROGRESS NOTE ADULT - ASSESSMENT
89 yo F pmhx A-Fib, HTN, CKD, CAD, PAD, Dementia, BIBEMS for evaluation of cough and sob x approx one month.  Family member states pt has had an on-going cough, outpt cxr performed (unremarkable per family member). admitted for positive influenza A and acute respiratory failure.     #Influenzae A bronchitis  #Lt pleural effusion with compression atelectasis  #Partial occulusion of bilateral lower lobe segmental bronchi  #acute hypoxic respiratory failure  - fever curve uptrending with tmax of 100.3 on 12/11  -Can continue Rocephin to complete course, stop vancomycin  - pulm recs appreciated  - SLP: Soft & bite sized/thins. 1:1 feeds  - CTAP: Minimally decreased moderate to large left pleural effusion with increased compressive atelectasis resulting in the central left lower lobe collapse  -TTE shows pulm HTN  - blood cultures on admission NGTD  - S/p tamiflu x 5 days and Unasyn x 7 days (completed)  - ID, pulm and cardio recs appreciated  - no need for thoracentesis as per pulmonary  - is not AOx3 to be placed on bipap  -Supplemental O2 as needed  - continue Lasix, repeat AM CXR today shows improvement    #Dark urine:  -No blood on UA, urine in canister appears jose ramon in color  -CK normal  -Nephrology consult appreciated  -Likely concentrated from diuresis    #KATHRYN on CKD 3A  - Cr increased from 0.7 to 1  - GFR decreased 4 to 3A  - nephro consult appreciated  - resolved  - currently on a primafit    #CAD/PAD?  - pt is not on a statin? or aspirin?    #hyponatremia, resolved    #chronic persistent afib  - rate controlled off medications  - not on any anticoagulation    #HTN  - normotensive off medications    #dementia  - not on any meds  - calorie count ordered on 12/12      DVT prophylaxis: SCD  GI prophylaxis: Protonix   diet: HH diet   code status: DNR/DNI  Handoff: Acute, pending improvement in pleural effusion  HCP: [facility], [phone] 89 yo F pmhx A-Fib, HTN, CKD, CAD, PAD, Dementia, BIBEMS for evaluation of cough and sob x approx one month.  Family member states pt has had an on-going cough, outpt cxr performed (unremarkable per family member). admitted for positive influenza A and acute respiratory failure.     #Influenzae A bronchitis  #Lt pleural effusion with compression atelectasis  #Partial occulusion of bilateral lower lobe segmental bronchi  #acute hypoxic respiratory failure  - fever curve uptrending with tmax of 100.3 on 12/11  -Can continue Rocephin to complete course, stop vancomycin  - pulm recs appreciated  - SLP: Soft & bite sized/thins. 1:1 feeds  - CTAP: Minimally decreased moderate to large left pleural effusion with increased compressive atelectasis resulting in the central left lower lobe collapse  -TTE shows pulm HTN  - blood cultures on admission NGTD  - S/p tamiflu x 5 days and Unasyn x 7 days (completed)  - ID, pulm and cardio recs appreciated  - no need for thoracentesis as per pulmonary  - is not AOx3 to be placed on bipap  -Supplemental O2 as needed  - continue Lasix, repeat AM CXR today shows improvement    #Dark urine:  -No blood on UA, urine in canister appears jose ramon in color  -CK normal  -Nephrology consult appreciated  -Likely concentrated from diuresis    #KATHRYN on CKD 3A  - Cr increased from 0.7 to 1  - GFR decreased 4 to 3A  - nephro consult appreciated  - resolved  - currently on a primafit    #CAD/PAD?  - pt is not on a statin? or aspirin?    #hyponatremia, resolved    #chronic persistent afib  - rate controlled off medications  - not on any anticoagulation    #HTN  - normotensive off medications    #dementia  - not on any meds  - calorie count ordered on 12/12      DVT prophylaxis: SCD  GI prophylaxis: Protonix   diet: HH diet   code status: DNR/DNI  Handoff: Acute, pending improvement in pleural effusion  HCP: [facility], [phone]

## 2023-12-18 ENCOUNTER — TRANSCRIPTION ENCOUNTER (OUTPATIENT)
Age: 88
End: 2023-12-18

## 2023-12-18 VITALS
RESPIRATION RATE: 18 BRPM | TEMPERATURE: 96 F | HEART RATE: 79 BPM | SYSTOLIC BLOOD PRESSURE: 136 MMHG | DIASTOLIC BLOOD PRESSURE: 71 MMHG

## 2023-12-18 LAB
ALBUMIN SERPL ELPH-MCNC: 3 G/DL — LOW (ref 3.5–5.2)
ALBUMIN SERPL ELPH-MCNC: 3 G/DL — LOW (ref 3.5–5.2)
ALP SERPL-CCNC: 128 U/L — HIGH (ref 30–115)
ALP SERPL-CCNC: 128 U/L — HIGH (ref 30–115)
ALT FLD-CCNC: 10 U/L — SIGNIFICANT CHANGE UP (ref 0–41)
ALT FLD-CCNC: 10 U/L — SIGNIFICANT CHANGE UP (ref 0–41)
ANION GAP SERPL CALC-SCNC: 11 MMOL/L — SIGNIFICANT CHANGE UP (ref 7–14)
ANION GAP SERPL CALC-SCNC: 11 MMOL/L — SIGNIFICANT CHANGE UP (ref 7–14)
AST SERPL-CCNC: 25 U/L — SIGNIFICANT CHANGE UP (ref 0–41)
AST SERPL-CCNC: 25 U/L — SIGNIFICANT CHANGE UP (ref 0–41)
BASOPHILS # BLD AUTO: 0.04 K/UL — SIGNIFICANT CHANGE UP (ref 0–0.2)
BASOPHILS # BLD AUTO: 0.04 K/UL — SIGNIFICANT CHANGE UP (ref 0–0.2)
BASOPHILS NFR BLD AUTO: 0.4 % — SIGNIFICANT CHANGE UP (ref 0–1)
BASOPHILS NFR BLD AUTO: 0.4 % — SIGNIFICANT CHANGE UP (ref 0–1)
BILIRUB SERPL-MCNC: 0.8 MG/DL — SIGNIFICANT CHANGE UP (ref 0.2–1.2)
BILIRUB SERPL-MCNC: 0.8 MG/DL — SIGNIFICANT CHANGE UP (ref 0.2–1.2)
BUN SERPL-MCNC: 24 MG/DL — HIGH (ref 10–20)
BUN SERPL-MCNC: 24 MG/DL — HIGH (ref 10–20)
CALCIUM SERPL-MCNC: 8.7 MG/DL — SIGNIFICANT CHANGE UP (ref 8.4–10.5)
CALCIUM SERPL-MCNC: 8.7 MG/DL — SIGNIFICANT CHANGE UP (ref 8.4–10.5)
CHLORIDE SERPL-SCNC: 96 MMOL/L — LOW (ref 98–110)
CHLORIDE SERPL-SCNC: 96 MMOL/L — LOW (ref 98–110)
CO2 SERPL-SCNC: 32 MMOL/L — SIGNIFICANT CHANGE UP (ref 17–32)
CO2 SERPL-SCNC: 32 MMOL/L — SIGNIFICANT CHANGE UP (ref 17–32)
CREAT SERPL-MCNC: 0.7 MG/DL — SIGNIFICANT CHANGE UP (ref 0.7–1.5)
CREAT SERPL-MCNC: 0.7 MG/DL — SIGNIFICANT CHANGE UP (ref 0.7–1.5)
CULTURE RESULTS: SIGNIFICANT CHANGE UP
EGFR: 79 ML/MIN/1.73M2 — SIGNIFICANT CHANGE UP
EGFR: 79 ML/MIN/1.73M2 — SIGNIFICANT CHANGE UP
EOSINOPHIL # BLD AUTO: 0.1 K/UL — SIGNIFICANT CHANGE UP (ref 0–0.7)
EOSINOPHIL # BLD AUTO: 0.1 K/UL — SIGNIFICANT CHANGE UP (ref 0–0.7)
EOSINOPHIL NFR BLD AUTO: 1.1 % — SIGNIFICANT CHANGE UP (ref 0–8)
EOSINOPHIL NFR BLD AUTO: 1.1 % — SIGNIFICANT CHANGE UP (ref 0–8)
GLUCOSE SERPL-MCNC: 81 MG/DL — SIGNIFICANT CHANGE UP (ref 70–99)
GLUCOSE SERPL-MCNC: 81 MG/DL — SIGNIFICANT CHANGE UP (ref 70–99)
HCT VFR BLD CALC: 41.3 % — SIGNIFICANT CHANGE UP (ref 37–47)
HCT VFR BLD CALC: 41.3 % — SIGNIFICANT CHANGE UP (ref 37–47)
HGB BLD-MCNC: 13.5 G/DL — SIGNIFICANT CHANGE UP (ref 12–16)
HGB BLD-MCNC: 13.5 G/DL — SIGNIFICANT CHANGE UP (ref 12–16)
IMM GRANULOCYTES NFR BLD AUTO: 1.4 % — HIGH (ref 0.1–0.3)
IMM GRANULOCYTES NFR BLD AUTO: 1.4 % — HIGH (ref 0.1–0.3)
LYMPHOCYTES # BLD AUTO: 1.19 K/UL — LOW (ref 1.2–3.4)
LYMPHOCYTES # BLD AUTO: 1.19 K/UL — LOW (ref 1.2–3.4)
LYMPHOCYTES # BLD AUTO: 12.6 % — LOW (ref 20.5–51.1)
LYMPHOCYTES # BLD AUTO: 12.6 % — LOW (ref 20.5–51.1)
MAGNESIUM SERPL-MCNC: 2.3 MG/DL — SIGNIFICANT CHANGE UP (ref 1.8–2.4)
MAGNESIUM SERPL-MCNC: 2.3 MG/DL — SIGNIFICANT CHANGE UP (ref 1.8–2.4)
MCHC RBC-ENTMCNC: 27.8 PG — SIGNIFICANT CHANGE UP (ref 27–31)
MCHC RBC-ENTMCNC: 27.8 PG — SIGNIFICANT CHANGE UP (ref 27–31)
MCHC RBC-ENTMCNC: 32.7 G/DL — SIGNIFICANT CHANGE UP (ref 32–37)
MCHC RBC-ENTMCNC: 32.7 G/DL — SIGNIFICANT CHANGE UP (ref 32–37)
MCV RBC AUTO: 85 FL — SIGNIFICANT CHANGE UP (ref 81–99)
MCV RBC AUTO: 85 FL — SIGNIFICANT CHANGE UP (ref 81–99)
MONOCYTES # BLD AUTO: 0.98 K/UL — HIGH (ref 0.1–0.6)
MONOCYTES # BLD AUTO: 0.98 K/UL — HIGH (ref 0.1–0.6)
MONOCYTES NFR BLD AUTO: 10.4 % — HIGH (ref 1.7–9.3)
MONOCYTES NFR BLD AUTO: 10.4 % — HIGH (ref 1.7–9.3)
NEUTROPHILS # BLD AUTO: 6.98 K/UL — HIGH (ref 1.4–6.5)
NEUTROPHILS # BLD AUTO: 6.98 K/UL — HIGH (ref 1.4–6.5)
NEUTROPHILS NFR BLD AUTO: 74.1 % — SIGNIFICANT CHANGE UP (ref 42.2–75.2)
NEUTROPHILS NFR BLD AUTO: 74.1 % — SIGNIFICANT CHANGE UP (ref 42.2–75.2)
NRBC # BLD: 0 /100 WBCS — SIGNIFICANT CHANGE UP (ref 0–0)
NRBC # BLD: 0 /100 WBCS — SIGNIFICANT CHANGE UP (ref 0–0)
PHOSPHATE SERPL-MCNC: 3.3 MG/DL — SIGNIFICANT CHANGE UP (ref 2.1–4.9)
PHOSPHATE SERPL-MCNC: 3.3 MG/DL — SIGNIFICANT CHANGE UP (ref 2.1–4.9)
PLATELET # BLD AUTO: 375 K/UL — SIGNIFICANT CHANGE UP (ref 130–400)
PLATELET # BLD AUTO: 375 K/UL — SIGNIFICANT CHANGE UP (ref 130–400)
PMV BLD: 10 FL — SIGNIFICANT CHANGE UP (ref 7.4–10.4)
PMV BLD: 10 FL — SIGNIFICANT CHANGE UP (ref 7.4–10.4)
POTASSIUM SERPL-MCNC: 3.9 MMOL/L — SIGNIFICANT CHANGE UP (ref 3.5–5)
POTASSIUM SERPL-MCNC: 3.9 MMOL/L — SIGNIFICANT CHANGE UP (ref 3.5–5)
POTASSIUM SERPL-SCNC: 3.9 MMOL/L — SIGNIFICANT CHANGE UP (ref 3.5–5)
POTASSIUM SERPL-SCNC: 3.9 MMOL/L — SIGNIFICANT CHANGE UP (ref 3.5–5)
PROT SERPL-MCNC: 6.7 G/DL — SIGNIFICANT CHANGE UP (ref 6–8)
PROT SERPL-MCNC: 6.7 G/DL — SIGNIFICANT CHANGE UP (ref 6–8)
RBC # BLD: 4.86 M/UL — SIGNIFICANT CHANGE UP (ref 4.2–5.4)
RBC # BLD: 4.86 M/UL — SIGNIFICANT CHANGE UP (ref 4.2–5.4)
RBC # FLD: 14.2 % — SIGNIFICANT CHANGE UP (ref 11.5–14.5)
RBC # FLD: 14.2 % — SIGNIFICANT CHANGE UP (ref 11.5–14.5)
SODIUM SERPL-SCNC: 139 MMOL/L — SIGNIFICANT CHANGE UP (ref 135–146)
SODIUM SERPL-SCNC: 139 MMOL/L — SIGNIFICANT CHANGE UP (ref 135–146)
SPECIMEN SOURCE: SIGNIFICANT CHANGE UP
WBC # BLD: 9.42 K/UL — SIGNIFICANT CHANGE UP (ref 4.8–10.8)
WBC # BLD: 9.42 K/UL — SIGNIFICANT CHANGE UP (ref 4.8–10.8)
WBC # FLD AUTO: 9.42 K/UL — SIGNIFICANT CHANGE UP (ref 4.8–10.8)
WBC # FLD AUTO: 9.42 K/UL — SIGNIFICANT CHANGE UP (ref 4.8–10.8)

## 2023-12-18 PROCEDURE — 71045 X-RAY EXAM CHEST 1 VIEW: CPT | Mod: 26

## 2023-12-18 PROCEDURE — 99238 HOSP IP/OBS DSCHRG MGMT 30/<: CPT

## 2023-12-18 RX ORDER — FUROSEMIDE 40 MG
40 TABLET ORAL
Qty: 0 | Refills: 0 | DISCHARGE
Start: 2023-12-18

## 2023-12-18 RX ORDER — FUROSEMIDE 40 MG
1 TABLET ORAL
Qty: 0 | Refills: 0 | DISCHARGE
Start: 2023-12-18

## 2023-12-18 RX ORDER — ACETAMINOPHEN 500 MG
2 TABLET ORAL
Qty: 0 | Refills: 0 | DISCHARGE
Start: 2023-12-18

## 2023-12-18 RX ADMIN — Medication 40 MILLIGRAM(S): at 14:57

## 2023-12-18 RX ADMIN — POLYETHYLENE GLYCOL 3350 17 GRAM(S): 17 POWDER, FOR SOLUTION ORAL at 12:20

## 2023-12-18 RX ADMIN — CHLORHEXIDINE GLUCONATE 1 APPLICATION(S): 213 SOLUTION TOPICAL at 05:22

## 2023-12-18 RX ADMIN — Medication 40 MILLIGRAM(S): at 05:22

## 2023-12-18 RX ADMIN — PANTOPRAZOLE SODIUM 40 MILLIGRAM(S): 20 TABLET, DELAYED RELEASE ORAL at 06:11

## 2023-12-18 RX ADMIN — CEFTRIAXONE 100 MILLIGRAM(S): 500 INJECTION, POWDER, FOR SOLUTION INTRAMUSCULAR; INTRAVENOUS at 12:20

## 2023-12-18 NOTE — DISCHARGE NOTE PROVIDER - CARE PROVIDERS DIRECT ADDRESSES
[email or web address]

## 2023-12-18 NOTE — DISCHARGE NOTE NURSING/CASE MANAGEMENT/SOCIAL WORK - PATIENT PORTAL LINK FT
You can access the FollowMyHealth Patient Portal offered by [facility] by registering at the following website: [email or web address]. By joining [employer]’s FollowMyHealth portal, you will also be able to view your health information using other applications (apps) compatible with our system. You can access the FollowMyHealth Patient Portal offered by [facility] by registering at the following website: [email or web address]. By joining [employer]’s FollowMyHealth portal, you will also be able to view your health information using other applications (apps) compatible with our system.

## 2023-12-18 NOTE — DISCHARGE NOTE NURSING/CASE MANAGEMENT/SOCIAL WORK - NSDCPEFALRISK_GEN_ALL_CORE
For information on Fall & Injury Prevention, visit: [email or web address] OR  [email or web address] OR  https://www.cdc.gov/steadi/patient.html For information on Fall & Injury Prevention, visit: [email or web address]/[email or web address]-[email or web address] OR  [email or web address]://[email or web address]/news/fall-[email or web address]-tips-to-avoid-injury OR  https://www.cdc.gov/steadi/patient.html

## 2023-12-18 NOTE — DISCHARGE NOTE PROVIDER - NSDCCPCAREPLAN_GEN_ALL_CORE_FT
PRINCIPAL DISCHARGE DIAGNOSIS  Diagnosis: Pleural effusion  Assessment and Plan of Treatment: There was extra fluid on your lungs because of influenza.  Please follow with your primary care doctors at home.

## 2023-12-18 NOTE — DISCHARGE NOTE PROVIDER - CARE PROVIDER_API CALL
Dontae Soria  Internal Medicine  [address] [place] [id number]  Phone: [phone]  Fax: [phone]  Follow Up Time:    Dontae Soria  Internal Medicine  [address] [place] [id number]  Phone: [phone]  Fax: [phone]  Follow Up Time:

## 2023-12-18 NOTE — DISCHARGE NOTE NURSING/CASE MANAGEMENT/SOCIAL WORK - NSDCVIVACCINE_GEN_ALL_CORE_FT
COVID-19 vaccine, vector-nr, rS-Ad26, PF, 0.5 mL ([facility]); 01-Jul-2021 17:02; Doretha Doe (NANDA); [facility]; [id number] (Exp. Date: 01-Jul-2021); IntraMuscular; Deltoid Right.; 0.5 milliLiter(s);    COVID-19 vaccine, vector-nr, rS-Ad26, PF, 0.5 mL ([facility]); 01-Jul-2021 17:02; Doretha Doe (NANDA); [facility]; [id number] (Exp. Date: 01-Jul-2021); IntraMuscular; Deltoid Right.; 0.5 milliLiter(s);

## 2023-12-18 NOTE — DISCHARGE NOTE PROVIDER - NSDCMRMEDTOKEN_GEN_ALL_CORE_FT
acetaminophen 325 mg oral tablet: 2 tab(s) orally every 6 hours As needed Temp greater or equal to 38C (100.4F), Mild Pain (1 - 3)  ascorbic acid 500 mg oral tablet: 1 tab(s) orally 2 times a day  ergocalciferol 1.25 mg (50,000 intl units) oral tablet: 1 tab(s) orally once a week; please check vitamin D levels in 2 weeks   ezetimibe-simvastatin 10 mg-10 mg oral tablet: 1 tab(s) orally once a day  famotidine 20 mg oral tablet: 1 tab(s) orally 2 times a day  ferrous sulfate 324 mg (65 mg elemental iron) oral tablet: 1 tab(s) orally once a day  Lasix 20 mg oral tablet: 1 tab(s) orally once a day  levothyroxine 50 mcg (0.05 mg) oral tablet: 1 tab(s) orally once a day  metoprolol succinate 25 mg oral tablet, extended release: 1 tab(s) orally once a day  zinc sulfate 220 mg oral capsule: 1 cap(s) orally once a day

## 2023-12-18 NOTE — DISCHARGE NOTE PROVIDER - HOSPITAL COURSE
89 yo F pmhx A-Fib, HTN, CKD, CAD, PAD, Dementia, BIBEMS for evaluation of cough and sob x approx one month.  Family member states pt has had an on-going cough, outpt cxr performed (unremarkable per family member). admitted for positive influenza A and acute respiratory failure.     #Influenzae A bronchitis  #Lt pleural effusion with compression atelectasis  #Partial occulusion of bilateral lower lobe segmental bronchi  #acute hypoxic respiratory failure  - fever curve uptrending with tmax of 100.3 on 12/11  -Completed empiric antibiotics  - pulm recs appreciated  - SLP: Soft & bite sized/thins. 1:1 feeds  - CTAP: Minimally decreased moderate to large left pleural effusion with increased compressive atelectasis resulting in the central left lower lobe collapse  -TTE shows pulm HTN  - blood cultures on admission NGTD  - S/p tamiflu x 5 days and Unasyn x 7 days (completed)  - ID, pulm and cardio recs appreciated  - no need for thoracentesis as per pulmonary  - S/p IV Lasix, repeat AM CXR shows improvement, patient off supplemental O2    #Dark urine:  -No blood on UA, urine in canister appears jose ramon in color  -CK normal  -Nephrology consult appreciated  -Likely concentrated from diuresis  -Resolved    #KATHRYN on CKD 3A  - Cr increased from 0.7 to 1  - GFR decreased 4 to 3A  - nephro consult appreciated  - resolved    #hyponatremia, resolved    #chronic persistent afib  - rate controlled off medications  - not on any anticoagulation    #HTN  - normotensive off medications    #dementia  - supportive care    DC home with home care, has 24h    DNR/DNI

## 2023-12-19 ENCOUNTER — TRANSCRIPTION ENCOUNTER (OUTPATIENT)
Age: 88
End: 2023-12-19

## 2023-12-20 DIAGNOSIS — N18.31 CHRONIC KIDNEY DISEASE, STAGE 3A: ICD-10-CM

## 2023-12-20 DIAGNOSIS — I73.9 PERIPHERAL VASCULAR DISEASE, UNSPECIFIED: ICD-10-CM

## 2023-12-20 DIAGNOSIS — F03.90 UNSPECIFIED DEMENTIA WITHOUT BEHAVIORAL DISTURBANCE: ICD-10-CM

## 2023-12-20 DIAGNOSIS — N18.9 CHRONIC KIDNEY DISEASE, UNSPECIFIED: ICD-10-CM

## 2023-12-20 DIAGNOSIS — I25.10 ATHEROSCLEROTIC HEART DISEASE OF NATIVE CORONARY ARTERY WITHOUT ANGINA PECTORIS: ICD-10-CM

## 2023-12-20 DIAGNOSIS — I13.0 HYPERTENSIVE HEART AND CHRONIC KIDNEY DISEASE WITH HEART FAILURE AND STAGE 1 THROUGH STAGE 4 CHRONIC KIDNEY DISEASE, OR UNSPECIFIED CHRONIC KIDNEY DISEASE: ICD-10-CM

## 2023-12-20 DIAGNOSIS — E66.9 OBESITY, UNSPECIFIED: ICD-10-CM

## 2023-12-20 DIAGNOSIS — I27.20 PULMONARY HYPERTENSION, UNSPECIFIED: ICD-10-CM

## 2023-12-20 DIAGNOSIS — I50.30 UNSPECIFIED DIASTOLIC (CONGESTIVE) HEART FAILURE: ICD-10-CM

## 2023-12-20 DIAGNOSIS — I48.20 CHRONIC ATRIAL FIBRILLATION, UNSPECIFIED: ICD-10-CM

## 2023-12-20 DIAGNOSIS — Z66 DO NOT RESUSCITATE: ICD-10-CM

## 2023-12-20 DIAGNOSIS — Z88.0 ALLERGY STATUS TO PENICILLIN: ICD-10-CM

## 2023-12-20 DIAGNOSIS — J96.01 ACUTE RESPIRATORY FAILURE WITH HYPOXIA: ICD-10-CM

## 2023-12-20 DIAGNOSIS — E87.1 HYPO-OSMOLALITY AND HYPONATREMIA: ICD-10-CM

## 2023-12-20 DIAGNOSIS — Z51.5 ENCOUNTER FOR PALLIATIVE CARE: ICD-10-CM

## 2023-12-20 DIAGNOSIS — J69.0 PNEUMONITIS DUE TO INHALATION OF FOOD AND VOMIT: ICD-10-CM

## 2023-12-20 DIAGNOSIS — J10.1 INFLUENZA DUE TO OTHER IDENTIFIED INFLUENZA VIRUS WITH OTHER RESPIRATORY MANIFESTATIONS: ICD-10-CM

## 2023-12-20 DIAGNOSIS — J98.11 ATELECTASIS: ICD-10-CM

## 2023-12-20 DIAGNOSIS — J90 PLEURAL EFFUSION, NOT ELSEWHERE CLASSIFIED: ICD-10-CM

## 2023-12-20 DIAGNOSIS — N17.9 ACUTE KIDNEY FAILURE, UNSPECIFIED: ICD-10-CM

## 2023-12-29 ENCOUNTER — TRANSCRIPTION ENCOUNTER (OUTPATIENT)
Age: 88
End: 2023-12-29

## 2024-01-05 ENCOUNTER — TRANSCRIPTION ENCOUNTER (OUTPATIENT)
Age: 89
End: 2024-01-05

## 2024-04-02 NOTE — PROGRESS NOTE ADULT - PROVIDER SPECIALTY LIST ADULT
Hospitalist
Internal Medicine
Hospitalist
Internal Medicine
Hospitalist
Internal Medicine
Pulmonology
Hospitalist
Infectious Disease
Internal Medicine
within normal limits

## 2024-04-08 NOTE — ED ADULT NURSE NOTE - NSFALLRSKINDICTYPE_ED_ALL_ED
Ozempic   Weekly injection   Week 1-2  0.25mg  or you can take weeks 1- 4 and then weeks 5 - 6 the 0.50mg dose   Week 3 - 5 0.50mg            Will need a new pen delivers only 1.0 mg - can stay on this dose a very long time and you don't have to increase     Potential to increase to 2.0 mg if desired for BG/ weight control          Goals:  Practice healthy stress management and mindful eating - think are you physically hungry or are you bored, stressed, emotional etc, make of list of things to do besides eat.    Try to get good quality sleep with a goal of 7-8 hours per night.  Stay physically active daily.  Recommend working up to a total of 30 minutes on 5 days/ week.  Recommend a fitness tracker.     Eat in a healthy way- eliminate trans fats, limit saturated fats and added sugars; follow the plate method - picture above.  Keep a food record (MyFitnessPal, Loseit).    A meal is 3 or more food groups; make it colorful for better nutrition.    Total Carbohydrates (in grams) = Breakfast  30    Lunch  30    Supper  30    If desired snacks 15                       See me 1x/ year    A1c goal keep it under 7%                   Confusion/Impaired Gait

## 2024-12-29 NOTE — ED ADULT NURSE NOTE - NSFALLRSKPSTHSTOCCUR_ED_ALL_ED
Patient comes to ED with abdominal pain.  States recent dx of a cyst.  States attempted to go back to work this morning and states with walking a movement the pain increased   
Single Mechanical/Accidental Fall

## 2025-06-30 NOTE — CONSULT NOTE ADULT - SUBJECTIVE AND OBJECTIVE BOX
Problem: Discharge Planning  Goal: Discharge to home or other facility with appropriate resources  Outcome: Progressing     Problem: Safety - Adult  Goal: Free from fall injury  Outcome: Progressing     Problem: Respiratory - Adult  Goal: Achieves optimal ventilation and oxygenation  6/30/2025 1001 by Crista Goodman RN  Outcome: Progressing  6/30/2025 0755 by Kaye Gracia RCP  Outcome: Progressing     Problem: ABCDS Injury Assessment  Goal: Absence of physical injury  Outcome: Progressing     Problem: Skin/Tissue Integrity  Goal: Skin integrity remains intact  Description: 1.  Monitor for areas of redness and/or skin breakdown  2.  Assess vascular access sites hourly  3.  Every 4-6 hours minimum:  Change oxygen saturation probe site  4.  Every 4-6 hours:  If on nasal continuous positive airway pressure, respiratory therapy assess nares and determine need for appliance change or resting period  Outcome: Progressing     Problem: Chronic Conditions and Co-morbidities  Goal: Patient's chronic conditions and co-morbidity symptoms are monitored and maintained or improved  Outcome: Progressing      HPI: 89F with PMH of AF, HTN, CKD, CAD, PAD, dementia, here with acute hypoxic respiratory failure from influenza A, s/p tamiflu and unasyn. Has been on room air. Noted to have R pleural effusion on lasix. ALso with KATHRYN on CKD, being monitored. Patient is DNR/DNI. Palliative consulted for GOC.    PERTINENT PM/SXH:   Atrial fibrillation    Anemia    CAD (coronary artery disease)    Coronary artery disease    Hearing loss    Hypertension    Dementia    Peripheral artery disease    Venous stasis ulcers of both lower extremities    OA (osteoarthritis)      History of appendectomy    S/P ORIF (open reduction internal fixation) fracture      FAMILY HISTORY:  no pertinent family history      SOCIAL HISTORY:   Significant other/partner[ X]  Children[ ]  Amish/Spirituality:  Substance hx:  [ ]   Tobacco hx:  [ ]   Alcohol hx: [ ]   Living Situation: [X ]Home  [ ]Long term care  [ ]Rehab [ ]Other  Home Services: [ ] HHA [ ] Visting RN [ ] Hospice  Occupation:  Home Opioid hx:  [ ] Y [ ] N [ ] I-Stop Reference No:    ADVANCE DIRECTIVES:    [ ] Full Code [ ] DNR  MOLST  [ ]  Living Will  [ ]   DECISION MAKER(s):  [ ] Health Care Proxy(s)  [ ] Surrogate(s)  [ ] Guardian           Name(s): Phone Number(s):  Speedy, [phone]       BASELINE (I)ADL(s) (prior to admission):  [place]: [ ]Total  [ ] Moderate [ ]Dependent  Palliative Performance Status Version 2:         %    http://npcrc.org/files/news/palliative_performance_scale_ppsv2.pdf    Allergies    Celebrex (Unknown)    Intolerances    MEDICATIONS  (STANDING):  cefTRIAXone   IVPB      cefTRIAXone   IVPB 2000 milliGRAM(s) IV Intermittent every 24 hours  chlorhexidine 2% Cloths 1 Application(s) Topical <User Schedule>  cloNIDine 0.1 milliGRAM(s) Oral once  furosemide   Injectable 40 milliGRAM(s) IV Push daily  pantoprazole    Tablet 40 milliGRAM(s) Oral before breakfast  polyethylene glycol 3350 17 Gram(s) Oral daily  vancomycin  IVPB      vancomycin  IVPB 1000 milliGRAM(s) IV Intermittent every 24 hours    MEDICATIONS  (PRN):  acetaminophen     Tablet .. 650 milliGRAM(s) Oral every 6 hours PRN Temp greater or equal to 38C (100.4F), Mild Pain (1 - 3)  aluminum hydroxide/magnesium hydroxide/simethicone Suspension 30 milliLiter(s) Oral every 4 hours PRN Dyspepsia  bisacodyl 5 milliGRAM(s) Oral daily PRN Constipation  melatonin 5 milliGRAM(s) Oral at bedtime PRN Insomnia  ondansetron Injectable 4 milliGRAM(s) IV Push every 8 hours PRN Nausea and/or Vomiting  senna 2 Tablet(s) Oral at bedtime PRN Constipation      PRESENT SYMPTOMS: [X ]Unable to obtain due to poor mentation   Source if other than patient:  [ ]Family   [ ]Team     Pain: [ ]yes [ ]no  QOL impact -   Location -                    Aggravating factors -  Quality -  Radiation -  Timing-  Severity (0-10 scale):  Minimal acceptable level (0-10 scale):     CPOT:    https://www.[facility].org/getattachment/[email or web address]/Critical-Care-Pain-Observation-Tool-(CPOT)    PAIN AD Score: 0  http://geriatrictoolkit.[facility]/cog/painad.pdf (press ctrl +  left click to view)    Dyspnea:                           [ ]None[ ]Mild [ ]Moderate [ ]Severe     Respiratory Distress Observation Scale (RDOS): 0  A score of 0 to 2 signifies little or no respiratory distress, 3 signifies mild distress, scores 4 to 6 indicate moderate distress, and scores greater than 7 signify severe distress  https://www.[facility].ca/sites/default/files/PDFS/883764-xhrljstkgts-blclxhqb-kvorvabtqul-zpnvj.pdf    Anxiety:                             [ ]None[ ]Mild [ ]Moderate [ ]Severe   Fatigue:                             [ ]None[ ]Mild [ ]Moderate [ ]Severe   Nausea:                             [ ]None[ ]Mild [ ]Moderate [ ]Severe   Loss of appetite:              [ ]None[ ]Mild [ ]Moderate [ ]Severe   Constipation:                    [ ]None[ ]Mild [ ]Moderate [ ]Severe    Other Symptoms:  [ ]All other review of systems negative     Palliative Performance Status Version 2:         %    http://[facility].org/files/news/palliative_performance_scale_ppsv2.pdf  PHYSICAL EXAM:  Vital Signs Last 24 Hrs  T(C): 35.3 (14 Dec 2023 14:22), Max: 35.9 (14 Dec 2023 05:22)  T(F): 95.6 (14 Dec 2023 14:22), Max: 96.7 (14 Dec 2023 05:22)  HR: 86 (14 Dec 2023 14:22) (75 - 86)  BP: 110/61 (14 Dec 2023 14:22) (110/61 - 143/73)  BP(mean): --  RR: 16 (14 Dec 2023 14:22) (16 - 16)  SpO2: 95% (14 Dec 2023 05:22) (94% - 95%)    Parameters below as of 14 Dec 2023 05:22  Patient On (Oxygen Delivery Method): room air     I&O's Summary    13 Dec 2023 07:01  -  14 Dec 2023 07:00  --------------------------------------------------------  IN: 0 mL / OUT: 500 mL / NET: -500 mL    14 Dec 2023 07:01  -  14 Dec 2023 15:34  --------------------------------------------------------  IN: 0 mL / OUT: 925 mL / NET: -925 mL        GENERAL:  [X ] No acute distress [ ]Lethargic  [ ]Unarousable  [ ]Verbal  [ ]Non-Verbal [ ]Cachexia    BEHAVIORAL/PSYCH:  [ ]Alert and Oriented x  [ ] Anxiety [ ] Delirium [ ] Agitation [X ] Calm   EYES: [ ] No scleral icterus [ ] Scleral icterus [ ] Closed  ENMT:  [ ]Dry mouth  [ ]No external oral lesions [ X] No external ear or nose lesions  CARDIOVASCULAR:  [ ]Regular [ ]Irregular [ ]Tachy [ ]Not Tachy  [ ]Dat [ ] Edema [ ] No edema  PULMONARY:  [ ]Tachypnea  [ ]Audible excessive secretions [X ] No labored breathing [ ] labored breathing  GASTROINTESTINAL: [ ]Soft  [ ]Distended  [ X]Not distended [ ]Non tender [ ]Tender  MUSCULOSKELETAL: [ ]No clubbing [ ] clubbing  [ X] No cyanosis [ ] cyanosis  NEUROLOGIC: [ ]No focal deficits  [ ]Follows commands  [ ]Does not follow commands  [ ]Cognitive impairment  [ ]Dysphagia  [ ]Dysarthria  [ ]Paresis   SKIN: [ ] Jaundiced [X ] Non-jaundiced [ ]Rash [ ]No Rash [ ] Warm [ ] Dry  MISC/LINES: [ ] ET tube [ ] Trach [ ]NGT/OGT [ ]PEG [ ]Vang    CRITICAL CARE:  [ ] Shock Present  [ ]Septic [ ]Cardiogenic [ ]Neurologic [ ]Hypovolemic  [ ]  Vasopressors [ ]  Inotropes   [ ]Respiratory failure present [ ]Mechanical ventilation [ ]Non-invasive ventilatory support [ ]High flow  [ ]Acute  [ ]Chronic [ ]Hypoxic  [ ]Hypercarbic [ ]Other  [ ]Other organ failure     LABS: reviewed by me                                   13.2   13.11 )-----------( 341      ( 14 Dec 2023 04:30 )             40.5     12-14    142  |  97<L>  |  33<H>  ----------------------------<  75  3.4<L>   |  35<H>  |  0.5<L>    Ca    9.0      14 Dec 2023 04:30    RADIOLOGY & ADDITIONAL STUDIES: reviewed by me  CXR 12/14/123  Left basilar opacity/effusion, unchanged. Mild increase in right basilar   opacity/effusion.    PROTEIN CALORIE MALNUTRITION PRESENT: [ ]mild [ ]moderate [ ]severe [ ]underweight [ ]morbid obesity  https://www.andeal.org/vault/0200/web/files/ONC/Table_Clinical%20Characteristics%20to%20Document%20Malnutrition-White%20JV%20et%20al%202012.pdf    Height (cm): 162.6 (11-30-23 @ 19:45)  Weight (kg): 72.6 (11-30-23 @ 19:45)  BMI (kg/m2): 27.5 (11-30-23 @ 19:45)    [ ]PPSV2 < or = to 30% [ ]significant weight loss  [ ]poor nutritional intake  [ ]anasarca      [ ]Artificial Nutrition          Palliative Care Spiritual/Emotional Screening Tool Question  Severity (0-4):                    OR                    [X ] Unable to determine/NA  Score of 2 or greater indicates recommendation of Chaplaincy referral  Chaplaincy Referral: [ ] Yes [ ] Refused [ ] Following     Caregiver [place]:  [ ] Yes [ ] No    OR    [x ] Unable to determine. Will assess at later time if appropriate.  Social Work Referral [ ]  Patient and Family Centered Care Referral [ ]    Anticipatory Grief Present: [ ] Yes [ ] No    OR     [ x] Unable to determine. Will assess at later time if appropriate.  Social Work Referral [ ]  Patient and Family Centered Care Referral [ ]    REFERRALS:   [ ]Chaplaincy  [ ]Hospice  [ ]Child Life  [ ]Social Work  [ ]Case management [ ]Holistic Therapy     Palliative care education provided to patient and/or family    Goals of Care Document:     ______________  Cesar Morgan MD  Palliative Medicine  [facility]   of Geriatric and Palliative Medicine  [phone]   HPI: 89F with PMH of AF, HTN, CKD, CAD, PAD, dementia, here with acute hypoxic respiratory failure from influenza A, s/p tamiflu and unasyn. Has been on room air. Noted to have R pleural effusion on lasix. ALso with KATHRYN on CKD, being monitored. Patient is DNR/DNI. Palliative consulted for GOC.    PERTINENT PM/SXH:   Atrial fibrillation    Anemia    CAD (coronary artery disease)    Coronary artery disease    Hearing loss    Hypertension    Dementia    Peripheral artery disease    Venous stasis ulcers of both lower extremities    OA (osteoarthritis)      History of appendectomy    S/P ORIF (open reduction internal fixation) fracture      FAMILY HISTORY:  no pertinent family history      SOCIAL HISTORY:   Significant other/partner[ X]  Children[ ]  Christianity/Spirituality:  Substance hx:  [ ]   Tobacco hx:  [ ]   Alcohol hx: [ ]   Living Situation: [X ]Home  [ ]Long term care  [ ]Rehab [ ]Other  Home Services: [ ] HHA [ ] Visting RN [ ] Hospice  Occupation:  Home Opioid hx:  [ ] Y [ ] N [ ] I-Stop Reference No:    ADVANCE DIRECTIVES:    [ ] Full Code [ ] DNR  MOLST  [ ]  Living Will  [ ]   DECISION MAKER(s):  [ ] Health Care Proxy(s)  [ ] Surrogate(s)  [ ] Guardian           Name(s): Phone Number(s):  Speedy, [phone]       BASELINE (I)ADL(s) (prior to admission):  [place]: [ ]Total  [ ] Moderate [ ]Dependent  Palliative Performance Status Version 2:         %    http://npcrc.org/files/news/palliative_performance_scale_ppsv2.pdf    Allergies    Celebrex (Unknown)    Intolerances    MEDICATIONS  (STANDING):  cefTRIAXone   IVPB      cefTRIAXone   IVPB 2000 milliGRAM(s) IV Intermittent every 24 hours  chlorhexidine 2% Cloths 1 Application(s) Topical <User Schedule>  cloNIDine 0.1 milliGRAM(s) Oral once  furosemide   Injectable 40 milliGRAM(s) IV Push daily  pantoprazole    Tablet 40 milliGRAM(s) Oral before breakfast  polyethylene glycol 3350 17 Gram(s) Oral daily  vancomycin  IVPB      vancomycin  IVPB 1000 milliGRAM(s) IV Intermittent every 24 hours    MEDICATIONS  (PRN):  acetaminophen     Tablet .. 650 milliGRAM(s) Oral every 6 hours PRN Temp greater or equal to 38C (100.4F), Mild Pain (1 - 3)  aluminum hydroxide/magnesium hydroxide/simethicone Suspension 30 milliLiter(s) Oral every 4 hours PRN Dyspepsia  bisacodyl 5 milliGRAM(s) Oral daily PRN Constipation  melatonin 5 milliGRAM(s) Oral at bedtime PRN Insomnia  ondansetron Injectable 4 milliGRAM(s) IV Push every 8 hours PRN Nausea and/or Vomiting  senna 2 Tablet(s) Oral at bedtime PRN Constipation      PRESENT SYMPTOMS: [X ]Unable to obtain due to poor mentation   Source if other than patient:  [ ]Family   [ ]Team     Pain: [ ]yes [ ]no  QOL impact -   Location -                    Aggravating factors -  Quality -  Radiation -  Timing-  Severity (0-10 scale):  Minimal acceptable level (0-10 scale):     CPOT:    https://www.[facility].org/getattachment/[email or web address]/Critical-Care-Pain-Observation-Tool-(CPOT)    PAIN AD Score: 0  http://geriatrictoolkit.[facility]/cog/painad.pdf (press ctrl +  left click to view)    Dyspnea:                           [ ]None[ ]Mild [ ]Moderate [ ]Severe     Respiratory Distress Observation Scale (RDOS): 0  A score of 0 to 2 signifies little or no respiratory distress, 3 signifies mild distress, scores 4 to 6 indicate moderate distress, and scores greater than 7 signify severe distress  https://www.[facility].ca/sites/default/files/PDFS/629929-teqbcinolml-ykxyutuh-pljkgquzkpy-mhbjc.pdf    Anxiety:                             [ ]None[ ]Mild [ ]Moderate [ ]Severe   Fatigue:                             [ ]None[ ]Mild [ ]Moderate [ ]Severe   Nausea:                             [ ]None[ ]Mild [ ]Moderate [ ]Severe   Loss of appetite:              [ ]None[ ]Mild [ ]Moderate [ ]Severe   Constipation:                    [ ]None[ ]Mild [ ]Moderate [ ]Severe    Other Symptoms:  [ ]All other review of systems negative     Palliative Performance Status Version 2:         %    http://[facility].org/files/news/palliative_performance_scale_ppsv2.pdf  PHYSICAL EXAM:  Vital Signs Last 24 Hrs  T(C): 35.3 (14 Dec 2023 14:22), Max: 35.9 (14 Dec 2023 05:22)  T(F): 95.6 (14 Dec 2023 14:22), Max: 96.7 (14 Dec 2023 05:22)  HR: 86 (14 Dec 2023 14:22) (75 - 86)  BP: 110/61 (14 Dec 2023 14:22) (110/61 - 143/73)  BP(mean): --  RR: 16 (14 Dec 2023 14:22) (16 - 16)  SpO2: 95% (14 Dec 2023 05:22) (94% - 95%)    Parameters below as of 14 Dec 2023 05:22  Patient On (Oxygen Delivery Method): room air     I&O's Summary    13 Dec 2023 07:01  -  14 Dec 2023 07:00  --------------------------------------------------------  IN: 0 mL / OUT: 500 mL / NET: -500 mL    14 Dec 2023 07:01  -  14 Dec 2023 15:34  --------------------------------------------------------  IN: 0 mL / OUT: 925 mL / NET: -925 mL        GENERAL:  [X ] No acute distress [ ]Lethargic  [ ]Unarousable  [ ]Verbal  [ ]Non-Verbal [ ]Cachexia    BEHAVIORAL/PSYCH:  [ ]Alert and Oriented x  [ ] Anxiety [ ] Delirium [ ] Agitation [X ] Calm   EYES: [ ] No scleral icterus [ ] Scleral icterus [ ] Closed  ENMT:  [ ]Dry mouth  [ ]No external oral lesions [ X] No external ear or nose lesions  CARDIOVASCULAR:  [ ]Regular [ ]Irregular [ ]Tachy [ ]Not Tachy  [ ]Dat [ ] Edema [ ] No edema  PULMONARY:  [ ]Tachypnea  [ ]Audible excessive secretions [X ] No labored breathing [ ] labored breathing  GASTROINTESTINAL: [ ]Soft  [ ]Distended  [ X]Not distended [ ]Non tender [ ]Tender  MUSCULOSKELETAL: [ ]No clubbing [ ] clubbing  [ X] No cyanosis [ ] cyanosis  NEUROLOGIC: [ ]No focal deficits  [ ]Follows commands  [ ]Does not follow commands  [ ]Cognitive impairment  [ ]Dysphagia  [ ]Dysarthria  [ ]Paresis   SKIN: [ ] Jaundiced [X ] Non-jaundiced [ ]Rash [ ]No Rash [ ] Warm [ ] Dry  MISC/LINES: [ ] ET tube [ ] Trach [ ]NGT/OGT [ ]PEG [ ]Vang    CRITICAL CARE:  [ ] Shock Present  [ ]Septic [ ]Cardiogenic [ ]Neurologic [ ]Hypovolemic  [ ]  Vasopressors [ ]  Inotropes   [ ]Respiratory failure present [ ]Mechanical ventilation [ ]Non-invasive ventilatory support [ ]High flow  [ ]Acute  [ ]Chronic [ ]Hypoxic  [ ]Hypercarbic [ ]Other  [ ]Other organ failure     LABS: reviewed by me                                   13.2   13.11 )-----------( 341      ( 14 Dec 2023 04:30 )             40.5     12-14    142  |  97<L>  |  33<H>  ----------------------------<  75  3.4<L>   |  35<H>  |  0.5<L>    Ca    9.0      14 Dec 2023 04:30    RADIOLOGY & ADDITIONAL STUDIES: reviewed by me  CXR 12/14/123  Left basilar opacity/effusion, unchanged. Mild increase in right basilar   opacity/effusion.    PROTEIN CALORIE MALNUTRITION PRESENT: [ ]mild [ ]moderate [ ]severe [ ]underweight [ ]morbid obesity  https://www.andeal.org/vault/5000/web/files/ONC/Table_Clinical%20Characteristics%20to%20Document%20Malnutrition-White%20JV%20et%20al%202012.pdf    Height (cm): 162.6 (11-30-23 @ 19:45)  Weight (kg): 72.6 (11-30-23 @ 19:45)  BMI (kg/m2): 27.5 (11-30-23 @ 19:45)    [ ]PPSV2 < or = to 30% [ ]significant weight loss  [ ]poor nutritional intake  [ ]anasarca      [ ]Artificial Nutrition          Palliative Care Spiritual/Emotional Screening Tool Question  Severity (0-4):                    OR                    [X ] Unable to determine/NA  Score of 2 or greater indicates recommendation of Chaplaincy referral  Chaplaincy Referral: [ ] Yes [ ] Refused [ ] Following     Caregiver [place]:  [ ] Yes [ ] No    OR    [x ] Unable to determine. Will assess at later time if appropriate.  Social Work Referral [ ]  Patient and Family Centered Care Referral [ ]    Anticipatory Grief Present: [ ] Yes [ ] No    OR     [ x] Unable to determine. Will assess at later time if appropriate.  Social Work Referral [ ]  Patient and Family Centered Care Referral [ ]    REFERRALS:   [ ]Chaplaincy  [ ]Hospice  [ ]Child Life  [ ]Social Work  [ ]Case management [ ]Holistic Therapy     Palliative care education provided to patient and/or family    Goals of Care Document:     ______________  Cesar Morgan MD  Palliative Medicine  [facility]   of Geriatric and Palliative Medicine  [phone]